# Patient Record
Sex: MALE | Race: BLACK OR AFRICAN AMERICAN | URBAN - METROPOLITAN AREA
[De-identification: names, ages, dates, MRNs, and addresses within clinical notes are randomized per-mention and may not be internally consistent; named-entity substitution may affect disease eponyms.]

---

## 2020-07-28 ENCOUNTER — INPATIENT (INPATIENT)
Facility: HOSPITAL | Age: 80
LOS: 8 days | Discharge: NURSING, REHAB OR LONG-TERM CARE | DRG: 207 | End: 2020-08-06
Attending: INTERNAL MEDICINE | Admitting: INTERNAL MEDICINE
Payer: MEDICARE

## 2020-07-28 ENCOUNTER — EMERGENCY (EMERGENCY)
Facility: HOSPITAL | Age: 80
LOS: 1 days | Discharge: ANOTHER HOSPITAL | End: 2020-07-28
Attending: EMERGENCY MEDICINE | Admitting: EMERGENCY MEDICINE
Payer: SELF-PAY

## 2020-07-28 VITALS
RESPIRATION RATE: 25 BRPM | WEIGHT: 285.06 LBS | HEART RATE: 57 BPM | OXYGEN SATURATION: 93 % | SYSTOLIC BLOOD PRESSURE: 149 MMHG | TEMPERATURE: 99 F | DIASTOLIC BLOOD PRESSURE: 65 MMHG

## 2020-07-28 VITALS
OXYGEN SATURATION: 97 % | SYSTOLIC BLOOD PRESSURE: 128 MMHG | RESPIRATION RATE: 24 BRPM | HEART RATE: 34 BPM | DIASTOLIC BLOOD PRESSURE: 60 MMHG

## 2020-07-28 VITALS
HEART RATE: 62 BPM | SYSTOLIC BLOOD PRESSURE: 82 MMHG | WEIGHT: 285.06 LBS | TEMPERATURE: 89 F | OXYGEN SATURATION: 98 % | RESPIRATION RATE: 28 BRPM | DIASTOLIC BLOOD PRESSURE: 36 MMHG

## 2020-07-28 DIAGNOSIS — Z29.9 ENCOUNTER FOR PROPHYLACTIC MEASURES, UNSPECIFIED: ICD-10-CM

## 2020-07-28 DIAGNOSIS — Z98.890 OTHER SPECIFIED POSTPROCEDURAL STATES: Chronic | ICD-10-CM

## 2020-07-28 DIAGNOSIS — A41.9 SEPSIS, UNSPECIFIED ORGANISM: ICD-10-CM

## 2020-07-28 DIAGNOSIS — J96.00 ACUTE RESPIRATORY FAILURE, UNSPECIFIED WHETHER WITH HYPOXIA OR HYPERCAPNIA: ICD-10-CM

## 2020-07-28 DIAGNOSIS — T68.XXXA HYPOTHERMIA, INITIAL ENCOUNTER: ICD-10-CM

## 2020-07-28 DIAGNOSIS — R41.82 ALTERED MENTAL STATUS, UNSPECIFIED: ICD-10-CM

## 2020-07-28 DIAGNOSIS — R00.1 BRADYCARDIA, UNSPECIFIED: ICD-10-CM

## 2020-07-28 DIAGNOSIS — R56.9 UNSPECIFIED CONVULSIONS: ICD-10-CM

## 2020-07-28 DIAGNOSIS — I95.9 HYPOTENSION, UNSPECIFIED: ICD-10-CM

## 2020-07-28 DIAGNOSIS — Z51.5 ENCOUNTER FOR PALLIATIVE CARE: ICD-10-CM

## 2020-07-28 LAB
ALBUMIN SERPL ELPH-MCNC: 2.2 G/DL — LOW (ref 3.3–5)
ALBUMIN SERPL ELPH-MCNC: 2.8 G/DL — LOW (ref 3.3–5)
ALP SERPL-CCNC: 70 U/L — SIGNIFICANT CHANGE UP (ref 40–120)
ALP SERPL-CCNC: 82 U/L — SIGNIFICANT CHANGE UP (ref 30–120)
ALT FLD-CCNC: 58 U/L — SIGNIFICANT CHANGE UP (ref 12–78)
ALT FLD-CCNC: 70 U/L DA — HIGH (ref 10–60)
ANION GAP SERPL CALC-SCNC: 3 MMOL/L — LOW (ref 5–17)
ANION GAP SERPL CALC-SCNC: 3 MMOL/L — LOW (ref 5–17)
APPEARANCE UR: ABNORMAL
APTT BLD: 35.7 SEC — HIGH (ref 27.5–35.5)
APTT BLD: 38.3 SEC — HIGH (ref 27.5–35.5)
AST SERPL-CCNC: 76 U/L — HIGH (ref 15–37)
AST SERPL-CCNC: 90 U/L — HIGH (ref 10–40)
BASE EXCESS BLDA CALC-SCNC: 0.1 MMOL/L — SIGNIFICANT CHANGE UP (ref -2–2)
BASE EXCESS BLDA CALC-SCNC: 0.5 MMOL/L — SIGNIFICANT CHANGE UP (ref -2–2)
BASOPHILS # BLD AUTO: 0 K/UL — SIGNIFICANT CHANGE UP (ref 0–0.2)
BASOPHILS # BLD AUTO: 0.01 K/UL — SIGNIFICANT CHANGE UP (ref 0–0.2)
BASOPHILS NFR BLD AUTO: 0 % — SIGNIFICANT CHANGE UP (ref 0–2)
BASOPHILS NFR BLD AUTO: 0.2 % — SIGNIFICANT CHANGE UP (ref 0–2)
BILIRUB SERPL-MCNC: 1.5 MG/DL — HIGH (ref 0.2–1.2)
BILIRUB SERPL-MCNC: 1.7 MG/DL — HIGH (ref 0.2–1.2)
BILIRUB UR-MCNC: ABNORMAL
BLOOD GAS COMMENTS: SIGNIFICANT CHANGE UP
BUN SERPL-MCNC: 32 MG/DL — HIGH (ref 7–23)
BUN SERPL-MCNC: 32 MG/DL — HIGH (ref 7–23)
CALCIUM SERPL-MCNC: 8.3 MG/DL — LOW (ref 8.5–10.1)
CALCIUM SERPL-MCNC: 9.4 MG/DL — SIGNIFICANT CHANGE UP (ref 8.4–10.5)
CHLORIDE SERPL-SCNC: 109 MMOL/L — HIGH (ref 96–108)
CHLORIDE SERPL-SCNC: 113 MMOL/L — HIGH (ref 96–108)
CK SERPL-CCNC: 769 U/L — HIGH (ref 39–308)
CO2 SERPL-SCNC: 29 MMOL/L — SIGNIFICANT CHANGE UP (ref 22–31)
CO2 SERPL-SCNC: 32 MMOL/L — HIGH (ref 22–31)
COLOR SPEC: ABNORMAL
CORTIS AM PEAK SERPL-MCNC: 16.1 UG/DL — SIGNIFICANT CHANGE UP (ref 6–18.4)
CREAT SERPL-MCNC: 1.3 MG/DL — SIGNIFICANT CHANGE UP (ref 0.5–1.3)
CREAT SERPL-MCNC: 1.57 MG/DL — HIGH (ref 0.5–1.3)
DIFF PNL FLD: ABNORMAL
EOSINOPHIL # BLD AUTO: 0.08 K/UL — SIGNIFICANT CHANGE UP (ref 0–0.5)
EOSINOPHIL # BLD AUTO: 0.21 K/UL — SIGNIFICANT CHANGE UP (ref 0–0.5)
EOSINOPHIL NFR BLD AUTO: 2.1 % — SIGNIFICANT CHANGE UP (ref 0–6)
EOSINOPHIL NFR BLD AUTO: 4.6 % — SIGNIFICANT CHANGE UP (ref 0–6)
EPI CELLS # UR: SIGNIFICANT CHANGE UP
ETHANOL SERPL-MCNC: <10 MG/DL — SIGNIFICANT CHANGE UP (ref 0–3)
GLUCOSE BLDC GLUCOMTR-MCNC: 92 MG/DL — SIGNIFICANT CHANGE UP (ref 70–99)
GLUCOSE SERPL-MCNC: 107 MG/DL — HIGH (ref 70–99)
GLUCOSE SERPL-MCNC: 87 MG/DL — SIGNIFICANT CHANGE UP (ref 70–99)
GLUCOSE UR QL: NEGATIVE — SIGNIFICANT CHANGE UP
HCO3 BLDA-SCNC: 23 MMOL/L — SIGNIFICANT CHANGE UP (ref 23–27)
HCO3 BLDA-SCNC: 24 MMOL/L — SIGNIFICANT CHANGE UP (ref 21–29)
HCT VFR BLD CALC: 28.8 % — LOW (ref 39–50)
HCT VFR BLD CALC: 31.1 % — LOW (ref 39–50)
HCT VFR BLD CALC: 37.5 % — LOW (ref 39–50)
HGB BLD-MCNC: 11.2 G/DL — LOW (ref 13–17)
HGB BLD-MCNC: 9.2 G/DL — LOW (ref 13–17)
HGB BLD-MCNC: 9.5 G/DL — LOW (ref 13–17)
HOROWITZ INDEX BLDA+IHG-RTO: 100 — SIGNIFICANT CHANGE UP
HOROWITZ INDEX BLDA+IHG-RTO: 30 — SIGNIFICANT CHANGE UP
IMM GRANULOCYTES NFR BLD AUTO: 0.2 % — SIGNIFICANT CHANGE UP (ref 0–1.5)
IMM GRANULOCYTES NFR BLD AUTO: 0.5 % — SIGNIFICANT CHANGE UP (ref 0–1.5)
INR BLD: 1.14 RATIO — SIGNIFICANT CHANGE UP (ref 0.88–1.16)
INR BLD: 1.21 RATIO — HIGH (ref 0.88–1.16)
KETONES UR-MCNC: ABNORMAL
LACTATE SERPL-SCNC: 0.8 MMOL/L — SIGNIFICANT CHANGE UP (ref 0.7–2)
LEUKOCYTE ESTERASE UR-ACNC: ABNORMAL
LYMPHOCYTES # BLD AUTO: 0.23 K/UL — LOW (ref 1–3.3)
LYMPHOCYTES # BLD AUTO: 0.35 K/UL — LOW (ref 1–3.3)
LYMPHOCYTES # BLD AUTO: 5.9 % — LOW (ref 13–44)
LYMPHOCYTES # BLD AUTO: 7.7 % — LOW (ref 13–44)
MAGNESIUM SERPL-MCNC: 2 MG/DL — SIGNIFICANT CHANGE UP (ref 1.6–2.6)
MCHC RBC-ENTMCNC: 28.7 PG — SIGNIFICANT CHANGE UP (ref 27–34)
MCHC RBC-ENTMCNC: 28.8 PG — SIGNIFICANT CHANGE UP (ref 27–34)
MCHC RBC-ENTMCNC: 29 PG — SIGNIFICANT CHANGE UP (ref 27–34)
MCHC RBC-ENTMCNC: 29.9 GM/DL — LOW (ref 32–36)
MCHC RBC-ENTMCNC: 30.5 GM/DL — LOW (ref 32–36)
MCHC RBC-ENTMCNC: 31.9 GM/DL — LOW (ref 32–36)
MCV RBC AUTO: 90.3 FL — SIGNIFICANT CHANGE UP (ref 80–100)
MCV RBC AUTO: 94 FL — SIGNIFICANT CHANGE UP (ref 80–100)
MCV RBC AUTO: 97.2 FL — SIGNIFICANT CHANGE UP (ref 80–100)
MONOCYTES # BLD AUTO: 0.22 K/UL — SIGNIFICANT CHANGE UP (ref 0–0.9)
MONOCYTES # BLD AUTO: 0.45 K/UL — SIGNIFICANT CHANGE UP (ref 0–0.9)
MONOCYTES NFR BLD AUTO: 5.7 % — SIGNIFICANT CHANGE UP (ref 2–14)
MONOCYTES NFR BLD AUTO: 9.8 % — SIGNIFICANT CHANGE UP (ref 2–14)
NEUTROPHILS # BLD AUTO: 3.32 K/UL — SIGNIFICANT CHANGE UP (ref 1.8–7.4)
NEUTROPHILS # BLD AUTO: 3.54 K/UL — SIGNIFICANT CHANGE UP (ref 1.8–7.4)
NEUTROPHILS NFR BLD AUTO: 77.5 % — HIGH (ref 43–77)
NEUTROPHILS NFR BLD AUTO: 85.8 % — HIGH (ref 43–77)
NITRITE UR-MCNC: NEGATIVE — SIGNIFICANT CHANGE UP
NRBC # BLD: 0 /100 WBCS — SIGNIFICANT CHANGE UP (ref 0–0)
PCO2 BLDA: 58 MMHG — HIGH (ref 32–46)
PCO2 BLDA: 85 MMHG — CRITICAL HIGH (ref 32–46)
PCP SPEC-MCNC: SIGNIFICANT CHANGE UP
PH BLD: 7.28 — LOW (ref 7.35–7.45)
PH BLDA: 7.15 — CRITICAL LOW (ref 7.35–7.45)
PH UR: 5 — SIGNIFICANT CHANGE UP (ref 5–8)
PLATELET # BLD AUTO: 135 K/UL — LOW (ref 150–400)
PLATELET # BLD AUTO: 147 K/UL — LOW (ref 150–400)
PLATELET # BLD AUTO: 158 K/UL — SIGNIFICANT CHANGE UP (ref 150–400)
PO2 BLDA: 145 MMHG — HIGH (ref 74–108)
PO2 BLDA: 68 MMHG — LOW (ref 74–108)
POTASSIUM SERPL-MCNC: 4.2 MMOL/L — SIGNIFICANT CHANGE UP (ref 3.5–5.3)
POTASSIUM SERPL-MCNC: 4.8 MMOL/L — SIGNIFICANT CHANGE UP (ref 3.5–5.3)
POTASSIUM SERPL-SCNC: 4.2 MMOL/L — SIGNIFICANT CHANGE UP (ref 3.5–5.3)
POTASSIUM SERPL-SCNC: 4.8 MMOL/L — SIGNIFICANT CHANGE UP (ref 3.5–5.3)
PROT SERPL-MCNC: 5.6 G/DL — LOW (ref 6–8.3)
PROT SERPL-MCNC: 6.8 G/DL — SIGNIFICANT CHANGE UP (ref 6–8.3)
PROT UR-MCNC: 500 MG/DL
PROTHROM AB SERPL-ACNC: 13.7 SEC — HIGH (ref 10.6–13.6)
PROTHROM AB SERPL-ACNC: 14 SEC — HIGH (ref 10.6–13.6)
RBC # BLD: 3.19 M/UL — LOW (ref 4.2–5.8)
RBC # BLD: 3.31 M/UL — LOW (ref 4.2–5.8)
RBC # BLD: 3.86 M/UL — LOW (ref 4.2–5.8)
RBC # FLD: 14.4 % — SIGNIFICANT CHANGE UP (ref 10.3–14.5)
RBC # FLD: 14.6 % — HIGH (ref 10.3–14.5)
RBC # FLD: 15 % — HIGH (ref 10.3–14.5)
RBC CASTS # UR COMP ASSIST: >50 /HPF (ref 0–4)
SAO2 % BLDA: 92 % — SIGNIFICANT CHANGE UP (ref 92–96)
SAO2 % BLDA: 98 % — HIGH (ref 92–96)
SARS-COV-2 RNA SPEC QL NAA+PROBE: SIGNIFICANT CHANGE UP
SODIUM SERPL-SCNC: 144 MMOL/L — SIGNIFICANT CHANGE UP (ref 135–145)
SODIUM SERPL-SCNC: 145 MMOL/L — SIGNIFICANT CHANGE UP (ref 135–145)
SP GR SPEC: 1.01 — SIGNIFICANT CHANGE UP (ref 1.01–1.02)
T3FREE SERPL-MCNC: 1.27 PG/ML — LOW (ref 1.8–4.6)
T4 FREE SERPL-MCNC: 1.1 NG/DL — SIGNIFICANT CHANGE UP (ref 0.9–1.8)
TROPONIN I SERPL-MCNC: 0.03 NG/ML — SIGNIFICANT CHANGE UP (ref 0.02–0.06)
UROBILINOGEN FLD QL: 4
WBC # BLD: 3.87 K/UL — SIGNIFICANT CHANGE UP (ref 3.8–10.5)
WBC # BLD: 4.57 K/UL — SIGNIFICANT CHANGE UP (ref 3.8–10.5)
WBC # BLD: 5.66 K/UL — SIGNIFICANT CHANGE UP (ref 3.8–10.5)
WBC # FLD AUTO: 3.87 K/UL — SIGNIFICANT CHANGE UP (ref 3.8–10.5)
WBC # FLD AUTO: 4.57 K/UL — SIGNIFICANT CHANGE UP (ref 3.8–10.5)
WBC # FLD AUTO: 5.66 K/UL — SIGNIFICANT CHANGE UP (ref 3.8–10.5)
WBC UR QL: SIGNIFICANT CHANGE UP

## 2020-07-28 PROCEDURE — 71045 X-RAY EXAM CHEST 1 VIEW: CPT

## 2020-07-28 PROCEDURE — 71045 X-RAY EXAM CHEST 1 VIEW: CPT | Mod: 26

## 2020-07-28 PROCEDURE — 83735 ASSAY OF MAGNESIUM: CPT

## 2020-07-28 PROCEDURE — 85610 PROTHROMBIN TIME: CPT

## 2020-07-28 PROCEDURE — 93005 ELECTROCARDIOGRAM TRACING: CPT

## 2020-07-28 PROCEDURE — 36415 COLL VENOUS BLD VENIPUNCTURE: CPT

## 2020-07-28 PROCEDURE — 70450 CT HEAD/BRAIN W/O DYE: CPT | Mod: 26,59

## 2020-07-28 PROCEDURE — 80053 COMPREHEN METABOLIC PANEL: CPT

## 2020-07-28 PROCEDURE — 99291 CRITICAL CARE FIRST HOUR: CPT

## 2020-07-28 PROCEDURE — 93010 ELECTROCARDIOGRAM REPORT: CPT | Mod: 76

## 2020-07-28 PROCEDURE — 82550 ASSAY OF CK (CPK): CPT

## 2020-07-28 PROCEDURE — 82962 GLUCOSE BLOOD TEST: CPT

## 2020-07-28 PROCEDURE — 70496 CT ANGIOGRAPHY HEAD: CPT | Mod: 26

## 2020-07-28 PROCEDURE — 85027 COMPLETE CBC AUTOMATED: CPT

## 2020-07-28 PROCEDURE — 71045 X-RAY EXAM CHEST 1 VIEW: CPT | Mod: 26,76

## 2020-07-28 PROCEDURE — 70498 CT ANGIOGRAPHY NECK: CPT

## 2020-07-28 PROCEDURE — 93010 ELECTROCARDIOGRAM REPORT: CPT

## 2020-07-28 PROCEDURE — 70498 CT ANGIOGRAPHY NECK: CPT | Mod: 26

## 2020-07-28 PROCEDURE — 99053 MED SERV 10PM-8AM 24 HR FAC: CPT

## 2020-07-28 PROCEDURE — 80307 DRUG TEST PRSMV CHEM ANLYZR: CPT

## 2020-07-28 PROCEDURE — 70450 CT HEAD/BRAIN W/O DYE: CPT

## 2020-07-28 PROCEDURE — 70496 CT ANGIOGRAPHY HEAD: CPT

## 2020-07-28 PROCEDURE — 84484 ASSAY OF TROPONIN QUANT: CPT

## 2020-07-28 PROCEDURE — 71045 X-RAY EXAM CHEST 1 VIEW: CPT | Mod: 26,77

## 2020-07-28 PROCEDURE — 85730 THROMBOPLASTIN TIME PARTIAL: CPT

## 2020-07-28 RX ORDER — PROPOFOL 10 MG/ML
20 INJECTION, EMULSION INTRAVENOUS
Qty: 1000 | Refills: 0 | Status: DISCONTINUED | OUTPATIENT
Start: 2020-07-28 | End: 2020-07-28

## 2020-07-28 RX ORDER — CEFTRIAXONE 500 MG/1
1000 INJECTION, POWDER, FOR SOLUTION INTRAMUSCULAR; INTRAVENOUS ONCE
Refills: 0 | Status: COMPLETED | OUTPATIENT
Start: 2020-07-28 | End: 2020-07-28

## 2020-07-28 RX ORDER — LEVETIRACETAM 250 MG/1
1000 TABLET, FILM COATED ORAL EVERY 12 HOURS
Refills: 0 | Status: DISCONTINUED | OUTPATIENT
Start: 2020-07-28 | End: 2020-07-29

## 2020-07-28 RX ORDER — DEXMEDETOMIDINE HYDROCHLORIDE IN 0.9% SODIUM CHLORIDE 4 UG/ML
0.2 INJECTION INTRAVENOUS
Qty: 200 | Refills: 0 | Status: DISCONTINUED | OUTPATIENT
Start: 2020-07-28 | End: 2020-07-28

## 2020-07-28 RX ORDER — ATROPINE SULFATE 0.1 MG/ML
1 SYRINGE (ML) INJECTION ONCE
Refills: 0 | Status: COMPLETED | OUTPATIENT
Start: 2020-07-28 | End: 2020-07-28

## 2020-07-28 RX ORDER — HEPARIN SODIUM 5000 [USP'U]/ML
10000 INJECTION INTRAVENOUS; SUBCUTANEOUS EVERY 6 HOURS
Refills: 0 | Status: DISCONTINUED | OUTPATIENT
Start: 2020-07-28 | End: 2020-07-29

## 2020-07-28 RX ORDER — HEPARIN SODIUM 5000 [USP'U]/ML
INJECTION INTRAVENOUS; SUBCUTANEOUS
Qty: 25000 | Refills: 0 | Status: DISCONTINUED | OUTPATIENT
Start: 2020-07-28 | End: 2020-07-29

## 2020-07-28 RX ORDER — SODIUM CHLORIDE 9 MG/ML
1000 INJECTION INTRAMUSCULAR; INTRAVENOUS; SUBCUTANEOUS ONCE
Refills: 0 | Status: COMPLETED | OUTPATIENT
Start: 2020-07-28 | End: 2020-07-28

## 2020-07-28 RX ORDER — HEPARIN SODIUM 5000 [USP'U]/ML
5000 INJECTION INTRAVENOUS; SUBCUTANEOUS EVERY 6 HOURS
Refills: 0 | Status: DISCONTINUED | OUTPATIENT
Start: 2020-07-28 | End: 2020-07-29

## 2020-07-28 RX ORDER — SUCCINYLCHOLINE CHLORIDE 100 MG/5ML
50 SYRINGE (ML) INTRAVENOUS ONCE
Refills: 0 | Status: COMPLETED | OUTPATIENT
Start: 2020-07-28 | End: 2020-07-28

## 2020-07-28 RX ORDER — CEFTRIAXONE 500 MG/1
1000 INJECTION, POWDER, FOR SOLUTION INTRAMUSCULAR; INTRAVENOUS EVERY 24 HOURS
Refills: 0 | Status: DISCONTINUED | OUTPATIENT
Start: 2020-07-29 | End: 2020-07-31

## 2020-07-28 RX ORDER — PANTOPRAZOLE SODIUM 20 MG/1
40 TABLET, DELAYED RELEASE ORAL DAILY
Refills: 0 | Status: DISCONTINUED | OUTPATIENT
Start: 2020-07-28 | End: 2020-08-02

## 2020-07-28 RX ORDER — ETOMIDATE 2 MG/ML
20 INJECTION INTRAVENOUS ONCE
Refills: 0 | Status: DISCONTINUED | OUTPATIENT
Start: 2020-07-28 | End: 2020-07-28

## 2020-07-28 RX ORDER — ATROPINE SULFATE 0.1 MG/ML
1 SYRINGE (ML) INJECTION ONCE
Refills: 0 | Status: DISCONTINUED | OUTPATIENT
Start: 2020-07-28 | End: 2020-07-28

## 2020-07-28 RX ORDER — SODIUM CHLORIDE 9 MG/ML
500 INJECTION, SOLUTION INTRAVENOUS ONCE
Refills: 0 | Status: COMPLETED | OUTPATIENT
Start: 2020-07-28 | End: 2020-07-28

## 2020-07-28 RX ORDER — SODIUM CHLORIDE 9 MG/ML
1000 INJECTION, SOLUTION INTRAVENOUS
Refills: 0 | Status: DISCONTINUED | OUTPATIENT
Start: 2020-07-28 | End: 2020-07-29

## 2020-07-28 RX ORDER — CHLORHEXIDINE GLUCONATE 213 G/1000ML
15 SOLUTION TOPICAL EVERY 12 HOURS
Refills: 0 | Status: DISCONTINUED | OUTPATIENT
Start: 2020-07-28 | End: 2020-08-01

## 2020-07-28 RX ORDER — NOREPINEPHRINE BITARTRATE/D5W 8 MG/250ML
0.1 PLASTIC BAG, INJECTION (ML) INTRAVENOUS
Qty: 8 | Refills: 0 | Status: DISCONTINUED | OUTPATIENT
Start: 2020-07-28 | End: 2020-07-28

## 2020-07-28 RX ORDER — HEPARIN SODIUM 5000 [USP'U]/ML
10000 INJECTION INTRAVENOUS; SUBCUTANEOUS ONCE
Refills: 0 | Status: COMPLETED | OUTPATIENT
Start: 2020-07-28 | End: 2020-07-28

## 2020-07-28 RX ORDER — MAGNESIUM SULFATE 500 MG/ML
2 VIAL (ML) INJECTION ONCE
Refills: 0 | Status: COMPLETED | OUTPATIENT
Start: 2020-07-28 | End: 2020-07-28

## 2020-07-28 RX ORDER — SUCCINYLCHOLINE CHLORIDE 100 MG/5ML
50 SYRINGE (ML) INTRAVENOUS ONCE
Refills: 0 | Status: DISCONTINUED | OUTPATIENT
Start: 2020-07-28 | End: 2020-07-28

## 2020-07-28 RX ORDER — ETOMIDATE 2 MG/ML
20 INJECTION INTRAVENOUS ONCE
Refills: 0 | Status: COMPLETED | OUTPATIENT
Start: 2020-07-28 | End: 2020-07-28

## 2020-07-28 RX ADMIN — Medication 1 MILLIGRAM(S): at 10:05

## 2020-07-28 RX ADMIN — SODIUM CHLORIDE 120 MILLILITER(S): 9 INJECTION, SOLUTION INTRAVENOUS at 20:16

## 2020-07-28 RX ADMIN — LEVETIRACETAM 440 MILLIGRAM(S): 250 TABLET, FILM COATED ORAL at 13:19

## 2020-07-28 RX ADMIN — Medication 2 MILLIGRAM(S): at 11:20

## 2020-07-28 RX ADMIN — PROPOFOL 15.5 MICROGRAM(S)/KG/MIN: 10 INJECTION, EMULSION INTRAVENOUS at 15:39

## 2020-07-28 RX ADMIN — Medication 2 MILLIGRAM(S): at 10:45

## 2020-07-28 RX ADMIN — PANTOPRAZOLE SODIUM 40 MILLIGRAM(S): 20 TABLET, DELAYED RELEASE ORAL at 12:58

## 2020-07-28 RX ADMIN — SODIUM CHLORIDE 500 MILLILITER(S): 9 INJECTION, SOLUTION INTRAVENOUS at 18:15

## 2020-07-28 RX ADMIN — SODIUM CHLORIDE 1000 MILLILITER(S): 9 INJECTION INTRAMUSCULAR; INTRAVENOUS; SUBCUTANEOUS at 10:00

## 2020-07-28 RX ADMIN — CHLORHEXIDINE GLUCONATE 15 MILLILITER(S): 213 SOLUTION TOPICAL at 17:09

## 2020-07-28 RX ADMIN — Medication 50 MILLIGRAM(S): at 10:08

## 2020-07-28 RX ADMIN — HEPARIN SODIUM 2300 UNIT(S)/HR: 5000 INJECTION INTRAVENOUS; SUBCUTANEOUS at 17:08

## 2020-07-28 RX ADMIN — Medication 2 MILLIGRAM(S): at 10:35

## 2020-07-28 RX ADMIN — PROPOFOL 15.5 MICROGRAM(S)/KG/MIN: 10 INJECTION, EMULSION INTRAVENOUS at 11:02

## 2020-07-28 RX ADMIN — CEFTRIAXONE 100 MILLIGRAM(S): 500 INJECTION, POWDER, FOR SOLUTION INTRAMUSCULAR; INTRAVENOUS at 11:03

## 2020-07-28 RX ADMIN — Medication 50 GRAM(S): at 17:10

## 2020-07-28 RX ADMIN — HEPARIN SODIUM 10000 UNIT(S): 5000 INJECTION INTRAVENOUS; SUBCUTANEOUS at 17:09

## 2020-07-28 RX ADMIN — ETOMIDATE 20 MILLIGRAM(S): 2 INJECTION INTRAVENOUS at 10:00

## 2020-07-28 NOTE — ED PROVIDER NOTE - PROGRESS NOTE DETAILS
Spoke with daughter. Patient fell 4 days ago and has been on the floor unable to get up since. Last talking at approximately 9 pm last night. Daughter heard him gurgling this morning and called EMS. CTA reveals no major vessel occlusion. Spoke with son who confirms hx of CVA, leg swelling, scrotal hernia. Pt has been seeing PCP Yuni Starks for leg weakness, spinal stenosis, edema. Has not had any cardiac eval for a few months due to eviction from his house and currently living in [place] with his daughter. Unknown if pt had a seizure in the past. States pt has been confused lately and probably has dementia.   I spoke with Dr. Mock who covers [facility] for consults. Plan - transfer to [facility] for admission there. Dr. Chamberlain accepting. Persistently bradycardic in 40s. Junctional at times but BP maintained at 110/60.  Becoming more awake and responsive. External pacemaker on standby.

## 2020-07-28 NOTE — ED PROVIDER NOTE - ENMT, MLM
Airway patent, Nasal mucosa clear. Appears to have dried blood on tongue.  Throat has no vesicles, no oropharyngeal exudates and uvula is midline.

## 2020-07-28 NOTE — ED PROVIDER NOTE - CARE PLAN
Principal Discharge DX:	Septic shock  Secondary Diagnosis:	Bradycardia Principal Discharge DX:	Septic shock  Secondary Diagnosis:	Bradycardia  Secondary Diagnosis:	Seizure

## 2020-07-28 NOTE — ED PROVIDER NOTE - OBJECTIVE STATEMENT
78yo M transferred from [facility] with seizure and bradycardia into the 30s.  pmd= dr jagruti holman covered by dr cardoso.  dr jackson cardiology at bedside on arrival in [facility]. pt hypothermic and hypotensive. pt with tongue bite and AMS. Dr maria pulmonolgy

## 2020-07-28 NOTE — H&P ADULT - PROBLEM SELECTOR PLAN 8
CTA HEAD/NECK -There is a questionable filling defect noted, possibly right atrial thrombus .HEPARIN DRIP ,ECHO IS PENDING ,SEEN BY CARDIOLOGIST

## 2020-07-28 NOTE — H&P ADULT - COMMENTS
ROS IS UNOBTAINABLE DUE TO PATIENT'S STATUS ROS IS UNOBTAINABLE DUE TO PATIENT'S STATUS ,Due to altered mental status/intubation, subjective information was not able to be obtained from the patient. History was obtained, to the extent possible, from review of the chart and collateral sources of information.

## 2020-07-28 NOTE — H&P ADULT - PROBLEM SELECTOR PLAN 4
Admitted for septic workup and evaluation,send blood and urine cx,serial lactate levels,monitor vitals closley,ivfs hydration,monitor urine output and renal profile,iv abx initiated ,seen by ID CONSULT

## 2020-07-28 NOTE — ED PROVIDER NOTE - CARDIAC, MLM
Bradycardic, Heart sounds S1, S2.  No murmurs, rubs or gallops. 2+ pitting edema bl LE, pitting Scrotal Edema

## 2020-07-28 NOTE — ED ADULT NURSE NOTE - OBJECTIVE STATEMENT
patient comes to ED from [facility] for admission for seizure pt presents unresponsive with nonpurposeful movements with shallow respirations pt presents with bradycardia hypotensive according to EMS pt was on the floor after possible seizure with blood noted in mouth/tongue

## 2020-07-28 NOTE — ED PROVIDER NOTE - CRITICAL CARE PROVIDED
interpretation of diagnostic studies/direct patient care (not related to procedure)/consultation with other physicians/documentation/additional history taking/consult w/ pt's family directly relating to pts condition

## 2020-07-28 NOTE — CONSULT NOTE ADULT - PROBLEM SELECTOR RECOMMENDATION 9
very limited history so very broad differential but would include bacterial sepsis in the differential and recommend broad spectrum abx pending clarification of clinical picture and results of micro investigations

## 2020-07-28 NOTE — H&P ADULT - PROBLEM SELECTOR PLAN 3
LAURE PRECAUTIONS ,ON KEPPRA ,SEEN BY NEUROLOGIST ,EEG ORDERED ,ICU ADMISSION - CTA head and neck with motion artifact, no obvious stenosis

## 2020-07-28 NOTE — ED ADULT NURSE REASSESSMENT NOTE - NS ED NURSE REASSESS COMMENT FT1
SON  HENNY LAURA 8   attempted to get list of meds from son but does not have list and meds have been changed recently and md office not open at this time
pt with cough  at times strips printed out  for transport son aware transfer
unable to get rectal temp [facility] aware pt transferred to [facility] via [facility]
pt received on stretcher bradycardic pacer pads on responds to pain stimulus small laceration to left side of tongue weaker strength to left side arm in particular  nods back to sleep after arousal family has been giving pt meds

## 2020-07-28 NOTE — CONSULT NOTE ADULT - ASSESSMENT
The patient is an 79 year old male with a history of HTN, CVA who presents with unresponsiveness.    Plan:  - Telemetry largely consistent with sinus bradycardia although at times alternating with junctional rhythm  - Continue to monitor on telemetry  - If HR drops below 30, can give a dose of atropine  - Heart rates largely due to comatose state  - Hold antihypertensives The patient is an 79 year old male with a history of HTN, CVA who presents with unresponsiveness.    Plan:  - Telemetry largely consistent with sinus bradycardia although at times alternating with junctional rhythm  - Continue to monitor on telemetry  - If HR drops below 30, can give a dose of atropine  - Heart rates largely due to comatose state  - Hold antihypertensives  - CTA head and neck with motion artifact, no obvious stenosis. There is a questionable filling defect noted, possibly right atrial thrombus.  - Check echocardiogram  - Sepsis work-up as patient is hypothermic  - Received antibiotics  - If vasopressors needed, can consider dopamine which will also increase HR  - Neurology eval for seizures  - Mechanical ventilation  - ICU care

## 2020-07-28 NOTE — CONSULT NOTE ADULT - ATTENDING COMMENTS
Agree with above, patient seen independently     80 y/o male with hx CVA admitted with status epilepticus and acute resp failure, found to be hypothermic and bradycardic. CT head neg for bleed however CTA suspicious for RA thrombus. After holding propofol he withdraws to pain on all 4 extremities, his pupils are equal bilaterally and reactive to light. Will continue vent support today, try weaning in am. Start Keppra, check EEG. May need MRI brain. Start ceftriaxone for suspected UTI, send UA, UDS. Ativan prn for seizures. Multiple attempts to reach family have been unsuccessful.     Patient critically ill, 37 mins spent Agree with above, patient seen independently     80 y/o male with hx CVA admitted with status epilepticus and acute resp failure, found to be hypothermic and bradycardic. CT head neg for bleed however CTA suspicious for RA thrombus. After holding propofol he withdraws to pain on all 4 extremities, his pupils are equal bilaterally and reactive to light. Will continue vent support today, try weaning in am. Start Keppra, check EEG. May need MRI brain. Start ceftriaxone for suspected UTI, send UA, UDS. Ativan prn for seizures. Check official ECHO, start iv heparin. Multiple attempts to reach family have been unsuccessful.     Patient critically ill, 37 mins spent

## 2020-07-28 NOTE — H&P ADULT - PROBLEM SELECTOR PLAN 2
LIKELY MULTIFACTORIAL 2/2 TO ACUTE METABOLIC ENCEPHALOPATHY SECONDARY TO SEPSIS AND ACUTE RESPIRATORY FAILURE ,POA AND POSTICTAL STATE

## 2020-07-28 NOTE — CONSULT NOTE ADULT - SUBJECTIVE AND OBJECTIVE BOX
CATHY SANCHEZ    [facility] ED    Patient is a 79y old  Male who presents with a chief complaint of      Allergies    No Known Allergies    Intolerances        HPI:      PAST MEDICAL & SURGICAL HISTORY:      FAMILY HISTORY:        MEDICATIONS   atropine Injectable 1 milliGRAM(s) IV Push once  cefTRIAXone   IVPB 1000 milliGRAM(s) IV Intermittent Once  dexMEDEtomidine Infusion 0.2 MICROgram(s)/kG/Hr IV Continuous <Continuous>  etomidate Injectable 20 milliGRAM(s) IV Push once  norepinephrine Infusion 0.1 MICROgram(s)/kG/Min IV Continuous <Continuous>  sodium chloride 0.9% Bolus 1000 milliLiter(s) IV Bolus once  succinylcholine Injectable 50 milliGRAM(s) IV Push Once      Vital Signs Last 24 Hrs  T(C): --  T(F): --  HR: 62 (28 Jul 2020 10:15) (62 - 62)  BP: 82/36 (28 Jul 2020 10:15) (82/36 - 82/36)  BP(mean): --  RR: 28 (28 Jul 2020 10:15) (28 - 28)  SpO2: 98% (28 Jul 2020 10:15) (98% - 98%)            LABS:                    WBC:      MICROBIOLOGY:  RECENT CULTURES:                  Sodium:          Hemoglobin:      Platelets:             RADIOLOGY & ADDITIONAL STUDIES:

## 2020-07-28 NOTE — ED PROVIDER NOTE - PROGRESS NOTE DETAILS
d/w patient son Jared, provided history CVA 2012, hernia seugery with testicle resection 2015, HTN, umbilical hernia repair 12/2019.  pmd= NIMISHA holman. son st he is HCP and wants full code d/w dr jagruti holamn, aware of pt status, confirmed history Witnessed Tonic Clonic seizure while intubated, 2 mg  IVP Ativan given - Erika POP PGY-3

## 2020-07-28 NOTE — CONSULT NOTE ADULT - SUBJECTIVE AND OBJECTIVE BOX
Patient is a 79y old  Male who presents with a chief complaint of altered mental status    BRIEF HOSPITAL COURSE: 78 yo male, PMHx CVA 2012, HTN, hernia surgery with testicle resection 2015, umbilical hernia repair 12/2019, who presented as transfer from [facility] with altered mental status. ED team spoke with patient's daughter, who stated her father fell to the ground on the floor of his motel room about 4 days ago and was unable to get up. She had been caring for him on the floor since and did not call 911 for assistance. Today she found him unresponsive, with blood in his mouth, and incontinent of urine. He was BIBA to [place] ED. Upon arrival, patient was sinus vs junctional bradycardic with HR 30's and prolonged QTc, BP stable initially.  Labs revealed impaired renal function (baseline unknown), transaminitis. CT brain was negative. A CODE STROKE was called, and a CTA head/neck were performed, which was concerning for ascending aortic mass possible right atrial thrombus. Patient was transferred to [facility] ED for further care. On arrival, he was bradycardic, hypotensive, and hypothermic to 89'F. ABG pH 7.1, pCO2 >80. Patient was moaning, not following commands or making purposeful movements. He was moving his LUE and LLE > RUE/RLE. He was emergently intubated for airway protection and ICU consult was called.      Events last 24 hours: ***      PAST MEDICAL & SURGICAL HISTORY:  Chronic GERD  HTN (hypertension)  HLD (hyperlipidemia)  CVA (cerebral vascular accident)          SOCIAL HISTORY:        Review of Systems:  CONSTITUTIONAL: No fever, chills, or fatigue  EYES: No visual disturbances  ENMT:  No difficulty hearing  NECK: No pain  RESPIRATORY: No cough. No shortness of breath  CARDIOVASCULAR: No chest pain, palpitations, or leg swelling  GASTROINTESTINAL: No abdominal pain. No nausea, vomiting, diarrhea, or constipation. No hematemesis, melena or hematochezia  GENITOURINARY: No dysuria, frequency, hematuria, or incontinence  NEUROLOGICAL: No headaches, loss of strength, numbness, or tremors  SKIN: No rashes  MUSCULOSKELETAL: No back or extremity pain. No calf pain  PSYCHIATRIC: No depression, anxiety, or difficulty sleeping      [  ] Due to altered mental status/intubation, subjective information was not able to be obtained from the patient. History was obtained, to the extent possible, from review of the chart and collateral sources of information.        Medications:        levETIRAcetam  IVPB 1000 milliGRAM(s) IV Intermittent every 12 hours  LORazepam   Injectable 2 milliGRAM(s) IV Push once  LORazepam   Injectable 2 milliGRAM(s) IV Push every 1 hour PRN  LORazepam   Injectable 2 milliGRAM(s) IV Push once  LORazepam   Injectable 2 milliGRAM(s) IV Push once  propofol Infusion. 20 MICROgram(s)/kG/Min IV Continuous <Continuous>  succinylcholine Injectable 50 milliGRAM(s) IV Push Once        pantoprazole  Injectable 40 milliGRAM(s) IV Push daily        sodium chloride 0.9% Bolus 1000 milliLiter(s) IV Bolus once      chlorhexidine 0.12% Liquid 15 milliLiter(s) Oral Mucosa every 12 hours        Mode: AC/ CMV (Assist Control/ Continuous Mandatory Ventilation)  RR (machine): 20  TV (machine): 550  FiO2: 40  PEEP: 5  ITime: 0.1  MAP: 11  PIP: 26      ICU Vital Signs Last 24 Hrs  T(C): 31.9 (28 Jul 2020 09:28), Max: 31.9 (28 Jul 2020 09:28)  T(F): 89.5 (28 Jul 2020 09:28), Max: 89.5 (28 Jul 2020 09:28)  HR: 40 (28 Jul 2020 10:15) (40 - 62)  BP: 82/36 (28 Jul 2020 10:15) (82/36 - 82/36)  BP(mean): --  ABP: --  ABP(mean): --  RR: 28 (28 Jul 2020 10:15) (28 - 28)  SpO2: 98% (28 Jul 2020 10:15) (98% - 98%)      ABG - ( 28 Jul 2020 11:08 )  pH, Arterial: x     pH, Blood: 7.28  /  pCO2: 58    /  pO2: 68    / HCO3: 24    / Base Excess: 0.1   /  SaO2: 92                  I&O's Detail        LABS:                CAPILLARY BLOOD GLUCOSE            CULTURES:            Physical Examination:    General: No acute distress.      HEENT: Pupils equal, reactive to light.  Symmetric.    PULM: Clear to auscultation bilaterally, no significant sputum production    CVS: Regular rate and rhythm, no murmurs, rubs, or gallops    ABD: Soft, nondistended, nontender, normoactive bowel sounds, no masses    EXT: No edema, nontender    SKIN: Warm and well perfused, no rashes noted.    NEURO: Alert, oriented, interactive, nonfocal        RADIOLOGY: ***        INVASIVE LINES:  INDWELLING CANTOR:  VTE PROPHYLAXIS:  CAM ICU:  CODE STATUS:        CRITICAL CARE TIME SPENT: *** minutes spent performing frequent bedside reassessments and augmenting plan of care to address problems of acute critical illness that pose high probability of life threatening deterioration and/or end organ damage/dysfunction and discussing goals of care, non-inclusive of time spent on procedures performed. Patient is a 79y old  Male who presents with a chief complaint of altered mental status    BRIEF HOSPITAL COURSE: 78 yo male, PMHx CVA 2012, HTN, hernia surgery with testicle resection 2015, umbilical hernia repair 12/2019, who presented as transfer from [facility] with altered mental status. ED team spoke with patient's daughter, who stated her father fell to the ground on the floor of his motel room about 4 days ago and was unable to get up. She had been caring for him on the floor since and did not call 911 for assistance. Today she found him unresponsive, with blood in his mouth, and incontinent of urine. He was BIBA to [place] ED. Upon arrival, patient was bradycardic with HR 30's, BP stable initially.  Labs revealed no leukocytosis, impaired renal function (baseline unknown), transaminitis, troponin negative . CT brain was negative. A CODE STROKE was called, and a CTA head/neck were performed, which was negative for large vessel occlusion but concerning for a density anterior to the ascending aorta possibly right atrial thrombus. Also revealed enlarged, multinodular thyroid. TPA criteria was not met, as patient had unknown onset of symptoms. Patient was transferred to [facility] ED for further care. On arrival, he was bradycardic, hypotensive, and hypothermic to 89'F. He briefly required vasopressor therapy, which was titrated off after receiving 2L NS bolus. ABG pH 7.1, pCO2 >80, lactate normal. Patient was moaning, not following commands or making purposeful movements. He was moving his LUE and LLE > RUE/RLE. He was emergently intubated for airway protection and ICU consult was called. On my evaluation, patient was actively seizing, generalized tonic clonic, given Ativan 2mg IV with cessation of seizure activity. Patient experienced two subsequent seizures, given Ativan 2mg IV x 2 and was started on Propofol infusion for seizure suppression as well as Keppra.       Alt MRN from [place] #[id number]      Unable to reach family to obtain further history. I attempted to contact patient's daughter twice with no answer, a voicemail was left. Unable to reach patient's son, invalid phone number.         PAST MEDICAL & SURGICAL HISTORY:  Chronic GERD  HTN (hypertension)  HLD (hyperlipidemia)  CVA (cerebral vascular accident)        SOCIAL HISTORY: UATO due to AMS        Review of Systems: Due to altered mental status/intubation, subjective information was not able to be obtained from the patient. History was obtained, to the extent possible, from review of the chart and collateral sources of information.        Medications:  levETIRAcetam  IVPB 1000 milliGRAM(s) IV Intermittent every 12 hours  LORazepam   Injectable 2 milliGRAM(s) IV Push once  LORazepam   Injectable 2 milliGRAM(s) IV Push every 1 hour PRN  LORazepam   Injectable 2 milliGRAM(s) IV Push once  LORazepam   Injectable 2 milliGRAM(s) IV Push once  propofol Infusion. 20 MICROgram(s)/kG/Min IV Continuous <Continuous>  succinylcholine Injectable 50 milliGRAM(s) IV Push Once  pantoprazole  Injectable 40 milliGRAM(s) IV Push daily  sodium chloride 0.9% Bolus 1000 milliLiter(s) IV Bolus once  chlorhexidine 0.12% Liquid 15 milliLiter(s) Oral Mucosa every 12 hours        Mode: AC/ CMV (Assist Control/ Continuous Mandatory Ventilation)  RR (machine): 20  TV (machine): 550  FiO2: 40  PEEP: 5  ITime: 0.1  MAP: 11  PIP: 26        ICU Vital Signs Last 24 Hrs  T(C): 31.9 (28 Jul 2020 09:28), Max: 31.9 (28 Jul 2020 09:28)  T(F): 89.5 (28 Jul 2020 09:28), Max: 89.5 (28 Jul 2020 09:28)  HR: 40 (28 Jul 2020 10:15) (40 - 62)  BP: 82/36 (28 Jul 2020 10:15) (82/36 - 82/36)  BP(mean): --  ABP: --  ABP(mean): --  RR: 28 (28 Jul 2020 10:15) (28 - 28)  SpO2: 98% (28 Jul 2020 10:15) (98% - 98%)        ABG - ( 28 Jul 2020 11:08 )  pH, Arterial: x     pH, Blood: 7.28  /  pCO2: 58    /  pO2: 68    / HCO3: 24    / Base Excess: 0.1   /  SaO2: 92                  I&O's Detail        LABS:                CAPILLARY BLOOD GLUCOSE            CULTURES:            Physical Examination:    General: No acute distress.      HEENT: Pupils 4mm equal, non-reactive to light. Symmetric. Enlarged tongue protruding with multiple small nonbleeding lacerations noted anteriorly.    PULM: Coarse to auscultation bilaterally, no significant sputum production    CVS: Sinus bradycardia HR 36bpm    ABD: Soft, nondistended, nontender, hypoactive bowel sounds, no masses    : Enlarged scrotum    EXT: 2+ pitting LE edema symmetric     SKIN: Warm and well perfused, no rashes noted.    NEURO: RASS -5. Pupils equal, nonreactive. No blink to threat. +Cough. No withdrawal to noxious stimuli x 4 extremities.        RADIOLOGY:         INVASIVE LINES: X   INDWELLING CANTOR: Y   VTE PROPHYLAXIS:  CAM ICU: +   CODE STATUS: FULL        CRITICAL CARE TIME SPENT: 70 minutes spent performing frequent bedside reassessments and augmenting plan of care to address problems of acute critical illness that pose high probability of life threatening deterioration and/or end organ damage/dysfunction, attempting to contact family, non-inclusive of time spent on procedures performed.

## 2020-07-28 NOTE — CONSULT NOTE ADULT - ASSESSMENT
LAURA MORGAN [facility] P [id number]  1940 DOA 2020 DR SHAREE MOCK  ALLERGY  nka       CONTACT    Datr Jacinta sullivan [phone] selv [phone] son Jared sullivan [phone]             Initial evaluation/Pulmonary Critical Care consultation requested on 2020   by Dr Mock  from Dr Johnson   Patient examined chart reviewed    HOSPITAL ADMISSION   PATIENT CAME  FROM (if information available)      LAURA MORGAN [facility] P [id number]  1940 DOA 2020 DR SHAREE MOCK     REVIEW OF SYMPTOMS      Able to give ROS  NO     PHYSICAL EXAM    HEENT Unremarkable PERRLA atraumatic   RESP Fair air entry EXP prolonged    Harsh breath sound Resp distres mild   CARDIAC S1 S2 No S3     NO JVD    ABDOMEN SOFT BS PRESENT NOT DISTENDED No hepatosplenomegaly PEDAL EDEMA present No calf tenderness  NO rash   GENERAL Not TOXIC looking    HPI/PMH.       79 m lving in hotel (evicted recently) was found unresponsive and brought to o er and transferred to [facility] P 2020 and Pulm consulted     Pt was noted unresponsive on hotel floor with possible tongue bite ? seizure incontinent   er vitals [facility] S 149/65 57         OXYGENATION      2020 ra 93%     VITALS/LABS       2020 149/65 57       PATIENT DATA/ASSESSMENT/RECOMMENDATIONS     79 m found unresponsive with possible clot in r atrium on cta head resp failure pH 711 pco2 80 possible rhabdo possible mi dehydrated in [place] No ac cva found on ct head     Pt intubated in er 2020                                            PROBLEMS.  POSSIBLE SEPSIS POA   2020 w 4.5   Suggest sepsis muller and bs ab     INTUBATED 2020    VENT  Lung protective ventilation  Target PaO2 60(+) FIO2 Least needed VT 6/k pH 7.30(+)PPLAT 35(-)    RO VTE   2020 cta head filling defect r atrium   Suggest cta chest and venous duplex legs and echo to ro clot in transit    POSSIBLE MI   POSSIBLE RHABDOMYOLYSIS  2020 ck 769   2020 Tr .026   Monitor c enz     HEMODYNAMICS  Target MAP 65 (+)    ANEMIA poa   2020 Hb 11.2   Monmitor serially     LASHELL POA   2020 Cr 1.5  IV fluids     MULTINODULAR GOITER    Seen on cta head     UNRESPONSIVE poa   RO HYPOGLYCEMIA    RO CVA   2020 cta Head Neg-betzy   2020 CT h No ac infarcts Multiple chr infarcts r mca and cerebellar   Monitor bgl   neuro eval    RO SEIZURE  sz precautions        TIME SPENT Over 55 minutes aggregate care time spent on encounter; activities included combinations of  direct pt care, counseling and/or coordinating care reviewing notes, lab data/ imaging, discussion with multidisciplinary team/ pt /family. Risks, benefits, alternatives of planned interventions when applicable were discussed in detail and questions answered as best as possible    LAURA MORGAN [facility] P [phone]  1940 DOA 2020 DR SHAREE MOCK

## 2020-07-28 NOTE — CONSULT NOTE ADULT - SUBJECTIVE AND OBJECTIVE BOX
CATHY SANCHEZ    [place]  ED    Patient is a 79y old  Male who presents with a chief complaint of      Allergies    Allergy Status Unknown    Intolerances        HPI:      PAST MEDICAL & SURGICAL HISTORY:  High cholesterol  HTN (hypertension)  No significant past surgical history      FAMILY HISTORY:        MEDICATIONS       Vital Signs Last 24 Hrs  T(C): 37.1 (28 Jul 2020 05:44), Max: 37.1 (28 Jul 2020 05:44)  T(F): 98.7 (28 Jul 2020 05:44), Max: 98.7 (28 Jul 2020 05:44)  HR: 34 (28 Jul 2020 08:50) (32 - 61)  BP: 128/60 (28 Jul 2020 08:50) (116/56 - 149/65)  BP(mean): --  RR: 24 (28 Jul 2020 08:50) (22 - 28)  SpO2: 97% (28 Jul 2020 08:50) (93% - 97%)            LABS:                        11.2   4.57  )-----------( 158      ( 28 Jul 2020 06:01 )             37.5     07-28    144  |  109<H>  |  32<H>  ----------------------------<  107<H>  4.8   |  32<H>  |  1.57<H>    Ca    9.4      28 Jul 2020 06:01  Mg     2.0     07-28    TPro  6.8  /  Alb  2.8<L>  /  TBili  1.7<H>  /  DBili  x   /  AST  90<H>  /  ALT  70<H>  /  AlkPhos  82  07-28    PT/INR - ( 28 Jul 2020 06:01 )   PT: 13.7 sec;   INR: 1.14 ratio         PTT - ( 28 Jul 2020 06:01 )  PTT:38.3 sec          WBC:  WBC Count: 4.57 K/uL (07-28 @ 06:01)      MICROBIOLOGY:  RECENT CULTURES:        CARDIAC MARKERS ( 28 Jul 2020 06:06 )  x     / x     / 769 U/L / x     / x      CARDIAC MARKERS ( 28 Jul 2020 06:01 )  .026 ng/mL / x     / x     / x     / x            PT/INR - ( 28 Jul 2020 06:01 )   PT: 13.7 sec;   INR: 1.14 ratio         PTT - ( 28 Jul 2020 06:01 )  PTT:38.3 sec    Sodium:  Sodium, Serum: 144 mmol/L (07-28 @ 06:01)      1.57 mg/dL 07-28 @ 06:01      Hemoglobin:  Hemoglobin: 11.2 g/dL (07-28 @ 06:01)      Platelets: Platelet Count - Automated: 158 K/uL (07-28 @ 06:01)      LIVER FUNCTIONS - ( 28 Jul 2020 06:01 )  Alb: 2.8 g/dL / Pro: 6.8 g/dL / ALK PHOS: 82 U/L / ALT: 70 U/L DA / AST: 90 U/L / GGT: x                 RADIOLOGY & ADDITIONAL STUDIES:

## 2020-07-28 NOTE — ED PROVIDER NOTE - OBJECTIVE STATEMENT
As per EMS, daughter found patient on the floor of a motel where she is staying. Last time seen well unknown at this time. Patient noted to have no coherent speech, and was reported to be moving both arms, but not his legs. Noted to have blood in his mouth from presumed bite to tongue, and was incontinent of urine.

## 2020-07-28 NOTE — CONSULT NOTE ADULT - SUBJECTIVE AND OBJECTIVE BOX
Date/Time Patient Seen:  		  Referring MD:   Data Reviewed	       Patient is a 79y old  Male who presents with a chief complaint of Altered Mental Status, Acute Hypercapnic Hypoxemic Respiratory Failure (28 Jul 2020 11:20)      Subjective/HPI     PAST MEDICAL & SURGICAL HISTORY:  Chronic GERD  HTN (hypertension)  HLD (hyperlipidemia)  CVA (cerebral vascular accident)        Medication list         MEDICATIONS  (STANDING):  chlorhexidine 0.12% Liquid 15 milliLiter(s) Oral Mucosa every 12 hours  levETIRAcetam  IVPB 1000 milliGRAM(s) IV Intermittent every 12 hours  magnesium sulfate  IVPB 2 Gram(s) IV Intermittent once  pantoprazole  Injectable 40 milliGRAM(s) IV Push daily  propofol Infusion. 20 MICROgram(s)/kG/Min (15.5 mL/Hr) IV Continuous <Continuous>    MEDICATIONS  (PRN):  LORazepam   Injectable 2 milliGRAM(s) IV Push every 1 hour PRN Seizure         Vitals log        ICU Vital Signs Last 24 Hrs  T(C): 33.1 (28 Jul 2020 12:42), Max: 33.1 (28 Jul 2020 12:42)  T(F): 91.5 (28 Jul 2020 12:42), Max: 91.5 (28 Jul 2020 12:42)  HR: 42 (28 Jul 2020 14:00) (36 - 62)  BP: 114/64 (28 Jul 2020 14:00) (77/47 - 131/61)  BP(mean): 84 (28 Jul 2020 14:00) (84 - 88)  ABP: --  ABP(mean): --  RR: 19 (28 Jul 2020 14:00) (19 - 28)  SpO2: 97% (28 Jul 2020 14:00) (94% - 98%)       Mode: AC/ CMV (Assist Control/ Continuous Mandatory Ventilation)  RR (machine): 20  TV (machine): 550  FiO2: 30  PEEP: 5  ITime: 1  MAP: 14  PIP: 24      Input and Output:  I&O's Detail    28 Jul 2020 07:01  -  28 Jul 2020 15:53  --------------------------------------------------------  IN:    propofol Infusion: 31 mL    Solution: 100 mL  Total IN: 131 mL    OUT:    Indwelling Catheter - Urethral: 40 mL  Total OUT: 40 mL    Total NET: 91 mL          Lab Data                        9.5    3.87  )-----------( 135      ( 28 Jul 2020 13:01 )             31.1     07-28    145  |  113<H>  |  32<H>  ----------------------------<  87  4.2   |  29  |  1.30    Ca    8.3<L>      28 Jul 2020 13:01  Phos  3.9     07-28  Mg     1.9     07-28    TPro  5.6<L>  /  Alb  2.2<L>  /  TBili  1.5<H>  /  DBili  x   /  AST  76<H>  /  ALT  58  /  AlkPhos  70  07-28    ABG - ( 28 Jul 2020 11:08 )  pH, Arterial: x     pH, Blood: 7.28  /  pCO2: 58    /  pO2: 68    / HCO3: 24    / Base Excess: 0.1   /  SaO2: 92                CARDIAC MARKERS ( 28 Jul 2020 13:01 )  .026 ng/mL / x     / 608 U/L / x     / x            Review of Systems	      Objective     Physical Examination        Pertinent Lab findings & Imaging      Mickey:  NO   Adequate UO     I&O's Detail    28 Jul 2020 07:01  -  28 Jul 2020 15:53  --------------------------------------------------------  IN:    propofol Infusion: 31 mL    Solution: 100 mL  Total IN: 131 mL    OUT:    Indwelling Catheter - Urethral: 40 mL  Total OUT: 40 mL    Total NET: 91 mL               Discussed with:     Cultures:	        Radiology

## 2020-07-28 NOTE — H&P ADULT - PROBLEM SELECTOR PLAN 9
LEG ELEVATION ,SCROTAL ELEVATION ,FOLLOWED BY CARDIOLOGY ,ECHO PENDING TO EVALUATE EF AND RULE OUT CHF

## 2020-07-28 NOTE — CONSULT NOTE ADULT - ASSESSMENT
80 yo male, PMHx CVA 2012, HTN, who presented as a transfer from [facility] with altered mental status, possible post-ictal state following new onset seizure, with acute hypercapnic respiratory failure, transient shock distributive vs cardiogenic, LASHELL, and transaminitis.    Neuro: multiple witnessed seizures in ED, temporarily resolves with Ativan. CT/CTA brain without acute intracranial pathology. Start Propofol to raise seizure threshold. Start Keppra. Needs EEG and neuro consult, consider eventual MRI once stabilized from cardiac and respiratory standpoint.    Pulm: Intubated for acute hypercapnic respiratory failure, vent augmented to manipulate acid-base balance, repeat ABG with improving respiratory acidosis. Weaned to 30% FiO2    CV: Questionable R atrial thrombus on CTA neck, will start empiric full anticoagulation with Heparin gtt. Needs TTE, will perform bedside POCUS on arrival to ICU to guide further resuscitation vs deresuscitation. Uncertain etiology of bradycardia, check TFT, cortisol, continue warming. Hemodynamics stabilized, weaned off pressors, monitoring end points of perfusion.    GI: Transaminitis possible ischemic hepatitis due to shock state, continue to trend    Renal: LASHELL likely ATN due to hypoperfusion during shock state, monitoring renal indices and UOP. Avoid further nephrotoxins    ID: Possible occult sepsis, blood cultures sent, send UA/UCx, sputum cx if able to obtain specimen. Check procalcitonin. Received empiric abx with Ceftriaxone    Endo: Check TFT, cortisol as above    Heme: Heparin gtt as above 78 yo male, PMHx CVA 2012, HTN, who presented as a transfer from [facility] with altered mental status, possible post-ictal state following new onset seizure, with acute hypercapnic respiratory failure, transient shock distributive vs cardiogenic, LASHELL, and transaminitis.    Neuro: multiple witnessed seizures in ED, temporarily resolves with Ativan. CT/CTA brain without acute intracranial pathology. Start Propofol to raise seizure threshold. Start Keppra. Needs EEG to r/o status epilepticus as etiology of ongoing encephalopathy, and neuro consult, consider eventual MRI once stabilized from cardiac and respiratory standpoint.    Pulm: Intubated for acute hypercapnic respiratory failure, vent augmented to manipulate acid-base balance, repeat ABG with improving respiratory acidosis. Weaned to 30% FiO2    CV: Questionable R atrial thrombus on CTA neck, will start empiric full anticoagulation with Heparin gtt. Needs TTE, will perform bedside POCUS on arrival to ICU to guide further resuscitation vs deresuscitation. Uncertain etiology of bradycardia, check TFT, cortisol, continue warming. Hemodynamics stabilized, weaned off pressors, monitoring end points of perfusion.    GI: Transaminitis possible ischemic hepatitis due to shock state, continue to trend    Renal: LASHELL likely ATN due to hypoperfusion during shock state, monitoring renal indices and UOP. Avoid further nephrotoxins    ID: Possible occult sepsis, blood cultures sent, send UA/UCx, sputum cx if able to obtain specimen. Check procalcitonin. Received empiric abx with Ceftriaxone    Endo: Check TFT, cortisol as above    Heme: Heparin gtt as above

## 2020-07-28 NOTE — H&P ADULT - NSICDXPASTMEDICALHX_GEN_ALL_CORE_FT
PAST MEDICAL HISTORY:  Chronic GERD     CVA (cerebral vascular accident)     HLD (hyperlipidemia)     HTN (hypertension)

## 2020-07-28 NOTE — CONSULT NOTE ADULT - SUBJECTIVE AND OBJECTIVE BOX
HPI:  78yo Male  transferred from [place] ED to [place] ED with s/p seizure activity  and bradycardia into the 30s.  Arrived to [place] hypothermic and hypotensive .Patient  required intubation ph 7.1 with CO2 85 due to acute hypoxic respiratory failure on arrival to ER ,placed on Levophed drip due to hypotension Admitted for septic workup and evaluation ,send blood and urine cx, serial lactate levels, monitor vitals closely hydration ,monitor urine output and renal profile,iv abx initiated -given ceftriaxone in ER  .Pulmonology and cardiology consults are called .Patient admitted  to ICU with septic shock ,noted to have  tongue bite and AMS ,neurology evaluation called  Admitted  to intensive care  unit for monitoring , to rule out ami -send 3 sets of cardiac enzymes to rule out acute coronary event, obtain ECHO to evaluate LVEF, cardiology consult  ,continue current sepsis management, ventilator management /O2 supply, anticoagulation plan as per cardiology consult Palliative care consult requested ,to discuss advance directives and complete MOLST .Tried to reach family to obtain details of past medical hx - left a message for daughter .not able to reach son ( non valid number ) (28 Jul 2020 10:57)      PAST MEDICAL & SURGICAL HISTORY:  Chronic GERD  HTN (hypertension)  HLD (hyperlipidemia)  CVA (cerebral vascular accident)      Antimicrobials      Immunological      Other  chlorhexidine 0.12% Liquid 15 milliLiter(s) Oral Mucosa every 12 hours  levETIRAcetam  IVPB 1000 milliGRAM(s) IV Intermittent every 12 hours  LORazepam   Injectable 2 milliGRAM(s) IV Push every 1 hour PRN  pantoprazole  Injectable 40 milliGRAM(s) IV Push daily  propofol Infusion. 20 MICROgram(s)/kG/Min IV Continuous <Continuous>      Allergies    No Known Allergies    Intolerances        SOCIAL HISTORY:  Social History:      FAMILY HISTORY:      ROS:    EYES:  Negative  blurry vision or double vision  GASTROINTESTINAL:  Negative for nausea, vomiting, diarrhea  -otherwise negative except for subjective    Vital Signs Last 24 Hrs  T(C): 33.1 (28 Jul 2020 12:42), Max: 33.1 (28 Jul 2020 12:42)  T(F): 91.5 (28 Jul 2020 12:42), Max: 91.5 (28 Jul 2020 12:42)  HR: 41 (28 Jul 2020 12:42) (36 - 62)  BP: 112/54 (28 Jul 2020 12:42) (77/47 - 116/54)  BP(mean): --  RR: 20 (28 Jul 2020 12:42) (20 - 28)  SpO2: 94% (28 Jul 2020 12:42) (94% - 98%)    PE:  WDWN   HEENT:  pt now intubated with some crusted blood on tongue  Neck:  Supple, no adenopathy  Lungs:  No accessory muscle use, bilaterally clear to auscultation, upper airway sounds  Cor:  distant  Abd:  Symmetric, normoactive BS.  Soft,, no masses, guarding or rebound.  Liver and spleen not enlarged  Extrem:  No cyanosis or sig LE edema  Skin:  No rashes.  Neuro:nonverbal      LABS:      in separate SY chart with decreased lymphocytes    MICROBIOLOGY:      RADIOLOGY & ADDITIONAL STUDIES:        --

## 2020-07-28 NOTE — H&P ADULT - NSHPSOURCEINFOTX_GEN_ALL_CORE
LEFT A MESSAGE FOR DAUGHTER SATYA [phone] AT 10 .30 AM  AND CALLED SON HENNY [phone]( NONVALID NUMBER)

## 2020-07-28 NOTE — H&P ADULT - ATTENDING COMMENTS
78yo Male  transferred from [place] ED to [place] ED with s/p seizure activity  and bradycardia into the 30s.  Arrived to [place] hypothermic and hypotensive .Patient  required intubation ph 7.1 with CO2 85 due to acute hypoxic respiratory failure on arrival to ER ,placed on Levophed drip due to hypotension Admitted for septic workup and evaluation ,send blood and urine cx, serial lactate levels, monitor vitals closely hydration ,monitor urine output and renal profile,iv abx initiated -given ceftriaxone in ER and seen by ID consult .Pulmonology and cardiology consults are called .Patient admitted  to ICU with septic shock ,noted to have  tongue bite and AMS ,neurology evaluation called  Admitted  to intensive care  unit for monitoring , to rule out ami -send 3 sets of cardiac enzymes to rule out acute coronary event, obtain ECHO to evaluate LVEF, cardiology consult  ,continue current sepsis management, ventilator management /O2 supply, anticoagulation plan as per cardiology consult Palliative care consult requested ,to discuss advance directives and complete MOLST .Tried to reach family to obtain details of past medical hx - left a message for daughter .not able to reach son ( non valid number ) 78yo Male PMHx CVA 2012, HTN, HLD , hernia surgery with testicle resection 2015, umbilical hernia repair 12/2019,  transferred from [place] ED to [place] ED with s/p seizure activity  and bradycardia into the 30s.  Arrived to [place] hypothermic and hypotensive .Patient  required intubation ph 7.1 with CO2 85 due to acute hypoxic respiratory failure on arrival to ER ,placed on Levophed drip due to hypotension Admitted for septic workup and evaluation ,send blood and urine cx, serial lactate levels, monitor vitals closely hydration ,monitor urine output and renal profile,iv abx initiated -given ceftriaxone in ER and seen by ID consult .Pulmonology and cardiology consults are called .Patient admitted  to ICU with septic shock ,noted to have  tongue bite and AMS ,neurology evaluation called  Admitted  to intensive care  unit for monitoring , to rule out ami -send 3 sets of cardiac enzymes to rule out acute coronary event, obtain ECHO to evaluate LVEF, cardiology consult  ,continue current sepsis management, ventilator management /O2 supply, anticoagulation plan as per cardiology consult Palliative care consult requested ,to discuss advance directives and complete MOLST .Tried to reach family to obtain details of past medical hx - left a message for daughter .not able to reach son ( non valid number )[place]  ED team spoke with patient's daughter, who stated her father fell to the ground on the floor of his motel room about 4 days ago and was unable to get up. She had been caring for him on the floor since and did not call 911 for assistance. Today she found him unresponsive, with blood in his mouth, and incontinent of urine.  CT brain was negative. A CODE STROKE was called, and a CTA head/neck were performed, which was negative for large vessel occlusion but concerning for a density anterior to the ascending aorta possibly right atrial thrombus. Also revealed enlarged, multinodular thyroid. TPA criteria was not met, as patient had unknown onset of symptoms. Patient was transferred to [facility] ED for further care .He developed  multiple witnessed seizures in [place] ED, temporarily resolved with Ativan administration . CT/CTA brain NAD . Seen by neurologist and EEG ordered to r/o status epilepticus as etiology of ongoing encephalopathy, probably will require  MRI once stabilized from cardiac and respiratory standpoint.

## 2020-07-28 NOTE — ED PROVIDER NOTE - ATTENDING CONTRIBUTION TO CARE
80yo M with CVA, HTN, hernia repair transffered from [place] ED with AMS, seizure, bradycardia in 30s,CTA head and neck no intervention path.  pt arrives with borderline bp and hypothermic  Dr Lima cardiology at bedside on arrival.  WD, WD, responsive to pain, non verbal,  NC/AT  S1S2 bradycardia,  CTAB,soft nontender  b/l LE edema 3+ with scrotal edema  labs, blood cultures, lactate, warmed IVFs, warming blanket, cardiac enzymes, EKG, cardioloogy consult, pulmonology consult, ICU consult, pt required intubation ph 7.1 with CO2 85.  sIv antibiotics  admit to ICU with septic shock 78yo M with CVA, HTN, hernia repair transffered from [place] ED with AMS, seizure, bradycardia in 30s,CTA head and neck no intervention path.  pt arrives with borderline bp and hypothermic  Dr Lima cardiology at bedside on arrival.  WD, WD, responsive to pain, non verbal,  NC/AT  S1S2 bradycardia,  CTAB,soft nontender  b/l LE edema 3+ with scrotal edema  labs, blood cultures, lactate, warmed IVFs, warming blanket, cardiac enzymes, EKG, cardioloogy consult, pulmonology consult, ICU consult, pt required intubation ph 7.1 with CO2 85.  sIv antibiotics  admit to ICU with septic shock, seizure, bradycardia

## 2020-07-28 NOTE — CONSULT NOTE ADULT - SUBJECTIVE AND OBJECTIVE BOX
History of Present Illness:    Past Medical/Surgical History:    Medications:    Family History: Non-contributory family history of premature cardiovascular atherosclerotic disease    Social History: No tobacco, alcohol or drug use    Review of Systems:  General: No fevers, chills, weight loss or gain  Skin: No rashes, color changes  Cardiovascular: No chest pain, orthopnea  Respiratory: No shortness of breath, cough  Gastrointestinal: No nausea, abdominal pain  Genitourinary: No incontinence, pain with urination  Musculoskeletal: No pain, swelling, decreased range of motion  Neurological: No headache, weakness  Psychiatric: No depression, anxiety  Endocrine: No weight loss or gain, increased thirst  All other systems are comprehensively negative.    Physical Exam:  Vitals:        Vital Signs Last 24 Hrs  T(C): --  T(F): --  HR: --  BP: --  BP(mean): --  RR: --  SpO2: --  General: NAD  HEENT: MMM  Neck: No JVD, no carotid bruit  Lungs: CTAB  CV: RRR, nl S1/S2, no M/R/G  Abdomen: S/NT/ND, +BS  Extremities: No LE edema, no cyanosis  Neuro: AAOx3, non-focal  Skin: No rash    Labs:                  ECG: History of Present Illness: The patient is an 79 year old male with a history of HTN, CVA who presents with being found down. The patient is unable to provide additional history due to comatose state. He was found down by his daughter in a motel about 4 days ago.    Past Medical/Surgical History:    Medications:    Family History: Non-contributory family history of premature cardiovascular atherosclerotic disease    Social History: No tobacco, alcohol or drug use    Review of Systems:  General: No fevers, chills, weight loss or gain  Skin: No rashes, color changes  Cardiovascular: No chest pain, orthopnea  Respiratory: No shortness of breath, cough  Gastrointestinal: No nausea, abdominal pain  Genitourinary: No incontinence, pain with urination  Musculoskeletal: No pain, swelling, decreased range of motion  Neurological: No headache, weakness  Psychiatric: No depression, anxiety  Endocrine: No weight loss or gain, increased thirst  All other systems are comprehensively negative.    Physical Exam:  Vitals:        Vital Signs Last 24 Hrs  T(C): --  T(F): --  HR: --  BP: --  BP(mean): --  RR: --  SpO2: --  General: NAD  HEENT: MMM  Neck: No JVD, no carotid bruit  Lungs: CTAB  CV: RRR, nl S1/S2, no M/R/G  Abdomen: S/NT/ND, +BS  Extremities: No LE edema, no cyanosis  Neuro: AAOx3, non-focal  Skin: No rash    Labs:                  ECG: History of Present Illness: The patient is an 79 year old male with a history of HTN, CVA who presents with being found down. The patient is unable to provide additional history due to comatose state. He was found down by his daughter in a motel about 4 days ago. His daughter was caring for him since and this morning found him unresponsive with blood coming from his mouth.    Past Medical/Surgical History:  HTN, CVA     Medications:  Home Medications:  atorvastatin 10 mg oral tablet: 1 tab(s) orally once a day (28 Jul 2020 11:16)  carvedilol 25 mg oral tablet: 1 tab(s) orally 2 times a day with meals  (28 Jul 2020 11:16)  hydrALAZINE 50 mg oral tablet: 1 tab(s) orally 2 times a day (28 Jul 2020 11:16)  pantoprazole 20 mg oral delayed release tablet: 1 tab(s) orally once a day (28 Jul 2020 11:16)  spironolactone 25 mg oral tablet: 1 tab(s) orally once a day (28 Jul 2020 11:16)      Family History: Non-contributory family history of premature cardiovascular atherosclerotic disease    Social History: No tobacco, alcohol or drug use    Review of Systems:  General: No fevers, chills, weight loss or gain  Skin: No rashes, color changes  Cardiovascular: No chest pain, orthopnea  Respiratory: No shortness of breath, cough  Gastrointestinal: No nausea, abdominal pain  Genitourinary: No incontinence, pain with urination  Musculoskeletal: No pain, swelling, decreased range of motion  Neurological: No headache, weakness  Psychiatric: No depression, anxiety  Endocrine: No weight loss or gain, increased thirst  All other systems are comprehensively negative.    Physical Exam:  Vitals:        Vital Signs Last 24 Hrs  T(C): --  T(F): --  HR: --  BP: --  BP(mean): --  RR: --  SpO2: --  General: Comatose  HEENT: MMM  Neck: No JVD, no carotid bruit  Lungs: CTAB  CV: RRR, nl S1/S2, no M/R/G  Abdomen: S/NT/ND, +BS  Extremities: 2+ LE edema, no cyanosis  Neuro: AAOx0, non-focal  Skin: No rash    Labs:                  ECG: Sinus bradycardia with junctional bradycardia

## 2020-07-28 NOTE — H&P ADULT - ASSESSMENT
78yo Male  transferred from [place] ED to [place] ED with s/p seizure activity  and bradycardia into the 30s.  Arrived to [place] hypothermic and hypotensive .Patient  required intubation ph 7.1 with CO2 85 due to acute hypoxic respiratory failure on arrival to ER ,placed on Levophed drip due to hypotension Admitted for septic workup and evaluation ,send blood and urine cx, serial lactate levels, monitor vitals closely hydration ,monitor urine output and renal profile,iv abx initiated -given ceftriaxone in ER and seen by ID consult .Pulmonology and cardiology consults are called .Patient admitted  to ICU with septic shock ,noted to have  tongue bite and AMS ,neurology evaluation called  Admitted  to intensive care  unit for monitoring , to rule out ami -send 3 sets of cardiac enzymes to rule out acute coronary event, obtain ECHO to evaluate LVEF, cardiology consult  ,continue current sepsis management, ventilator management /O2 supply, anticoagulation plan as per cardiology consult Palliative care consult requested ,to discuss advance directives and complete MOLST .Tried to reach family to obtain details of past medical hx - left a message for daughter .not able to reach son ( non valid number ) 78yo Male PMHx CVA 2012, HTN, HLD , hernia surgery with testicle resection 2015, umbilical hernia repair 12/2019,  transferred from [place] ED to [place] ED with s/p seizure activity  and bradycardia into the 30s.  Arrived to [place] hypothermic and hypotensive .Patient  required intubation ph 7.1 with CO2 85 due to acute hypoxic respiratory failure on arrival to ER ,placed on Levophed drip due to hypotension Admitted for septic workup and evaluation ,send blood and urine cx, serial lactate levels, monitor vitals closely hydration ,monitor urine output and renal profile,iv abx initiated -given ceftriaxone in ER and seen by ID consult .Pulmonology and cardiology consults are called .Patient admitted  to ICU with septic shock ,noted to have  tongue bite and AMS ,neurology evaluation called  Admitted  to intensive care  unit for monitoring , to rule out ami -send 3 sets of cardiac enzymes to rule out acute coronary event, obtain ECHO to evaluate LVEF, cardiology consult  ,continue current sepsis management, ventilator management /O2 supply, anticoagulation plan as per cardiology consult Palliative care consult requested ,to discuss advance directives and complete MOLST .Tried to reach family to obtain details of past medical hx - left a message for daughter .not able to reach son ( non valid number )[place]  ED team spoke with patient's daughter, who stated her father fell to the ground on the floor of his motel room about 4 days ago and was unable to get up. She had been caring for him on the floor since and did not call 911 for assistance. Today she found him unresponsive, with blood in his mouth, and incontinent of urine.  CT brain was negative. A CODE STROKE was called, and a CTA head/neck were performed, which was negative for large vessel occlusion but concerning for a density anterior to the ascending aorta possibly right atrial thrombus. Also revealed enlarged, multinodular thyroid. TPA criteria was not met, as patient had unknown onset of symptoms. Patient was transferred to [facility] ED for further care .He developed  multiple witnessed seizures in [place] ED, temporarily resolved with Ativan administration . CT/CTA brain NAD . Seen by neurologist and EEG ordered to r/o status epilepticus as etiology of ongoing encephalopathy, probably will require  MRI once stabilized from cardiac and respiratory standpoint.

## 2020-07-28 NOTE — H&P ADULT - PROBLEM SELECTOR PLAN 7
Admitted  to telemetry unit for monitoring , send 3 sets of cardiac enzymes to rule out acute coronary event, obtain ECHO to evaluate LVEF, cardiology consult  ,continue current management, O2 supply, anticoagulation plan as per cardiology consult ,Echocardiogram with mild LV systolic dysfunction, no obvious right atrial thrombus ,Hold carvedilol; eventually can resume when HR picks up furtherseen by Dr Lima  in ER

## 2020-07-28 NOTE — H&P ADULT - PROBLEM SELECTOR PLAN 5
ON LEVAPHED DRIP Admitted for septic workup and evaluation,send blood and urine cx,serial lactate levels,monitor vitals closley,ivfs hydration,monitor urine output and renal profile,iv abx initiated

## 2020-07-28 NOTE — CONSULT NOTE ADULT - ASSESSMENT
full consult to follow 80yo Male  transferred from [place] ED to [facility] with s/p seizure activity  and bradycardia into the 30s.  Arrived to [facility] hypothermic and hypotensive .Patient  required intubation ph 7.1 with CO2 85 due to acute hypoxic respiratory failure on arrival to ER ,placed on Levophed drip due to hypotension Admitted for septic workup and evaluation limited history but apparently down for prolonged period prior to coming to ER

## 2020-07-28 NOTE — H&P ADULT - NSHPLABSRESULTS_GEN_ALL_CORE
PENDING < from: Xray Chest 1 View- PORTABLE-Urgent (07.28.20 @ 13:46) >    EXAM:  XR CHEST PORTABLE URGENT 1V                            PROCEDURE DATE:  07/28/2020          INTERPRETATION:  Chest one view    HISTORY: NG tube placement    COMPARISON STUDY: Earlier the same day    Frontal expiratory view of the chest showsthe heart to be similarly enlarged in size. Endotracheal tube is present. Nasogastric tube reaches the body of the stomach.    The lungs show mild right base atelectasis. Defibrillator pad projects over the left chest and there is no definite evidence of pneumothorax.    IMPRESSION:  NG tube to stomach.    Thank you for the courtesy of this referral.    < end of copied text >

## 2020-07-28 NOTE — ED PROVIDER NOTE - CLINICAL SUMMARY MEDICAL DECISION MAKING FREE TEXT BOX
78yo M transferred with bradycardia, hypothermia, hypotension, seizure, requiring intubation, vasopressors, IV antibiotics, warming blanket, warm IVFs, cardiology, pulmonolgy and critical care consultations.

## 2020-07-28 NOTE — PATIENT PROFILE ADULT - SURGICAL SITE INCISION
Date of Service: 10/11/2017    CHIEF COMPLAINT:  Sore throat.    HISTORY OF PRESENT ILLNESS:  The patient is a 8-year-old male with no significant past medical history who presents to clinic with a 2-day history of sore throat.  His father brings him into clinic and states that Floyd had been in his usual state of health until 2 days prior to this clinic visit when he began complaining of sore throat.  Father states that Floyd has not been complaining of any cold symptoms.  He has had no nasal congestion or runny nose.  There have been no complaints of any ear pain or ear drainage.  He has not been having any cough.  There has been no shortness of breath or wheezing.  He has not had any rashes.  Appetite has been slightly reduced with the sore throat pain, but he has been drinking well.  Father does state that he went to school yesterday but today did stay home because he was feeling poorly.  He was fatigued this morning, did take approximately a 45 minute nap and afterwards he felt better.  Father states that there has been no hoarseness to his voice.  No other symptoms have been reported.    RECENT ILLNESSES:  None.    SICK CONTACTS:  Elder sister in the home with sore throat pain last week but no other ill contacts have been noted.    REVIEW OF SYSTEMS:  See HPI, otherwise negative.    ALLERGIES:  None.    CURRENT MEDICATIONS:  Ibuprofen.  Tylenol.    He has used MetroGel and Cleocin external solution to perioral rash in the past but not currently.    OBJECTIVE:  VITAL SIGNS:  Blood pressure is 108/66.  Initial temperature in the office was 99.2.  Prior to discharge home it was 99.5.  Respiratory rate is 14.  Weight is 34.1 kg.  GENERAL:  He is alert and active.  He is not in any respiratory distress.  He is well hydrated.  HEENT:  Head is normocephalic, atraumatic.  Extraocular movements were intact.  Sclerae and conjunctivae are clear.  Lids are normal.  He has a good red reflex bilaterally.  Nares are  patent.  He has no rhinorrhea.  There is no crusted nasal drainage or flaring.  Ears are normal shape and position.  Tympanic membranes are pearly gray bilaterally.  Oropharynx reveals tonsils to be 2-3+ in size with bright red erythema but no overlying exudate is seen.  There are no tonsillar crypts or fissures.  He has no halitosis or strawberry tongue.  Mucous membranes are moist.  NECK:  Supple.  He has no lymphadenopathy and good range of motion to the neck.  LUNGS:  Clear to auscultation and percussion.  There is no wheeze, no rhonchi or crackles.  HEART:  Mildly tachycardic.  He has a very soft systolic ejection murmur but no rub or gallop and cap refill is brisk.  ABDOMEN:  Soft, rounded, nontender, nondistended.  He has good bowel sounds.  There are no masses or hepatosplenomegaly.  EXTREMITIES:  Warm, dry without abnormalities.  NEUROLOGICAL:  He has normal tone, bulk and strength.  SKIN:  Warm, dry, well perfused.  Cap refill is brisk.  He has no rash or other lesions.    IMPRESSION:  A 8-year-old with acute pharyngitis, low-grade fever and enlarged tonsils.    PLAN:  I discussed with the family the different findings on his examination today in the office.  Due to the constellation of symptoms, we did opt to go ahead and do a Rapid Strep in the office, which was negative.  We will send his other swab for throat culture and contact the family if it does turn positive.  In the meantime, however, no antibiotics were prescribed.  We did discuss symptomatic treatment in the home and appropriate dosing of Tylenol and Motrin as well as the use of lozenges, pushing fluids and salt was recommended.  If he has no improvement or any worsening, especially within the next 5-7 days, he should be returned to clinic and be re-seen with contemplation of other viruses including that of mononucleosis.  Father did verbalize his understanding.      Dictated By: Aletha Groves MD  Signing Provider: Aletha Groves,  MD REDDING/gilberto ([id number])  DD: 10/11/2017 14:12:08 TD: 10/12/2017 07:45:55    Copy Sent To:      no

## 2020-07-28 NOTE — CONSULT NOTE ADULT - CONSULT REQUESTED DATE/TIME
28-Jul-2020 09:24
28-Jul-2020 10:20
28-Jul-2020 10:37
28-Jul-2020 13:00
28-Jul-2020
28-Jul-2020 15:53

## 2020-07-28 NOTE — H&P ADULT - NSHPATTENDINGPLANDISCUSS_GEN_ALL_CORE
ER PHYSICIAN IN [facility] , , , ,  , , [facility] RN , LEFT A MESSAGE FOR DAUGHTER SATYA AT 10.30 AM

## 2020-07-28 NOTE — CONSULT NOTE ADULT - SUBJECTIVE AND OBJECTIVE BOX
ams sz respiratory failure ? septic shock  antibiotics as needed  recommend Keppra 500 bid  EEG  monitor electrolytes   may need repeat ct imaging or mri head if no change in exam

## 2020-07-28 NOTE — H&P ADULT - HISTORY OF PRESENT ILLNESS
78yo Male  transferred from [place] ED to [place] ED with s/p seizure activity  and bradycardia into the 30s.  Arrived to [place] hypothermic and hypotensive .Patient  required intubation ph 7.1 with CO2 85 due to acute hypoxic respiratory failure on arrival to ER ,placed on Levophed drip due to hypotension Admitted for septic workup and evaluation ,send blood and urine cx, serial lactate levels, monitor vitals closely hydration ,monitor urine output and renal profile,iv abx initiated -given ceftriaxone in ER and seen by ID consult .Pulmonology and cardiology consults are called .Patient admitted  to ICU with septic shock ,noted to have  tongue bite and AMS ,neurology evaluation called  Admitted  to intensive care  unit for monitoring , to rule out ami -send 3 sets of cardiac enzymes to rule out acute coronary event, obtain ECHO to evaluate LVEF, cardiology consult  ,continue current sepsis management, ventilator management /O2 supply, anticoagulation plan as per cardiology consult Palliative care consult requested ,to discuss advance directives and complete MOLST .Tried to reach family to obtain details of past medical hx - left a message for daughter .not able to reach son ( non valid number ) 78yo Male PMHx CVA 2012, HTN, HLD , hernia surgery with testicle resection 2015, umbilical hernia repair 12/2019,  transferred from [place] ED to [place] ED with s/p seizure activity  and bradycardia into the 30s.  Arrived to [place] hypothermic and hypotensive .Patient  required intubation ph 7.1 with CO2 85 due to acute hypoxic respiratory failure on arrival to ER ,placed on Levophed drip due to hypotension Admitted for septic workup and evaluation ,send blood and urine cx, serial lactate levels, monitor vitals closely hydration ,monitor urine output and renal profile,iv abx initiated -given ceftriaxone in ER and seen by ID consult .Pulmonology and cardiology consults are called .Patient admitted  to ICU with septic shock ,noted to have  tongue bite and AMS ,neurology evaluation called  Admitted  to intensive care  unit for monitoring , to rule out ami -send 3 sets of cardiac enzymes to rule out acute coronary event, obtain ECHO to evaluate LVEF, cardiology consult  ,continue current sepsis management, ventilator management /O2 supply, anticoagulation plan as per cardiology consult Palliative care consult requested ,to discuss advance directives and complete MOLST .Tried to reach family to obtain details of past medical hx - left a message for daughter .not able to reach son ( non valid number )[facility]  ED team spoke with patient's daughter, who stated her father fell to the ground on the floor of his motel room about 4 days ago and was unable to get up. She had been caring for him on the floor since and did not call 911 for assistance. Today she found him unresponsive, with blood in his mouth, and incontinent of urine.  CT brain was negative. A CODE STROKE was called, and a CTA head/neck were performed, which was negative for large vessel occlusion but concerning for a density anterior to the ascending aorta possibly right atrial thrombus. Also revealed enlarged, multinodular thyroid. TPA criteria was not met, as patient had unknown onset of symptoms. Patient was transferred to [facility] ED for further care .He developed  multiple witnessed seizures in [place] ED, temporarily resolved with Ativan administration . CT/CTA brain NAD . Seen by neurologist and EEG ordered to r/o status epilepticus as etiology of ongoing encephalopathy, probably will require  MRI once stabilized from cardiac and respiratory standpoint.

## 2020-07-29 DIAGNOSIS — I51.3 INTRACARDIAC THROMBOSIS, NOT ELSEWHERE CLASSIFIED: ICD-10-CM

## 2020-07-29 DIAGNOSIS — R60.0 LOCALIZED EDEMA: ICD-10-CM

## 2020-07-29 DIAGNOSIS — R41.89 OTHER SYMPTOMS AND SIGNS INVOLVING COGNITIVE FUNCTIONS AND AWARENESS: ICD-10-CM

## 2020-07-29 PROBLEM — I10 ESSENTIAL (PRIMARY) HYPERTENSION: Chronic | Status: ACTIVE | Noted: 2020-07-28

## 2020-07-29 PROBLEM — E78.00 PURE HYPERCHOLESTEROLEMIA, UNSPECIFIED: Chronic | Status: ACTIVE | Noted: 2020-07-28

## 2020-07-29 LAB
ALBUMIN SERPL ELPH-MCNC: 2.3 G/DL — LOW (ref 3.3–5)
ALP SERPL-CCNC: 71 U/L — SIGNIFICANT CHANGE UP (ref 40–120)
ALT FLD-CCNC: 72 U/L — SIGNIFICANT CHANGE UP (ref 12–78)
ANION GAP SERPL CALC-SCNC: 6 MMOL/L — SIGNIFICANT CHANGE UP (ref 5–17)
ANION GAP SERPL CALC-SCNC: 7 MMOL/L — SIGNIFICANT CHANGE UP (ref 5–17)
APTT BLD: 93 SEC — HIGH (ref 27.5–35.5)
APTT BLD: >200 SEC — CRITICAL HIGH (ref 27.5–35.5)
AST SERPL-CCNC: 99 U/L — HIGH (ref 15–37)
BASE EXCESS BLDA CALC-SCNC: 2.4 MMOL/L — HIGH (ref -2–2)
BASOPHILS # BLD AUTO: 0.02 K/UL — SIGNIFICANT CHANGE UP (ref 0–0.2)
BASOPHILS NFR BLD AUTO: 0.3 % — SIGNIFICANT CHANGE UP (ref 0–2)
BILIRUB SERPL-MCNC: 1.5 MG/DL — HIGH (ref 0.2–1.2)
BLOOD GAS COMMENTS ARTERIAL: SIGNIFICANT CHANGE UP
BUN SERPL-MCNC: 32 MG/DL — HIGH (ref 7–23)
BUN SERPL-MCNC: 32 MG/DL — HIGH (ref 7–23)
CALCIUM SERPL-MCNC: 8.3 MG/DL — LOW (ref 8.5–10.1)
CALCIUM SERPL-MCNC: 8.4 MG/DL — LOW (ref 8.5–10.1)
CHLORIDE SERPL-SCNC: 112 MMOL/L — HIGH (ref 96–108)
CHLORIDE SERPL-SCNC: 113 MMOL/L — HIGH (ref 96–108)
CO2 SERPL-SCNC: 26 MMOL/L — SIGNIFICANT CHANGE UP (ref 22–31)
CO2 SERPL-SCNC: 27 MMOL/L — SIGNIFICANT CHANGE UP (ref 22–31)
CREAT SERPL-MCNC: 1.7 MG/DL — HIGH (ref 0.5–1.3)
CREAT SERPL-MCNC: 1.7 MG/DL — HIGH (ref 0.5–1.3)
EOSINOPHIL # BLD AUTO: 0.11 K/UL — SIGNIFICANT CHANGE UP (ref 0–0.5)
EOSINOPHIL NFR BLD AUTO: 1.7 % — SIGNIFICANT CHANGE UP (ref 0–6)
GLUCOSE SERPL-MCNC: 74 MG/DL — SIGNIFICANT CHANGE UP (ref 70–99)
GLUCOSE SERPL-MCNC: 87 MG/DL — SIGNIFICANT CHANGE UP (ref 70–99)
HCO3 BLDA-SCNC: 27 MMOL/L — SIGNIFICANT CHANGE UP (ref 23–27)
HCT VFR BLD CALC: 27.7 % — LOW (ref 39–50)
HCT VFR BLD CALC: 28.8 % — LOW (ref 39–50)
HGB BLD-MCNC: 9 G/DL — LOW (ref 13–17)
HGB BLD-MCNC: 9.2 G/DL — LOW (ref 13–17)
HOROWITZ INDEX BLDA+IHG-RTO: 30 — SIGNIFICANT CHANGE UP
IMM GRANULOCYTES NFR BLD AUTO: 0.3 % — SIGNIFICANT CHANGE UP (ref 0–1.5)
LYME C6 AB IGG/IGM EIA REFLEX WESTERN BL: SIGNIFICANT CHANGE UP
LYMPHOCYTES # BLD AUTO: 0.56 K/UL — LOW (ref 1–3.3)
LYMPHOCYTES # BLD AUTO: 8.8 % — LOW (ref 13–44)
MAGNESIUM SERPL-MCNC: 2.1 MG/DL — SIGNIFICANT CHANGE UP (ref 1.6–2.6)
MCHC RBC-ENTMCNC: 28.6 PG — SIGNIFICANT CHANGE UP (ref 27–34)
MCHC RBC-ENTMCNC: 28.8 PG — SIGNIFICANT CHANGE UP (ref 27–34)
MCHC RBC-ENTMCNC: 31.9 GM/DL — LOW (ref 32–36)
MCHC RBC-ENTMCNC: 32.5 GM/DL — SIGNIFICANT CHANGE UP (ref 32–36)
MCV RBC AUTO: 88.8 FL — SIGNIFICANT CHANGE UP (ref 80–100)
MCV RBC AUTO: 89.4 FL — SIGNIFICANT CHANGE UP (ref 80–100)
MONOCYTES # BLD AUTO: 0.84 K/UL — SIGNIFICANT CHANGE UP (ref 0–0.9)
MONOCYTES NFR BLD AUTO: 13.2 % — SIGNIFICANT CHANGE UP (ref 2–14)
NEUTROPHILS # BLD AUTO: 4.8 K/UL — SIGNIFICANT CHANGE UP (ref 1.8–7.4)
NEUTROPHILS NFR BLD AUTO: 75.7 % — SIGNIFICANT CHANGE UP (ref 43–77)
NRBC # BLD: 0 /100 WBCS — SIGNIFICANT CHANGE UP (ref 0–0)
NRBC # BLD: 0 /100 WBCS — SIGNIFICANT CHANGE UP (ref 0–0)
PCO2 BLDA: 37 MMHG — SIGNIFICANT CHANGE UP (ref 32–46)
PH BLDA: 7.46 — HIGH (ref 7.35–7.45)
PHOSPHATE SERPL-MCNC: 2.9 MG/DL — SIGNIFICANT CHANGE UP (ref 2.5–4.5)
PLATELET # BLD AUTO: 135 K/UL — LOW (ref 150–400)
PLATELET # BLD AUTO: 154 K/UL — SIGNIFICANT CHANGE UP (ref 150–400)
PO2 BLDA: 99 MMHG — SIGNIFICANT CHANGE UP (ref 74–108)
POTASSIUM SERPL-MCNC: 3.8 MMOL/L — SIGNIFICANT CHANGE UP (ref 3.5–5.3)
POTASSIUM SERPL-MCNC: 4 MMOL/L — SIGNIFICANT CHANGE UP (ref 3.5–5.3)
POTASSIUM SERPL-SCNC: 3.8 MMOL/L — SIGNIFICANT CHANGE UP (ref 3.5–5.3)
POTASSIUM SERPL-SCNC: 4 MMOL/L — SIGNIFICANT CHANGE UP (ref 3.5–5.3)
PROT SERPL-MCNC: 6 G/DL — SIGNIFICANT CHANGE UP (ref 6–8.3)
RBC # BLD: 3.12 M/UL — LOW (ref 4.2–5.8)
RBC # BLD: 3.22 M/UL — LOW (ref 4.2–5.8)
RBC # FLD: 15.1 % — HIGH (ref 10.3–14.5)
RBC # FLD: 15.4 % — HIGH (ref 10.3–14.5)
SAO2 % BLDA: 98 % — HIGH (ref 92–96)
SARS-COV-2 IGG SERPL QL IA: POSITIVE
SARS-COV-2 IGM SERPL IA-ACNC: 90.6 INDEX — HIGH
SODIUM SERPL-SCNC: 145 MMOL/L — SIGNIFICANT CHANGE UP (ref 135–145)
SODIUM SERPL-SCNC: 146 MMOL/L — HIGH (ref 135–145)
WBC # BLD: 6.35 K/UL — SIGNIFICANT CHANGE UP (ref 3.8–10.5)
WBC # BLD: 6.51 K/UL — SIGNIFICANT CHANGE UP (ref 3.8–10.5)
WBC # FLD AUTO: 6.35 K/UL — SIGNIFICANT CHANGE UP (ref 3.8–10.5)
WBC # FLD AUTO: 6.51 K/UL — SIGNIFICANT CHANGE UP (ref 3.8–10.5)

## 2020-07-29 PROCEDURE — 99291 CRITICAL CARE FIRST HOUR: CPT

## 2020-07-29 RX ORDER — FUROSEMIDE 40 MG
60 TABLET ORAL ONCE
Refills: 0 | Status: DISCONTINUED | OUTPATIENT
Start: 2020-07-29 | End: 2020-07-29

## 2020-07-29 RX ORDER — SODIUM CHLORIDE 9 MG/ML
1000 INJECTION, SOLUTION INTRAVENOUS
Refills: 0 | Status: DISCONTINUED | OUTPATIENT
Start: 2020-07-29 | End: 2020-07-30

## 2020-07-29 RX ORDER — HYDRALAZINE HCL 50 MG
10 TABLET ORAL ONCE
Refills: 0 | Status: COMPLETED | OUTPATIENT
Start: 2020-07-29 | End: 2020-07-29

## 2020-07-29 RX ORDER — HYDRALAZINE HCL 50 MG
50 TABLET ORAL EVERY 8 HOURS
Refills: 0 | Status: DISCONTINUED | OUTPATIENT
Start: 2020-07-29 | End: 2020-08-02

## 2020-07-29 RX ORDER — ALBUTEROL 90 UG/1
4 AEROSOL, METERED ORAL EVERY 6 HOURS
Refills: 0 | Status: DISCONTINUED | OUTPATIENT
Start: 2020-07-29 | End: 2020-08-01

## 2020-07-29 RX ORDER — FUROSEMIDE 40 MG
60 TABLET ORAL ONCE
Refills: 0 | Status: COMPLETED | OUTPATIENT
Start: 2020-07-29 | End: 2020-07-29

## 2020-07-29 RX ORDER — IPRATROPIUM BROMIDE 0.2 MG/ML
4 SOLUTION, NON-ORAL INHALATION EVERY 6 HOURS
Refills: 0 | Status: DISCONTINUED | OUTPATIENT
Start: 2020-07-29 | End: 2020-08-01

## 2020-07-29 RX ORDER — HYDRALAZINE HCL 50 MG
25 TABLET ORAL THREE TIMES A DAY
Refills: 0 | Status: DISCONTINUED | OUTPATIENT
Start: 2020-07-29 | End: 2020-07-29

## 2020-07-29 RX ORDER — HEPARIN SODIUM 5000 [USP'U]/ML
5000 INJECTION INTRAVENOUS; SUBCUTANEOUS EVERY 8 HOURS
Refills: 0 | Status: DISCONTINUED | OUTPATIENT
Start: 2020-07-29 | End: 2020-08-06

## 2020-07-29 RX ORDER — SODIUM CHLORIDE 9 MG/ML
1000 INJECTION, SOLUTION INTRAVENOUS
Refills: 0 | Status: DISCONTINUED | OUTPATIENT
Start: 2020-07-29 | End: 2020-07-29

## 2020-07-29 RX ORDER — LEVETIRACETAM 250 MG/1
500 TABLET, FILM COATED ORAL
Refills: 0 | Status: DISCONTINUED | OUTPATIENT
Start: 2020-07-29 | End: 2020-07-29

## 2020-07-29 RX ORDER — LEVETIRACETAM 250 MG/1
500 TABLET, FILM COATED ORAL EVERY 12 HOURS
Refills: 0 | Status: DISCONTINUED | OUTPATIENT
Start: 2020-07-29 | End: 2020-08-03

## 2020-07-29 RX ORDER — SODIUM CHLORIDE 9 MG/ML
1000 INJECTION, SOLUTION INTRAVENOUS ONCE
Refills: 0 | Status: COMPLETED | OUTPATIENT
Start: 2020-07-29 | End: 2020-07-29

## 2020-07-29 RX ORDER — ATORVASTATIN CALCIUM 80 MG/1
10 TABLET, FILM COATED ORAL AT BEDTIME
Refills: 0 | Status: DISCONTINUED | OUTPATIENT
Start: 2020-07-29 | End: 2020-08-06

## 2020-07-29 RX ORDER — ALBUMIN HUMAN 25 %
50 VIAL (ML) INTRAVENOUS EVERY 6 HOURS
Refills: 0 | Status: COMPLETED | OUTPATIENT
Start: 2020-07-29 | End: 2020-07-31

## 2020-07-29 RX ADMIN — Medication 25 MILLIGRAM(S): at 14:32

## 2020-07-29 RX ADMIN — SODIUM CHLORIDE 150 MILLILITER(S): 9 INJECTION, SOLUTION INTRAVENOUS at 01:10

## 2020-07-29 RX ADMIN — Medication 10 MILLIGRAM(S): at 16:30

## 2020-07-29 RX ADMIN — PANTOPRAZOLE SODIUM 40 MILLIGRAM(S): 20 TABLET, DELAYED RELEASE ORAL at 11:54

## 2020-07-29 RX ADMIN — Medication 60 MILLIGRAM(S): at 20:46

## 2020-07-29 RX ADMIN — LEVETIRACETAM 420 MILLIGRAM(S): 250 TABLET, FILM COATED ORAL at 12:59

## 2020-07-29 RX ADMIN — HEPARIN SODIUM 0 UNIT(S)/HR: 5000 INJECTION INTRAVENOUS; SUBCUTANEOUS at 00:51

## 2020-07-29 RX ADMIN — CHLORHEXIDINE GLUCONATE 15 MILLILITER(S): 213 SOLUTION TOPICAL at 05:42

## 2020-07-29 RX ADMIN — ALBUTEROL 4 PUFF(S): 90 AEROSOL, METERED ORAL at 20:15

## 2020-07-29 RX ADMIN — CEFTRIAXONE 100 MILLIGRAM(S): 500 INJECTION, POWDER, FOR SOLUTION INTRAMUSCULAR; INTRAVENOUS at 11:53

## 2020-07-29 RX ADMIN — ATORVASTATIN CALCIUM 10 MILLIGRAM(S): 80 TABLET, FILM COATED ORAL at 21:20

## 2020-07-29 RX ADMIN — Medication 50 MILLIGRAM(S): at 21:20

## 2020-07-29 RX ADMIN — HEPARIN SODIUM 5000 UNIT(S): 5000 INJECTION INTRAVENOUS; SUBCUTANEOUS at 21:20

## 2020-07-29 RX ADMIN — HEPARIN SODIUM 1900 UNIT(S)/HR: 5000 INJECTION INTRAVENOUS; SUBCUTANEOUS at 01:54

## 2020-07-29 RX ADMIN — LEVETIRACETAM 440 MILLIGRAM(S): 250 TABLET, FILM COATED ORAL at 01:04

## 2020-07-29 RX ADMIN — Medication 4 PUFF(S): at 20:15

## 2020-07-29 RX ADMIN — Medication 50 MILLILITER(S): at 17:40

## 2020-07-29 RX ADMIN — Medication 60 MILLIGRAM(S): at 14:33

## 2020-07-29 RX ADMIN — Medication 50 MILLILITER(S): at 12:59

## 2020-07-29 RX ADMIN — SODIUM CHLORIDE 1000 MILLILITER(S): 9 INJECTION, SOLUTION INTRAVENOUS at 11:54

## 2020-07-29 RX ADMIN — CHLORHEXIDINE GLUCONATE 15 MILLILITER(S): 213 SOLUTION TOPICAL at 17:39

## 2020-07-29 NOTE — PROGRESS NOTE ADULT - ASSESSMENT
80yo Male PMHx CVA 2012, HTN, HLD , hernia surgery with testicle resection 2015, umbilical hernia repair 12/2019,  transferred from [place] ED to [place] ED with s/p seizure activity  and bradycardia into the 30s.  Arrived to [place] hypothermic and hypotensive .Patient  required intubation ph 7.1 with CO2 85 due to acute hypoxic respiratory failure on arrival to ER ,placed on Levophed drip due to hypotension Admitted for septic workup and evaluation ,send blood and urine cx, serial lactate levels, monitor vitals closely hydration ,monitor urine output and renal profile,iv abx initiated -given ceftriaxone in ER and seen by ID consult .Pulmonology and cardiology consults are called .Patient admitted  to ICU with septic shock ,noted to have  tongue bite and AMS ,neurology evaluation called  Admitted  to intensive care  unit for monitoring , to rule out ami -send 3 sets of cardiac enzymes to rule out acute coronary event, obtain ECHO to evaluate LVEF, cardiology consult  ,continue current sepsis management, ventilator management /O2 supply, anticoagulation plan as per cardiology consult Palliative care consult requested ,to discuss advance directives and complete MOLST .Tried to reach family to obtain details of past medical hx - left a message for daughter .not able to reach son ( non valid number )[place]  ED team spoke with patient's daughter, who stated her father fell to the ground on the floor of his motel room about 4 days ago and was unable to get up. She had been caring for him on the floor since and did not call 911 for assistance. Today she found him unresponsive, with blood in his mouth, and incontinent of urine.  CT brain was negative. A CODE STROKE was called, and a CTA head/neck were performed, which was negative for large vessel occlusion but concerning for a density anterior to the ascending aorta possibly right atrial thrombus. Also revealed enlarged, multinodular thyroid. TPA criteria was not met, as patient had unknown onset of symptoms. Patient was transferred to [facility] ED for further care .He developed  multiple witnessed seizures in [place] ED, temporarily resolved with Ativan administration . CT/CTA brain NAD . Seen by neurologist and EEG ordered to r/o status epilepticus as etiology of ongoing encephalopathy, probably will require  MRI once stabilized from cardiac and respiratory standpoint.

## 2020-07-29 NOTE — PROGRESS NOTE ADULT - SUBJECTIVE AND OBJECTIVE BOX
Chief Complaint: Unresponsiveness    Interval Events: BP and HR improved. Remains intubated.    Review of Systems:  Unable to obtain    Physical Exam:  Vitals:        Vital Signs Last 24 Hrs  T(C): 37.2 (29 Jul 2020 07:36), Max: 37.2 (28 Jul 2020 23:54)  T(F): 99 (29 Jul 2020 07:36), Max: 99 (28 Jul 2020 23:54)  HR: 57 (29 Jul 2020 08:24) (36 - 57)  BP: 165/71 (29 Jul 2020 07:00) (77/47 - 165/71)  BP(mean): 102 (29 Jul 2020 07:00) (80 - 103)  RR: 17 (29 Jul 2020 07:00) (8 - 28)  SpO2: 100% (29 Jul 2020 08:24) (55% - 100%)  General: Comatose  HEENT: ET tube in place  Neck: No JVD, no carotid bruit  Lungs: CTAB  CV: RRR, nl S1/S2, no M/R/G  Abdomen: S/NT/ND, +BS  Extremities: 2+ LE edema, no cyanosis  Neuro: Non-focal  Skin: No rash    Labs:                        9.2    6.35  )-----------( 154      ( 29 Jul 2020 05:58 )             28.8     07-29    146<H>  |  113<H>  |  32<H>  ----------------------------<  74  4.0   |  26  |  1.70<H>    Ca    8.4<L>      29 Jul 2020 05:58  Phos  2.9     07-29  Mg     2.1     07-29    TPro  6.0  /  Alb  2.3<L>  /  TBili  1.5<H>  /  DBili  x   /  AST  99<H>  /  ALT  72  /  AlkPhos  71  07-29    CARDIAC MARKERS ( 28 Jul 2020 13:01 )  .026 ng/mL / x     / 608 U/L / x     / x      CARDIAC MARKERS ( 28 Jul 2020 06:06 )  x     / x     / 769 U/L / x     / x      CARDIAC MARKERS ( 28 Jul 2020 06:01 )  .026 ng/mL / x     / x     / x     / x          PT/INR - ( 28 Jul 2020 13:01 )   PT: 14.0 sec;   INR: 1.21 ratio         PTT - ( 29 Jul 2020 07:56 )  PTT:93.0 sec    Telemetry: Sinus bradycardia

## 2020-07-29 NOTE — PROGRESS NOTE ADULT - ATTENDING COMMENTS
80yo Male PMHx CVA 2012, HTN, HLD , hernia surgery with testicle resection 2015, umbilical hernia repair 12/2019,  transferred from [place] ED to [facility] ED with s/p seizure activity  and bradycardia into the 30s.  Arrived to [facility] hypothermic and hypotensive .Patient  required intubation ph 7.1 with CO2 85 due to acute hypoxic respiratory failure on arrival to ER ,placed on Levophed drip due to hypotension Admitted for septic workup and evaluation ,send blood and urine cx, serial lactate levels, monitor vitals closely hydration ,monitor urine output and renal profile,iv abx initiated -given ceftriaxone in ER and seen by ID consult .Pulmonology and cardiology consults are called .Patient admitted  to ICU with septic shock ,noted to have  tongue bite and AMS ,neurology evaluation called  Admitted  to intensive care  unit for monitoring , to rule out ami -send 3 sets of cardiac enzymes to rule out acute coronary event, obtain ECHO to evaluate LVEF, cardiology consult  ,continue current sepsis management, ventilator management /O2 supply, anticoagulation plan as per cardiology consult Palliative care consult requested ,to discuss advance directives and complete MOLST .Tried to reach family to obtain details of past medical hx - left a message for daughter .not able to reach son ( non valid number )[place]  ED team spoke with patient's daughter, who stated her father fell to the ground on the floor of his motel room about 4 days ago and was unable to get up. She had been caring for him on the floor since and did not call 911 for assistance. Today she found him unresponsive, with blood in his mouth, and incontinent of urine.  CT brain was negative. A CODE STROKE was called, and a CTA head/neck were performed, which was negative for large vessel occlusion but concerning for a density anterior to the ascending aorta possibly right atrial thrombus. Also revealed enlarged, multinodular thyroid. TPA criteria was not met, as patient had unknown onset of symptoms. Patient was transferred to [facility] ED for further care .He developed  multiple witnessed seizures in [place] ED, temporarily resolved with Ativan administration . CT/CTA brain NAD . Seen by neurologist and EEG ordered to r/o status epilepticus as etiology of ongoing encephalopathy, probably will require  MRI once stabilized from cardiac and respiratory standpoint.

## 2020-07-29 NOTE — PROGRESS NOTE ADULT - SUBJECTIVE AND OBJECTIVE BOX
infectious diseases progress note:    CATHY SANCHEZ is a 79y y. o. Male patient    No concerning overnight events    Allergies    Allergy Status Unknown  No Known Allergies    Intolerances        ANTIBIOTICS/RELEVANT:  antimicrobials  cefTRIAXone   IVPB 1000 milliGRAM(s) IV Intermittent every 24 hours    immunologic:    OTHER:  chlorhexidine 0.12% Liquid 15 milliLiter(s) Oral Mucosa every 12 hours  lactated ringers. 1000 milliLiter(s) IV Continuous <Continuous>  lactated ringers. 1000 milliLiter(s) IV Continuous <Continuous>  levETIRAcetam  IVPB 1000 milliGRAM(s) IV Intermittent every 12 hours  LORazepam   Injectable 2 milliGRAM(s) IV Push every 1 hour PRN  pantoprazole  Injectable 40 milliGRAM(s) IV Push daily      Objective:  Vital Signs Last 24 Hrs  T(C): 37.2 (2020 07:36), Max: 37.2 (2020 23:54)  T(F): 99 (2020 07:36), Max: 99 (2020 23:54)  HR: 57 (2020 08:24) (36 - 57)  BP: 165/71 (2020 07:00) (77/47 - 165/71)  BP(mean): 102 (2020 07:00) (80 - 103)  RR: 17 (2020 07:00) (8 - 28)  SpO2: 100% (2020 08:24) (55% - 100%)    T(C): 37.2 (20 @ 07:36), Max: 37.2 (20 @ 23:54)  T(C): 37.2 (20 @ 07:36), Max: 37.2 (20 @ 23:54)  T(C): 37.2 (20 @ 07:36), Max: 37.2 (20 @ 23:54)    PHYSICAL EXAM:  HEENT: NC atraumatic, intubated  Neck: supple  Respiratory: no accessory muscle use, breathing comfortably on the vent  Cardiovascular: distant  Gastrointestinal: normal appearing, nondistended  Extremities: no clubbing, no cyanosis,  Neuro: pt not alert      LABS:                          9.2    6.35  )-----------( 154      ( 2020 05:58 )             28.8       6.35  @ 05:58  5.66  @ 23:45  3.87  @ 13:01  4.57  @ 06:01          146<H>  |  113<H>  |  32<H>  ----------------------------<  74  4.0   |  26  |  1.70<H>    Ca    8.4<L>      2020 05:58  Phos  2.9       Mg     2.1         TPro  6.0  /  Alb  2.3<L>  /  TBili  1.5<H>  /  DBili  x   /  AST  99<H>  /  ALT  72  /  AlkPhos  71        Creatinine, Serum: 1.70 mg/dL (20 @ 05:58)  Creatinine, Serum: 1.30 mg/dL (20 @ 13:01)  Creatinine, Serum: 1.57 mg/dL (20 @ 06:01)      PT/INR - ( 2020 13:01 )   PT: 14.0 sec;   INR: 1.21 ratio         PTT - ( 2020 07:56 )  PTT:93.0 sec  Urinalysis Basic - ( 2020 18:19 )    Color: Red / Appearance: Turbid / S.015 / pH: x  Gluc: x / Ketone: Trace  / Bili: Small / Urobili: 4   Blood: x / Protein: 500 mg/dL / Nitrite: Negative   Leuk Esterase: Trace / RBC: >50 /HPF / WBC 0-2   Sq Epi: x / Non Sq Epi: Occasional / Bacteria: x            INFLAMMATORY MARKERS  Auto Neutrophil #: 4.80 K/uL (20 @ 05:58)  Auto Lymphocyte #: 0.56 K/uL (20 @ 05:58)  Auto Neutrophil #: 3.32 K/uL (20 @ 13:01)  Auto Lymphocyte #: 0.23 K/uL (20 @ 13:01)  Auto Lymphocyte #: 0.35 K/uL (20 @ 06:01)  Auto Neutrophil #: 3.54 K/uL (20 @ 06:01)    Lactate, Blood: 0.8 mmol/L (20 @ 10:00)    Auto Eosinophil #: 0.11 K/uL (20 @ 05:58)  Auto Eosinophil #: 0.08 K/uL (20 @ 13:01)  Auto Eosinophil #: 0.21 K/uL (20 @ 06:01)        Procalcitonin, Serum: 0.08 ng/mL (20 @ 13:01)    Troponin I, Serum: .026 ng/mL (20 @ 13:01)  Troponin I, Serum: .026 ng/mL (20 @ 06:01)    Creatine Kinase, Serum: 608 U/L (20 @ 13:01)  Creatine Kinase, Serum: 769 U/L (20 @ 06:06)              Activated Partial Thromboplastin Time: 93.0 sec (20 @ 07:56)  Activated Partial Thromboplastin Time: >200.0 sec (20 @ 23:45)  Activated Partial Thromboplastin Time: 35.7 sec (20 @ 13:01)  INR: 1.21 ratio (20 @ 13:01)  Activated Partial Thromboplastin Time: 38.3 sec (20 @ 06:01)  INR: 1.14 ratio (20 @ 06:01)          MICROBIOLOGY:      LYME IgG/IgM Antibodies Result: 0.12 Index ( 20:00)          RADIOLOGY & ADDITIONAL STUDIES:

## 2020-07-29 NOTE — DIETITIAN INITIAL EVALUATION ADULT. - ENTERAL
Jevity 1.5 30mL/hr x 20 hrs, advance by 10mL Q8h until goal rate of 80mL/hr x 20 hrs achieved (2400cal, 102 gm protein)

## 2020-07-29 NOTE — PROGRESS NOTE ADULT - SUBJECTIVE AND OBJECTIVE BOX
PROGRESS NOTE  Patient is a 79y old  Male who presents with a chief complaint of Altered Mental Status, Acute Hypercapnic Hypoxemic Respiratory Failure (2020 11:20)  Chart and available morning labs /imaging are reviewed electronically , urgent issues addressed . More information  is being added upon completion of rounds , when more information is collected and management discussed with consultants , medical staff and social service/case management on the floor   OVERNIGHT  24 hour events:  Overnight poorly responsive, no seizures off propofol, off heparin 2/2 continued oral bleeding and hematuria.  Patient failed CPAP trial this AM. NGT PLACED .OFF HEPARIN DRIP NOW DUE TO HEMATURIA AND PPT ELEVATION   HPI:  80yo Male PMHx CVA , HTN, HLD , hernia surgery with testicle resection , umbilical hernia repair 2019,  transferred from [place] [facility] to [place] [facility] with s/p seizure activity  and bradycardia into the 30s.  Arrived to [place] hypothermic and hypotensive .Patient  required intubation ph 7.1 with CO2 85 due to acute hypoxic respiratory failure on arrival to ER ,placed on Levophed drip due to hypotension Admitted for septic workup and evaluation ,send blood and urine cx, serial lactate levels, monitor vitals closely hydration ,monitor urine output and renal profile,iv abx initiated -given ceftriaxone in ER and seen by ID consult .Pulmonology and cardiology consults are called .Patient admitted  to ICU with septic shock ,noted to have  tongue bite and AMS ,neurology evaluation called  Admitted  to intensive care  unit for monitoring , to rule out ami -send 3 sets of cardiac enzymes to rule out acute coronary event, obtain ECHO to evaluate LVEF, cardiology consult  ,continue current sepsis management, ventilator management /O2 supply, anticoagulation plan as per cardiology consult Palliative care consult requested ,to discuss advance directives and complete MOLST .Tried to reach family to obtain details of past medical hx - left a message for daughter .not able to reach son ( non valid number )[place]  ED team spoke with patient's daughter, who stated her father fell to the ground on the floor of his motel room about 4 days ago and was unable to get up. She had been caring for him on the floor since and did not call 911 for assistance. Today she found him unresponsive, with blood in his mouth, and incontinent of urine.  CT brain was negative. A CODE STROKE was called, and a CTA head/neck were performed, which was negative for large vessel occlusion but concerning for a density anterior to the ascending aorta possibly right atrial thrombus. Also revealed enlarged, multinodular thyroid. TPA criteria was not met, as patient had unknown onset of symptoms. [facility] was transferred [facility] care .He developed  multiple witnessed seizures in [place] ED, temporarily resolved with Ativan administration . CT/CTA brain NAD . Seen by neurologist and EEG ordered to r/o status epilepticus as etiology of ongoing encephalopathy, probably will require  MRI once stabilized from cardiac and respiratory standpoint. (2020 10:57)  PAST MEDICAL & SURGICAL HISTORY:  Chronic GERD  HTN (hypertension)  HLD (hyperlipidemia)  CVA (cerebral vascular accident)  High cholesterol  HTN (hypertension)  History of surgical removal of testicle  H/O hernia repair  No significant past surgical history  MEDICATIONS  (STANDING):  albumin human 25% IVPB 50 milliLiter(s) IV Intermittent every 6 hours  ALBUTerol    90 MICROgram(s) HFA Inhaler 4 Puff(s) Inhalation every 6 hours  atorvastatin 10 milliGRAM(s) Oral at bedtime  cefTRIAXone   IVPB 1000 milliGRAM(s) IV Intermittent every 24 hours  chlorhexidine 0.12% Liquid 15 milliLiter(s) Oral Mucosa every 12 hours  furosemide   Injectable 60 milliGRAM(s) IV Push once  hydrALAZINE 25 milliGRAM(s) Oral three times a day  ipratropium 17 MICROgram(s) HFA Inhaler 4 Puff(s) Inhalation every 6 hours  lactated ringers. 1000 milliLiter(s) (120 mL/Hr) IV Continuous <Continuous>  lactated ringers. 1000 milliLiter(s) (150 mL/Hr) IV Continuous <Continuous>  levETIRAcetam  IVPB 500 milliGRAM(s) IV Intermittent every 12 hours  pantoprazole  Injectable 40 milliGRAM(s) IV Push daily  MEDICATIONS  (PRN):  LORazepam   Injectable 2 milliGRAM(s) IV Push every 1 hour PRN Seizure  OBJECTIVE  T(C): 36.7 (20 @ 12:46), Max: 37.2 (20 @ 23:54)  HR: 53 (20 @ 14:00) (47 - 71)  BP: 192/86 (20 @ 14:00) (104/74 - 192/86)  RR: 27 (20 @ 14:00) (0 - 97)  SpO2: 98% (20 @ 14:00) (55% - 100%)  Wt(kg): --  I&O's Summary  2020 07:01  -  2020 07:00  --------------------------------------------------------  IN: 2665 mL / OUT: 375 mL / NET: 2290 mL  CANTOR -HEMATURIA   General: Comatose  HEENT: ET tube in place   Neck: No JVD, no carotid bruit  Lungs: CTAB  CV: RRR, nl S1/S2, no M/R/G  Abdomen: S/NT/ND, +BS NGT   Extremities: 2+ LE edema, no cyanosis  Neuro: Non-focal  Skin: No rash  LABS:                        9.2    6.35  )-----------( 154      ( 2020 05:58 )             28.8         146<H>  |  113<H>  |  32<H>  ----------------------------<  74  4.0   |  26  |  1.70<H>    Ca    8.4<L>      2020 05:58  Phos  2.9       Mg     2.1         TPro  6.0  /  Alb  2.3<L>  /  TBili  1.5<H>  /  DBili  x   /  AST  99<H>  /  ALT  72  /  AlkPhos  71      CAPILLARY BLOOD GLUCOSE        PT/INR - ( 2020 13:01 )   PT: 14.0 sec;   INR: 1.21 ratio         PTT - ( 2020 07:56 )  PTT:93.0 sec  Urinalysis Basic - ( 2020 18:19 )    Color: Red / Appearance: Turbid / S.015 / pH: x  Gluc: x / Ketone: Trace  / Bili: Small / Urobili: 4   Blood: x / Protein: 500 mg/dL / Nitrite: Negative   Leuk Esterase: Trace / RBC: >50 /HPF / WBC 0-2   Sq Epi: x / Non Sq Epi: Occasional / Bacteria: x        RADIOLOGY & ADDITIONAL TESTS:< from: Xray Chest 1 View- PORTABLE-Urgent (20 @ 13:46) >  EXAM:  XR CHEST PORTABLE URGENT 1V                            PROCEDURE DATE:  2020          INTERPRETATION:  Chest one view    HISTORY: NG tube placement    COMPARISON STUDY: Earlier the same day    Frontal expiratory view of the chest showsthe heart to be similarly enlarged in size. Endotracheal tube is present. Nasogastric tube reaches the body of the stomach.    The lungs show mild right base atelectasis. Defibrillator pad projects over the left chest and there is no definite evidence of pneumothorax.    IMPRESSION:  NG tube to stomach.    Thank you for the courtesy of this referral.    < end of copied text >  < from: TTE Echo Complete w/o Contrast w/ Doppler (20 @ 14:20) >   EXAM:  ECHO TTE WO CON COMP W DOPP         PROCEDURE DATE:  2020        INTERPRETATION:  Ordering Physician: AMNA COSBY    Indication: Acute respiratory failure    Technician: BRANDAN    Study Quality: Good  A complete echocardiographic study was performed utilizing standard protocol including spectral and color Doppler in all echocardiographic windows.    Height: 177 cm  Weight: 129 kg  BSA: 2.42 m2  Blood Pressure: 131/61 mmHg    MEASUREMENTS  IVS: 1.6 cm  PWT: 1.5 cm  LA: 4.6 cm  AO: 3.3 cm  LVIDd: 5.9 cm  LVIDs: 4.5 cm    LVEF: 45%  RVSP: N/A  RA Pressure: N/A    FINDINGS  Left Ventricle: The left ventricle is dilated. Increased left ventricular wall thickness. There is global hypokinesis. The left ventricular systolic function is mildly reduced with an estimated EF of 45-50%.  Right Ventricle: The right ventricle is normal in size and function.  Left Atrium: The left atrium is dilated.  Right Atrium: The right atrium is dilated.  Mitral Valve: The mitral valve is structurally normal. Trace mitral regurgitation.  Aortic Valve: Aortic valve sclerosis. No aortic regurgitation.  Tricuspid Valve: The tricuspid valve is structurally normal. Trace tricuspid regurgitation. Inadequate tricuspid regurgitation to estimate pulmonary artery systolic pressure.  Pulmonic Valve: The pulmonic valve is grossly normal. Trace pulmonic regurgitation.  Diastolic Function: Diastolic dysfunction with elevated left ventricular filling pressures.  Pericardium/Pleura: No pericardial effusion visualized.  Aorta: The aortic root is normal in size.  CONCLUSIONS:  1. Mildly reduced left ventricular systolic function.  2. Dilated left ventricle with increased left ventricular wall thickness.  3. Bi-atrial enlargement.  < end of copied text >   reviewed elctronically  ASSESSMENT/PLAN:

## 2020-07-29 NOTE — PROGRESS NOTE ADULT - SUBJECTIVE AND OBJECTIVE BOX
Patient is a 79y old  Male who presents with a chief complaint of Altered Mental Status, Acute Hypercapnic Hypoxemic Respiratory Failure (2020 11:20)    24 hour events: ***    REVIEW OF SYSTEMS  Constitutional: No fever, chills, fatigue  Neuro: No headache, numbness, weakness  Resp: No cough, wheezing, shortness of breath  CVS: No chest pain, palpitations, leg swelling  GI: No abdominal pain, nausea, vomiting, diarrhea   : No dysuria, frequency, incontinence  Skin: No itching, burning, rashes, or lesions   Msk: No joint pain or swelling  Psych: No depression, anxiety, mood swings  Heme: No bleeding    T(F): 99 (20 @ 07:36), Max: 99 (20 @ 23:54)  HR: 57 (20 @ 08:24) (34 - 62)  BP: 165/71 (20 @ 07:00) (77/47 - 165/71)  RR: 17 (20 @ 07:00) (8 - 28)  SpO2: 100% (20 @ 08:24) (55% - 100%)  Wt(kg): --    Mode: AC/ CMV (Assist Control/ Continuous Mandatory Ventilation), RR (machine): 20, TV (machine): 550, FiO2: 30, PEEP: 5  CAPILLARY BLOOD GLUCOSE        I&O's Summary     @ 07:01  -   @ 07:00  --------------------------------------------------------  IN: 2665 mL / OUT: 375 mL / NET: 2290 mL      PHYSICAL EXAM  General:   CNS:   HEENT:   Resp:   CVS:   Abd:   Ext:   Skin:     MEDICATIONS  cefTRIAXone   IVPB IV Intermittent          levETIRAcetam  IVPB IV Intermittent  LORazepam   Injectable IV Push PRN        pantoprazole  Injectable IV Push      lactated ringers. IV Continuous  lactated ringers. IV Continuous      chlorhexidine 0.12% Liquid Oral Mucosa                            9.2    6.35  )-----------( 154      ( 2020 05:58 )             28.8       07-29    146<H>  |  113<H>  |  32<H>  ----------------------------<  74  4.0   |  26  |  1.70<H>    Ca    8.4<L>      2020 05:58  Phos  2.9       Mg     2.1         TPro  6.0  /  Alb  2.3<L>  /  TBili  1.5<H>  /  DBili  x   /  AST  99<H>  /  ALT  72  /  AlkPhos  71      Lactate 0.8           28 @ 10:00      CARDIAC MARKERS ( 2020 13:01 )  .026 ng/mL / x     / 608 U/L / x     / x      CARDIAC MARKERS ( 2020 06:06 )  x     / x     / 769 U/L / x     / x      CARDIAC MARKERS ( 2020 06:01 )  .026 ng/mL / x     / x     / x     / x          PT/INR - ( 2020 13:01 )   PT: 14.0 sec;   INR: 1.21 ratio         PTT - ( 2020 07:56 )  PTT:93.0 sec  Urinalysis Basic - ( 2020 18:19 )    Color: Red / Appearance: Turbid / S.015 / pH: x  Gluc: x / Ketone: Trace  / Bili: Small / Urobili: 4   Blood: x / Protein: 500 mg/dL / Nitrite: Negative   Leuk Esterase: Trace / RBC: >50 /HPF / WBC 0-2   Sq Epi: x / Non Sq Epi: Occasional / Bacteria: x            Radiology: ***  Bedside lung ultrasound: ***  Bedside ECHO: ***    CENTRAL LINE: N          DATE INSERTED:              REMOVE: Y/N  CANTOR: Y                       DATE INSERTED:              REMOVE: Y/N  A-LINE: N                       DATE INSERTED:              REMOVE: Y/N    GLOBAL ISSUE/BEST PRACTICE  Analgesia:   Sedation:   CAM-ICU:   HOB elevation: yes  Stress ulcer prophylaxis:   VTE prophylaxis:   Glycemic control:   Nutrition:     CODE STATUS: ***  [facility] discussion: Y Patient is a 79y old  Male who presents with a chief complaint of Altered Mental Status, Acute Hypercapnic Hypoxemic Respiratory Failure (2020 11:20)    24 hour events: Patient seen and examined at bedside.     REVIEW OF SYSTEMS  Unable to obtain meaningful ROS    T(F): 99 (20 @ 07:36), Max: 99 (20 @ 23:54)  HR: 57 (20 @ 08:24) (34 - 62)  BP: 165/71 (20 @ 07:00) (77/47 - 165/71)  RR: 17 (20 @ 07:00) (8 - 28)  SpO2: 100% (20 @ 08:24) (55% - 100%)  Wt(kg): --    Mode: AC/ CMV (Assist Control/ Continuous Mandatory Ventilation), RR (machine): 20, TV (machine): 550, FiO2: 30, PEEP: 5  CAPILLARY BLOOD GLUCOSE        I&O's Summary     @ 07:01  -   @ 07:00  --------------------------------------------------------  IN: 2665 mL / OUT: 375 mL / NET: 2290 mL      PHYSICAL EXAM  General:   CNS:   HEENT:   Resp:   CVS:   Abd:   Ext:   Skin:     MEDICATIONS  cefTRIAXone   IVPB IV Intermittent          levETIRAcetam  IVPB IV Intermittent  LORazepam   Injectable IV Push PRN        pantoprazole  Injectable IV Push      lactated ringers. IV Continuous  lactated ringers. IV Continuous      chlorhexidine 0.12% Liquid Oral Mucosa                            9.2    6.35  )-----------( 154      ( 2020 05:58 )             28.8       07-29    146<H>  |  113<H>  |  32<H>  ----------------------------<  74  4.0   |  26  |  1.70<H>    Ca    8.4<L>      2020 05:58  Phos  2.9       Mg     2.1         TPro  6.0  /  Alb  2.3<L>  /  TBili  1.5<H>  /  DBili  x   /  AST  99<H>  /  ALT  72  /  AlkPhos  71  07-29    Lactate 0.8           07-28 @ 10:00      CARDIAC MARKERS ( 2020 13:01 )  .026 ng/mL / x     / 608 U/L / x     / x      CARDIAC MARKERS ( 2020 06:06 )  x     / x     / 769 U/L / x     / x      CARDIAC MARKERS ( 2020 06:01 )  .026 ng/mL / x     / x     / x     / x          PT/INR - ( 2020 13:01 )   PT: 14.0 sec;   INR: 1.21 ratio         PTT - ( 2020 07:56 )  PTT:93.0 sec  Urinalysis Basic - ( 2020 18:19 )    Color: Red / Appearance: Turbid / S.015 / pH: x  Gluc: x / Ketone: Trace  / Bili: Small / Urobili: 4   Blood: x / Protein: 500 mg/dL / Nitrite: Negative   Leuk Esterase: Trace / RBC: >50 /HPF / WBC 0-2   Sq Epi: x / Non Sq Epi: Occasional / Bacteria: x            Radiology: ***  Bedside lung ultrasound: ***  Bedside ECHO: ***    CENTRAL LINE: N          DATE INSERTED:              REMOVE: Y/N  CANTOR: Y                       DATE INSERTED:              REMOVE: Y/N  A-LINE: N                       DATE INSERTED:              REMOVE: Y/N    GLOBAL ISSUE/BEST PRACTICE  Analgesia:   Sedation:   CAM-ICU:   HOB elevation: yes  Stress ulcer prophylaxis:   VTE prophylaxis:   Glycemic control:   Nutrition:     CODE STATUS: Full  GO discussion: Y Patient is a 79y old  Male who presents with a chief complaint of Altered Mental Status, Acute Hypercapnic Hypoxemic Respiratory Failure (2020 11:20)    24 hour events: Patient seen and examined at bedside. Patient failed CPAP trial this AM.     REVIEW OF SYSTEMS  Unable to obtain meaningful ROS due to respiratory status    T(F): 99 (20 @ 07:36), Max: 99 (20 @ 23:54)  HR: 57 (20 @ 08:24) (34 - 62)  BP: 165/71 (20 @ 07:00) (77/47 - 165/71)  RR: 17 (20 @ 07:00) (8 - 28)  SpO2: 100% (20 @ 08:24) (55% - 100%)  Wt(kg): --    Mode: AC/ CMV (Assist Control/ Continuous Mandatory Ventilation), RR (machine): 20, TV (machine): 550, FiO2: 30, PEEP: 5  CAPILLARY BLOOD GLUCOSE        I&O's Summary     @ 07:01  -   @ 07:00  --------------------------------------------------------  IN: 2665 mL / OUT: 375 mL / NET: 2290 mL      PHYSICAL EXAM  General:   CNS:   HEENT:   Resp:   CVS:   Abd:   Ext:   Skin:     MEDICATIONS  cefTRIAXone   IVPB IV Intermittent          levETIRAcetam  IVPB IV Intermittent  LORazepam   Injectable IV Push PRN        pantoprazole  Injectable IV Push      lactated ringers. IV Continuous  lactated ringers. IV Continuous      chlorhexidine 0.12% Liquid Oral Mucosa                            9.2    6.35  )-----------( 154      ( 2020 05:58 )             28.8       07-29    146<H>  |  113<H>  |  32<H>  ----------------------------<  74  4.0   |  26  |  1.70<H>    Ca    8.4<L>      2020 05:58  Phos  2.9       Mg     2.1         TPro  6.0  /  Alb  2.3<L>  /  TBili  1.5<H>  /  DBili  x   /  AST  99<H>  /  ALT  72  /  AlkPhos  71      Lactate 0.8            @ 10:00      CARDIAC MARKERS ( 2020 13:01 )  .026 ng/mL / x     / 608 U/L / x     / x      CARDIAC MARKERS ( 2020 06:06 )  x     / x     / 769 U/L / x     / x      CARDIAC MARKERS ( 2020 06:01 )  .026 ng/mL / x     / x     / x     / x          PT/INR - ( 2020 13:01 )   PT: 14.0 sec;   INR: 1.21 ratio         PTT - ( 2020 07:56 )  PTT:93.0 sec  Urinalysis Basic - ( 2020 18:19 )    Color: Red / Appearance: Turbid / S.015 / pH: x  Gluc: x / Ketone: Trace  / Bili: Small / Urobili: 4   Blood: x / Protein: 500 mg/dL / Nitrite: Negative   Leuk Esterase: Trace / RBC: >50 /HPF / WBC 0-2   Sq Epi: x / Non Sq Epi: Occasional / Bacteria: x            Radiology: ***  Bedside lung ultrasound: ***  Bedside ECHO: ***    CENTRAL LINE: N          DATE INSERTED:              REMOVE: Y/N  CANTOR: Y                       DATE INSERTED:              REMOVE: Y/N  A-LINE: N                       DATE INSERTED:              REMOVE: Y/N    GLOBAL ISSUE/BEST PRACTICE  Analgesia:   Sedation:   CAM-ICU:   HOB elevation: yes  Stress ulcer prophylaxis:   VTE prophylaxis:   Glycemic control:   Nutrition:     CODE STATUS: Full  [facility] discussion: Y Patient is a 79y old  Male who presents with a chief complaint of Altered Mental Status, Acute Hypercapnic Hypoxemic Respiratory Failure (2020 11:20)    24 hour events: Patient seen and examined at bedside. Patient failed CPAP trial this AM.     REVIEW OF SYSTEMS  Unable to obtain meaningful ROS as patient intubated    T(F): 99 (20 @ 07:36), Max: 99 (20 @ 23:54)  HR: 57 (20 @ 08:24) (34 - 62)  BP: 165/71 (20 @ 07:00) (77/47 - 165/71)  RR: 17 (20 @ 07:00) (8 - 28)  SpO2: 100% (20 @ 08:24) (55% - 100%)  Wt(kg): --    Mode: AC/ CMV (Assist Control/ Continuous Mandatory Ventilation), RR (machine): 20, TV (machine): 550, FiO2: 30, PEEP: 5  CAPILLARY BLOOD GLUCOSE        I&O's Summary     @ 07:01  -   @ 07:00  --------------------------------------------------------  IN: 2665 mL / OUT: 375 mL / NET: 2290 mL      PHYSICAL EXAM  General: Intubated and sedated  CNS: non-focal  HEENT: ET tube in place  Resp: CTAB  CVS: RRR, nl S1/S2, no M/R/G  Abd: S/NT/ND, +BS  Ext: 2+ LE edema, no cyanosis  Skin: No rash    MEDICATIONS  cefTRIAXone   IVPB IV Intermittent          levETIRAcetam  IVPB IV Intermittent  LORazepam   Injectable IV Push PRN        pantoprazole  Injectable IV Push      lactated ringers. IV Continuous  lactated ringers. IV Continuous      chlorhexidine 0.12% Liquid Oral Mucosa                            9.2    6.35  )-----------( 154      ( 2020 05:58 )             28.8       07    146<H>  |  113<H>  |  32<H>  ----------------------------<  74  4.0   |  26  |  1.70<H>    Ca    8.4<L>      2020 05:58  Phos  2.9       Mg     2.1         TPro  6.0  /  Alb  2.3<L>  /  TBili  1.5<H>  /  DBili  x   /  AST  99<H>  /  ALT  72  /  AlkPhos  71      Lactate 0.8            @ 10:00      CARDIAC MARKERS ( 2020 13:01 )  .026 ng/mL / x     / 608 U/L / x     / x      CARDIAC MARKERS ( 2020 06:06 )  x     / x     / 769 U/L / x     / x      CARDIAC MARKERS ( 2020 06:01 )  .026 ng/mL / x     / x     / x     / x          PT/INR - ( 2020 13:01 )   PT: 14.0 sec;   INR: 1.21 ratio         PTT - ( 2020 07:56 )  PTT:93.0 sec  Urinalysis Basic - ( 2020 18:19 )    Color: Red / Appearance: Turbid / S.015 / pH: x  Gluc: x / Ketone: Trace  / Bili: Small / Urobili: 4   Blood: x / Protein: 500 mg/dL / Nitrite: Negative   Leuk Esterase: Trace / RBC: >50 /HPF / WBC 0-2   Sq Epi: x / Non Sq Epi: Occasional / Bacteria: x            Radiology: ***  Bedside lung ultrasound: ***  Bedside ECHO: ***    CENTRAL LINE: N          DATE INSERTED:              REMOVE: Y/N  CANTOR: Y                       DATE INSERTED:              REMOVE: Y/N  A-LINE: N                       DATE INSERTED:              REMOVE: Y/N    GLOBAL ISSUE/BEST PRACTICE  Analgesia: yes  Sedation: yes  HOB elevation: yes  Stress ulcer prophylaxis: yes  VTE prophylaxis:   Glycemic control:   Nutrition:     CODE STATUS: Full  [facility] discussion: Y Patient is a 79y old  Male who presents with a chief complaint of Altered Mental Status, Acute Hypercapnic Hypoxemic Respiratory Failure (2020 11:20)    24 hour events: Patient seen and examined at bedside. Patient failed CPAP trial this AM.     REVIEW OF SYSTEMS  Unable to obtain meaningful ROS as patient intubated    T(F): 99 (20 @ 07:36), Max: 99 (20 @ 23:54)  HR: 57 (20 @ 08:24) (34 - 62)  BP: 165/71 (20 @ 07:00) (77/47 - 165/71)  RR: 17 (20 @ 07:00) (8 - 28)  SpO2: 100% (20 @ 08:24) (55% - 100%)  Wt(kg): --    Mode: AC/ CMV (Assist Control/ Continuous Mandatory Ventilation), RR (machine): 20, TV (machine): 550, FiO2: 30, PEEP: 5  CAPILLARY BLOOD GLUCOSE        I&O's Summary     @ 07:01  -   @ 07:00  --------------------------------------------------------  IN: 2665 mL / OUT: 375 mL / NET: 2290 mL      PHYSICAL EXAM  General: Intubated and sedated  CNS: non-focal  HEENT: ET tube in place  Resp: CTAB  CVS: RRR, nl S1/S2, no M/R/G  Abd: S/NT/ND, +BS  Ext: 2+ LE edema, no cyanosis  Skin: No rash    MEDICATIONS  cefTRIAXone   IVPB IV Intermittent          levETIRAcetam  IVPB IV Intermittent  LORazepam   Injectable IV Push PRN        pantoprazole  Injectable IV Push      lactated ringers. IV Continuous  lactated ringers. IV Continuous      chlorhexidine 0.12% Liquid Oral Mucosa                            9.2    6.35  )-----------( 154      ( 2020 05:58 )             28.8       07    146<H>  |  113<H>  |  32<H>  ----------------------------<  74  4.0   |  26  |  1.70<H>    Ca    8.4<L>      2020 05:58  Phos  2.9       Mg     2.1         TPro  6.0  /  Alb  2.3<L>  /  TBili  1.5<H>  /  DBili  x   /  AST  99<H>  /  ALT  72  /  AlkPhos  71      Lactate 0.8            @ 10:00      CARDIAC MARKERS ( 2020 13:01 )  .026 ng/mL / x     / 608 U/L / x     / x      CARDIAC MARKERS ( 2020 06:06 )  x     / x     / 769 U/L / x     / x      CARDIAC MARKERS ( 2020 06:01 )  .026 ng/mL / x     / x     / x     / x          PT/INR - ( 2020 13:01 )   PT: 14.0 sec;   INR: 1.21 ratio         PTT - ( 2020 07:56 )  PTT:93.0 sec  Urinalysis Basic - ( 2020 18:19 )    Color: Red / Appearance: Turbid / S.015 / pH: x  Gluc: x / Ketone: Trace  / Bili: Small / Urobili: 4   Blood: x / Protein: 500 mg/dL / Nitrite: Negative   Leuk Esterase: Trace / RBC: >50 /HPF / WBC 0-2   Sq Epi: x / Non Sq Epi: Occasional / Bacteria: x    CENTRAL LINE: N          DATE INSERTED:              REMOVE: Y/N  CANTOR: Y                       DATE INSERTED:              REMOVE: Y/N  A-LINE: N                       DATE INSERTED:              REMOVE: Y/N    GLOBAL ISSUE/BEST PRACTICE  Analgesia: yes  Sedation: yes  HOB elevation: yes  Stress ulcer prophylaxis: yes  VTE prophylaxis:   Glycemic control:   Nutrition:     CODE STATUS: Full  [facility] discussion: Y Patient is a 79y old  Male who presents with a chief complaint of Altered Mental Status, Acute Hypercapnic Hypoxemic Respiratory Failure (2020 11:20)    24 hour events: Patient seen and examined at bedside. Overnight poorly responsive, no seizures off propofol, off heparin 2/2 continued oral bleeding and hematuria.  Patient failed CPAP trial this AM.     REVIEW OF SYSTEMS  Unable to obtain meaningful ROS as patient intubated    T(F): 99 (20 @ 07:36), Max: 99 (20 @ 23:54)  HR: 57 (20 @ 08:24) (34 - 62)  BP: 165/71 (20 @ 07:00) (77/47 - 165/71)  RR: 17 (20 @ 07:00) (8 - 28)  SpO2: 100% (20 @ 08:24) (55% - 100%)  Wt(kg): --    Mode: AC/ CMV (Assist Control/ Continuous Mandatory Ventilation), RR (machine): 20, TV (machine): 550, FiO2: 30, PEEP: 5  CAPILLARY BLOOD GLUCOSE        I&O's Summary     @ 07:01  -   @ 07:00  --------------------------------------------------------  IN: 2665 mL / OUT: 375 mL / NET: 2290 mL      PHYSICAL EXAM  General: Intubated, off sedation  CNS: non-focal  HEENT: ET tube in place  Resp: CTAB  CVS: RRR, nl S1/S2, no M/R/G  Abd: very ascitic, pitting edema, firm likely 2/2 underlying fluid, withdrawing to deep palpation  Ext: 2+ LE edema, no cyanosis  Skin: No rash    MEDICATIONS  cefTRIAXone   IVPB IV Intermittent          levETIRAcetam  IVPB IV Intermittent  LORazepam   Injectable IV Push PRN        pantoprazole  Injectable IV Push      lactated ringers. IV Continuous  lactated ringers. IV Continuous      chlorhexidine 0.12% Liquid Oral Mucosa                            9.2    6.35  )-----------( 154      ( 2020 05:58 )             28.8           146<H>  |  113<H>  |  32<H>  ----------------------------<  74  4.0   |  26  |  1.70<H>    Ca    8.4<L>      2020 05:58  Phos  2.9       Mg     2.1         TPro  6.0  /  Alb  2.3<L>  /  TBili  1.5<H>  /  DBili  x   /  AST  99<H>  /  ALT  72  /  AlkPhos  71      Lactate 0.8            @ 10:00      CARDIAC MARKERS ( 2020 13:01 )  .026 ng/mL / x     / 608 U/L / x     / x      CARDIAC MARKERS ( 2020 06:06 )  x     / x     / 769 U/L / x     / x      CARDIAC MARKERS ( 2020 06:01 )  .026 ng/mL / x     / x     / x     / x          PT/INR - ( 2020 13:01 )   PT: 14.0 sec;   INR: 1.21 ratio         PTT - ( 2020 07:56 )  PTT:93.0 sec  Urinalysis Basic - ( 2020 18:19 )        Color: Red / Appearance: Turbid / S.015 / pH: x  Gluc: x / Ketone: Trace  / Bili: Small / Urobili: 4   Blood: x / Protein: 500 mg/dL / Nitrite: Negative   Leuk Esterase: Trace / RBC: >50 /HPF / WBC 0-2   Sq Epi: x / Non Sq Epi: Occasional / Bacteria: x    EXAM:  CT ANGIO NECK (W)AW IC                          PROCEDURE DATE:  2020    IMPRESSION:  Extremely limited study due to extensive motion. No major branch occlusion or aneurysm noted within the intracranial vasculature. Atherosclerotic stenosis involving the origins of the internal carotid arteries, likely less than 50%.    At the inferior margin of the examination, there is a questionable area of fluid and contrast density anterior to the ascending aorta, probably the right atrium. Apparent filling defect likely related to contrast mixing, but thrombus is not entirely excluded. Consider dedicated CT chest.    Enlarged, multinodular thyroid.      PROCEDURE DATE:  2020      eXAM:  XR CHEST AP OR PA 1V         There is prominence of the central pulmonary vasculature, which may reflect venous hypertension.  Low lung volumes are present.       EXAM:  ECHO TTE WO CON COMP W DOPP      PROCEDURE DATE:  2020    CONCLUSIONS:  1. Mildly reduced left ventricular systolic function.  2. Dilated left ventricle with increased left ventricular wall thickness.  3. Bi-atrial enlargement.      CENTRAL LINE: N          DATE INSERTED:              REMOVE: Y/N  CANTOR: Y                       DATE INSERTED:              REMOVE: Y/N  A-LINE: N                       DATE INSERTED:              REMOVE: Y/N    GLOBAL ISSUE/BEST PRACTICE  Analgesia: yes  Sedation: yes  HOB elevation: yes  Stress ulcer prophylaxis: yes  VTE prophylaxis:   Glycemic control:   Nutrition:     CODE STATUS: Full  [facility] discussion: Y Patient is a 79y old  Male who presents with a chief complaint of Altered Mental Status, Acute Hypercapnic Hypoxemic Respiratory Failure (2020 11:20)    24 hour events: Patient seen and examined at bedside. Overnight poorly responsive, no seizures off propofol, off heparin 2/2 continued oral bleeding and hematuria.  Patient failed CPAP trial this AM.     REVIEW OF SYSTEMS  Unable to obtain meaningful ROS as patient intubated    T(F): 99 (20 @ 07:36), Max: 99 (20 @ 23:54)  HR: 57 (20 @ 08:24) (34 - 62)  BP: 165/71 (20 @ 07:00) (77/47 - 165/71)  RR: 17 (20 @ 07:00) (8 - 28)  SpO2: 100% (20 @ 08:24) (55% - 100%)  Wt(kg): --    Mode: AC/ CMV (Assist Control/ Continuous Mandatory Ventilation), RR (machine): 20, TV (machine): 550, FiO2: 30, PEEP: 5  CAPILLARY BLOOD GLUCOSE        I&O's Summary     @ 07:01  -   @ 07:00  --------------------------------------------------------  IN: 2665 mL / OUT: 375 mL / NET: 2290 mL      PHYSICAL EXAM  General: Intubated, off sedation  CNS: non-focal  HEENT: ET tube in place  Resp: CTAB  CVS: RRR, nl S1/S2, no M/R/G  Abd: very ascitic, pitting edema, firm likely 2/2 underlying fluid, withdrawing to deep palpation; impressive scrotal edema  Ext: 2+ LE edema, no cyanosis  Skin: No rash    MEDICATIONS  cefTRIAXone   IVPB IV Intermittent          levETIRAcetam  IVPB IV Intermittent  LORazepam   Injectable IV Push PRN        pantoprazole  Injectable IV Push      lactated ringers. IV Continuous  lactated ringers. IV Continuous      chlorhexidine 0.12% Liquid Oral Mucosa                            9.2    6.35  )-----------( 154      ( 2020 05:58 )             28.8           146<H>  |  113<H>  |  32<H>  ----------------------------<  74  4.0   |  26  |  1.70<H>    Ca    8.4<L>      2020 05:58  Phos  2.9       Mg     2.1         TPro  6.0  /  Alb  2.3<L>  /  TBili  1.5<H>  /  DBili  x   /  AST  99<H>  /  ALT  72  /  AlkPhos  71      Lactate 0.8            @ 10:00      CARDIAC MARKERS ( 2020 13:01 )  .026 ng/mL / x     / 608 U/L / x     / x      CARDIAC MARKERS ( 2020 06:06 )  x     / x     / 769 U/L / x     / x      CARDIAC MARKERS ( 2020 06:01 )  .026 ng/mL / x     / x     / x     / x          PT/INR - ( 2020 13:01 )   PT: 14.0 sec;   INR: 1.21 ratio         PTT - ( 2020 07:56 )  PTT:93.0 sec  Urinalysis Basic - ( 2020 18:19 )        Color: Red / Appearance: Turbid / S.015 / pH: x  Gluc: x / Ketone: Trace  / Bili: Small / Urobili: 4   Blood: x / Protein: 500 mg/dL / Nitrite: Negative   Leuk Esterase: Trace / RBC: >50 /HPF / WBC 0-2   Sq Epi: x / Non Sq Epi: Occasional / Bacteria: x    EXAM:  CT ANGIO NECK (W)AW IC                          PROCEDURE DATE:  2020    IMPRESSION:  Extremely limited study due to extensive motion. No major branch occlusion or aneurysm noted within the intracranial vasculature. Atherosclerotic stenosis involving the origins of the internal carotid arteries, likely less than 50%.    At the inferior margin of the examination, there is a questionable area of fluid and contrast density anterior to the ascending aorta, probably the right atrium. Apparent filling defect likely related to contrast mixing, but thrombus is not entirely excluded. Consider dedicated CT chest.    Enlarged, multinodular thyroid.      PROCEDURE DATE:  2020      eXAM:  XR CHEST AP OR PA 1V         There is prominence of the central pulmonary vasculature, which may reflect venous hypertension.  Low lung volumes are present.       EXAM:  ECHO TTE WO CON COMP W DOPP      PROCEDURE DATE:  2020    CONCLUSIONS:  1. Mildly reduced left ventricular systolic function.  2. Dilated left ventricle with increased left ventricular wall thickness.  3. Bi-atrial enlargement.      CENTRAL LINE: N          DATE INSERTED:              REMOVE: Y/N  CANTOR: Y                       DATE INSERTED:              REMOVE: Y/N  A-LINE: N                       DATE INSERTED:              REMOVE: Y/N    GLOBAL ISSUE/BEST PRACTICE  Analgesia: yes  Sedation: yes  HOB elevation: yes  Stress ulcer prophylaxis: yes  VTE prophylaxis:   Glycemic control:   Nutrition:     CODE STATUS: Full  GOC discussion: Y

## 2020-07-29 NOTE — PROGRESS NOTE ADULT - ASSESSMENT
78yo Male  transferred from [place] ED to [facility] with s/p seizure activity  and bradycardia into the 30s.  Arrived to [facility] hypothermic and hypotensive .Patient  required intubation ph 7.1 with CO2 85 due to acute hypoxic respiratory failure on arrival to ER ,placed on Levophed drip due to hypotension Admitted for septic workup and evaluation limited history but apparently down for prolonged period prior to coming to ER

## 2020-07-29 NOTE — PROGRESS NOTE ADULT - ASSESSMENT
80 yo male, PMHx CVA 2012, HTN, who presented as a transfer from [facility] with altered mental status, possible post-ictal state following new onset seizure, with acute hypercapnic respiratory failure, transient shock distributive vs cardiogenic, LASHELL, and transaminitis.    Neuro: multiple witnessed seizures in ED, temporarily resolves with Ativan. CT/CTA brain without acute intracranial pathology. Start Propofol to raise seizure threshold. Start Keppra. Needs EEG to r/o status epilepticus as etiology of ongoing encephalopathy, and neuro consult, consider eventual MRI once stabilized from cardiac and respiratory standpoint.  Pulm: Intubated for acute hypercapnic respiratory failure, vent augmented to manipulate acid-base balance, repeat ABG with improving respiratory acidosis. Weaned to 30% FiO2  CV: Questionable R atrial thrombus on CTA neck, will start empiric full anticoagulation with Heparin gtt. Needs TTE, will perform bedside POCUS on arrival to ICU to guide further resuscitation vs deresuscitation. Uncertain etiology of bradycardia, check TFT, cortisol, continue warming. Hemodynamics stabilized, weaned off pressors, monitoring end points of perfusion.  GI: Transaminitis possible ischemic hepatitis due to shock state, continue to trend  Renal: LASHELL likely ATN due to hypoperfusion during shock state, monitoring renal indices and UOP. Avoid further nephrotoxins  ID: Possible occult sepsis, blood cultures sent, send UA/UCx, sputum cx if able to obtain specimen. Check procalcitonin. Received empiric abx with Ceftriaxone  Endo: Check TFT, cortisol as above  Heme: Heparin gtt as above 80 yo male, PMHx CVA 2012, HTN, who presented as a transfer from [facility] with altered mental status, possible post-ictal state following new onset seizure, with acute hypercapnic respiratory failure, transient shock distributive vs cardiogenic, LASHELL, and transaminitis.    Neuro: multiple witnessed seizures in ED, temporarily resolves with Ativan. CT/CTA brain without acute intracranial pathology. Start Propofol to raise seizure threshold. Start Keppra. Needs EEG to r/o status epilepticus as etiology of ongoing encephalopathy, and neuro consult, consider eventual MRI once stabilized from cardiac and respiratory standpoint.  Pulm: Intubated for acute hypercapnic respiratory failure, vent augmented to manipulate acid-base balance, repeat ABG with improving respiratory acidosis. Weaned to 30% FiO2  CV: Questionable R atrial thrombus on CTA neck, will start empiric full anticoagulation with Heparin gtt. Needs TTE, will perform bedside POCUS on arrival to ICU to guide further resuscitation vs deresuscitation. Uncertain etiology of bradycardia, check TFT, cortisol, continue warming. Hemodynamics stabilized, weaned off pressors, monitoring end points of perfusion.  GI: Transaminitis possible ischemic hepatitis due to shock state, continue to trend  Renal: LASHELL likely ATN due to hypoperfusion during shock state, monitoring renal indices and UOP. Avoid further nephrotoxins  ID: Continue empiric abx with Ceftriaxone. Possible occult sepsis, blood cultures sent, send UA/UCx, sputum cx if able to obtain specimen. Procalcitonin 0.08.   Endo: TSH wnl, cortisol wnl  Heme: S/p heparin gtt 80 yo male, PMHx CVA 2012, HTN, who presented as a transfer from [facility] with altered mental status, possible post-ictal state following new onset seizure, with acute hypercapnic respiratory failure, transient shock distributive vs cardiogenic, LASHELL, and transaminitis.    Neuro: multiple witnessed seizures in ED, temporarily resolves with Ativan. CTA head and neck with motion artifact, no obvious stenosis, questionable filling defect noted, possibly right atrial thrombus. Off Propofol. Decrease Keppra 1000 mg BID to 500 mg BID. F/u EEG to r/o status epilepticus as etiology of ongoing encephalopathy. May need MRI brain  Pulm: Intubated for acute hypercapnic respiratory failure, vent augmented to manipulate acid-base balance. Weaned to 30% FiO2  CV: Questionable R atrial thrombus on CTA neck, now off Heparin gtt. Echocardiogram with mild LV systolic dysfunction, no obvious right atrial thrombus. Hold Carvedilol for bradycardia. Start Hydralazine 25 mg TID. Restart Atorvastatin.   GI: Start tube feeds. Transaminitis possible ischemic hepatitis due to shock state, continue to trend  Renal: LASHELL likely ATN due to hypoperfusion during shock state, monitoring renal indices and UOP  ID: Continue empiric abx with Ceftriaxone. Possible occult sepsis, UA trace LE, f/u BCx UCx, sputum cx if able to obtain specimen. Procalcitonin 0.08.  Endo: TSH wnl, T3 low, cortisol wnl  Heme: Off heparin gtt  Dispo: Monitor in ICU 80 yo male, PMHx CVA 2012, HTN, who presented as a transfer from [facility] with altered mental status, possible post-ictal state following new onset seizure, with acute hypercapnic respiratory failure, transient shock distributive vs cardiogenic, LASHELL, and transaminitis.    Neuro: multiple witnessed seizures in ED, temporarily resolves with Ativan. CTA head and neck with motion artifact, no obvious stenosis, questionable filling defect noted, possibly right atrial thrombus. Off Propofol. Decrease Keppra 1000 mg BID to 500 mg BID. F/u EEG to r/o status epilepticus as etiology of ongoing encephalopathy. May need MRI brain  Pulm: Intubated for acute hypercapnic respiratory failure, vent augmented to manipulate acid-base balance. Weaned to 30% FiO2. Start Albumin 25% 50 mL q6h  CV: Questionable R atrial thrombus on CTA neck, now off Heparin gtt. Echocardiogram with mild LV systolic dysfunction, no obvious right atrial thrombus. Hold Carvedilol for bradycardia. Start Hydralazine 25 mg TID. Restart Atorvastatin.   GI: Start tube feeds. Transaminitis possible ischemic hepatitis due to shock state, continue to trend  Renal: LASHELL likely ATN due to hypoperfusion during shock state, monitoring renal indices and UOP. Given IV LR 1 L bolus x1, IV Lasix 60 mg  ID: Continue empiric abx with Ceftriaxone. Possible occult sepsis, UA trace LE, f/u BCx UCx, sputum cx if able to obtain specimen. Procalcitonin 0.08.  Endo: TSH wnl, T3 low, cortisol wnl  Heme: Off heparin gtt  Dispo: Monitor in ICU 78 yo male, PMHx CVA 2012, HTN, who presented as a transfer from [facility] with altered mental status, possible post-ictal state following new onset seizure, with acute hypercapnic respiratory failure, transient shock distributive vs cardiogenic, LASHELL, and transaminitis.    Neuro: multiple witnessed seizures in ED, temporarily resolves with Ativan. CTA head and neck with motion artifact, no obvious stenosis, questionable filling defect noted, possibly right atrial thrombus. Off Propofol.   - Decrease Keppra 1000 mg BID to 500 mg BID.   - F/u EEG to r/o status epilepticus as etiology of ongoing encephalopathy.   - May need MRI brain  Pulm: Intubated for acute hypercapnic respiratory failure, vent augmented to manipulate acid-base balance. Weaned to 30% FiO2. Start Albumin 25% 50 mL q6h  - SBT today  - No extubation until oral bleeding resolves, monitor bleeding  CV: Questionable R atrial thrombus on CTA neck, now off Heparin gtt 2/2 oral bleeding and hematuria. Trops WNL. Echocardiogram with mild LV systolic dysfunction, dilated LV w LVH, b/l atrial enlargement, no obvious right atrial thrombus. On POCUS of IVC appears non-fluid responsive.   - Hold Carvedilol for bradycardia, will revaluate need on 7/30  - Start Hydralazine 25 mg TID.   - Restart Atorvastatin  GI: Start tube feeds. Transaminitis possible ischemic hepatitis due to shock state, continue to trend  Renal: LASHELL likely ATN due to hypoperfusion during shock state, complicated by Rhabdomyolysis (CK downtrending, contd. dark hematuria), monitoring renal indices and UOP. On POCUS of IVC appears non-fluid responsive.   - Given IV LR 1 L bolus x1 for rhabdomyolysis   - IV Lasix 60 mg, monitor urine output  - Albumin 25% 50ml q6  ID: Rule out occult sepsis. UA trace LE. Last Procalcitonin 0.08.   - Continue empiric Ceftriaxone.    - f/u BCx, UCx, sputum cx if able to obtain specimen.   Endo: TSH wnl, T3 low, cortisol wnl; CT Angio w/ multinodular thyroid; will require outpatient workup  Heme: Off heparin gtt; no need for A/C at this time  - Consider starting ASA 7/30  Diet:  - Started tube feeds, s/p dietitian eval  Dispo: Monitor in ICU;   - contact daughter for collateral history and post-discharge planning 80 yo male, PMHx CVA 2012, HTN, who presented as a transfer from [facility] with altered mental status, possible post-ictal state following new onset seizure, with acute hypercapnic respiratory failure, transient shock distributive vs cardiogenic, LASHELL, and transaminitis. Hypothermia, bradycardia to 30's and multiple witnessed seizures in ED.     Neuro: Multiple witnessed seizures in ED, resolving with Ativan; likely metabolic encephalopathy CTA head and neck with motion artifact, no obvious stenosis, questionable filling defect noted, possibly right atrial thrombus. Off Propofol.   - Decrease Keppra 1000 mg BID to 500 mg BID.   - F/u EEG to r/o status epilepticus as etiology of ongoing encephalopathy.   - May need MRI brain  - Ativan 1 mg IVP 1 6 prn for breakthrough seizures  Pulm: Intubated for acute hypercapnic respiratory failure, vent augmented to manipulate acid-base balance. Weaned to 30% FiO2. Start Albumin 25% 50 mL q6h  - SBT today  - No extubation until oral bleeding resolves, monitor bleeding  CV: Questionable R atrial thrombus on CTA neck, now off Heparin gtt 2/2 oral bleeding and hematuria. Trops WNL. Echocardiogram with mild LV systolic dysfunction, dilated LV w LVH, b/l atrial enlargement, no obvious right atrial thrombus. On POCUS of IVC appears non-fluid responsive.   - Hold Carvedilol for bradycardia, will revaluate need on 7/30  - Start Hydralazine 25 mg TID.   - Restart Atorvastatin  GI: Start tube feeds. Transaminitis possible ischemic hepatitis due to shock state, continue to trend  Renal: LASHELL likely ATN due to hypoperfusion during shock state, complicated by rhabdomyolysis (CK downtrending, contd. dark hematuria), monitoring renal indices and UOP. On POCUS of IVC appears non-fluid responsive. Heavy proteinuria in addition to large blood and +LE on UA, likely underlying CKD.   - Given IV LR 1 L bolus x1 for rhabdomyolysis   - IV Lasix 60 mg, monitor urine output  - Albumin 25% 50ml q6  ID: Rule out occult sepsis. UA trace LE. Last Procalcitonin 0.08.   - Continue empiric Ceftriaxone.    - f/u BCx, UCx, sputum cx if able to obtain specimen.   Endo: TSH wnl, T3 low, cortisol wnl; CT Angio w/ multinodular thyroid; will require outpatient workup  Heme: Off heparin gtt; no need for A/C at this time  - Consider starting ASA 7/30  Diet:  - Started tube feeds, s/p dietitian eval  Dispo: Monitor in ICU;   - contact daughter for collateral history and post-discharge planning 78 yo male, PMHx CVA 2012, HTN, who presented as a transfer from [facility] with altered mental status, possible post-ictal state following new onset seizure, with acute hypercapnic respiratory failure, transient shock distributive vs cardiogenic, LASHELL, and transaminitis. Hypothermia, bradycardia to 30's and multiple witnessed seizures in ED.     Neuro: Multiple witnessed seizures in ED, resolving with Ativan; likely metabolic encephalopathy CTA head and neck with motion artifact, no obvious stenosis, questionable filling defect noted, possibly right atrial thrombus. Off Propofol.   - Decrease Keppra 1000 mg BID to 500 mg BID.   - F/u EEG to r/o status epilepticus as etiology of ongoing encephalopathy.   - May need MRI brain  - Ativan 1 mg IVP 1 6 prn for breakthrough seizures  Pulm: Intubated for acute hypercapnic respiratory failure, vent augmented to manipulate acid-base balance. Weaned to 30% FiO2. POCUS w b/l basilar consolidations.  - Start Albumin 25% 50 mL q6h  - SBT today  - No extubation until oral bleeding resolves, monitor bleeding  CV: Questionable R atrial thrombus on CTA neck, now off Heparin gtt 2/2 oral bleeding and hematuria. Trops WNL. Echocardiogram with mild LV systolic dysfunction, dilated LV w LVH, b/l atrial enlargement, no obvious right atrial thrombus. On POCUS of IVC appears non-fluid responsive.   - Hold Carvedilol for bradycardia, will revaluate need on 7/30  - Start Hydralazine 25 mg TID.   - Restart Atorvastatin  GI: Start tube feeds. Transaminitis possible ischemic hepatitis due to shock state, continue to trend  Renal: LASHELL likely ATN due to hypoperfusion during shock state, complicated by rhabdomyolysis (CK downtrending, contd. dark hematuria), monitoring renal indices and UOP. On POCUS of IVC appears non-fluid responsive. Heavy proteinuria in addition to large blood and +LE on UA, likely underlying CKD.   - Given IV LR 1 L bolus x1 for rhabdomyolysis   - IV Lasix 60 mg, monitor urine output  - Albumin 25% 50ml q6  ID: Rule out occult sepsis. UA trace LE. Last Procalcitonin 0.08.   - Continue empiric Ceftriaxone.    - f/u BCx, UCx, sputum cx if able to obtain specimen.   Endo: TSH wnl, T3 low, cortisol wnl; CT Angio w/ multinodular thyroid; will require outpatient workup  Heme: Off heparin gtt; no need for A/C at this time  - Consider starting ASA 7/30  Diet:  - Started tube feeds, s/p dietitian eval  Dispo: Monitor in ICU;   - contact daughter for collateral history and post-discharge planning

## 2020-07-29 NOTE — PROGRESS NOTE ADULT - SUBJECTIVE AND OBJECTIVE BOX
Date/Time Patient Seen:  		  Referring MD:   Data Reviewed	       Patient is a 79y old  Male who presents with a chief complaint of Altered Mental Status, Acute Hypercapnic Hypoxemic Respiratory Failure (28 Jul 2020 11:20)      Subjective/HPI     PAST MEDICAL & SURGICAL HISTORY:  Chronic GERD  HTN (hypertension)  HLD (hyperlipidemia)  CVA (cerebral vascular accident)  High cholesterol  HTN (hypertension)  Unknown and unspecified causes of morbidity  History of surgical removal of testicle  H/O hernia repair  No significant past surgical history        Medication list         MEDICATIONS  (STANDING):  cefTRIAXone   IVPB 1000 milliGRAM(s) IV Intermittent every 24 hours  chlorhexidine 0.12% Liquid 15 milliLiter(s) Oral Mucosa every 12 hours  lactated ringers. 1000 milliLiter(s) (120 mL/Hr) IV Continuous <Continuous>  lactated ringers. 1000 milliLiter(s) (150 mL/Hr) IV Continuous <Continuous>  levETIRAcetam  IVPB 1000 milliGRAM(s) IV Intermittent every 12 hours  pantoprazole  Injectable 40 milliGRAM(s) IV Push daily    MEDICATIONS  (PRN):  LORazepam   Injectable 2 milliGRAM(s) IV Push every 1 hour PRN Seizure         Vitals log        ICU Vital Signs Last 24 Hrs  T(C): 36.9 (29 Jul 2020 03:52), Max: 37.2 (28 Jul 2020 23:54)  T(F): 98.5 (29 Jul 2020 03:52), Max: 99 (28 Jul 2020 23:54)  HR: 52 (29 Jul 2020 07:00) (32 - 62)  BP: 165/71 (29 Jul 2020 07:00) (77/47 - 165/71)  BP(mean): 102 (29 Jul 2020 07:00) (80 - 103)  ABP: --  ABP(mean): --  RR: 17 (29 Jul 2020 07:00) (8 - 28)  SpO2: 100% (29 Jul 2020 07:00) (55% - 100%)       Mode: AC/ CMV (Assist Control/ Continuous Mandatory Ventilation)  RR (machine): 20  TV (machine): 550  FiO2: 30  PEEP: 5  ITime: 1  MAP: 14  PIP: 27      Input and Output:  I&O's Detail    28 Jul 2020 07:01  -  29 Jul 2020 07:00  --------------------------------------------------------  IN:    heparin  Infusion.: 203 mL    Lactated Ringers IV Bolus: 500 mL    lactated ringers.: 600 mL    lactated ringers.: 1050 mL    propofol Infusion: 62 mL    Solution: 50 mL    Solution: 200 mL  Total IN: 2665 mL    OUT:    Indwelling Catheter - Urethral: 375 mL  Total OUT: 375 mL    Total NET: 2290 mL          Lab Data                        9.2    6.35  )-----------( 154      ( 29 Jul 2020 05:58 )             28.8     07-29    146<H>  |  113<H>  |  32<H>  ----------------------------<  74  4.0   |  26  |  1.70<H>    Ca    8.4<L>      29 Jul 2020 05:58  Phos  2.9     07-29  Mg     2.1     07-29    TPro  6.0  /  Alb  2.3<L>  /  TBili  1.5<H>  /  DBili  x   /  AST  99<H>  /  ALT  72  /  AlkPhos  71  07-29    ABG - ( 28 Jul 2020 11:08 )  pH, Arterial: x     pH, Blood: 7.28  /  pCO2: 58    /  pO2: 68    / HCO3: 24    / Base Excess: 0.1   /  SaO2: 92                CARDIAC MARKERS ( 28 Jul 2020 13:01 )  .026 ng/mL / x     / 608 U/L / x     / x      CARDIAC MARKERS ( 28 Jul 2020 06:06 )  x     / x     / 769 U/L / x     / x      CARDIAC MARKERS ( 28 Jul 2020 06:01 )  .026 ng/mL / x     / x     / x     / x            Review of Systems	      Objective     Physical Examination    heart s1s2  lung dec BS  abd soft      Pertinent Lab findings & Imaging      Mickey:  NO   Adequate UO     I&O's Detail    28 Jul 2020 07:01  -  29 Jul 2020 07:00  --------------------------------------------------------  IN:    heparin  Infusion.: 203 mL    Lactated Ringers IV Bolus: 500 mL    lactated ringers.: 600 mL    lactated ringers.: 1050 mL    propofol Infusion: 62 mL    Solution: 50 mL    Solution: 200 mL  Total IN: 2665 mL    OUT:    Indwelling Catheter - Urethral: 375 mL  Total OUT: 375 mL    Total NET: 2290 mL               Discussed with:     Cultures:	        Radiology

## 2020-07-29 NOTE — PROGRESS NOTE ADULT - ASSESSMENT
cont rx cont rx      PAPITOS CATHY [facility] [phone]  1940 DOA 2020 DR SHAREE PUTNAM     REVIEW OF SYMPTOMS      Able to give ROS  NO     PHYSICAL EXAM    HEENT Unremarkable PERRLA atraumatic   RESP Fair air entry EXP prolonged    Harsh breath sound Resp distres mild   CARDIAC S1 S2 No S3     NO JVD    ABDOMEN SOFT BS PRESENT NOT DISTENDED No hepatosplenomegaly PEDAL EDEMA present No calf tenderness  NO rash   GENERAL Not TOXIC looking    HPI/PMH.       79 m lving in hotel (evicted recently) was found unresponsive and brought to [facility] er and transferred to [facility] P 2020 and Pulm consulted     Pt was noted unresponsive on hotel floor with possible tongue bite ? seizure incontinent   er vitals [facility] S 149/65 57         OXYGENATION      2020 ra 93%     VITALS/LABS       2020 afeb 58 190/80   2020 u 550   2020 ac 20/550/5/.3     RELEVANT INFORMATION     ABG  9a ac 20/550/.3/5 728/58/68   COVID igg  covid igg pos+betzy   COVID pcr  Neg-betzy   W 2020 w 6.3   ROCEPHIN rocephin ()   ECHO  echo ef 45% dd biatrial enlargement  HYDRALAZINE hydralaz 25.3 ()    Hb 2020 Hb 9.2   Plt 2020 plt 154   Na 2020 Na 146   Xr 2020 Cr 1.7   KEPPRA keppra 500.2 ()     PT DATA/BEST PRACTICE  ALLERGY    nka                 WT               2020 128 k                       BMI                2020 40             CrCl                                    ADVANCED DIRECTIVE     LINES TUBES POA.                 NONVIOLENT NONSELFDESTRUCTIVE LEVEL ONE 2020   HEAD OF BED ELEVATION Yes      INFECTION PPLX               PROCEDURE   Intubation )   Arevalo   DVT PROPHYLAXIS           SCHULTZ PROPHYLAXIS          Protonix 40 ()   DYSPHAGIA EVAL     DIET.     jevity 1.5 1600 () (og)                                                                         IV F      ()   Albumin 12.5x4 ()         PATIENT DATA/ASSESSMENT/RECOMMENDATIONS     79 m found unresponsive with possible clot in r atrium on cta head resp failure pH 711 pco2 80 possible rhabdo possible mi dehydrated in [place] No ac cva found on ct head     Pt intubated in er 2020                                      PROBLEMS.  POSSIBLE SEPSIS POA   2020 w 4.5   Pt was started on rocephin for poss uti     INTUBATED 2020    VENT  Lung protective ventilation  Target PaO2 60(+) FIO2 Least needed VT 6/k pH 7.30(+)PPLAT 35(-)  Wean as tolertd     RO VTE   SUSPICION OF RA THROMBUS ON CTA HEAD   2020 cta head filling defect r atrium   ECHO  echo ef 45% dd biatrial enlargement   No clot seen   Pt taken off IV hep      POSSIBLE MI   POSSIBLE RHABDOMYOLYSIS  2020 ck 769   2020 Tr .026   Monitor c enz     HEMODYNAMICS  Target MAP 65 (+)  2020 pocus ivc eval indicated nonfluid responsive state     ANEMIA poa   2020 Hb 11.2   Monmitor serially     LASHELL POA   2020 Cr 1.5  Likely sec sepsis and rhabdo   IV fluids     MULTINODULAR GOITER    Seen on cta head     UNRESPONSIVE poa   RO HYPOGLYCEMIA    RO CVA   SEVERAL WITNESSED SZ IN ER 2020 cta Head Neg-betzy   2020 CT h No ac infarcts 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 Risks, benefits, alternatives of planned interventions when applicable were discussed in detail and questions answered as best as possible    LAURA MORGAN [facility] [phone]  1940 DOA 2020 DR SHAREE PUTNAM

## 2020-07-29 NOTE — PROGRESS NOTE ADULT - ASSESSMENT
The patient is an 79 year old male with a history of HTN, CVA who presents with unresponsiveness.    Plan:  - HR improved overall  - Hold carvedilol; eventually can resume when HR picks up further  - Now a bit hypertensive; if BP remains elevated can restart hydralazine  - CTA head and neck with motion artifact, no obvious stenosis. There is a questionable filling defect noted, possibly right atrial thrombus.  - Echocardiogram with mild LV systolic dysfunction, no obvious right atrial thrombus  - Now off of heparin  - On ceftriaxone  - Neurology follow-up  - EEG in progress  - Mechanical ventilation  - ICU care

## 2020-07-29 NOTE — PROGRESS NOTE ADULT - ATTENDING COMMENTS
Patient seen and examined, agree with above     78 y/o male with hx CVA and HTN, admitted with status epilepticus and acute resp failure, found to be hypothermic and bradycardic which improved with passive warming. CT head neg for bleed, CTA suspicious for RA thrombus but ECHO neg for thrombus.     He has been off propofol and responds to touch and pain, withdraws on all 4 extremities   He does not follow commands at this time   There is no active seizure activity   No fever   He had tongue bite during seizure episode and bled from that overnight after he was placed on heparin - heparin is off now     Bedside POCUS with a-line predominance, bibasilar consolidations, mild to moderate LV sys dysfn, diastolic dysfn, biatrial enlargement, IVC 2.3 cm w/o variability     Recs:   Neuro: continue Keppra 500 mg q12, f/u EEG, may need MRI brain, keep off propofol if tolerates, use prn Ativan for seizure  CV: Keep off AC given oral bleeding, hold aspirin - will likely start tomorrow, resume hydralazine for HTN, hold Coreg due to bradycardia, resume statin, ECHO reviewed   Resp: decrease Vt to 500 ml, RR to 16, repeat ABG, not ready for extubation today, will do PSV trial tomorrow, bibasilar consolidations likely atelectasis - ordered BDs and chest PT   GI: start TF   ID: suspected UTI, continue ceftriaxone, f/u UCx and BCx, elevated COVID IgG titre suggestive of recent infection/exposure   Renal: LASHELL from rhabdomyolysis, UO improved with albumin, crystalloids and Lasix, will repeat Lasix in pm, decrease IVF to 100 ml/hr, will need aggressive diuresis given anasarca and heart failure once renal fn stable, maintain Arevalo, hematuria improved   PPx: SCDs  Prognosis poor     Patient critically ill, 50 mins spent     Dr. Castro left voicemail for family to call back - will update

## 2020-07-29 NOTE — PROGRESS NOTE ADULT - SUBJECTIVE AND OBJECTIVE BOX
Neurology follow up note    CATHY [id number]      Interval History:    Patient feels ok no new complaints.    MEDICATIONS    atorvastatin 10 milliGRAM(s) Oral at bedtime  cefTRIAXone   IVPB 1000 milliGRAM(s) IV Intermittent every 24 hours  chlorhexidine 0.12% Liquid 15 milliLiter(s) Oral Mucosa every 12 hours  hydrALAZINE Injectable 25 milliGRAM(s) IV Push three times a day  lactated ringers. 1000 milliLiter(s) IV Continuous <Continuous>  lactated ringers. 1000 milliLiter(s) IV Continuous <Continuous>  LORazepam   Injectable 2 milliGRAM(s) IV Push every 1 hour PRN  pantoprazole  Injectable 40 milliGRAM(s) IV Push daily      Allergies    Allergy Status Unknown  No Known Allergies    Intolerances        Height (cm): 177.8 ( @ 12:42)  Weight (kg): 128.9 ( @ 12:42)  BMI (kg/m2): 40.8 ( @ 12:42)    Vital Signs Last 24 Hrs  T(C): 37.2 (2020 07:36), Max: 37.2 (2020 23:54)  T(F): 99 (2020 07:36), Max: 99 (2020 23:54)  HR: 66 (2020 10:00) (40 - 66)  BP: 166/70 (2020 10:00) (104/74 - 176/77)  BP(mean): 101 (2020 10:00) (80 - 111)  RR: 0 (2020 10:00) (0 - 21)  SpO2: 100% (2020 10:00) (55% - 100%)      REVIEW OF SYSTEMS: intubated       On Neurological Examination:    Mental Status - Patient is  Lethargic Unresponsive  .     Does not follow commands    Speech -  intubated               Cranial Nerves - Pupils 3 mm equal and reactive to light,     Motor Exam -    With stimuli positive movement of all 4 extremities    Muscle tone - is normal all over.  No asymmetry is seen.      Sensory    Bilateral intact to light touch      GENERAL Exam: Nontoxic , No Acute Distress   	  HEENT:  normocephalic, atraumatic  		  LUNGS: intubated   	  HEART: Normal S1S2   No murmur RRR        	  GI/ ABDOMEN:  Soft  Non tender  	   SKIN: Normal  No Ecchymosis               LABS:  CBC Full  -  ( 2020 05:58 )  WBC Count : 6.35 K/uL  RBC Count : 3.22 M/uL  Hemoglobin : 9.2 g/dL  Hematocrit : 28.8 %  Platelet Count - Automated : 154 K/uL  Mean Cell Volume : 89.4 fl  Mean Cell Hemoglobin : 28.6 pg  Mean Cell Hemoglobin Concentration : 31.9 gm/dL  Auto Neutrophil # : 4.80 K/uL  Auto Lymphocyte # : 0.56 K/uL  Auto Monocyte # : 0.84 K/uL  Auto Eosinophil # : 0.11 K/uL  Auto Basophil # : 0.02 K/uL  Auto Neutrophil % : 75.7 %  Auto Lymphocyte % : 8.8 %  Auto Monocyte % : 13.2 %  Auto Eosinophil % : 1.7 %  Auto Basophil % : 0.3 %    Urinalysis Basic - ( 2020 18:19 )    Color: Red / Appearance: Turbid / S.015 / pH: x  Gluc: x / Ketone: Trace  / Bili: Small / Urobili: 4   Blood: x / Protein: 500 mg/dL / Nitrite: Negative   Leuk Esterase: Trace / RBC: >50 /HPF / WBC 0-2   Sq Epi: x / Non Sq Epi: Occasional / Bacteria: x          146<H>  |  113<H>  |  32<H>  ----------------------------<  74  4.0   |  26  |  1.70<H>    Ca    8.4<L>      2020 05:58  Phos  2.9     -  Mg     2.1     -    TPro  6.0  /  Alb  2.3<L>  /  TBili  1.5<H>  /  DBili  x   /  AST  99<H>  /  ALT  72  /  AlkPhos  71      Hemoglobin A1C:     LIVER FUNCTIONS - ( 2020 05:58 )  Alb: 2.3 g/dL / Pro: 6.0 g/dL / ALK PHOS: 71 U/L / ALT: 72 U/L / AST: 99 U/L / GGT: x           Vitamin B12   PT/INR - ( 2020 13:01 )   PT: 14.0 sec;   INR: 1.21 ratio         PTT - ( 2020 07:56 )  PTT:93.0 sec      RADIOLOGY    ANALYSIS AND PLAN:  This is an 79-year-old with an episode of altered mental status and seizure.  1.	For episode of altered mental status and seizure, suspect this could be possibly metabolic encephalopathy.  The patient was hypothermic upon initial presentation, questionable any type of septic type process, septic shock.  2.	Infectious Disease workup.  3.	antibiotics as needed   4.	For underlying seizure events, status epilepticus,  Keppra 500 mg twice a day.    5.	Ativan 1 mg IV push, q. 6h. p.r.n. for any type of breakthrough seizures.  6.	Seizure precaution.  7.	Will plan for EEG.  8.	For history of high cholesterol, continue the patient on statin.  9.	based on clinical course may need repeat ct head or mri if possible   10.	For history of hypertension, monitor systolic blood pressure.  11.	Will monitor the patient's electrolytes.  12.	Attempted to contact the family, daughter; Jacinta, at [phone] and son, Jared at [phone], no response 2020    Greater than 45 minutes spent in direct patient care reviewing  the notes, lab data/ imaging , discussion with multidisciplinary team.

## 2020-07-29 NOTE — PROGRESS NOTE ADULT - SUBJECTIVE AND OBJECTIVE BOX
CATHYVALARIE SANCHEZ    [facility] ICU1 11    Patient is a 79y old  Male who presents with a chief complaint of Altered Mental Status, Acute Hypercapnic Hypoxemic Respiratory Failure (2020 11:20)       Allergies    Allergy Status Unknown  No Known Allergies    Intolerances        HPI:  78yo Male PMHx CVA , HTN, HLD , hernia surgery with testicle resection , umbilical hernia repair 2019,  transferred from [place] ED to [place] ED with s/p seizure activity  and bradycardia into the 30s.  Arrived to [place] hypothermic and hypotensive .Patient  required intubation ph 7.1 with CO2 85 due to acute hypoxic respiratory failure on arrival to ER ,placed on Levophed drip due to hypotension Admitted for septic workup and evaluation ,send blood and urine cx, serial lactate levels, monitor vitals closely hydration ,monitor urine output and renal profile,iv abx initiated -given ceftriaxone in ER and seen by ID consult .Pulmonology and cardiology consults are called .Patient admitted  to ICU with septic shock ,noted to have  tongue bite and AMS ,neurology evaluation called  Admitted  to intensive care  unit for monitoring , to rule out ami -send 3 sets of cardiac enzymes to rule out acute coronary event, obtain ECHO to evaluate LVEF, cardiology consult  ,continue current sepsis management, ventilator management /O2 supply, anticoagulation plan as per cardiology consult Palliative care consult requested ,to discuss advance directives and complete MOLST .Tried to reach family to obtain details of past medical hx - left a message for daughter .not able to reach son ( non valid number )[place]  ED team spoke with patient's daughter, who stated her father fell to the ground on the floor of his motel room about 4 days ago and was unable to get up. She had been caring for him on the floor since and did not call 911 for assistance. Today she found him unresponsive, with blood in his mouth, and incontinent of urine.  CT brain was negative. A CODE STROKE was called, and a CTA head/neck were performed, which was negative for large vessel occlusion but concerning for a density anterior to the ascending aorta possibly right atrial thrombus. Also revealed enlarged, multinodular thyroid. TPA criteria was not met, as patient had unknown onset of symptoms. Patient was transferred to [facility] ED for further care .He developed  multiple witnessed seizures in [place] ED, temporarily resolved with Ativan administration . CT/CTA brain NAD . Seen by neurologist and EEG ordered to r/o status epilepticus as etiology of ongoing encephalopathy, probably will require  MRI once stabilized from cardiac and respiratory standpoint. (2020 10:57)      PAST MEDICAL & SURGICAL HISTORY:  Chronic GERD  HTN (hypertension)  HLD (hyperlipidemia)  CVA (cerebral vascular accident)  High cholesterol  HTN (hypertension)  History of surgical removal of testicle  H/O hernia repair  No significant past surgical history      FAMILY HISTORY:        MEDICATIONS   cefTRIAXone   IVPB 1000 milliGRAM(s) IV Intermittent every 24 hours  chlorhexidine 0.12% Liquid 15 milliLiter(s) Oral Mucosa every 12 hours  lactated ringers. 1000 milliLiter(s) IV Continuous <Continuous>  lactated ringers. 1000 milliLiter(s) IV Continuous <Continuous>  levETIRAcetam  IVPB 1000 milliGRAM(s) IV Intermittent every 12 hours  LORazepam   Injectable 2 milliGRAM(s) IV Push every 1 hour PRN  pantoprazole  Injectable 40 milliGRAM(s) IV Push daily      Vital Signs Last 24 Hrs  T(C): 36.9 (2020 03:52), Max: 37.2 (2020 23:54)  T(F): 98.5 (2020 03:52), Max: 99 (2020 23:54)  HR: 53 (2020 06:00) (32 - 62)  BP: 139/63 (2020 06:00) (77/47 - 163/72)  BP(mean): 91 (2020 06:00) (80 - 103)  RR: 21 (2020 06:00) (8 - 28)  SpO2: 100% (2020 06:00) (55% - 100%)      20 @ 07:01  -  20 @ 06:51  --------------------------------------------------------  IN: 2665 mL / OUT: 375 mL / NET: 2290 mL        Mode: AC/ CMV (Assist Control/ Continuous Mandatory Ventilation), RR (machine): 20, TV (machine): 550, FiO2: 30, PEEP: 5, ITime: 1, MAP: 14, PIP: 27    LABS:                        9.2    6.35  )-----------( 154      ( 2020 05:58 )             28.8         146<H>  |  113<H>  |  32<H>  ----------------------------<  74  4.0   |  26  |  1.70<H>    Ca    8.4<L>      2020 05:58  Phos  2.9       Mg     2.1         TPro  6.0  /  Alb  2.3<L>  /  TBili  1.5<H>  /  DBili  x   /  AST  99<H>  /  ALT  72  /  AlkPhos  71      PT/INR - ( 2020 13:01 )   PT: 14.0 sec;   INR: 1.21 ratio         PTT - ( 2020 23:45 )  PTT:>200.0 sec  Urinalysis Basic - ( 2020 18:19 )    Color: Red / Appearance: Turbid / S.015 / pH: x  Gluc: x / Ketone: Trace  / Bili: Small / Urobili: 4   Blood: x / Protein: 500 mg/dL / Nitrite: Negative   Leuk Esterase: Trace / RBC: >50 /HPF / WBC 0-2   Sq Epi: x / Non Sq Epi: Occasional / Bacteria: x        ABG - ( 2020 11:08 )  pH, Arterial: x     pH, Blood: 7.28  /  pCO2: 58    /  pO2: 68    / HCO3: 24    / Base Excess: 0.1   /  SaO2: 92                  WBC:  WBC Count: 6.35 K/uL ( @ 05:58)  WBC Count: 5.66 K/uL ( @ 23:45)  WBC Count: 3.87 K/uL ( @ 13:01)  WBC Count: 4.57 K/uL ( @ 06:01)      MICROBIOLOGY:  RECENT CULTURES:        CARDIAC MARKERS ( 2020 13:01 )  .026 ng/mL / x     / 608 U/L / x     / x      CARDIAC MARKERS ( 2020 06:06 )  x     / x     / 769 U/L / x     / x      CARDIAC MARKERS ( 2020 06:01 )  .026 ng/mL / x     / x     / x     / x            PT/INR - ( 2020 13:01 )   PT: 14.0 sec;   INR: 1.21 ratio         PTT - ( 2020 23:45 )  PTT:>200.0 sec    Sodium:  Sodium, Serum: 146 mmol/L ( 05:58)  Sodium, Serum: 145 mmol/L ( 13:01)  Sodium, Serum: 144 mmol/L ( 06:01)      1.70 mg/dL :58  1.30 mg/dL :01  1.57 mg/dL  06:01      Hemoglobin:  Hemoglobin: 9.2 g/dL ( 05:58)  Hemoglobin: 9.2 g/dL ( 23:45)  Hemoglobin: 9.5 g/dL ( 13:01)  Hemoglobin: 11.2 g/dL ( 06:01)      Platelets: Platelet Count - Automated: 154 K/uL ( 05:58)  Platelet Count - Automated: 147 K/uL ( 23:45)  Platelet Count - Automated: 135 K/uL ( 13:01)  Platelet Count - Automated: 158 K/uL ( @ 06:01)      LIVER FUNCTIONS - ( 2020 05:58 )  Alb: 2.3 g/dL / Pro: 6.0 g/dL / ALK PHOS: 71 U/L / ALT: 72 U/L / AST: 99 U/L / GGT: x             Urinalysis Basic - ( 2020 18:19 )    Color: Red / Appearance: Turbid / S.015 / pH: x  Gluc: x / Ketone: Trace  / Bili: Small / Urobili: 4   Blood: x / Protein: 500 mg/dL / Nitrite: Negative   Leuk Esterase: Trace / RBC: >50 /HPF / WBC 0-2   Sq Epi: x / Non Sq Epi: Occasional / Bacteria: x        RADIOLOGY & ADDITIONAL STUDIES:

## 2020-07-29 NOTE — DIETITIAN INITIAL EVALUATION ADULT. - OTHER INFO
80 yo male, PMHx CVA 2012, HTN, who presented as a transfer from [facility] with altered mental status, possible post-ictal state following new onset seizure, with acute hypercapnic respiratory failure, transient shock distributive vs cardiogenic, ALSHELL, and transaminitis.  Unable to obtain any info from pt.

## 2020-07-29 NOTE — PROGRESS NOTE ADULT - SUBJECTIVE AND OBJECTIVE BOX
CATHY SANCHEZ    [place]  ED    Patient is a 79y old  Male who presents with a chief complaint of      Allergies    Allergy Status Unknown  No Known Allergies    Intolerances        HPI:      PAST MEDICAL & SURGICAL HISTORY:  Chronic GERD  HTN (hypertension)  HLD (hyperlipidemia)  CVA (cerebral vascular accident)  High cholesterol  HTN (hypertension)  History of surgical removal of testicle  H/O hernia repair  No significant past surgical history      FAMILY HISTORY:        MEDICATIONS       Vital Signs Last 24 Hrs  T(C): 36.9 (2020 03:52), Max: 37.2 (2020 23:54)  T(F): 98.5 (2020 03:52), Max: 99 (2020 23:54)  HR: 53 (2020 06:00) (32 - 62)  BP: 139/63 (2020 06:00) (77/47 - 163/72)  BP(mean): 91 (2020 06:00) (80 - 103)  RR: 21 (2020 06:00) (8 - 28)  SpO2: 100% (2020 06:00) (55% - 100%)            LABS:                        9.2    6.35  )-----------( 154      ( 2020 05:58 )             28.8     07-29    146<H>  |  113<H>  |  32<H>  ----------------------------<  74  4.0   |  26  |  1.70<H>    Ca    8.4<L>      2020 05:58  Phos  2.9     07-29  Mg     2.1     07-29    TPro  6.0  /  Alb  2.3<L>  /  TBili  1.5<H>  /  DBili  x   /  AST  99<H>  /  ALT  72  /  AlkPhos  71  07-29    PT/INR - ( 2020 13:01 )   PT: 14.0 sec;   INR: 1.21 ratio         PTT - ( 2020 23:45 )  PTT:>200.0 sec  Urinalysis Basic - ( 2020 18:19 )    Color: Red / Appearance: Turbid / S.015 / pH: x  Gluc: x / Ketone: Trace  / Bili: Small / Urobili: 4   Blood: x / Protein: 500 mg/dL / Nitrite: Negative   Leuk Esterase: Trace / RBC: >50 /HPF / WBC 0-2   Sq Epi: x / Non Sq Epi: Occasional / Bacteria: x        ABG - ( 2020 11:08 )  pH, Arterial: x     pH, Blood: 7.28  /  pCO2: 58    /  pO2: 68    / HCO3: 24    / Base Excess: 0.1   /  SaO2: 92                  WBC:  WBC Count: 6.35 K/uL ( 05:58)  WBC Count: 5.66 K/uL ( 23:45)  WBC Count: 3.87 K/uL ( 13:01)  WBC Count: 4.57 K/uL ( 06:01)      MICROBIOLOGY:  RECENT CULTURES:        CARDIAC MARKERS ( 2020 13:01 )  .026 ng/mL / x     / 608 U/L / x     / x      CARDIAC MARKERS ( 2020 06:06 )  x     / x     / 769 U/L / x     / x      CARDIAC MARKERS ( 2020 06:01 )  .026 ng/mL / x     / x     / x     / x            PT/INR - ( 2020 13:01 )   PT: 14.0 sec;   INR: 1.21 ratio         PTT - ( 2020 23:45 )  PTT:>200.0 sec    Sodium:  Sodium, Serum: 146 mmol/L ( 05:58)  Sodium, Serum: 145 mmol/L ( 13:01)  Sodium, Serum: 144 mmol/L ( 06:01)      1.70 mg/dL  05:58  1.30 mg/dL  13:01  1.57 mg/dL  06:01      Hemoglobin:  Hemoglobin: 9.2 g/dL ( 05:58)  Hemoglobin: 9.2 g/dL ( 23:45)  Hemoglobin: 9.5 g/dL ( 13:01)  Hemoglobin: 11.2 g/dL ( 06:01)      Platelets: Platelet Count - Automated: 154 K/uL ( 05:58)  Platelet Count - Automated: 147 K/uL ( 23:45)  Platelet Count - Automated: 135 K/uL ( 13:01)  Platelet Count - Automated: 158 K/uL (07-28 @ 06:01)      LIVER FUNCTIONS - ( 2020 05:58 )  Alb: 2.3 g/dL / Pro: 6.0 g/dL / ALK PHOS: 71 U/L / ALT: 72 U/L / AST: 99 U/L / GGT: x             Urinalysis Basic - ( 2020 18:19 )    Color: Red / Appearance: Turbid / S.015 / pH: x  Gluc: x / Ketone: Trace  / Bili: Small / Urobili: 4   Blood: x / Protein: 500 mg/dL / Nitrite: Negative   Leuk Esterase: Trace / RBC: >50 /HPF / WBC 0-2   Sq Epi: x / Non Sq Epi: Occasional / Bacteria: x        RADIOLOGY & ADDITIONAL STUDIES:

## 2020-07-30 LAB
ALBUMIN SERPL ELPH-MCNC: 2.6 G/DL — LOW (ref 3.3–5)
ALP SERPL-CCNC: 66 U/L — SIGNIFICANT CHANGE UP (ref 40–120)
ALT FLD-CCNC: 78 U/L — SIGNIFICANT CHANGE UP (ref 12–78)
ANION GAP SERPL CALC-SCNC: 4 MMOL/L — LOW (ref 5–17)
AST SERPL-CCNC: 80 U/L — HIGH (ref 15–37)
BASOPHILS # BLD AUTO: 0.01 K/UL — SIGNIFICANT CHANGE UP (ref 0–0.2)
BASOPHILS NFR BLD AUTO: 0.2 % — SIGNIFICANT CHANGE UP (ref 0–2)
BILIRUB SERPL-MCNC: 1.9 MG/DL — HIGH (ref 0.2–1.2)
BUN SERPL-MCNC: 30 MG/DL — HIGH (ref 7–23)
CALCIUM SERPL-MCNC: 8.6 MG/DL — SIGNIFICANT CHANGE UP (ref 8.5–10.1)
CHLORIDE SERPL-SCNC: 111 MMOL/L — HIGH (ref 96–108)
CO2 SERPL-SCNC: 31 MMOL/L — SIGNIFICANT CHANGE UP (ref 22–31)
CREAT SERPL-MCNC: 1.6 MG/DL — HIGH (ref 0.5–1.3)
CULTURE RESULTS: NO GROWTH — SIGNIFICANT CHANGE UP
EOSINOPHIL # BLD AUTO: 0.17 K/UL — SIGNIFICANT CHANGE UP (ref 0–0.5)
EOSINOPHIL NFR BLD AUTO: 2.7 % — SIGNIFICANT CHANGE UP (ref 0–6)
GLUCOSE SERPL-MCNC: 112 MG/DL — HIGH (ref 70–99)
HCT VFR BLD CALC: 27.2 % — LOW (ref 39–50)
HGB BLD-MCNC: 8.6 G/DL — LOW (ref 13–17)
IMM GRANULOCYTES NFR BLD AUTO: 0.3 % — SIGNIFICANT CHANGE UP (ref 0–1.5)
LYMPHOCYTES # BLD AUTO: 0.72 K/UL — LOW (ref 1–3.3)
LYMPHOCYTES # BLD AUTO: 11.2 % — LOW (ref 13–44)
MAGNESIUM SERPL-MCNC: 2 MG/DL — SIGNIFICANT CHANGE UP (ref 1.6–2.6)
MCHC RBC-ENTMCNC: 28.5 PG — SIGNIFICANT CHANGE UP (ref 27–34)
MCHC RBC-ENTMCNC: 31.6 GM/DL — LOW (ref 32–36)
MCV RBC AUTO: 90.1 FL — SIGNIFICANT CHANGE UP (ref 80–100)
MONOCYTES # BLD AUTO: 0.92 K/UL — HIGH (ref 0–0.9)
MONOCYTES NFR BLD AUTO: 14.4 % — HIGH (ref 2–14)
NEUTROPHILS # BLD AUTO: 4.57 K/UL — SIGNIFICANT CHANGE UP (ref 1.8–7.4)
NEUTROPHILS NFR BLD AUTO: 71.2 % — SIGNIFICANT CHANGE UP (ref 43–77)
NRBC # BLD: 0 /100 WBCS — SIGNIFICANT CHANGE UP (ref 0–0)
PHOSPHATE SERPL-MCNC: 3.2 MG/DL — SIGNIFICANT CHANGE UP (ref 2.5–4.5)
PLATELET # BLD AUTO: 134 K/UL — LOW (ref 150–400)
POTASSIUM SERPL-MCNC: 3.6 MMOL/L — SIGNIFICANT CHANGE UP (ref 3.5–5.3)
POTASSIUM SERPL-SCNC: 3.6 MMOL/L — SIGNIFICANT CHANGE UP (ref 3.5–5.3)
PROT SERPL-MCNC: 6.1 G/DL — SIGNIFICANT CHANGE UP (ref 6–8.3)
RBC # BLD: 3.02 M/UL — LOW (ref 4.2–5.8)
RBC # FLD: 15.4 % — HIGH (ref 10.3–14.5)
SODIUM SERPL-SCNC: 146 MMOL/L — HIGH (ref 135–145)
SPECIMEN SOURCE: SIGNIFICANT CHANGE UP
WBC # BLD: 6.41 K/UL — SIGNIFICANT CHANGE UP (ref 3.8–10.5)
WBC # FLD AUTO: 6.41 K/UL — SIGNIFICANT CHANGE UP (ref 3.8–10.5)

## 2020-07-30 PROCEDURE — 99291 CRITICAL CARE FIRST HOUR: CPT

## 2020-07-30 PROCEDURE — 71045 X-RAY EXAM CHEST 1 VIEW: CPT | Mod: 26

## 2020-07-30 PROCEDURE — 70551 MRI BRAIN STEM W/O DYE: CPT | Mod: 26

## 2020-07-30 RX ORDER — MIDAZOLAM HYDROCHLORIDE 1 MG/ML
2 INJECTION, SOLUTION INTRAMUSCULAR; INTRAVENOUS ONCE
Refills: 0 | Status: DISCONTINUED | OUTPATIENT
Start: 2020-07-30 | End: 2020-07-30

## 2020-07-30 RX ORDER — FUROSEMIDE 40 MG
60 TABLET ORAL ONCE
Refills: 0 | Status: COMPLETED | OUTPATIENT
Start: 2020-07-30 | End: 2020-07-30

## 2020-07-30 RX ORDER — CARVEDILOL PHOSPHATE 80 MG/1
25 CAPSULE, EXTENDED RELEASE ORAL EVERY 12 HOURS
Refills: 0 | Status: DISCONTINUED | OUTPATIENT
Start: 2020-07-30 | End: 2020-07-30

## 2020-07-30 RX ORDER — ASPIRIN/CALCIUM CARB/MAGNESIUM 324 MG
81 TABLET ORAL DAILY
Refills: 0 | Status: DISCONTINUED | OUTPATIENT
Start: 2020-07-30 | End: 2020-08-06

## 2020-07-30 RX ORDER — FUROSEMIDE 40 MG
60 TABLET ORAL DAILY
Refills: 0 | Status: DISCONTINUED | OUTPATIENT
Start: 2020-07-31 | End: 2020-08-02

## 2020-07-30 RX ORDER — CARVEDILOL PHOSPHATE 80 MG/1
6.25 CAPSULE, EXTENDED RELEASE ORAL EVERY 12 HOURS
Refills: 0 | Status: DISCONTINUED | OUTPATIENT
Start: 2020-07-30 | End: 2020-08-02

## 2020-07-30 RX ADMIN — LEVETIRACETAM 420 MILLIGRAM(S): 250 TABLET, FILM COATED ORAL at 01:05

## 2020-07-30 RX ADMIN — Medication 60 MILLIGRAM(S): at 11:26

## 2020-07-30 RX ADMIN — HEPARIN SODIUM 5000 UNIT(S): 5000 INJECTION INTRAVENOUS; SUBCUTANEOUS at 22:10

## 2020-07-30 RX ADMIN — Medication 4 PUFF(S): at 13:35

## 2020-07-30 RX ADMIN — Medication 50 MILLILITER(S): at 18:21

## 2020-07-30 RX ADMIN — Medication 50 MILLIGRAM(S): at 05:10

## 2020-07-30 RX ADMIN — Medication 50 MILLIGRAM(S): at 22:10

## 2020-07-30 RX ADMIN — PANTOPRAZOLE SODIUM 40 MILLIGRAM(S): 20 TABLET, DELAYED RELEASE ORAL at 11:27

## 2020-07-30 RX ADMIN — LEVETIRACETAM 420 MILLIGRAM(S): 250 TABLET, FILM COATED ORAL at 13:02

## 2020-07-30 RX ADMIN — CARVEDILOL PHOSPHATE 6.25 MILLIGRAM(S): 80 CAPSULE, EXTENDED RELEASE ORAL at 18:26

## 2020-07-30 RX ADMIN — Medication 50 MILLILITER(S): at 11:27

## 2020-07-30 RX ADMIN — SODIUM CHLORIDE 100 MILLILITER(S): 9 INJECTION, SOLUTION INTRAVENOUS at 05:09

## 2020-07-30 RX ADMIN — HEPARIN SODIUM 5000 UNIT(S): 5000 INJECTION INTRAVENOUS; SUBCUTANEOUS at 05:10

## 2020-07-30 RX ADMIN — Medication 50 MILLILITER(S): at 05:10

## 2020-07-30 RX ADMIN — ALBUTEROL 4 PUFF(S): 90 AEROSOL, METERED ORAL at 07:30

## 2020-07-30 RX ADMIN — MIDAZOLAM HYDROCHLORIDE 2 MILLIGRAM(S): 1 INJECTION, SOLUTION INTRAMUSCULAR; INTRAVENOUS at 17:44

## 2020-07-30 RX ADMIN — CHLORHEXIDINE GLUCONATE 15 MILLILITER(S): 213 SOLUTION TOPICAL at 05:10

## 2020-07-30 RX ADMIN — Medication 81 MILLIGRAM(S): at 11:26

## 2020-07-30 RX ADMIN — Medication 50 MILLILITER(S): at 00:00

## 2020-07-30 RX ADMIN — ALBUTEROL 4 PUFF(S): 90 AEROSOL, METERED ORAL at 13:30

## 2020-07-30 RX ADMIN — HEPARIN SODIUM 5000 UNIT(S): 5000 INJECTION INTRAVENOUS; SUBCUTANEOUS at 13:03

## 2020-07-30 RX ADMIN — Medication 4 PUFF(S): at 21:00

## 2020-07-30 RX ADMIN — ALBUTEROL 4 PUFF(S): 90 AEROSOL, METERED ORAL at 20:59

## 2020-07-30 RX ADMIN — ATORVASTATIN CALCIUM 10 MILLIGRAM(S): 80 TABLET, FILM COATED ORAL at 22:10

## 2020-07-30 RX ADMIN — CHLORHEXIDINE GLUCONATE 15 MILLILITER(S): 213 SOLUTION TOPICAL at 18:21

## 2020-07-30 RX ADMIN — Medication 50 MILLIGRAM(S): at 13:03

## 2020-07-30 RX ADMIN — CEFTRIAXONE 100 MILLIGRAM(S): 500 INJECTION, POWDER, FOR SOLUTION INTRAMUSCULAR; INTRAVENOUS at 11:27

## 2020-07-30 RX ADMIN — Medication 4 PUFF(S): at 07:30

## 2020-07-30 NOTE — PROGRESS NOTE ADULT - SUBJECTIVE AND OBJECTIVE BOX
Patient is a 79y old  Male who presents with a chief complaint of UNRESPONSIVENESS (2020 07:25)    24 hour events: Patient seen and examined at bedside.    REVIEW OF SYSTEMS  Unable to obtain meaningful ROS as patient intubated    T(F): 98.3 (20 @ 05:04), Max: 99.6 (20 @ 20:39)  HR: 76 (20 @ 07:00) (53 - 78)  BP: 161/81 (20 @ 07:00) (127/66 - 192/86)  RR: 39 (20 @ 07:00) (0 - 97)  SpO2: 96% (20 @ 07:00) (92% - 100%)  Wt(kg): --    Mode: AC/ CMV (Assist Control/ Continuous Mandatory Ventilation), RR (machine): 16, TV (machine): 500, FiO2: 30, PEEP: 5  CAPILLARY BLOOD GLUCOSE        I&O's Summary     @ 07:01  -   @ 07:00  --------------------------------------------------------  IN: 4660 mL / OUT: 5145 mL / NET: -485 mL      PHYSICAL EXAM  General: Intubated, off sedation  CNS: non-focal  HEENT: ET tube in place  Resp: CTAB  CVS: RRR, nl S1/S2, no M/R/G  Abd: very ascitic, pitting edema, firm likely 2/2 underlying fluid, withdrawing to deep palpation; impressive scrotal edema  Ext: 2+ LE edema, no cyanosis  Skin: No rash    MEDICATIONS  cefTRIAXone   IVPB IV Intermittent    hydrALAZINE Oral    atorvastatin Oral    ALBUTerol    90 MICROgram(s) HFA Inhaler Inhalation  ipratropium 17 MICROgram(s) HFA Inhaler Inhalation    levETIRAcetam  IVPB IV Intermittent      heparin   Injectable SubCutaneous    pantoprazole  Injectable IV Push      albumin human 25% IVPB IV Intermittent  lactated ringers. IV Continuous      chlorhexidine 0.12% Liquid Oral Mucosa                            8.6    6.41  )-----------( 134      ( 2020 05:35 )             27.2       07-30    146<H>  |  111<H>  |  30<H>  ----------------------------<  112<H>  3.6   |  31  |  1.60<H>    Ca    8.6      2020 05:35  Phos  3.2     0730  Mg     2.0     30    TPro  6.1  /  Alb  2.6<L>  /  TBili  1.9<H>  /  DBili  x   /  AST  80<H>  /  ALT  78  /  AlkPhos  66  07-30      CARDIAC MARKERS ( 2020 13:01 )  .026 ng/mL / x     / 608 U/L / x     / x          PT/INR - ( 2020 13:01 )   PT: 14.0 sec;   INR: 1.21 ratio         PTT - ( 2020 07:56 )  PTT:93.0 sec  Urinalysis Basic - ( 2020 18:19 )    Color: Red / Appearance: Turbid / S.015 / pH: x  Gluc: x / Ketone: Trace  / Bili: Small / Urobili: 4   Blood: x / Protein: 500 mg/dL / Nitrite: Negative   Leuk Esterase: Trace / RBC: >50 /HPF / WBC 0-2   Sq Epi: x / Non Sq Epi: Occasional / Bacteria: x      .Urine Clean Catch (Midstream)   No growth --  @ 01:14  .Blood Blood-Peripheral   No growth to date. --  @ 16:18      CENTRAL LINE: N          DATE INSERTED:              REMOVE: Y/N  CANTOR: Y                       DATE INSERTED:              REMOVE: Y/N  A-LINE: N                       DATE INSERTED:              REMOVE: Y/N    GLOBAL ISSUE/BEST PRACTICE  Analgesia: yes  Sedation: yes  HOB elevation: yes  Stress ulcer prophylaxis: yes  VTE prophylaxis:   Glycemic control:   Nutrition:     CODE STATUS: Full  GO discussion: Y Patient is a 79y old  Male who presents with a chief complaint of UNRESPONSIVENESS (2020 07:25)    24 hour events: Patient seen and examined at bedside.    REVIEW OF SYSTEMS    Patient gestured no to pain, sob, cp, abd pain. Otherwise unable to obtain meaningful ROS as patient intubated and poorly responsive    T(F): 98.3 (20 @ 05:04), Max: 99.6 (20 @ 20:39)  HR: 76 (20 @ 07:00) (53 - 78)  BP: 161/81 (20 @ 07:00) (127/66 - 192/86)  RR: 39 (20 @ 07:00) (0 - 97)  SpO2: 96% (20 @ 07:00) (92% - 100%)  Wt(kg): --    Mode: AC/ CMV (Assist Control/ Continuous Mandatory Ventilation), RR (machine): 16, TV (machine): 500, FiO2: 30, PEEP: 5  CAPILLARY BLOOD GLUCOSE        I&O's Summary     @ 07:01  -   @ 07:00  --------------------------------------------------------  IN: 4660 mL / OUT: 5145 mL / NET: -485 mL      PHYSICAL EXAM  General: Intubated, off sedation  CNS: awake but unable to open eyes, responds minimally to commands and questions, able to shake head no, squeeze hand b/l, wiggle toes b/l,  HEENT: not able to open eyes, pupils responsive to light, no EOM, ET tube in place  Resp: CTAB  CVS: RRR, nl S1/S2, no M/R/G  Abd: very ascitic, pitting edema, firm likely 2/2 underlying fluid, withdrawing to deep palpation; impressive scrotal edema  Ext: 2+ LE edema, no cyanosis  Skin: No rash    MEDICATIONS  cefTRIAXone   IVPB IV Intermittent    hydrALAZINE Oral    atorvastatin Oral    ALBUTerol    90 MICROgram(s) HFA Inhaler Inhalation  ipratropium 17 MICROgram(s) HFA Inhaler Inhalation    levETIRAcetam  IVPB IV Intermittent      heparin   Injectable SubCutaneous    pantoprazole  Injectable IV Push      albumin human 25% IVPB IV Intermittent  lactated ringers. IV Continuous      chlorhexidine 0.12% Liquid Oral Mucosa                            8.6    6.41  )-----------( 134      ( 2020 05:35 )             27.2       07-30    146<H>  |  111<H>  |  30<H>  ----------------------------<  112<H>  3.6   |  31  |  1.60<H>    Ca    8.6      2020 05:35  Phos  3.2     07-30  Mg     2.0     07-30    TPro  6.1  /  Alb  2.6<L>  /  TBili  1.9<H>  /  DBili  x   /  AST  80<H>  /  ALT  78  /  AlkPhos  66  07-30      CARDIAC MARKERS ( 2020 13:01 )  .026 ng/mL / x     / 608 U/L / x     / x          PT/INR - ( 2020 13:01 )   PT: 14.0 sec;   INR: 1.21 ratio         PTT - ( 2020 07:56 )  PTT:93.0 sec  Urinalysis Basic - ( 2020 18:19 )    Color: Red / Appearance: Turbid / S.015 / pH: x  Gluc: x / Ketone: Trace  / Bili: Small / Urobili: 4   Blood: x / Protein: 500 mg/dL / Nitrite: Negative   Leuk Esterase: Trace / RBC: >50 /HPF / WBC 0-2   Sq Epi: x / Non Sq Epi: Occasional / Bacteria: x      .Urine Clean Catch (Midstream)   No growth --  @ 01:14  .Blood Blood-Peripheral   No growth to date. --  @ 16:18      CENTRAL LINE: N          DATE INSERTED:              REMOVE: Y/N  CANTOR: Y                       DATE INSERTED:              REMOVE: Y/N  A-LINE: N                       DATE INSERTED:              REMOVE: Y/N    GLOBAL ISSUE/BEST PRACTICE  Analgesia: yes  Sedation: yes  HOB elevation: yes  Stress ulcer prophylaxis: yes  VTE prophylaxis:   Glycemic control:   Nutrition:     CODE STATUS: Full  GO discussion: Y Patient is a 79y old  Male who presents with a chief complaint of UNRESPONSIVENESS (2020 07:25)    24 hour events: Patient seen and examined at bedside. Failed SBT this AM, put back on Pressure Support.     REVIEW OF SYSTEMS    Patient gestured no to pain, sob, cp, abd pain.   Otherwise unable to obtain meaningful ROS as patient intubated and poorly responsive    T(F): 98.3 (20 @ 05:04), Max: 99.6 (20 @ 20:39)  HR: 76 (20 @ 07:00) (53 - 78)  BP: 161/81 (20 @ 07:00) (127/66 - 192/86)  RR: 39 (20 @ 07:00) (0 - 97)  SpO2: 96% (20 @ 07:00) (92% - 100%)  Wt(kg): --    Mode: AC/ CMV (Assist Control/ Continuous Mandatory Ventilation), RR (machine): 16, TV (machine): 500, FiO2: 30, PEEP: 5  CAPILLARY BLOOD GLUCOSE      I&O's Summary     @ 07:01  -   @ 07:00  --------------------------------------------------------  IN: 4660 mL / OUT: 5145 mL / NET: -485 mL      PHYSICAL EXAM  General: Intubated, off sedation  CNS: interval improvement in mental status, awake, opens eyes when called, responds to commands and questions, able to shake head yes/no, squeeze hand b/l, wiggle toes b/l,  HEENT: pupils responsive to light, strabismus noted, ET tube in place  Resp: CTAB  CVS: RRR, nl S1/S2, no M/R/G  Abd: very ascitic, pitting abdominal edema, firm likely 2/2 underlying fluid; impressive scrotal edema  Ext: 2+ LE edema, WWP, no cyanosis  Skin: No rash  Urine color clear yellow (maroon yesterday)    MEDICATIONS  cefTRIAXone   IVPB IV Intermittent    hydrALAZINE Oral    atorvastatin Oral    ALBUTerol    90 MICROgram(s) HFA Inhaler Inhalation  ipratropium 17 MICROgram(s) HFA Inhaler Inhalation    levETIRAcetam  IVPB IV Intermittent      heparin   Injectable SubCutaneous    pantoprazole  Injectable IV Push      albumin human 25% IVPB IV Intermittent  lactated ringers. IV Continuous      chlorhexidine 0.12% Liquid Oral Mucosa                            8.6    6.41  )-----------( 134      ( 2020 05:35 )             27.2       07-30    146<H>  |  111<H>  |  30<H>  ----------------------------<  112<H>  3.6   |  31  |  1.60<H>    Ca    8.6      2020 05:35  Phos  3.2     07-30  Mg     2.0     07-30    TPro  6.1  /  Alb  2.6<L>  /  TBili  1.9<H>  /  DBili  x   /  AST  80<H>  /  ALT  78  /  AlkPhos  66  07-30      CARDIAC MARKERS ( 2020 13:01 )  .026 ng/mL / x     / 608 U/L / x     / x          PT/INR - ( 2020 13:01 )   PT: 14.0 sec;   INR: 1.21 ratio         PTT - ( 2020 07:56 )  PTT:93.0 sec  Urinalysis Basic - ( 2020 18:19 )    Color: Red / Appearance: Turbid / S.015 / pH: x  Gluc: x / Ketone: Trace  / Bili: Small / Urobili: 4   Blood: x / Protein: 500 mg/dL / Nitrite: Negative   Leuk Esterase: Trace / RBC: >50 /HPF / WBC 0-2   Sq Epi: x / Non Sq Epi: Occasional / Bacteria: x      .Urine Clean Catch (Midstream)   No growth --  @ 01:14  .Blood Blood-Peripheral   No growth to date. --  @ 16:18      CENTRAL LINE: N          DATE INSERTED:              REMOVE: Y/N  CANTOR: Y                       DATE INSERTED:              REMOVE: Y/N  A-LINE: N                       DATE INSERTED:              REMOVE: Y/N    GLOBAL ISSUE/BEST PRACTICE  Analgesia: yes  Sedation: yes  HOB elevation: yes  Stress ulcer prophylaxis: yes  VTE prophylaxis:   Glycemic control:   Nutrition:     CODE STATUS: Full  GOC discussion: Y

## 2020-07-30 NOTE — PROGRESS NOTE ADULT - ASSESSMENT
78 yo male, PMHx CVA 2012, HTN, who presented as a transfer from [facility] with altered mental status, possible post-ictal state following new onset seizure, with acute hypercapnic respiratory failure, transient shock distributive vs cardiogenic, LASHELL, and transaminitis. Hypothermia, bradycardia to 30's and multiple witnessed seizures in ED.     Neuro: Multiple witnessed seizures in ED, resolving with Ativan; likely metabolic encephalopathy CTA head and neck with motion artifact, no obvious stenosis, questionable filling defect noted, possibly right atrial thrombus. Off Propofol.   - Decrease Keppra 1000 mg BID to 500 mg BID.   - F/u EEG to r/o status epilepticus as etiology of ongoing encephalopathy.   - May need MRI brain  - Ativan 1 mg IVP 1 6 prn for breakthrough seizures  Pulm: Intubated for acute hypercapnic respiratory failure, vent augmented to manipulate acid-base balance. Weaned to 30% FiO2. POCUS w b/l basilar consolidations.  - Start Albumin 25% 50 mL q6h  - SBT today  - No extubation until oral bleeding resolves, monitor bleeding  CV: Questionable R atrial thrombus on CTA neck, now off Heparin gtt 2/2 oral bleeding and hematuria. Trops WNL. Echocardiogram with mild LV systolic dysfunction, dilated LV w LVH, b/l atrial enlargement, no obvious right atrial thrombus. On POCUS of IVC appears non-fluid responsive.   - Hold Carvedilol for bradycardia, will revaluate need on 7/30  - Start Hydralazine 25 mg TID.   - Restart Atorvastatin  GI: Start tube feeds. Transaminitis possible ischemic hepatitis due to shock state, continue to trend  Renal: LASHELL likely ATN due to hypoperfusion during shock state, complicated by rhabdomyolysis (CK downtrending, contd. dark hematuria), monitoring renal indices and UOP. On POCUS of IVC appears non-fluid responsive. Heavy proteinuria in addition to large blood and +LE on UA, likely underlying CKD.   - Given IV LR 1 L bolus x1 for rhabdomyolysis   - IV Lasix 60 mg, monitor urine output  - Albumin 25% 50ml q6  ID: Rule out occult sepsis. UA trace LE. Last Procalcitonin 0.08.   - Continue empiric Ceftriaxone.    - f/u BCx, UCx, sputum cx if able to obtain specimen.   Endo: TSH wnl, T3 low, cortisol wnl; CT Angio w/ multinodular thyroid; will require outpatient workup  Heme: Off heparin gtt; no need for A/C at this time  - Consider starting ASA 7/30  Diet:  - Started tube feeds, s/p dietitian eval  Dispo: Monitor in ICU;   - contact daughter for collateral history and post-discharge planning 80 yo male, PMHx CVA 2012, HTN, who presented as a transfer from [facility] with altered mental status, possible post-ictal state following new onset seizure, with acute hypercapnic respiratory failure, transient shock distributive vs cardiogenic, LASHELL, and transaminitis. Hypothermia, bradycardia to 30's and multiple witnessed seizures in ED.     Neuro: Multiple witnessed seizures in ED, resolving with Ativan; likely metabolic encephalopathy CTA head and neck with motion artifact, no obvious stenosis, questionable filling defect noted, possibly right atrial thrombus. Off Propofol.   - Continue Keppra 500 mg BID  - F/u EEG to r/o status epilepticus as etiology of ongoing encephalopathy.   - May need MRI brain  - Ativan 1 mg IVP 1 6 prn for breakthrough seizures  Pulm: Intubated for acute hypercapnic respiratory failure, vent augmented to manipulate acid-base balance. Weaned to 30% FiO2, didn't pass TOV. POCUS w b/l basilar consolidations.  - Start Albumin 25% 50 mL q6h  - SBT today  - No extubation until oral bleeding resolves, monitor bleeding  - Chest PT  CV: Questionable R atrial thrombus on CTA neck, now off Heparin gtt 2/2 oral bleeding and hematuria. Trops WNL. Echocardiogram with mild LV systolic dysfunction, dilated LV w LVH, b/l atrial enlargement, no obvious right atrial thrombus. On POCUS of IVC appears non-fluid responsive.   - Continue Hydralazine 25 mg TID  - Continue Atorvastatin  - Hold Carvedilol for bradycardia, will revaluate need on 7/30  - Holding ASA    GI: Start tube feeds. Transaminitis possible ischemic hepatitis due to shock state, continue to trend  Renal: LASHELL likely ATN due to hypoperfusion during shock state, complicated by rhabdomyolysis (CK downtrending, contd. dark hematuria), monitoring renal indices and UOP. On POCUS of IVC appears non-fluid responsive. Heavy proteinuria in addition to large blood and +LE on UA, likely underlying CKD.   - Given IV LR 1 L bolus x1 for rhabdomyolysis   - IV Lasix 60 mg, monitor urine output  - Albumin 25% 50ml q6  ID: Rule out occult sepsis. UA trace LE. Last Procalcitonin 0.08.   - Continue empiric Ceftriaxone.    - f/u BCx, UCx, sputum cx if able to obtain specimen.   Endo: TSH wnl, T3 low, cortisol wnl; CT Angio w/ multinodular thyroid; will require outpatient workup  Heme: Off heparin gtt; no need for A/C at this time  - Consider starting ASA 7/30  Diet:  - Started tube feeds, s/p dietitian eval  Dispo: Monitor in ICU;   - contact daughter for collateral history and post-discharge planning 80 yo male, PMHx CVA 2012, HTN, who presented as a transfer from [facility] with altered mental status, acute hypercapnic respiratory failure, transient shock distributive vs cardiogenic, LASHELL, and transaminitis. Hypothermia, bradycardia to 30's and multiple witnessed seizures in [facility]. 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     Neuro: Multiple witnessed seizures in ED, resolving with Ativan; likely metabolic encephalopathy CTA head and neck with motion artifact, no obvious stenosis, questionable filling defect noted, possibly right atrial thrombus. Off Propofol.   - Continue Keppra 500 mg BID  - EEG w/o acute pathology  - May need MRI brain  - Ativan 1 mg IVP 1 6 prn for breakthrough seizures  - Keep off Propofol  Pulm: Intubated for acute hypercapnic respiratory failure, vent augmented to manipulate acid-base balance. Didn't pass SBT 7/30 AM, now on PS. POCUS w a-line predominance, b/l basilar consolidations, likely atelectasis  - Albumin 25% 50 mL q6h  - SBT today  - Cont. Albuterol 90 mcg inhaler  - Cont. Atrovent  - Chest PT  - Plan for TOV in PM  CV: Aggressive diuresis given anasarca and heart failure once renal fn stable; Questionable R atrial thrombus on CTA neck, now off Heparin gtt 2/2 oral bleeding and hematuria. Echocardiogram with mild LV systolic dysfunction, dilated LV w LVH, b/l atrial enlargement, no obvious right atrial thrombus. On POCUS (7/29) of IVC 2.3 cm w/o variability. - Received IV Lasix 60 mg in AM; consider repeat in PM  - Start Carvedilol  - Start ASA  - Continue Hydralazine 50 mg q8  - Continue Atorvastatin  GI: Hold tube feeds for possible extubation. AST/ALT downtrending  Renal: LASHELL likely ATN due to hypoperfusion during shock state, complicated by rhabdomyolysis (CK downtrending, hematuria resolved), monitoring renal indices and UOP. On POCUS of IVC appears non-fluid responsive. Heavy proteinuria noted on UA, possible underlying CKD.   - UO improved s/p albumin, IVF, and lasix.  - IVF now at 100ml/hr for rhabdomyolysis  - Monitor urine output  - Albumin 25% 50ml q6  - Continue protonix  ID: No evidence of infection. BCx and UCx NGTD. UA trace LE.   - Discontinue empiric Ceftriaxone  - f/u BCx, UCx, sputum cx if able to obtain specimen.   Endo: TSH wnl, T3 low, cortisol wnl; CT Angio w/ multinodular thyroid; will require outpatient workup  Heme: Oral bleed and hematuria resolved. Off heparin gtt  - Consider restarting Heparin on 7/31  - Start ASA  PPx: SCDs  Diet:  - Started tube feeds, s/p dietitian eval  Dispo: Monitor in ICU;   - contact daughter for collateral history and post-discharge planning

## 2020-07-30 NOTE — PROGRESS NOTE ADULT - SUBJECTIVE AND OBJECTIVE BOX
Date/Time Patient Seen:  		  Referring MD:   Data Reviewed	       Patient is a 79y old  Male who presents with a chief complaint of UNRESPONSIVENESS (29 Jul 2020 08:33)      Subjective/HPI     PAST MEDICAL & SURGICAL HISTORY:  Chronic GERD  HTN (hypertension)  HLD (hyperlipidemia)  CVA (cerebral vascular accident)  High cholesterol  HTN (hypertension)  Unknown and unspecified causes of morbidity  History of surgical removal of testicle  H/O hernia repair  No significant past surgical history        Medication list         MEDICATIONS  (STANDING):  albumin human 25% IVPB 50 milliLiter(s) IV Intermittent every 6 hours  ALBUTerol    90 MICROgram(s) HFA Inhaler 4 Puff(s) Inhalation every 6 hours  atorvastatin 10 milliGRAM(s) Oral at bedtime  cefTRIAXone   IVPB 1000 milliGRAM(s) IV Intermittent every 24 hours  chlorhexidine 0.12% Liquid 15 milliLiter(s) Oral Mucosa every 12 hours  heparin   Injectable 5000 Unit(s) SubCutaneous every 8 hours  hydrALAZINE 50 milliGRAM(s) Oral every 8 hours  ipratropium 17 MICROgram(s) HFA Inhaler 4 Puff(s) Inhalation every 6 hours  lactated ringers. 1000 milliLiter(s) (100 mL/Hr) IV Continuous <Continuous>  levETIRAcetam  IVPB 500 milliGRAM(s) IV Intermittent every 12 hours  pantoprazole  Injectable 40 milliGRAM(s) IV Push daily    MEDICATIONS  (PRN):         Vitals log        ICU Vital Signs Last 24 Hrs  T(C): 36.8 (30 Jul 2020 05:04), Max: 37.6 (29 Jul 2020 20:39)  T(F): 98.3 (30 Jul 2020 05:04), Max: 99.6 (29 Jul 2020 20:39)  HR: 76 (30 Jul 2020 07:00) (53 - 78)  BP: 161/81 (30 Jul 2020 07:00) (127/66 - 192/86)  BP(mean): 114 (30 Jul 2020 07:00) (90 - 124)  ABP: --  ABP(mean): --  RR: 39 (30 Jul 2020 07:00) (0 - 97)  SpO2: 96% (30 Jul 2020 07:00) (92% - 100%)       Mode: AC/ CMV (Assist Control/ Continuous Mandatory Ventilation)  RR (machine): 16  TV (machine): 500  FiO2: 30  PEEP: 5  ITime: 1  MAP: 12  PIP: 27      Input and Output:  I&O's Detail    29 Jul 2020 07:01  -  30 Jul 2020 07:00  --------------------------------------------------------  IN:    Enteral Tube Flush: 120 mL    Glucerna 1.5: 240 mL    Lactated Ringers IV Bolus: 1000 mL    lactated ringers.: 1350 mL    lactated ringers.: 1500 mL    Solution: 200 mL    Solution: 200 mL    Solution: 50 mL  Total IN: 4660 mL    OUT:    Indwelling Catheter - Urethral: 5145 mL  Total OUT: 5145 mL    Total NET: -485 mL          Lab Data                        8.6    6.41  )-----------( 134      ( 30 Jul 2020 05:35 )             27.2     07-30    146<H>  |  111<H>  |  30<H>  ----------------------------<  112<H>  3.6   |  31  |  1.60<H>    Ca    8.6      30 Jul 2020 05:35  Phos  3.2     07-30  Mg     2.0     07-30    TPro  6.1  /  Alb  2.6<L>  /  TBili  1.9<H>  /  DBili  x   /  AST  80<H>  /  ALT  78  /  AlkPhos  66  07-30    ABG - ( 29 Jul 2020 17:37 )  pH, Arterial: 7.46  pH, Blood: x     /  pCO2: 37    /  pO2: 99    / HCO3: 27    / Base Excess: 2.4   /  SaO2: 98                CARDIAC MARKERS ( 28 Jul 2020 13:01 )  .026 ng/mL / x     / 608 U/L / x     / x            Review of Systems	      Objective     Physical Examination  heart s1s2  lung dec BS  BIPAP in use        Pertinent Lab findings & Imaging      Mickey:  NO   Adequate UO     I&O's Detail    29 Jul 2020 07:01  -  30 Jul 2020 07:00  --------------------------------------------------------  IN:    Enteral Tube Flush: 120 mL    Glucerna 1.5: 240 mL    Lactated Ringers IV Bolus: 1000 mL    lactated ringers.: 1350 mL    lactated ringers.: 1500 mL    Solution: 200 mL    Solution: 200 mL    Solution: 50 mL  Total IN: 4660 mL    OUT:    Indwelling Catheter - Urethral: 5145 mL  Total OUT: 5145 mL    Total NET: -485 mL               Discussed with:     Cultures:	        Radiology

## 2020-07-30 NOTE — PROGRESS NOTE ADULT - ATTENDING COMMENTS
Patient seen and examined, agree with above     80 y/o male with hx CVA and HTN, admitted with status epilepticus and acute resp failure, found to be hypothermic and bradycardic which improved with passive warming. CT head neg for bleed, CTA suspicious for RA thrombus but ECHO neg for thrombus.     Recs:   Neuro: continue Keppra 500 mg q12, EEG neg for seizure, check MRI brain, more responsive today and follows simple commands, not on continuous sedation   CV: Keep off AC given oral bleeding, resume aspirin, BP elevated on hydralazine and HR stable, resume Coreg, continue statin    Resp: tolerated PSV 12/5 but not 10/5, not ready for extubation, continue vent support, bibasilar consolidations likely atelectasis - ordered BDs and chest PT   GI: continue TF, add bowel regimen   ID: urine and blood culture neg, no other signs of bacterial infection, d/c ceftriaxone and monitor off abx, elevated COVID IgG titre suggestive of recent exposure   Renal: LASHELL from rhabdomyolysis - improving, gave Lasix 60 mg earlier, will start daily Lasix, d/c IVF, d/c albumin tomorrow, hematuria resolved  PPx: sc heparin   Prognosis poor     Patient critically ill, 48 mins spent     D/w son over the phone - patient was at rehab from Dec 2019 to May 2020 after hospitalization for spinal stenosis, he was diagnosed with heart failure in Jan 2020, he did not have any COVID related symptoms except cough in May however some patients were diagnosed with COVID at that rehab around that time (likely exposure timeline), he has been very lethargic with speech abnormalities and falls lately (ambulates with walker), no obvious seizure like activity at home, 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 PCP is Dr. JL Starks [phone] who titrated his BP meds recently

## 2020-07-30 NOTE — PROGRESS NOTE ADULT - SUBJECTIVE AND OBJECTIVE BOX
Chief Complaint: Unresponsiveness    Interval Events: No events overnight. Being weaned off of vent.    Review of Systems:  Unable to obtain    Physical Exam:  Vital Signs Last 24 Hrs  T(C): 36.8 (30 Jul 2020 05:04), Max: 37.6 (29 Jul 2020 20:39)  T(F): 98.3 (30 Jul 2020 05:04), Max: 99.6 (29 Jul 2020 20:39)  HR: 76 (30 Jul 2020 07:00) (53 - 78)  BP: 161/81 (30 Jul 2020 07:00) (127/66 - 192/86)  BP(mean): 114 (30 Jul 2020 07:00) (90 - 124)  RR: 39 (30 Jul 2020 07:00) (0 - 97)  SpO2: 96% (30 Jul 2020 07:00) (92% - 100%)  General: NAD  HEENT: ET tube in place  Neck: No JVD, no carotid bruit  Lungs: CTAB  CV: RRR, nl S1/S2, no M/R/G  Abdomen: S/NT/ND, +BS  Extremities: 2+ LE edema, no cyanosis  Neuro: Non-focal  Skin: No rash    Labs:             07-30    146<H>  |  111<H>  |  30<H>  ----------------------------<  112<H>  3.6   |  31  |  1.60<H>    Ca    8.6      30 Jul 2020 05:35  Phos  3.2     07-30  Mg     2.0     07-30    TPro  6.1  /  Alb  2.6<L>  /  TBili  1.9<H>  /  DBili  x   /  AST  80<H>  /  ALT  78  /  AlkPhos  66  07-30                        8.6    6.41  )-----------( 134      ( 30 Jul 2020 05:35 )             27.2       Telemetry: Sinus rhythm

## 2020-07-30 NOTE — PROGRESS NOTE ADULT - SUBJECTIVE AND OBJECTIVE BOX
PROGRESS NOTE  Patient is a 79y old  Male who presents with a chief complaint of UNRESPONSIVENESS (2020 08:15)  Chart and available morning labs /imaging are reviewed electronically , urgent issues addressed . More information  is being added upon completion of rounds , when more information is collected and management discussed with consultants , medical staff and social service/case management on the floor   Intubated for acute hypercapnic respiratory failure, Off Propofol. Followed by neurologist due to sz events   Decreased Keppra 1000 mg BID to 500 mg BID.    F/u EEG to r/o status epilepticus as etiology of ongoing encephalopathy. May need MRI brain  OVERNIGHT Given IV LR 1 L bolus x1 for rhabdomyolysis - No extubation until oral bleeding resolves, monitor bleeding Off heparin gtt; no need for A/C at this time  REVIEW OF SYSTEMS  Unable to obtain meaningful ROS as patient intubated  HPI:  80yo Male PMHx CVA , HTN, HLD , hernia surgery with testicle resection , umbilical hernia repair 2019,  transferred from [place] ED to [place] ED with s/p seizure activity  and bradycardia into the 30s.  Arrived to [place] hypothermic and hypotensive .Patient  required intubation ph 7.1 with CO2 85 due to acute hypoxic respiratory failure on arrival to ER ,placed on Levophed drip due to hypotension Admitted for septic workup and evaluation ,send blood and urine cx, serial lactate levels, monitor vitals closely hydration ,monitor urine output and renal profile,iv abx initiated -given ceftriaxone in ER and seen by ID consult .Pulmonology and cardiology consults are called .Patient admitted  to ICU with septic shock ,noted to have  tongue bite and AMS ,neurology evaluation called  Admitted  to intensive care  unit for monitoring , to rule out ami -send 3 sets of cardiac enzymes to rule out acute coronary event, obtain ECHO to evaluate LVEF, cardiology consult  ,continue current sepsis management, ventilator management /O2 supply, anticoagulation plan as per cardiology consult Palliative care consult requested ,to discuss advance directives and complete MOLST .Tried to reach family to obtain details of past medical hx - left a message for daughter .not able to reach son ( non valid number )[place]  ED team spoke with patient's daughter, who stated her father fell to the ground on the floor of his motel room about 4 days ago and was unable to get up. She had been caring for him on the floor since and did not call 911 for assistance. Today she found him unresponsive, with blood in his mouth, and incontinent of urine.  CT brain was negative. A CODE STROKE was called, and a CTA head/neck were performed, which was negative for large vessel occlusion but concerning for a density anterior to the ascending aorta possibly right atrial thrombus. Also revealed enlarged, multinodular thyroid. TPA criteria was not met, as patient had unknown onset of symptoms. Patient was transferred to hospitals ED for further care .He developed  multiple witnessed seizures in [place] ED, temporarily resolved with Ativan administration . CT/CTA brain NAD . Seen by neurologist and EEG ordered to r/o status epilepticus as etiology of ongoing encephalopathy, probably will require  MRI once stabilized from cardiac and respiratory standpoint. (2020 10:57)    PAST MEDICAL & SURGICAL HISTORY:  Chronic GERD  HTN (hypertension)  HLD (hyperlipidemia)  CVA (cerebral vascular accident)  High cholesterol  HTN (hypertension)  History of surgical removal of testicle  H/O hernia repair  No significant past surgical history      MEDICATIONS  (STANDING):  albumin human 25% IVPB 50 milliLiter(s) IV Intermittent every 6 hours  ALBUTerol    90 MICROgram(s) HFA Inhaler 4 Puff(s) Inhalation every 6 hours  atorvastatin 10 milliGRAM(s) Oral at bedtime  cefTRIAXone   IVPB 1000 milliGRAM(s) IV Intermittent every 24 hours  chlorhexidine 0.12% Liquid 15 milliLiter(s) Oral Mucosa every 12 hours  heparin   Injectable 5000 Unit(s) SubCutaneous every 8 hours  hydrALAZINE 50 milliGRAM(s) Oral every 8 hours  ipratropium 17 MICROgram(s) HFA Inhaler 4 Puff(s) Inhalation every 6 hours  lactated ringers. 1000 milliLiter(s) (100 mL/Hr) IV Continuous <Continuous>  levETIRAcetam  IVPB 500 milliGRAM(s) IV Intermittent every 12 hours  pantoprazole  Injectable 40 milliGRAM(s) IV Push daily  NOTE In accordance with  [facility] policy ICU final medical management decisions/MD orders are  determined/done only by ICU Intensivists   MEDICATIONS  (PRN):      OBJECTIVE    T(C): 36.8 (20 @ 05:04), Max: 37.6 (20 @ 20:39)  HR: 76 (20 @ 08:00) (53 - 78)  BP: 168/74 (20 @ 08:00) (127/66 - 192/86)  RR: 33 (20 @ 08:00) (0 - 97)  SpO2: 96% (20 @ 08:00) (92% - 100%)  Wt(kg): --  I&O's Summary    2020 07:01  -  2020 07:00  --------------------------------------------------------  IN: 4660 mL / OUT: 5145 mL / NET: -485 mL  General: Comatose  HEENT: ET tube in place   Neck: No JVD, no carotid bruit  Lungs: CTAB  CV: RRR, nl S1/S2, no M/R/G  Abdomen: S/NT/ND, +BS NGT   Extremities: 2+ LE edema, no cyanosis  Neuro: Non-focal  Skin: No rash  LABS:                        8.6    6.41  )-----------( 134      ( 2020 05:35 )             27.2     07-30    146<H>  |  111<H>  |  30<H>  ----------------------------<  112<H>  3.6   |  31  |  1.60<H>    Ca    8.6      2020 05:35  Phos  3.2     07-30  Mg     2.0     07-30    TPro  6.1  /  Alb  2.6<L>  /  TBili  1.9<H>  /  DBili  x   /  AST  80<H>  /  ALT  78  /  AlkPhos  66  07-30    CAPILLARY BLOOD GLUCOSE        PT/INR - ( 2020 13:01 )   PT: 14.0 sec;   INR: 1.21 ratio         PTT - ( 2020 07:56 )  PTT:93.0 sec  Urinalysis Basic - ( 2020 18:19 )    Color: Red / Appearance: Turbid / S.015 / pH: x  Gluc: x / Ketone: Trace  / Bili: Small / Urobili: 4   Blood: x / Protein: 500 mg/dL / Nitrite: Negative   Leuk Esterase: Trace / RBC: >50 /HPF / WBC 0-2   Sq Epi: x / Non Sq Epi: Occasional / Bacteria: x        Culture - Urine (collected 2020 01:14)  Source: .Urine Clean Catch (Midstream)  Final Report (2020 04:38):    No growth    Culture - Blood (collected 2020 16:18)  Source: .Blood Blood-Peripheral  Preliminary Report (2020 17:01):    No growth to date.    Culture - Blood (collected 2020 16:18)  Source: .Blood Blood-Peripheral  Preliminary Report (2020 17:01):    No growth to date.      RADIOLOGY & ADDITIONAL TESTS:< from: Xray Chest 1 View- PORTABLE-Urgent (20 @ 13:46) >  EXAM:  XR CHEST PORTABLE URGENT 1V                            PROCEDURE DATE:  2020          INTERPRETATION:  Chest one view    HISTORY: NG tube placement    COMPARISON STUDY: Earlier the same day    Frontal expiratory view of the chest showsthe heart to be similarly enlarged in size. Endotracheal tube is present. Nasogastric tube reaches the body of the stomach.    The lungs show mild right base atelectasis. Defibrillator pad projects over the left chest and there is no definite evidence of pneumothorax.    IMPRESSION:  NG tube to stomach.    Thank you for the courtesy of this referral.    < end of copied text >  < from: EEG Awake or Drowsy (20 @ 10:26) >   EXAM:  EEG-AWAKE AND DROWSY        PROCEDURE DATE:  2020        INTERPRETATION:  GENERAL DESCRIPTION:  The EEG was recorded with a 32-channel digital EEG machine. Standard interval 10/20 lateral replacements were used.    CONDITIONS OF RECORDING: The EEG was carried out with the patient in the unresponsive state on a ventilator. Muscle artifacts present.    DESCRIPTION: The resting background rhythms consist of low to moderate voltage, symmetrical activity. The frequency spectrum shows amoderate amount of theta, small amount of delta and a negligible amount of beta activity.    Symmetrical sleep spindles and vertex wave activity was seen.    Hyperventilation was not performed because the patient was unresponsive. Intermittent photicstimulation from 2-20 flashes per second fails to elicit any abnormality.    With noxious stimulation there was no change in the background rhythms.    There were no areas of focal slowing, epileptiform  discharges or electrographic seizures.    IMPRESSION: Normal unresponsive EEG.    COMMENT: A 24-48 hour EEG might be desirable to further evaluate for possible seizure disorder, if clinically necessary. This EEG does not exclude the clinical diagnosis of seizures or epilepsy.    < end of copied text >     reviewed elctronically  < from: TTE Echo Complete w/o Contrast w/ Doppler (20 @ 14:20) >   EXAM:  ECHO TTE WO CON COMP W DOPP         PROCEDURE DATE:  2020        INTERPRETATION:  Ordering Physician: AMNA COSBY    Indication: Acute respiratory failure    Technician: BRANDAN    Study Quality: Good  A complete echocardiographic study was performed utilizing standard protocol including spectral and color Doppler in all echocardiographic windows.    Height: 177 cm  Weight: 129 kg  BSA: 2.42 m2  Blood Pressure: 131/61 mmHg    MEASUREMENTS  IVS: 1.6 cm  PWT: 1.5 cm  LA: 4.6 cm  AO: 3.3 cm  LVIDd: 5.9 cm  LVIDs: 4.5 cm    LVEF: 45%  RVSP: N/A  RA Pressure: N/A    FINDINGS  Left Ventricle: The left ventricle is dilated. Increased left ventricular wall thickness. There is global hypokinesis. The left ventricular systolic function is mildly reduced with an estimated EF of 45-50%.  Right Ventricle: The right ventricle is normal in size and function.  Left Atrium: The left atrium is dilated.  Right Atrium: The right atrium is dilated.  Mitral Valve: The mitral valve is structurally normal. Trace mitral regurgitation.  Aortic Valve: Aortic valve sclerosis. No aortic regurgitation.  Tricuspid Valve: The tricuspid valve is structurally normal. Trace tricuspid regurgitation. Inadequate tricuspid regurgitation to estimate pulmonary artery systolic pressure.  Pulmonic Valve: The pulmonic valve is grossly normal. Trace pulmonic regurgitation.  Diastolic Function: Diastolic dysfunction with elevated left ventricular filling pressures.  Pericardium/Pleura: No pericardial effusion visualized.  Aorta: The aortic root is normal in size.    CONCLUSIONS:  1. Mildly reduced left ventricular systolic function.  2. Dilated left ventricle with increased left ventricular wall thickness.  3. Bi-atrial enlargement.        < end of copied text >

## 2020-07-30 NOTE — PROGRESS NOTE ADULT - SUBJECTIVE AND OBJECTIVE BOX
infectious diseases progress note:    CATHY SANCHEZ is a 79y y. o. Male patient    Patient remains intubated but responding to discomfort    Allergies    Allergy Status Unknown  No Known Allergies    Intolerances        ANTIBIOTICS/RELEVANT:  antimicrobials  cefTRIAXone   IVPB 1000 milliGRAM(s) IV Intermittent every 24 hours    immunologic:    OTHER:  albumin human 25% IVPB 50 milliLiter(s) IV Intermittent every 6 hours  ALBUTerol    90 MICROgram(s) HFA Inhaler 4 Puff(s) Inhalation every 6 hours  aspirin  chewable 81 milliGRAM(s) Oral daily  atorvastatin 10 milliGRAM(s) Oral at bedtime  carvedilol 25 milliGRAM(s) Oral every 12 hours  chlorhexidine 0.12% Liquid 15 milliLiter(s) Oral Mucosa every 12 hours  furosemide   Injectable 60 milliGRAM(s) IV Push once  heparin   Injectable 5000 Unit(s) SubCutaneous every 8 hours  hydrALAZINE 50 milliGRAM(s) Oral every 8 hours  ipratropium 17 MICROgram(s) HFA Inhaler 4 Puff(s) Inhalation every 6 hours  lactated ringers. 1000 milliLiter(s) IV Continuous <Continuous>  levETIRAcetam  IVPB 500 milliGRAM(s) IV Intermittent every 12 hours  pantoprazole  Injectable 40 milliGRAM(s) IV Push daily      Objective:  Last 24-Vital Signs Last 24 Hrs  T(C): 36.8 (2020 05:04), Max: 37.6 (2020 20:39)  T(F): 98.3 (2020 05:04), Max: 99.6 (2020 20:39)  HR: 73 (2020 10:00) (53 - 78)  BP: 144/68 (2020 10:00) (127/66 - 192/86)  BP(mean): 98 (2020 10:00) (90 - 124)  RR: 25 (2020 10:00) (16 - 97)  SpO2: 98% (2020 10:00) (92% - 100%)    T(C): 36.8 (20 @ 05:04), Max: 37.6 (20 @ 20:39)  T(F): 98.3 (20 @ 05:04), Max: 99.6 (20 @ 20:39)  T(C): 36.8 (20 @ 05:04), Max: 37.6 (20 @ 20:39)  T(F): 98.3 (20 @ 05:04), Max: 99.6 (20 @ 20:39)  T(C): 36.8 (20 @ 05:04), Max: 37.6 (20 @ 20:39)  T(F): 98.3 (20 @ 05:04), Max: 99.6 (20 @ 20:39)    PHYSICAL EXAM:  Constitutional: Well-developed, well nourished  Eyes: PERRLA, EOMI  Ear/Nose/Throat: intubated  Neck: no JVD, no lymphadenopathy, supple  Respiratory: no accessory muscle use, lung fields bilaterally coarse upper airway sounds  Cardiovascular: distant  Gastrointestinal: soft, NT, no HSM, BS-normal  Extremities: no clubbing, no cyanosis,   Neuro: patient not alert      LABS:                        8.6    6.41  )-----------( 134      ( 2020 05:35 )             27.2       WBC 6.41   @ 05:35  WBC 6.51   @ 17:37  WBC 6.35   @ 05:58  WBC 5.66   @ 23:45  WBC 3.87   @ 13:01  WBC 4.57   @ 06:01      0730    146<H>  |  111<H>  |  30<H>  ----------------------------<  112<H>  3.6   |  31  |  1.60<H>    Ca    8.6      2020 05:35  Phos  3.2       Mg     2.0         TPro  6.1  /  Alb  2.6<L>  /  TBili  1.9<H>  /  DBili  x   /  AST  80<H>  /  ALT  78  /  AlkPhos  66        Creatinine, Serum: 1.60 mg/dL (20 @ 05:35)  Creatinine, Serum: 1.70 mg/dL (20 @ 17:53)  Creatinine, Serum: 1.70 mg/dL (20 @ 05:58)  Creatinine, Serum: 1.30 mg/dL (20 @ 13:01)  Creatinine, Serum: 1.57 mg/dL (20 @ 06:01)      PT/INR - ( 2020 13:01 )   PT: 14.0 sec;   INR: 1.21 ratio         PTT - ( 2020 07:56 )  PTT:93.0 sec  Urinalysis Basic - ( 2020 18:19 )    Color: Red / Appearance: Turbid / S.015 / pH: x  Gluc: x / Ketone: Trace  / Bili: Small / Urobili: 4   Blood: x / Protein: 500 mg/dL / Nitrite: Negative   Leuk Esterase: Trace / RBC: >50 /HPF / WBC 0-2   Sq Epi: x / Non Sq Epi: Occasional / Bacteria: x            MICROBIOLOGY:    COVID-19  Antibody - for prior infection screening (20 @ 03:07)    COVID-19 IgG Antibody Index: 90.60: Roche ECLIA Total AB (SHAWANDA)  NOTE: This result index represents a total antibody measurement,  which  includes IgG, IgA, and IgM  Negative <= 0.99 Index  Positive >= 1.00 Index Index    COVID-19 IgG Antibody Interpretation: Positive:      RADIOLOGY & ADDITIONAL STUDIES:

## 2020-07-30 NOTE — PROGRESS NOTE ADULT - SUBJECTIVE AND OBJECTIVE BOX
CATHY SANCHEZ    [facility] ICU1 11    Patient is a 79y old  Male who presents with a chief complaint of UNRESPONSIVENESS (2020 08:00)       Allergies    Allergy Status Unknown  No Known Allergies    Intolerances        HPI:  78yo Male PMHx CVA , HTN, HLD , hernia surgery with testicle resection , umbilical hernia repair 2019,  transferred from [place] ED to [place] ED with s/p seizure activity  and bradycardia into the 30s.  Arrived to [place] hypothermic and hypotensive .Patient  required intubation ph 7.1 with CO2 85 due to acute hypoxic respiratory failure on arrival to ER ,placed on Levophed drip due to hypotension Admitted for septic workup and evaluation ,send blood and urine cx, serial lactate levels, monitor vitals closely hydration ,monitor urine output and renal profile,iv abx initiated -given ceftriaxone in ER and seen by ID consult .Pulmonology and cardiology consults are called .Patient admitted  to ICU with septic shock ,noted to have  tongue bite and AMS ,neurology evaluation called  Admitted  to intensive care  unit for monitoring , to rule out ami -send 3 sets of cardiac enzymes to rule out acute coronary event, obtain ECHO to evaluate LVEF, cardiology consult  ,continue current sepsis management, ventilator management /O2 supply, anticoagulation plan as per cardiology consult Palliative care consult requested ,to discuss advance directives and complete MOLST .Tried to reach family to obtain details of past medical hx - left a message for daughter .not able to reach son ( non valid number )[place]  ED team spoke with patient's daughter, who stated her father fell to the ground on the floor of his motel room about 4 days ago and was unable to get up. She had been caring for him on the floor since and did not call 911 for assistance. Today she found him unresponsive, with blood in his mouth, and incontinent of urine.  CT brain was negative. A CODE STROKE was called, and a CTA head/neck were performed, which was negative for large vessel occlusion but concerning for a density anterior to the ascending aorta possibly right atrial thrombus. Also revealed enlarged, multinodular thyroid. TPA criteria was not met, as patient had unknown onset of symptoms. Patient was transferred to [facility] ED for further care .He developed  multiple witnessed seizures in [place] ED, temporarily resolved with Ativan administration . CT/CTA brain NAD . Seen by neurologist and EEG ordered to r/o status epilepticus as etiology of ongoing encephalopathy, probably will require  MRI once stabilized from cardiac and respiratory standpoint. (2020 10:57)      PAST MEDICAL & SURGICAL HISTORY:  Chronic GERD  HTN (hypertension)  HLD (hyperlipidemia)  CVA (cerebral vascular accident)  High cholesterol  HTN (hypertension)  History of surgical removal of testicle  H/O hernia repair  No significant past surgical history      FAMILY HISTORY:        MEDICATIONS   albumin human 25% IVPB 50 milliLiter(s) IV Intermittent every 6 hours  ALBUTerol    90 MICROgram(s) HFA Inhaler 4 Puff(s) Inhalation every 6 hours  atorvastatin 10 milliGRAM(s) Oral at bedtime  cefTRIAXone   IVPB 1000 milliGRAM(s) IV Intermittent every 24 hours  chlorhexidine 0.12% Liquid 15 milliLiter(s) Oral Mucosa every 12 hours  heparin   Injectable 5000 Unit(s) SubCutaneous every 8 hours  hydrALAZINE 50 milliGRAM(s) Oral every 8 hours  ipratropium 17 MICROgram(s) HFA Inhaler 4 Puff(s) Inhalation every 6 hours  lactated ringers. 1000 milliLiter(s) IV Continuous <Continuous>  levETIRAcetam  IVPB 500 milliGRAM(s) IV Intermittent every 12 hours  pantoprazole  Injectable 40 milliGRAM(s) IV Push daily      Vital Signs Last 24 Hrs  T(C): 36.8 (2020 05:04), Max: 37.6 (2020 20:39)  T(F): 98.3 (2020 05:04), Max: 99.6 (2020 20:39)  HR: 76 (2020 08:00) (53 - 78)  BP: 168/74 (2020 08:00) (127/66 - 192/86)  BP(mean): 106 (2020 08:00) (90 - 124)  RR: 33 (2020 08:00) (0 - 97)  SpO2: 96% (2020 08:00) (92% - 100%)      20 @ 07:01  -  20 @ 07:00  --------------------------------------------------------  IN: 4660 mL / OUT: 5145 mL / NET: -485 mL        Mode: AC/ CMV (Assist Control/ Continuous Mandatory Ventilation), RR (machine): 16, TV (machine): 500, FiO2: 30, PEEP: 5, ITime: 1, MAP: 12, PIP: 27    LABS:                        8.6    6.41  )-----------( 134      ( 2020 05:35 )             27.2     07-30    146<H>  |  111<H>  |  30<H>  ----------------------------<  112<H>  3.6   |  31  |  1.60<H>    Ca    8.6      2020 05:35  Phos  3.2     0730  Mg     2.0     07-30    TPro  6.1  /  Alb  2.6<L>  /  TBili  1.9<H>  /  DBili  x   /  AST  80<H>  /  ALT  78  /  AlkPhos  66  07-30    PT/INR - ( 2020 13:01 )   PT: 14.0 sec;   INR: 1.21 ratio         PTT - ( 2020 07:56 )  PTT:93.0 sec  Urinalysis Basic - ( 2020 18:19 )    Color: Red / Appearance: Turbid / S.015 / pH: x  Gluc: x / Ketone: Trace  / Bili: Small / Urobili: 4   Blood: x / Protein: 500 mg/dL / Nitrite: Negative   Leuk Esterase: Trace / RBC: >50 /HPF / WBC 0-2   Sq Epi: x / Non Sq Epi: Occasional / Bacteria: x        ABG - ( 2020 17:37 )  pH, Arterial: 7.46  pH, Blood: x     /  pCO2: 37    /  pO2: 99    / HCO3: 27    / Base Excess: 2.4   /  SaO2: 98                  WBC:  WBC Count: 6.41 K/uL ( @ 05:35)  WBC Count: 6.51 K/uL ( @ 17:37)  WBC Count: 6.35 K/uL ( @ 05:58)  WBC Count: 5.66 K/uL ( @ 23:45)  WBC Count: 3.87 K/uL ( @ 13:01)  WBC Count: 4.57 K/uL ( @ 06:01)      MICROBIOLOGY:  RECENT CULTURES:   .Urine Clean Catch (Midstream) XXXX XXXX   No growth     .Blood Blood-Peripheral XXXX XXXX   No growth to date.          CARDIAC MARKERS ( 2020 13:01 )  .026 ng/mL / x     / 608 U/L / x     / x            PT/INR - ( 2020 13:01 )   PT: 14.0 sec;   INR: 1.21 ratio         PTT - ( 2020 07:56 )  PTT:93.0 sec    Sodium:  Sodium, Serum: 146 mmol/L ( 05:35)  Sodium, Serum: 145 mmol/L ( 17:53)  Sodium, Serum: 146 mmol/L ( 05:58)  Sodium, Serum: 145 mmol/L ( 13:01)  Sodium, Serum: 144 mmol/L ( 06:01)      1.60 mg/dL  05:35  1.70 mg/dL  17:53  1.70 mg/dL  05:58  1.30 mg/dL  13:01  1.57 mg/dL  @ 06:01      Hemoglobin:  Hemoglobin: 8.6 g/dL ( @ 05:35)  Hemoglobin: 9.0 g/dL ( @ 17:37)  Hemoglobin: 9.2 g/dL ( @ 05:58)  Hemoglobin: 9.2 g/dL ( 23:45)  Hemoglobin: 9.5 g/dL ( 13:01)  Hemoglobin: 11.2 g/dL ( 06:01)      Platelets: Platelet Count - Automated: 134 K/uL ( @ 05:35)  Platelet Count - Automated: 135 K/uL ( 17:37)  Platelet Count - Automated: 154 K/uL ( 05:58)  Platelet Count - Automated: 147 K/uL ( @ 23:45)  Platelet Count - Automated: 135 K/uL ( 13:01)  Platelet Count - Automated: 158 K/uL ( @ 06:01)      LIVER FUNCTIONS - ( 2020 05:35 )  Alb: 2.6 g/dL / Pro: 6.1 g/dL / ALK PHOS: 66 U/L / ALT: 78 U/L / AST: 80 U/L / GGT: x             Urinalysis Basic - ( 2020 18:19 )    Color: Red / Appearance: Turbid / S.015 / pH: x  Gluc: x / Ketone: Trace  / Bili: Small / Urobili: 4   Blood: x / Protein: 500 mg/dL / Nitrite: Negative   Leuk Esterase: Trace / RBC: >50 /HPF / WBC 0-2   Sq Epi: x / Non Sq Epi: Occasional / Bacteria: x        RADIOLOGY & ADDITIONAL STUDIES:

## 2020-07-30 NOTE — PROGRESS NOTE ADULT - ASSESSMENT
78yo Male PMHx CVA 2012, HTN, HLD , hernia surgery with testicle resection 2015, umbilical hernia repair 12/2019,  transferred from [place] ED to [place] ED with s/p seizure activity  and bradycardia into the 30s.  Arrived to [place] hypothermic and hypotensive .Patient  required intubation ph 7.1 with CO2 85 due to acute hypoxic respiratory failure on arrival to ER ,placed on Levophed drip due to hypotension Admitted for septic workup and evaluation ,send blood and urine cx, serial lactate levels, monitor vitals closely hydration ,monitor urine output and renal profile,iv abx initiated -given ceftriaxone in ER and seen by ID consult .Pulmonology and cardiology consults are called .Patient admitted  to ICU with septic shock ,noted to have  tongue bite and AMS ,neurology evaluation called  Admitted  to intensive care  unit for monitoring , to rule out ami -send 3 sets of cardiac enzymes to rule out acute coronary event, obtain ECHO to evaluate LVEF, cardiology consult  ,continue current sepsis management, ventilator management /O2 supply, anticoagulation plan as per cardiology consult Palliative care consult requested ,to discuss advance directives and complete MOLST .Tried to reach family to obtain details of past medical hx - left a message for daughter .not able to reach son ( non valid number )[place]  ED team spoke with patient's daughter, who stated her father fell to the ground on the floor of his motel room about 4 days ago and was unable to get up. She had been caring for him on the floor since and did not call 911 for assistance. Today she found him unresponsive, with blood in his mouth, and incontinent of urine.  CT brain was negative. A CODE STROKE was called, and a CTA head/neck were performed, which was negative for large vessel occlusion but concerning for a density anterior to the ascending aorta possibly right atrial thrombus. Also revealed enlarged, multinodular thyroid. TPA criteria was not met, as patient had unknown onset of symptoms. Patient was transferred to [facility] ED for further care .He developed  multiple witnessed seizures in [place] ED, temporarily resolved with Ativan administration . CT/CTA brain NAD . Seen by neurologist and EEG ordered to r/o status epilepticus as etiology of ongoing encephalopathy, probably will require  MRI once stabilized from cardiac and respiratory standpoint.

## 2020-07-30 NOTE — PROGRESS NOTE ADULT - ASSESSMENT
The patient is an 79 year old male with a history of HTN, CVA who presents with unresponsiveness.    Plan:  - HR improved overall  - Hold carvedilol; eventually can resume when HR picks up further  - Continue hydralazine 50 mg q8h  - CTA head and neck with motion artifact, no obvious stenosis. There is a questionable filling defect noted, possibly right atrial thrombus.  - Echocardiogram with mild LV systolic dysfunction, no obvious right atrial thrombus  - On ceftriaxone  - Neurology follow-up  - EEG in progress  - Wean off ventilator  - ICU care

## 2020-07-30 NOTE — PROGRESS NOTE ADULT - ATTENDING COMMENTS
80yo Male PMHx CVA 2012, HTN, HLD , hernia surgery with testicle resection 2015, umbilical hernia repair 12/2019,  transferred from [place] ED to [place] ED with s/p seizure activity  and bradycardia into the 30s.  Arrived to [place] hypothermic and hypotensive .Patient  required intubation ph 7.1 with CO2 85 due to acute hypoxic respiratory failure on arrival to ER ,placed on Levophed drip due to hypotension Admitted for septic workup and evaluation ,send blood and urine cx, serial lactate levels, monitor vitals closely hydration ,monitor urine output and renal profile,iv abx initiated -given ceftriaxone in ER and seen by ID consult .Pulmonology and cardiology consults are called .Patient admitted  to ICU with septic shock ,noted to have  tongue bite and AMS ,neurology evaluation called  Admitted  to intensive care  unit for monitoring , to rule out ami -send 3 sets of cardiac enzymes to rule out acute coronary event, obtain ECHO to evaluate LVEF, cardiology consult  ,continue current sepsis management, ventilator management /O2 supply, anticoagulation plan as per cardiology consult Palliative care consult requested ,to discuss advance directives and complete MOLST .Tried to reach family to obtain details of past medical hx - left a message for daughter .not able to reach son ( non valid number )[place]  ED team spoke with patient's daughter, who stated her father fell to the ground on the floor of his motel room about 4 days ago and was unable to get up. She had been caring for him on the floor since and did not call 911 for assistance. Today she found him unresponsive, with blood in his mouth, and incontinent of urine.  CT brain was negative. A CODE STROKE was called, and a CTA head/neck were performed, which was negative for large vessel occlusion but concerning for a density anterior to the ascending aorta possibly right atrial thrombus. Also revealed enlarged, multinodular thyroid. TPA criteria was not met, as patient had unknown onset of symptoms. Patient was transferred to [facility] ED for further care .He developed  multiple witnessed seizures in [place] ED, temporarily resolved with Ativan administration . CT/CTA brain NAD . Seen by neurologist and EEG ordered to r/o status epilepticus as etiology of ongoing encephalopathy, probably will require  MRI once stabilized from cardiac and respiratory standpoint.

## 2020-07-30 NOTE — PROGRESS NOTE ADULT - SUBJECTIVE AND OBJECTIVE BOX
Neurology follow up note    CATHY [id number]      Interval History:    Patient intubated off sedation     MEDICATIONS    albumin human 25% IVPB 50 milliLiter(s) IV Intermittent every 6 hours  ALBUTerol    90 MICROgram(s) HFA Inhaler 4 Puff(s) Inhalation every 6 hours  aspirin  chewable 81 milliGRAM(s) Oral daily  atorvastatin 10 milliGRAM(s) Oral at bedtime  carvedilol 25 milliGRAM(s) Oral every 12 hours  cefTRIAXone   IVPB 1000 milliGRAM(s) IV Intermittent every 24 hours  chlorhexidine 0.12% Liquid 15 milliLiter(s) Oral Mucosa every 12 hours  heparin   Injectable 5000 Unit(s) SubCutaneous every 8 hours  hydrALAZINE 50 milliGRAM(s) Oral every 8 hours  ipratropium 17 MICROgram(s) HFA Inhaler 4 Puff(s) Inhalation every 6 hours  lactated ringers. 1000 milliLiter(s) IV Continuous <Continuous>  levETIRAcetam  IVPB 500 milliGRAM(s) IV Intermittent every 12 hours  pantoprazole  Injectable 40 milliGRAM(s) IV Push daily      Allergies    Allergy Status Unknown  No Known Allergies    Intolerances            Vital Signs Last 24 Hrs  T(C): 36.8 (2020 05:04), Max: 37.6 (2020 20:39)  T(F): 98.3 (2020 05:04), Max: 99.6 (2020 20:39)  HR: 74 (2020 09:00) (53 - 78)  BP: 156/75 (2020 09:00) (127/66 - 192/86)  BP(mean): 108 (2020 09:00) (90 - 124)  RR: 33 (2020 09:00) (16 - 97)  SpO2: 97% (2020 09:00) (92% - 100%)      REVIEW OF SYSTEMS: intubated       On Neurological Examination:    Mental Status - Patient is  Lethargic Unresponsive  .     Does not follow commands    Speech -  intubated               Cranial Nerves - Pupils 3 mm equal and reactive to light,     Motor Exam -    With stimuli positive movement of all 4 extremities    Muscle tone - is normal all over.  No asymmetry is seen.      Sensory    Bilateral intact to light touch      GENERAL Exam: Nontoxic , No Acute Distress   	  HEENT:  normocephalic, atraumatic  		  LUNGS: intubated   	  HEART: Normal S1S2   No murmur RRR        	  GI/ ABDOMEN:  Soft  Non tender  	   SKIN: Normal  No Ecchymosis             LABS:  CBC Full  -  ( 2020 05:35 )  WBC Count : 6.41 K/uL  RBC Count : 3.02 M/uL  Hemoglobin : 8.6 g/dL  Hematocrit : 27.2 %  Platelet Count - Automated : 134 K/uL  Mean Cell Volume : 90.1 fl  Mean Cell Hemoglobin : 28.5 pg  Mean Cell Hemoglobin Concentration : 31.6 gm/dL  Auto Neutrophil # : 4.57 K/uL  Auto Lymphocyte # : 0.72 K/uL  Auto Monocyte # : 0.92 K/uL  Auto Eosinophil # : 0.17 K/uL  Auto Basophil # : 0.01 K/uL  Auto Neutrophil % : 71.2 %  Auto Lymphocyte % : 11.2 %  Auto Monocyte % : 14.4 %  Auto Eosinophil % : 2.7 %  Auto Basophil % : 0.2 %    Urinalysis Basic - ( 2020 18:19 )    Color: Red / Appearance: Turbid / S.015 / pH: x  Gluc: x / Ketone: Trace  / Bili: Small / Urobili: 4   Blood: x / Protein: 500 mg/dL / Nitrite: Negative   Leuk Esterase: Trace / RBC: >50 /HPF / WBC 0-2   Sq Epi: x / Non Sq Epi: Occasional / Bacteria: x      07-30    146<H>  |  111<H>  |  30<H>  ----------------------------<  112<H>  3.6   |  31  |  1.60<H>    Ca    8.6      2020 05:35  Phos  3.2     07-30  Mg     2.0     07-30    TPro  6.1  /  Alb  2.6<L>  /  TBili  1.9<H>  /  DBili  x   /  AST  80<H>  /  ALT  78  /  AlkPhos  66  07-30    Hemoglobin A1C:     LIVER FUNCTIONS - ( 2020 05:35 )  Alb: 2.6 g/dL / Pro: 6.1 g/dL / ALK PHOS: 66 U/L / ALT: 78 U/L / AST: 80 U/L / GGT: x           Vitamin B12   PT/INR - ( 2020 13:01 )   PT: 14.0 sec;   INR: 1.21 ratio         PTT - ( 2020 07:56 )  PTT:93.0 sec      RADIOLOGY    ANALYSIS AND PLAN:  This is an 79-year-old with an episode of altered mental status and seizure.  1.	For episode of altered mental status and seizure, suspect this could be possibly metabolic encephalopathy.  The patient was hypothermic upon initial presentation, questionable any type of septic type process, septic shock.  2.	Infectious Disease workup.  3.	antibiotics as needed   4.	For underlying seizure events, status epilepticus,  Keppra 500 mg twice a day.    5.	Ativan 1 mg IV push, q. 6h. p.r.n. for any type of breakthrough seizures.  6.	Seizure precaution.  7.	 EEG no siezure discharges   8.	For history of high cholesterol, continue the patient on statin.  9.	based on clinical course may need repeat ct head or mri if possible   10.	For history of hypertension, monitor systolic blood pressure.  11.	Will monitor the patient's electrolytes.  12.	Attempted to contact the family, daughter; Jacinta, at [phone] and son, Jared at [phone], no response 2020    Greater than 45 minutes spent in direct patient care reviewing  the notes, lab data/ imaging , discussion with multidisciplinary team. Neurology follow up note    CATHY [id number]      Interval History:    Patient intubated off sedation     MEDICATIONS    albumin human 25% IVPB 50 milliLiter(s) IV Intermittent every 6 hours  ALBUTerol    90 MICROgram(s) HFA Inhaler 4 Puff(s) Inhalation every 6 hours  aspirin  chewable 81 milliGRAM(s) Oral daily  atorvastatin 10 milliGRAM(s) Oral at bedtime  carvedilol 25 milliGRAM(s) Oral every 12 hours  cefTRIAXone   IVPB 1000 milliGRAM(s) IV Intermittent every 24 hours  chlorhexidine 0.12% Liquid 15 milliLiter(s) Oral Mucosa every 12 hours  heparin   Injectable 5000 Unit(s) SubCutaneous every 8 hours  hydrALAZINE 50 milliGRAM(s) Oral every 8 hours  ipratropium 17 MICROgram(s) HFA Inhaler 4 Puff(s) Inhalation every 6 hours  lactated ringers. 1000 milliLiter(s) IV Continuous <Continuous>  levETIRAcetam  IVPB 500 milliGRAM(s) IV Intermittent every 12 hours  pantoprazole  Injectable 40 milliGRAM(s) IV Push daily      Allergies    Allergy Status Unknown  No Known Allergies    Intolerances            Vital Signs Last 24 Hrs  T(C): 36.8 (2020 05:04), Max: 37.6 (2020 20:39)  T(F): 98.3 (2020 05:04), Max: 99.6 (2020 20:39)  HR: 74 (2020 09:00) (53 - 78)  BP: 156/75 (2020 09:00) (127/66 - 192/86)  BP(mean): 108 (2020 09:00) (90 - 124)  RR: 33 (2020 09:00) (16 - 97)  SpO2: 97% (2020 09:00) (92% - 100%)      REVIEW OF SYSTEMS: intubated       On Neurological Examination:    Mental Status - Patient is  Lethargic Unresponsive  .     Does not follow commands    Speech -  intubated               Cranial Nerves - Pupils 3 mm equal and reactive to light,     Motor Exam -    With stimuli positive movement of all 4 extremities    Muscle tone - is normal all over.  No asymmetry is seen.      Sensory    Bilateral intact to light touch      GENERAL Exam: Nontoxic , No Acute Distress   	  HEENT:  normocephalic, atraumatic  		  LUNGS: intubated   	  HEART: Normal S1S2   No murmur RRR        	  GI/ ABDOMEN:  Soft  Non tender  	   SKIN: Normal  No Ecchymosis             LABS:  CBC Full  -  ( 2020 05:35 )  WBC Count : 6.41 K/uL  RBC Count : 3.02 M/uL  Hemoglobin : 8.6 g/dL  Hematocrit : 27.2 %  Platelet Count - Automated : 134 K/uL  Mean Cell Volume : 90.1 fl  Mean Cell Hemoglobin : 28.5 pg  Mean Cell Hemoglobin Concentration : 31.6 gm/dL  Auto Neutrophil # : 4.57 K/uL  Auto Lymphocyte # : 0.72 K/uL  Auto Monocyte # : 0.92 K/uL  Auto Eosinophil # : 0.17 K/uL  Auto Basophil # : 0.01 K/uL  Auto Neutrophil % : 71.2 %  Auto Lymphocyte % : 11.2 %  Auto Monocyte % : 14.4 %  Auto Eosinophil % : 2.7 %  Auto Basophil % : 0.2 %    Urinalysis Basic - ( 2020 18:19 )    Color: Red / Appearance: Turbid / S.015 / pH: x  Gluc: x / Ketone: Trace  / Bili: Small / Urobili: 4   Blood: x / Protein: 500 mg/dL / Nitrite: Negative   Leuk Esterase: Trace / RBC: >50 /HPF / WBC 0-2   Sq Epi: x / Non Sq Epi: Occasional / Bacteria: x      07-30    146<H>  |  111<H>  |  30<H>  ----------------------------<  112<H>  3.6   |  31  |  1.60<H>    Ca    8.6      2020 05:35  Phos  3.2     07-30  Mg     2.0     07-30    TPro  6.1  /  Alb  2.6<L>  /  TBili  1.9<H>  /  DBili  x   /  AST  80<H>  /  ALT  78  /  AlkPhos  66  07-30    Hemoglobin A1C:     LIVER FUNCTIONS - ( 2020 05:35 )  Alb: 2.6 g/dL / Pro: 6.1 g/dL / ALK PHOS: 66 U/L / ALT: 78 U/L / AST: 80 U/L / GGT: x           Vitamin B12   PT/INR - ( 2020 13:01 )   PT: 14.0 sec;   INR: 1.21 ratio         PTT - ( 2020 07:56 )  PTT:93.0 sec      RADIOLOGY    ANALYSIS AND PLAN:  This is an 79-year-old with an episode of altered mental status and seizure.  1.	For episode of altered mental status and seizure, suspect this could be possibly metabolic encephalopathy.  The patient was hypothermic upon initial presentation, questionable any type of septic type process, septic shock.  2.	Infectious Disease workup.  3.	antibiotics as needed   4.	For underlying seizure  events, status epilepticus,  Keppra 500 mg twice a day.    5.	Ativan 1 mg IV push, q. 6h. p.r.n. for any type of breakthrough seizures.  6.	Seizure precaution.  7.	 EEG no seizures discharges   8.	For history of high cholesterol, continue the patient on statin.  9.	based on clinical course may need repeat ct head or mri if possible   10.	For history of hypertension, monitor systolic blood pressure.  11.	Will monitor the patient's electrolytes.  12.	Attempted to contact the family, daughter; Jacinta, at [phone] and son, Jaerd at [phone], no response 2020    Greater than 45 minutes spent in direct patient care reviewing  the notes, lab data/ imaging , discussion with multidisciplinary team.

## 2020-07-30 NOTE — PROGRESS NOTE ADULT - ASSESSMENT
78yo Male  transferred from [place] ED to [place] ED with s/p seizure activity  and bradycardia into the 30s.  Arrived to [place] hypothermic and hypotensive .Patient  required intubation ph 7.1 with CO2 85 due to acute hypoxic respiratory failure on arrival to ER ,placed on Levophed drip due to hypotension Admitted for septic workup and evaluation limited history but apparently down for prolonged period prior to coming to ER

## 2020-07-30 NOTE — PROGRESS NOTE ADULT - ASSESSMENT
cont rx cont rx      LAURA MORGAN [facility] [id number]  1940 [address]A 2020 DR SHAREE PUTNAM     REVIEW OF SYMPTOMS      Able to give ROS  NO     PHYSICAL EXAM    HEENT Unremarkable PERRLA atraumatic   RESP Fair air entry EXP prolonged    Harsh breath sound Resp distres mild   CARDIAC S1 S2 No S3     NO JVD    ABDOMEN SOFT BS PRESENT NOT DISTENDED No hepatosplenomegaly PEDAL EDEMA present No calf tenderness  NO rash   GENERAL Not TOXIC looking    HPI/PMH.       79 m lving in hotel (evicted recently) was found unresponsive and brought to [place] er and transferred to [facility] P 2020 and Pulm consulted     Pt was noted unresponsive on hotel floor with possible tongue bite ? seizure incontinent   er vitals [facility] S 149/65 57         OXYGENATION      2020 ra 93%     VITALS/LABS       2020 afeb 160/80   2020 ac 16/500/    RELEVANT INFORMATION       ABG //500/.3 746/37/99  ABG  11a ac 20/550/.3/5 728/58/68     ALBUTEROL albuterol ()     COVID igg  covid igg pos+betzy   COVID pcr  Neg-betzy     W 2020 w 6.3   BLOD C  bc Neg-betzy   URINE c  uc Neg-betzy   ROCEPHIN rocephin ()     ECHO  echo ef 45% dd biatrial enlargement  LASIX lasix 60 ()   HYDRALAZINE hydralaz 50.3 ()    CARVEDILOL carvedilol 6.25x2(2020)     Hb -2020 Hb 9.2 - 8.6   Plt -2020 plt 154 - 134   Na 2020 Na 146   Xr -2020 Cr 1.7 - 1.6     KEPPRA keppra 500.2 ()       PT DATA/BEST PRACTICE  ALLERGY    nka                 WT               2020 128 k                       BMI                2020 40             CrCl                                    ADVANCED DIRECTIVE     LINES TUBES POA.                 NONVIOLENT NONSELFDESTRUCTIVE LEVEL ONE 2020   HEAD OF BED ELEVATION Yes      INFECTION PPLX          PROCEDURE   Intubation )   Arevalo   DVT PROPHYLAXIS         hposc (2020)   SCHULTZ PROPHYLAXIS          Protonix 40 ()   DYSPHAGIA EVAL     DIET.     jevity 1.5 1600 () (og)                                                                         IV F      ()   Albumin 12.5x4 ()                                                        PATIENT DATA/ASSESSMENT/RECOMMENDATIONS     79 m found unresponsive with possible clot in r atrium on cta head resp failure pH 711 pco2 80 possible rhabdo possible mi dehydrated in [place] No ac cva found on ct head     Pt intubated in er 2020                                      PROBLEMS.  POSSIBLE SEPSIS POA   2020 w 4.5   Pt was started on rocephin for poss uti     INTUBATED 2020    VENT  Lung protective ventilation  Target PaO2 60(+) FIO2 Least needed VT 6/k pH 7.30(+)PPLAT 35(-)  Wean as tolertd     RO VTE   SUSPICION OF RA THROMBUS ON CTA HEAD   2020 cta head filling defect r atrium   ECHO  echo ef 45% dd biatrial enlargement   No clot seen   Pt taken off IV hep    POSSIBLE MI   POSSIBLE RHABDOMYOLYSIS  2020 ck 769   2020 Tr .026   Monitor c enz   On albumin lasix     HEMODYNAMICS  Target MAP 65 (+)  2020 pocus ivc eval indicated nonfluid responsive state     ANEMIA poa   2020 Hb 11.2   Monmitor serially     LASHELL POA   2020 Cr 1.5  Likely sec sepsis and rhabdo   IV fluids     MULTINODULAR GOITER    Seen on cta head     UNRESPONSIVE poa   RO HYPOGLYCEMIA    RO CVA   POSSIBLE ANOXIC ENCEPHALOPATHY  SEVERAL WITNESSED SZ IN ER 2020 cta Head Neg-betzy   2020 CT h No ac infarcts 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 Risks, benefits, alternatives of planned interventions when applicable were discussed in detail and questions answered as best as possible    LAURA MORGAN [facility] [phone]  1940 DOA 2020 DR SHAREE PUTNAM

## 2020-07-31 DIAGNOSIS — N17.9 ACUTE KIDNEY FAILURE, UNSPECIFIED: ICD-10-CM

## 2020-07-31 DIAGNOSIS — I50.9 HEART FAILURE, UNSPECIFIED: ICD-10-CM

## 2020-07-31 DIAGNOSIS — Z01.84 ENCOUNTER FOR ANTIBODY RESPONSE EXAMINATION: ICD-10-CM

## 2020-07-31 LAB
ALBUMIN SERPL ELPH-MCNC: 2.7 G/DL — LOW (ref 3.3–5)
ALP SERPL-CCNC: 63 U/L — SIGNIFICANT CHANGE UP (ref 40–120)
ALT FLD-CCNC: 65 U/L — SIGNIFICANT CHANGE UP (ref 12–78)
ANION GAP SERPL CALC-SCNC: 4 MMOL/L — LOW (ref 5–17)
ANION GAP SERPL CALC-SCNC: 4 MMOL/L — LOW (ref 5–17)
AST SERPL-CCNC: 52 U/L — HIGH (ref 15–37)
BASOPHILS # BLD AUTO: 0.01 K/UL — SIGNIFICANT CHANGE UP (ref 0–0.2)
BASOPHILS NFR BLD AUTO: 0.2 % — SIGNIFICANT CHANGE UP (ref 0–2)
BILIRUB SERPL-MCNC: 2.2 MG/DL — HIGH (ref 0.2–1.2)
BUN SERPL-MCNC: 29 MG/DL — HIGH (ref 7–23)
BUN SERPL-MCNC: 29 MG/DL — HIGH (ref 7–23)
CALCIUM SERPL-MCNC: 8.5 MG/DL — SIGNIFICANT CHANGE UP (ref 8.5–10.1)
CALCIUM SERPL-MCNC: 8.7 MG/DL — SIGNIFICANT CHANGE UP (ref 8.5–10.1)
CHLORIDE SERPL-SCNC: 106 MMOL/L — SIGNIFICANT CHANGE UP (ref 96–108)
CHLORIDE SERPL-SCNC: 109 MMOL/L — HIGH (ref 96–108)
CK SERPL-CCNC: 335 U/L — HIGH (ref 26–308)
CO2 SERPL-SCNC: 33 MMOL/L — HIGH (ref 22–31)
CO2 SERPL-SCNC: 35 MMOL/L — HIGH (ref 22–31)
CREAT SERPL-MCNC: 1.4 MG/DL — HIGH (ref 0.5–1.3)
CREAT SERPL-MCNC: 1.4 MG/DL — HIGH (ref 0.5–1.3)
EOSINOPHIL # BLD AUTO: 0.17 K/UL — SIGNIFICANT CHANGE UP (ref 0–0.5)
EOSINOPHIL NFR BLD AUTO: 2.7 % — SIGNIFICANT CHANGE UP (ref 0–6)
GLUCOSE SERPL-MCNC: 139 MG/DL — HIGH (ref 70–99)
GLUCOSE SERPL-MCNC: 91 MG/DL — SIGNIFICANT CHANGE UP (ref 70–99)
GRAM STN FLD: SIGNIFICANT CHANGE UP
HCT VFR BLD CALC: 26.2 % — LOW (ref 39–50)
HGB BLD-MCNC: 8.3 G/DL — LOW (ref 13–17)
IMM GRANULOCYTES NFR BLD AUTO: 0.5 % — SIGNIFICANT CHANGE UP (ref 0–1.5)
LYMPHOCYTES # BLD AUTO: 0.65 K/UL — LOW (ref 1–3.3)
LYMPHOCYTES # BLD AUTO: 10.2 % — LOW (ref 13–44)
MAGNESIUM SERPL-MCNC: 1.8 MG/DL — SIGNIFICANT CHANGE UP (ref 1.6–2.6)
MAGNESIUM SERPL-MCNC: 1.8 MG/DL — SIGNIFICANT CHANGE UP (ref 1.6–2.6)
MCHC RBC-ENTMCNC: 28.7 PG — SIGNIFICANT CHANGE UP (ref 27–34)
MCHC RBC-ENTMCNC: 31.7 GM/DL — LOW (ref 32–36)
MCV RBC AUTO: 90.7 FL — SIGNIFICANT CHANGE UP (ref 80–100)
MONOCYTES # BLD AUTO: 0.84 K/UL — SIGNIFICANT CHANGE UP (ref 0–0.9)
MONOCYTES NFR BLD AUTO: 13.1 % — SIGNIFICANT CHANGE UP (ref 2–14)
NEUTROPHILS # BLD AUTO: 4.69 K/UL — SIGNIFICANT CHANGE UP (ref 1.8–7.4)
NEUTROPHILS NFR BLD AUTO: 73.3 % — SIGNIFICANT CHANGE UP (ref 43–77)
NRBC # BLD: 0 /100 WBCS — SIGNIFICANT CHANGE UP (ref 0–0)
PHOSPHATE SERPL-MCNC: 3.3 MG/DL — SIGNIFICANT CHANGE UP (ref 2.5–4.5)
PLATELET # BLD AUTO: 141 K/UL — LOW (ref 150–400)
POTASSIUM SERPL-MCNC: 3.7 MMOL/L — SIGNIFICANT CHANGE UP (ref 3.5–5.3)
POTASSIUM SERPL-MCNC: 3.7 MMOL/L — SIGNIFICANT CHANGE UP (ref 3.5–5.3)
POTASSIUM SERPL-SCNC: 3.7 MMOL/L — SIGNIFICANT CHANGE UP (ref 3.5–5.3)
POTASSIUM SERPL-SCNC: 3.7 MMOL/L — SIGNIFICANT CHANGE UP (ref 3.5–5.3)
PROT SERPL-MCNC: 6.2 G/DL — SIGNIFICANT CHANGE UP (ref 6–8.3)
RBC # BLD: 2.89 M/UL — LOW (ref 4.2–5.8)
RBC # FLD: 15.4 % — HIGH (ref 10.3–14.5)
SODIUM SERPL-SCNC: 145 MMOL/L — SIGNIFICANT CHANGE UP (ref 135–145)
SODIUM SERPL-SCNC: 146 MMOL/L — HIGH (ref 135–145)
SPECIMEN SOURCE: SIGNIFICANT CHANGE UP
WBC # BLD: 6.39 K/UL — SIGNIFICANT CHANGE UP (ref 3.8–10.5)
WBC # FLD AUTO: 6.39 K/UL — SIGNIFICANT CHANGE UP (ref 3.8–10.5)

## 2020-07-31 PROCEDURE — 99291 CRITICAL CARE FIRST HOUR: CPT

## 2020-07-31 RX ORDER — FUROSEMIDE 40 MG
60 TABLET ORAL ONCE
Refills: 0 | Status: COMPLETED | OUTPATIENT
Start: 2020-07-31 | End: 2020-07-31

## 2020-07-31 RX ORDER — POLYETHYLENE GLYCOL 3350 17 G/17G
17 POWDER, FOR SOLUTION ORAL
Refills: 0 | Status: DISCONTINUED | OUTPATIENT
Start: 2020-07-31 | End: 2020-08-01

## 2020-07-31 RX ORDER — DEXMEDETOMIDINE HYDROCHLORIDE IN 0.9% SODIUM CHLORIDE 4 UG/ML
0.2 INJECTION INTRAVENOUS
Qty: 200 | Refills: 0 | Status: DISCONTINUED | OUTPATIENT
Start: 2020-07-31 | End: 2020-08-01

## 2020-07-31 RX ORDER — SENNA PLUS 8.6 MG/1
2 TABLET ORAL AT BEDTIME
Refills: 0 | Status: DISCONTINUED | OUTPATIENT
Start: 2020-07-31 | End: 2020-08-01

## 2020-07-31 RX ORDER — POTASSIUM CHLORIDE 20 MEQ
40 PACKET (EA) ORAL ONCE
Refills: 0 | Status: COMPLETED | OUTPATIENT
Start: 2020-07-31 | End: 2020-07-31

## 2020-07-31 RX ORDER — MAGNESIUM SULFATE 500 MG/ML
2 VIAL (ML) INJECTION ONCE
Refills: 0 | Status: COMPLETED | OUTPATIENT
Start: 2020-07-31 | End: 2020-07-31

## 2020-07-31 RX ORDER — QUETIAPINE FUMARATE 200 MG/1
25 TABLET, FILM COATED ORAL AT BEDTIME
Refills: 0 | Status: DISCONTINUED | OUTPATIENT
Start: 2020-07-31 | End: 2020-08-01

## 2020-07-31 RX ADMIN — CHLORHEXIDINE GLUCONATE 15 MILLILITER(S): 213 SOLUTION TOPICAL at 17:56

## 2020-07-31 RX ADMIN — Medication 81 MILLIGRAM(S): at 11:19

## 2020-07-31 RX ADMIN — Medication 4 PUFF(S): at 14:14

## 2020-07-31 RX ADMIN — POLYETHYLENE GLYCOL 3350 17 GRAM(S): 17 POWDER, FOR SOLUTION ORAL at 11:19

## 2020-07-31 RX ADMIN — Medication 50 MILLILITER(S): at 11:19

## 2020-07-31 RX ADMIN — Medication 50 MILLILITER(S): at 00:12

## 2020-07-31 RX ADMIN — Medication 4 PUFF(S): at 01:32

## 2020-07-31 RX ADMIN — PANTOPRAZOLE SODIUM 40 MILLIGRAM(S): 20 TABLET, DELAYED RELEASE ORAL at 11:19

## 2020-07-31 RX ADMIN — Medication 60 MILLIGRAM(S): at 13:29

## 2020-07-31 RX ADMIN — ALBUTEROL 4 PUFF(S): 90 AEROSOL, METERED ORAL at 19:49

## 2020-07-31 RX ADMIN — POLYETHYLENE GLYCOL 3350 17 GRAM(S): 17 POWDER, FOR SOLUTION ORAL at 17:55

## 2020-07-31 RX ADMIN — HEPARIN SODIUM 5000 UNIT(S): 5000 INJECTION INTRAVENOUS; SUBCUTANEOUS at 06:26

## 2020-07-31 RX ADMIN — Medication 40 MILLIEQUIVALENT(S): at 18:38

## 2020-07-31 RX ADMIN — DEXMEDETOMIDINE HYDROCHLORIDE IN 0.9% SODIUM CHLORIDE 6.45 MICROGRAM(S)/KG/HR: 4 INJECTION INTRAVENOUS at 08:03

## 2020-07-31 RX ADMIN — Medication 50 GRAM(S): at 18:38

## 2020-07-31 RX ADMIN — DEXMEDETOMIDINE HYDROCHLORIDE IN 0.9% SODIUM CHLORIDE 6.45 MICROGRAM(S)/KG/HR: 4 INJECTION INTRAVENOUS at 20:03

## 2020-07-31 RX ADMIN — ALBUTEROL 4 PUFF(S): 90 AEROSOL, METERED ORAL at 07:51

## 2020-07-31 RX ADMIN — LEVETIRACETAM 420 MILLIGRAM(S): 250 TABLET, FILM COATED ORAL at 01:07

## 2020-07-31 RX ADMIN — Medication 4 PUFF(S): at 19:49

## 2020-07-31 RX ADMIN — Medication 50 MILLILITER(S): at 06:26

## 2020-07-31 RX ADMIN — ALBUTEROL 4 PUFF(S): 90 AEROSOL, METERED ORAL at 01:31

## 2020-07-31 RX ADMIN — HEPARIN SODIUM 5000 UNIT(S): 5000 INJECTION INTRAVENOUS; SUBCUTANEOUS at 21:14

## 2020-07-31 RX ADMIN — CHLORHEXIDINE GLUCONATE 15 MILLILITER(S): 213 SOLUTION TOPICAL at 06:25

## 2020-07-31 RX ADMIN — HEPARIN SODIUM 5000 UNIT(S): 5000 INJECTION INTRAVENOUS; SUBCUTANEOUS at 13:30

## 2020-07-31 RX ADMIN — CARVEDILOL PHOSPHATE 6.25 MILLIGRAM(S): 80 CAPSULE, EXTENDED RELEASE ORAL at 06:26

## 2020-07-31 RX ADMIN — QUETIAPINE FUMARATE 25 MILLIGRAM(S): 200 TABLET, FILM COATED ORAL at 21:11

## 2020-07-31 RX ADMIN — Medication 4 PUFF(S): at 07:53

## 2020-07-31 RX ADMIN — SENNA PLUS 2 TABLET(S): 8.6 TABLET ORAL at 21:12

## 2020-07-31 RX ADMIN — LEVETIRACETAM 420 MILLIGRAM(S): 250 TABLET, FILM COATED ORAL at 13:30

## 2020-07-31 RX ADMIN — Medication 50 MILLIGRAM(S): at 06:26

## 2020-07-31 RX ADMIN — ATORVASTATIN CALCIUM 10 MILLIGRAM(S): 80 TABLET, FILM COATED ORAL at 21:11

## 2020-07-31 RX ADMIN — ALBUTEROL 4 PUFF(S): 90 AEROSOL, METERED ORAL at 14:12

## 2020-07-31 RX ADMIN — Medication 50 MILLIGRAM(S): at 13:29

## 2020-07-31 RX ADMIN — Medication 60 MILLIGRAM(S): at 06:57

## 2020-07-31 RX ADMIN — Medication 50 MILLIGRAM(S): at 21:12

## 2020-07-31 NOTE — PROGRESS NOTE ADULT - SUBJECTIVE AND OBJECTIVE BOX
CATHY SANCHEZ    [facility] ICU1 11    Patient is a 79y old  Male who presents with a chief complaint of UNRESPONSIVENESS (31 Jul 2020 08:00)       Allergies    Allergy Status Unknown  No Known Allergies    Intolerances        HPI:  80yo Male PMHx CVA 2012, HTN, HLD , hernia surgery with testicle resection 2015, umbilical hernia repair 12/2019,  transferred from [place] ED to [place] ED with s/p seizure activity  and bradycardia into the 30s.  Arrived to [place] hypothermic and hypotensive .Patient  required intubation ph 7.1 with CO2 85 due to acute hypoxic respiratory failure on arrival to ER ,placed on Levophed drip due to hypotension Admitted for septic workup and evaluation ,send blood and urine cx, serial lactate levels, monitor vitals closely hydration ,monitor urine output and renal profile,iv abx initiated -given ceftriaxone in ER and seen by ID consult .Pulmonology and cardiology consults are called .Patient admitted  to ICU with septic shock ,noted to have  tongue bite and AMS ,neurology evaluation called  Admitted  to intensive care  unit for monitoring , to rule out ami -send 3 sets of cardiac enzymes to rule out acute coronary event, obtain ECHO to evaluate LVEF, cardiology consult  ,continue current sepsis management, ventilator management /O2 supply, anticoagulation plan as per cardiology consult Palliative care consult requested ,to discuss advance directives and complete MOLST .Tried to reach family to obtain details of past medical hx - left a message for daughter .not able to reach son ( non valid number )[place]  ED team spoke with patient's daughter, who stated her father fell to the ground on the floor of his motel room about 4 days ago and was unable to get up. She had been caring for him on the floor since and did not call 911 for assistance. Today she found him unresponsive, with blood in his mouth, and incontinent of urine.  CT brain was negative. A CODE STROKE was called, and a CTA head/neck were performed, which was negative for large vessel occlusion but concerning for a density anterior to the ascending aorta possibly right atrial thrombus. Also revealed enlarged, multinodular thyroid. TPA criteria was not met, as patient had unknown onset of symptoms. Patient was transferred to [facility] ED for further care .He developed  multiple witnessed seizures in [place] ED, temporarily resolved with Ativan administration . CT/CTA brain NAD . Seen by neurologist and EEG ordered to r/o status epilepticus as etiology of ongoing encephalopathy, probably will require  MRI once stabilized from cardiac and respiratory standpoint. (28 Jul 2020 10:57)      PAST MEDICAL & SURGICAL HISTORY:  Chronic GERD  HTN (hypertension)  HLD (hyperlipidemia)  CVA (cerebral vascular accident)  High cholesterol  HTN (hypertension)  History of surgical removal of testicle  H/O hernia repair  No significant past surgical history      FAMILY HISTORY:        MEDICATIONS   albumin human 25% IVPB 50 milliLiter(s) IV Intermittent every 6 hours  ALBUTerol    90 MICROgram(s) HFA Inhaler 4 Puff(s) Inhalation every 6 hours  aspirin  chewable 81 milliGRAM(s) Oral daily  atorvastatin 10 milliGRAM(s) Oral at bedtime  carvedilol 6.25 milliGRAM(s) Oral every 12 hours  cefTRIAXone   IVPB 1000 milliGRAM(s) IV Intermittent every 24 hours  chlorhexidine 0.12% Liquid 15 milliLiter(s) Oral Mucosa every 12 hours  dexMEDEtomidine Infusion 0.2 MICROgram(s)/kG/Hr IV Continuous <Continuous>  furosemide   Injectable 60 milliGRAM(s) IV Push daily  heparin   Injectable 5000 Unit(s) SubCutaneous every 8 hours  hydrALAZINE 50 milliGRAM(s) Oral every 8 hours  ipratropium 17 MICROgram(s) HFA Inhaler 4 Puff(s) Inhalation every 6 hours  levETIRAcetam  IVPB 500 milliGRAM(s) IV Intermittent every 12 hours  pantoprazole  Injectable 40 milliGRAM(s) IV Push daily      Vital Signs Last 24 Hrs  T(C): 36.7 (31 Jul 2020 03:43), Max: 37.1 (30 Jul 2020 15:50)  T(F): 98.1 (31 Jul 2020 03:43), Max: 98.7 (30 Jul 2020 15:50)  HR: 64 (31 Jul 2020 07:53) (54 - 85)  BP: 173/75 (31 Jul 2020 07:00) (144/68 - 176/86)  BP(mean): 108 (31 Jul 2020 07:00) (94 - 123)  RR: 19 (31 Jul 2020 07:00) (17 - 33)  SpO2: 95% (31 Jul 2020 07:53) (94% - 99%)      07-30-20 @ 07:01  -  07-31-20 @ 07:00  --------------------------------------------------------  IN: 2500 mL / OUT: 3600 mL / NET: -1100 mL        Mode: AC/ CMV (Assist Control/ Continuous Mandatory Ventilation), RR (machine): 16, TV (machine): 500, FiO2: 25, PEEP: 5, PS: 12, ITime: 1, MAP: 12, PIP: 28    LABS:                        8.3    6.39  )-----------( 141      ( 31 Jul 2020 05:35 )             26.2     07-31    146<H>  |  109<H>  |  29<H>  ----------------------------<  91  3.7   |  33<H>  |  1.40<H>    Ca    8.5      31 Jul 2020 05:35  Phos  3.3     07-31  Mg     1.8     07-31    TPro  6.2  /  Alb  2.7<L>  /  TBili  2.2<H>  /  DBili  x   /  AST  52<H>  /  ALT  65  /  AlkPhos  63  07-31          ABG - ( 29 Jul 2020 17:37 )  pH, Arterial: 7.46  pH, Blood: x     /  pCO2: 37    /  pO2: 99    / HCO3: 27    / Base Excess: 2.4   /  SaO2: 98                  WBC:  WBC Count: 6.39 K/uL (07-31 @ 05:35)  WBC Count: 6.41 K/uL (07-30 @ 05:35)  WBC Count: 6.51 K/uL (07-29 @ 17:37)  WBC Count: 6.35 K/uL (07-29 @ 05:58)  WBC Count: 5.66 K/uL (07-28 @ 23:45)  WBC Count: 3.87 K/uL (07-28 @ 13:01)  WBC Count: 4.57 K/uL (07-28 @ 06:01)      MICROBIOLOGY:  RECENT CULTURES:  07-29 .Urine Clean Catch (Midstream) XXXX XXXX   No growth    07-28 .Blood Blood-Peripheral XXXX XXXX   No growth to date.          CARDIAC MARKERS ( 31 Jul 2020 05:35 )  x     / x     / 335 U/L / x     / x                Sodium:  Sodium, Serum: 146 mmol/L (07-31 @ 05:35)  Sodium, Serum: 146 mmol/L (07-30 @ 05:35)  Sodium, Serum: 145 mmol/L (07-29 @ 17:53)  Sodium, Serum: 146 mmol/L (07-29 @ 05:58)  Sodium, Serum: 145 mmol/L (07-28 @ 13:01)  Sodium, Serum: 144 mmol/L (07-28 @ 06:01)      1.40 mg/dL 07-31 @ 05:35  1.60 mg/dL 07-30 @ 05:35  1.70 mg/dL 07-29 @ 17:53  1.70 mg/dL 07-29 @ 05:58  1.30 mg/dL 07-28 @ 13:01  1.57 mg/dL 07-28 @ 06:01      Hemoglobin:  Hemoglobin: 8.3 g/dL (07-31 @ 05:35)  Hemoglobin: 8.6 g/dL (07-30 @ 05:35)  Hemoglobin: 9.0 g/dL (07-29 @ 17:37)  Hemoglobin: 9.2 g/dL (07-29 @ 05:58)  Hemoglobin: 9.2 g/dL (07-28 @ 23:45)  Hemoglobin: 9.5 g/dL (07-28 @ 13:01)  Hemoglobin: 11.2 g/dL (07-28 @ 06:01)      Platelets: Platelet Count - Automated: 141 K/uL (07-31 @ 05:35)  Platelet Count - Automated: 134 K/uL (07-30 @ 05:35)  Platelet Count - Automated: 135 K/uL (07-29 @ 17:37)  Platelet Count - Automated: 154 K/uL (07-29 @ 05:58)  Platelet Count - Automated: 147 K/uL (07-28 @ 23:45)  Platelet Count - Automated: 135 K/uL (07-28 @ 13:01)  Platelet Count - Automated: 158 K/uL (07-28 @ 06:01)      LIVER FUNCTIONS - ( 31 Jul 2020 05:35 )  Alb: 2.7 g/dL / Pro: 6.2 g/dL / ALK PHOS: 63 U/L / ALT: 65 U/L / AST: 52 U/L / GGT: x                 RADIOLOGY & ADDITIONAL STUDIES:

## 2020-07-31 NOTE — PROGRESS NOTE ADULT - SUBJECTIVE AND OBJECTIVE BOX
Patient is a 79y old  Male who presents with a chief complaint of UNRESPONSIVENESS (31 Jul 2020 10:52)    Interval History: O/N MARINA, agitated and pulling tube overnight received midazolam 2mg ivp, put on seroquel 25mg d, placed on restraints. Failed CPAP trial in AM. Otherwise improving mental status.     REVIEW OF SYSTEMS  Able to gesture answers to questions, ROS negative as below  Constitutional: No fever, chills, fatigue  Neuro: No headache, numbness, weakness  Resp: No cough, wheezing, shortness of breath  CVS: No chest pain, palpitations, leg swelling  GI: No abdominal pain, nausea, vomiting, diarrhea   : No dysuria, frequency, incontinence  Skin: No itching, burning, rashes, or lesions   Msk: No joint pain or swelling  Heme: No bleeding    VITALS:  T(F): 98.8 (07-31-20 @ 13:00), Max: 98.8 (07-31-20 @ 13:00)  HR: 85 (07-31-20 @ 14:13) (51 - 86)  BP: 139/63 (07-31-20 @ 14:00) (124/58 - 176/86)  RR: 20 (07-31-20 @ 14:00) (16 - 27)  SpO2: 95% (07-31-20 @ 14:13) (94% - 99%)  Wt(kg): --    VENT:  Mode: AC/ CMV (Assist Control/ Continuous Mandatory Ventilation), RR (machine): 16, TV (machine): 500, FiO2: 25, PEEP: 5  CAPILLARY BLOOD GLUCOSE        I&O's Summary    07-30 @ 07:01  -  07-31 @ 07:00  --------------------------------------------------------  IN: 2500 mL / OUT: 3600 mL / NET: -1100 mL    07-31 @ 07:01  -  07-31 @ 15:54  --------------------------------------------------------  IN: 759.4 mL / OUT: 2000 mL / NET: -1240.6 mL      PHYSICAL EXAM  General: Intubated, off sedation  CNS: marked improvement in mental status, awake, responds to commands and questions, able to shake head yes/no, squeeze hand b/l, wiggle toes b/l,  HEENT: pupils responsive to light, strabismus resolved, ET tube in place  Resp: course ronchi b/l, no wheezing  CVS: RRR, nl S1/S2, no M/R/G  Abd: very ascitic, pitting abdominal edema, firm likely 2/2 underlying fluid; impressive scrotal edema  Ext: 2+ LE edema, WWP, no cyanosis  Skin: No rash  Urine color clear yellow (maroon yesterday)    MEDICATIONS    carvedilol Oral  furosemide   Injectable IV Push  hydrALAZINE Oral    atorvastatin Oral    ALBUTerol    90 MICROgram(s) HFA Inhaler Inhalation  ipratropium 17 MICROgram(s) HFA Inhaler Inhalation    dexMEDEtomidine Infusion IV Continuous  levETIRAcetam  IVPB IV Intermittent  QUEtiapine Oral      aspirin  chewable Oral  heparin   Injectable SubCutaneous    pantoprazole  Injectable IV Push  polyethylene glycol 3350 Oral  senna Oral          chlorhexidine 0.12% Liquid Oral Mucosa                            8.3    6.39  )-----------( 141      ( 31 Jul 2020 05:35 )             26.2       07-31    146<H>  |  109<H>  |  29<H>  ----------------------------<  91  3.7   |  33<H>  |  1.40<H>    Ca    8.5      31 Jul 2020 05:35  Phos  3.3     07-31  Mg     1.8     07-31    TPro  6.2  /  Alb  2.7<L>  /  TBili  2.2<H>  /  DBili  x   /  AST  52<H>  /  ALT  65  /  AlkPhos  63  07-31      CARDIAC MARKERS ( 31 Jul 2020 05:35 )  x     / x     / 335 U/L / x     / x            Culture - Sputum . (07.31.20 @ 12:07)    Gram Stain:   Moderate polymorphonuclear leukocytes per low power field  No Squamous epithelial cells per low power field  Rare Yeast like cells per oil power field    Specimen Source: .Sputum Sputum  Specimen Source: .Urine Clean Catch (Midstream) (07.29.20 @ 01:14)  Culture - Urine (07.29.20 @ 01:14)    Specimen Source: .Urine Clean Catch (Midstream)    Culture Results:   No growth    Culture - Blood (07.28.20 @ 16:18)    Specimen Source: .Blood Blood-Peripheral    Culture Results:   No growth to date.    CENTRAL LINE: N          DATE INSERTED:              REMOVE: Y/N  CANTOR: Y                       DATE INSERTED:              REMOVE: Y/N  A-LINE: N                       DATE INSERTED:              REMOVE: Y/N    GLOBAL ISSUE/BEST PRACTICE  Analgesia: yes  Sedation: yes  HOB elevation: yes  Stress ulcer prophylaxis: yes  VTE prophylaxis: scd  Glycemic control: ss  Nutrition: tube feeds    CODE STATUS: Full  GOC discussion: Y

## 2020-07-31 NOTE — PROGRESS NOTE ADULT - SUBJECTIVE AND OBJECTIVE BOX
PROGRESS NOTE  Patient is a 79y old  Male who presents with a chief complaint of UNRESPONSIVENESS (30 Jul 2020 10:47)  Chart and available morning labs /imaging are reviewed electronically , urgent issues addressed . More information  is being added upon completion of rounds , when more information is collected and management discussed with consultants , medical staff and social service/case management on the floor     OVERNIGHT      HPI:  80yo Male PMHx CVA 2012, HTN, HLD , hernia surgery with testicle resection 2015, umbilical hernia repair 12/2019,  transferred from [facility] to [facility] with s/p seizure activity  and bradycardia into the 30s.  Arrived to [facility] hypothermic and hypotensive .Patient  required intubation ph 7.1 with CO2 85 due to acute hypoxic respiratory failure on arrival to ER ,placed on Levophed drip due to hypotension Admitted for septic workup and evaluation ,send blood and urine cx, serial lactate levels, monitor vitals closely hydration ,monitor urine output and renal profile,iv abx initiated -given ceftriaxone in ER and seen by ID consult .Pulmonology and cardiology consults are called .Patient admitted  to ICU with septic shock ,noted to have  tongue bite and AMS ,neurology evaluation called  Admitted  to intensive care  unit for monitoring , to rule out ami -send 3 sets of cardiac enzymes to rule out acute coronary event, obtain ECHO to evaluate LVEF, cardiology consult  ,continue current sepsis management, ventilator management /O2 supply, anticoagulation plan as per cardiology consult Palliative care consult requested ,to discuss advance directives and complete MOLST .Tried to reach family to obtain details of past medical hx - left a message for daughter .not able to reach son ( non valid number )[facility]  ED team spoke with patient's daughter, who stated her father fell to the ground on the floor of his motel room about 4 days ago and was unable to get up. She had been caring for him on the floor since and did not call 911 for assistance. Today she found him unresponsive, with blood in his mouth, and incontinent of urine.  CT brain was negative. A CODE STROKE was called, and a CTA head/neck were performed, which was negative for large vessel occlusion but concerning for a density anterior to the ascending aorta possibly right atrial thrombus. Also revealed enlarged, multinodular thyroid. TPA criteria was not met, as patient had unknown onset of symptoms. Patient was transferred to [facility] ED for further care .He developed  multiple witnessed seizures in [facility] ED, temporarily resolved with Ativan administration . CT/CTA brain NAD . Seen by neurologist and EEG ordered to r/o status epilepticus as etiology of ongoing encephalopathy, probably will require  MRI once stabilized from cardiac and respiratory standpoint. (28 Jul 2020 10:57)    PAST MEDICAL & SURGICAL HISTORY:  Chronic GERD  HTN (hypertension)  HLD (hyperlipidemia)  CVA (cerebral vascular accident)  High cholesterol  HTN (hypertension)  History of surgical removal of testicle  H/O hernia repair  No significant past surgical history      MEDICATIONS  (STANDING):  albumin human 25% IVPB 50 milliLiter(s) IV Intermittent every 6 hours  ALBUTerol    90 MICROgram(s) HFA Inhaler 4 Puff(s) Inhalation every 6 hours  aspirin  chewable 81 milliGRAM(s) Oral daily  atorvastatin 10 milliGRAM(s) Oral at bedtime  carvedilol 6.25 milliGRAM(s) Oral every 12 hours  cefTRIAXone   IVPB 1000 milliGRAM(s) IV Intermittent every 24 hours  chlorhexidine 0.12% Liquid 15 milliLiter(s) Oral Mucosa every 12 hours  furosemide   Injectable 60 milliGRAM(s) IV Push daily  heparin   Injectable 5000 Unit(s) SubCutaneous every 8 hours  hydrALAZINE 50 milliGRAM(s) Oral every 8 hours  ipratropium 17 MICROgram(s) HFA Inhaler 4 Puff(s) Inhalation every 6 hours  levETIRAcetam  IVPB 500 milliGRAM(s) IV Intermittent every 12 hours  pantoprazole  Injectable 40 milliGRAM(s) IV Push daily    MEDICATIONS  (PRN):      OBJECTIVE    T(C): 36.7 (07-31-20 @ 03:43), Max: 37.1 (07-30-20 @ 15:50)  HR: 65 (07-31-20 @ 07:00) (54 - 79)  BP: 173/75 (07-31-20 @ 07:00) (144/68 - 176/86)  RR: 19 (07-31-20 @ 07:00) (17 - 33)  SpO2: 96% (07-31-20 @ 07:00) (94% - 99%)  Wt(kg): --  I&O's Summary    30 Jul 2020 07:01  -  31 Jul 2020 07:00  --------------------------------------------------------  IN: 2500 mL / OUT: 3600 mL / NET: -1100 mL    ROS is unobtainable due to current status PATIENT  IS UNABLE TO GIVE ANY/RELIABLE HISTORY OR REVIEW OF SYSTEMS PLEASE ALSO SEE UNDER HPI AND ASSESSMENT FOR OBTAINED REVIEW OF SYSTEMS   General: NAD  HEENT: ET tube in place  Neck: No JVD, no carotid bruit  Lungs: CTAB  CV: RRR, nl S1/S2, no M/R/G  Abdomen: S/NT/ND, +BS  Extremities: 2+ LE edema, no cyanosis  Neuro: Non-focal  Skin: No rash    LABS:                        8.3    6.39  )-----------( 141      ( 31 Jul 2020 05:35 )             26.2     07-31    146<H>  |  109<H>  |  29<H>  ----------------------------<  91  3.7   |  33<H>  |  1.40<H>    Ca    8.5      31 Jul 2020 05:35  Phos  3.3     07-31  Mg     1.8     07-31    TPro  6.2  /  Alb  2.7<L>  /  TBili  2.2<H>  /  DBili  x   /  AST  52<H>  /  ALT  65  /  AlkPhos  63  07-31    CAPILLARY BLOOD GLUCOSE        PTT - ( 29 Jul 2020 07:56 )  PTT:93.0 sec      Culture - Urine (collected 29 Jul 2020 01:14)  Source: .Urine Clean Catch (Midstream)  Final Report (30 Jul 2020 04:38):    No growth    Culture - Blood (collected 28 Jul 2020 16:18)  Source: .Blood Blood-Peripheral  Preliminary Report (29 Jul 2020 17:01):    No growth to date.    Culture - Blood (collected 28 Jul 2020 16:18)  Source: .Blood Blood-Peripheral  Preliminary Report (29 Jul 2020 17:01):    No growth to date.      RADIOLOGY & ADDITIONAL TESTS:   reviewed elctronically  ASSESSMENT/PLAN: PROGRESS NOTE  Patient is a 79y old  Male who presents with a chief complaint of UNRESPONSIVENESS (30 Jul 2020 10:47)  Chart and available morning labs /imaging are reviewed electronically , urgent issues addressed . More information  is being added upon completion of rounds , when more information is collected and management discussed with consultants , medical staff and social service/case management on the floor   OVERNIGHT Interval Events: No significant changes.  Failed SBT this AM, put back on Pressure Support. Failed SBT this AM, put back on Pressure Support. Failed SBT this AM, put back on Pressure Support.    unable to obtain meaningful ROS as patient intubated and poorly responsive  HPI:  80yo Male PMHx CVA 2012, HTN, HLD , hernia surgery with testicle resection 2015, umbilical hernia repair 12/2019,  transferred from [place] ED to [place] ED with s/p seizure activity  and bradycardia into the 30s.  Arrived to [place] hypothermic and hypotensive .Patient  required intubation ph 7.1 with CO2 85 due to acute hypoxic respiratory failure on arrival to ER ,placed on Levophed drip due to hypotension Admitted for septic workup and evaluation ,send blood and urine cx, serial lactate levels, monitor vitals closely hydration ,monitor urine output and renal profile,iv abx initiated -given ceftriaxone in ER and seen by ID consult .Pulmonology and cardiology consults are called .Patient admitted  to ICU with septic shock ,noted to have  tongue bite and AMS ,neurology evaluation called  Admitted  to intensive care  unit for monitoring , to rule out ami -send 3 sets of cardiac enzymes to rule out acute coronary event, obtain ECHO to evaluate LVEF, cardiology consult  ,continue current sepsis management, ventilator management /O2 supply, anticoagulation plan as per cardiology consult Palliative care consult requested ,to discuss advance directives and complete MOLST .Tried to reach family to obtain details of past medical hx - left a message for daughter .not able to reach son ( non valid number )[place]  ED team spoke with patient's daughter, who stated her father fell to the ground on the floor of his motel room about 4 days ago and was unable to get up. She had been caring for him on the floor since and did not call 911 for assistance. Today she found him unresponsive, with blood in his mouth, and incontinent of urine.  CT brain was negative. A CODE STROKE was called, and a CTA head/neck were performed, which was negative for large vessel occlusion but concerning for a density anterior to the ascending aorta possibly right atrial thrombus. Also revealed enlarged, multinodular thyroid. TPA criteria was not met, as patient had unknown onset of symptoms. Patient was transferred to [facility] ED for further care .He developed  multiple witnessed seizures in [place] ED, temporarily resolved with Ativan administration . CT/CTA brain NAD . Seen by neurologist and EEG ordered to r/o status epilepticus as etiology of ongoing encephalopathy, probably will require  MRI once stabilized from cardiac and respiratory standpoint. (28 Jul 2020 10:57)    PAST MEDICAL & SURGICAL HISTORY:  Chronic GERD  HTN (hypertension)  HLD (hyperlipidemia)  CVA (cerebral vascular accident)  High cholesterol  HTN (hypertension)  History of surgical removal of testicle  H/O hernia repair  No significant past surgical history      MEDICATIONS  (STANDING):  albumin human 25% IVPB 50 milliLiter(s) IV Intermittent every 6 hours  ALBUTerol    90 MICROgram(s) HFA Inhaler 4 Puff(s) Inhalation every 6 hours  aspirin  chewable 81 milliGRAM(s) Oral daily  atorvastatin 10 milliGRAM(s) Oral at bedtime  carvedilol 6.25 milliGRAM(s) Oral every 12 hours  cefTRIAXone   IVPB 1000 milliGRAM(s) IV Intermittent every 24 hours  chlorhexidine 0.12% Liquid 15 milliLiter(s) Oral Mucosa every 12 hours  furosemide   Injectable 60 milliGRAM(s) IV Push daily  heparin   Injectable 5000 Unit(s) SubCutaneous every 8 hours  hydrALAZINE 50 milliGRAM(s) Oral every 8 hours  ipratropium 17 MICROgram(s) HFA Inhaler 4 Puff(s) Inhalation every 6 hours  levETIRAcetam  IVPB 500 milliGRAM(s) IV Intermittent every 12 hours  pantoprazole  Injectable 40 milliGRAM(s) IV Push daily  NOTE In accordance with  [facility] policy ICU final medical management decisions/MD orders are  determined/done only by ICU Intensivists   MEDICATIONS  (PRN):      OBJECTIVE    T(C): 36.7 (07-31-20 @ 03:43), Max: 37.1 (07-30-20 @ 15:50)  HR: 65 (07-31-20 @ 07:00) (54 - 79)  BP: 173/75 (07-31-20 @ 07:00) (144/68 - 176/86)  RR: 19 (07-31-20 @ 07:00) (17 - 33)  SpO2: 96% (07-31-20 @ 07:00) (94% - 99%)  Wt(kg): --  I&O's Summary    30 Jul 2020 07:01  -  31 Jul 2020 07:00  --------------------------------------------------------  IN: 2500 mL / OUT: 3600 mL / NET: -1100 mL    ROS is unobtainable due to current status PATIENT  IS UNABLE TO GIVE ANY/RELIABLE HISTORY OR REVIEW OF SYSTEMS PLEASE ALSO SEE UNDER HPI AND ASSESSMENT FOR OBTAINED REVIEW OF SYSTEMS   General: NAD  HEENT: ET tube in place  Neck: No JVD, no carotid bruit  Lungs: CTAB  CV: RRR, nl S1/S2, no M/R/G  Abdomen: S/NT/ND, +BS  Extremities: 2+ LE edema, no cyanosis  Neuro: Non-focal  Skin: No rash    LABS:                        8.3    6.39  )-----------( 141      ( 31 Jul 2020 05:35 )             26.2     07-31    146<H>  |  109<H>  |  29<H>  ----------------------------<  91  3.7   |  33<H>  |  1.40<H>    Ca    8.5      31 Jul 2020 05:35  Phos  3.3     07-31  Mg     1.8     07-31    TPro  6.2  /  Alb  2.7<L>  /  TBili  2.2<H>  /  DBili  x   /  AST  52<H>  /  ALT  65  /  AlkPhos  63  07-31    CAPILLARY BLOOD GLUCOSE        PTT - ( 29 Jul 2020 07:56 )  PTT:93.0 sec      Culture - Urine (collected 29 Jul 2020 01:14)  Source: .Urine Clean Catch (Midstream)  Final Report (30 Jul 2020 04:38):    No growth    Culture - Blood (collected 28 Jul 2020 16:18)  Source: .Blood Blood-Peripheral  Preliminary Report (29 Jul 2020 17:01):    No growth to date.    Culture - Blood (collected 28 Jul 2020 16:18)  Source: .Blood Blood-Peripheral  Preliminary Report (29 Jul 2020 17:01):    No growth to date.      RADIOLOGY & ADDITIONAL TESTS:  < from: MR Head No Cont (07.30.20 @ 18:06) >  EXAM:  MR BRAIN                            PROCEDURE DATE:  07/30/2020          INTERPRETATION:  CLINICAL INDICATION: Weakness, lethargy, speech abnormality. Evaluate for CVA.    TECHNIQUE: Multi-planar multi-sequential MR imaging of the brain was performed without intravenous contrast.    COMPARISON: CT head, CT angiography head and neck 7/28/2020.    FINDINGS:    MRI BRAIN:    There is gliosis and encephalomalacia in the right occipital lobe with associated ex-vacuo dilatation of the occipital horn of right lateral ventricle, findings consistent with chronic infarct in the right PCA vascular territory. There is also cortically-based encephalomalacia/gliosis with associated hemosiderin deposition and chronic blood products in the right posterior frontal lobe and right perirolandic region, findings consistent with small chronic infarct in the right distal MCA vascular territory. There are chronic lacunar infarcts in the right subinsular white matter and in the cerebellar hemispheres bilaterally. There are scattered and patchy T2/FLAIR hyperintense changes in the periventricular and subcortical white matter and also in the central bonnie, nonspecific finding, but most likely representing sequelae of moderately severe chronic microvascular ischemic disease.    There is diffuse cortical sulcal prominence related to underlying brain parenchymal volume.    No acute infarction, intracranial hemorrhage or mass. There is no evidence of obstructive hydrocephalus. There are no extra-axial fluid collections.    The visualized skull base flow voids appear patent.    There is an enlarged, partially empty sella turcica.    The visualized intraorbital contents are normal. There are scattered mild mucosal inflammatory changes of the ethmoid air cells and maxillary sinuses. There are tiny polyps versus mucus retention cysts in the bilateral maxillary sinuses. The mastoid air cells are grossly clear. The visualized soft tissues and osseous structures appear unremarkable.    IMPRESSION:    No acute intracranial findings.    Chronic infarct in the right occipital lobe (right PCA territory). Cortically-based chronic infarcts in the right posterior frontal lobe and right perirolandic region (right distal MCA territory). Chronic lacunar infarcts in the right subinsular white matter and cerebellar hemispheres bilaterally.    < end of copied text >   reviewed elctronically  ASSESSMENT/PLAN: 	< from: Xray Chest 1 View-PORTABLE IMMEDIATE (07.30.20 @ 15:33) >  EXAM:  XR CHEST PORTABLE IMMED 1V                            PROCEDURE DATE:  07/30/2020          INTERPRETATION:  PROCEDURE: AP view of the chest.    CLINICAL INFORMATION: NG tube placement.    COMPARISON: 07/28/2020.    FINDINGS AND  IMPRESSION: There is an NG tube seen with the tip in the stomach. Lung apices and left costophrenic angle is excluded from the study. Heart size appears enlarged. Degenerative changes of the spine.    < end of copied text >

## 2020-07-31 NOTE — PROGRESS NOTE ADULT - ASSESSMENT
The patient is an 79 year old male with a history of HTN, CVA who presents with unresponsiveness.    Plan:  - HR improved overall  - Continue carvedilol 6.25 mg bid; titrate up if needed  - Continue hydralazine 50 mg q8h  - Receiving furosemide and albumin  - CTA head and neck with motion artifact, no obvious stenosis. There is a questionable filling defect noted, possibly right atrial thrombus.  - Echocardiogram with mild LV systolic dysfunction, no obvious right atrial thrombus  - On ceftriaxone  - Neurology follow-up  - MRI with old CVA  - ICU care

## 2020-07-31 NOTE — PROGRESS NOTE ADULT - SUBJECTIVE AND OBJECTIVE BOX
infectious diseases progress note:    CATHY SANCHEZ is a 79y y. o. Male patient    No concerning overnight events    Allergies    Allergy Status Unknown  No Known Allergies    Intolerances        ANTIBIOTICS/RELEVANT:  antimicrobials    immunologic:    OTHER:  albumin human 25% IVPB 50 milliLiter(s) IV Intermittent every 6 hours  ALBUTerol    90 MICROgram(s) HFA Inhaler 4 Puff(s) Inhalation every 6 hours  aspirin  chewable 81 milliGRAM(s) Oral daily  atorvastatin 10 milliGRAM(s) Oral at bedtime  carvedilol 6.25 milliGRAM(s) Oral every 12 hours  chlorhexidine 0.12% Liquid 15 milliLiter(s) Oral Mucosa every 12 hours  dexMEDEtomidine Infusion 0.2 MICROgram(s)/kG/Hr IV Continuous <Continuous>  furosemide   Injectable 60 milliGRAM(s) IV Push daily  heparin   Injectable 5000 Unit(s) SubCutaneous every 8 hours  hydrALAZINE 50 milliGRAM(s) Oral every 8 hours  ipratropium 17 MICROgram(s) HFA Inhaler 4 Puff(s) Inhalation every 6 hours  levETIRAcetam  IVPB 500 milliGRAM(s) IV Intermittent every 12 hours  pantoprazole  Injectable 40 milliGRAM(s) IV Push daily  polyethylene glycol 3350 17 Gram(s) Oral two times a day  senna 2 Tablet(s) Oral at bedtime      Objective:  Vital Signs Last 24 Hrs  T(C): 36.8 (31 Jul 2020 08:00), Max: 37.1 (30 Jul 2020 15:50)  T(F): 98.2 (31 Jul 2020 08:00), Max: 98.7 (30 Jul 2020 15:50)  HR: 57 (31 Jul 2020 09:00) (54 - 85)  BP: 137/59 (31 Jul 2020 09:00) (137/59 - 176/86)  BP(mean): 85 (31 Jul 2020 09:00) (85 - 123)  RR: 20 (31 Jul 2020 09:00) (17 - 30)  SpO2: 96% (31 Jul 2020 09:00) (94% - 99%)    T(C): 36.8 (07-31-20 @ 08:00), Max: 37.6 (07-29-20 @ 20:39)  T(C): 36.8 (07-31-20 @ 08:00), Max: 37.6 (07-29-20 @ 20:39)  T(C): 36.8 (07-31-20 @ 08:00), Max: 37.6 (07-29-20 @ 20:39)    PHYSICAL EXAM: pt intubated  HEENT: NC atraumatic  Neck: supple  Respiratory: no accessory muscle use, breathing comfortably on vent  Cardiovascular: distant  Gastrointestinal: normal appearing, nondistended  Extremities: no clubbing, no cyanosis,      LABS:                          8.3    6.39  )-----------( 141      ( 31 Jul 2020 05:35 )             26.2       6.39 07-31 @ 05:35  6.41 07-30 @ 05:35  6.51 07-29 @ 17:37  6.35 07-29 @ 05:58  5.66 07-28 @ 23:45  3.87 07-28 @ 13:01  4.57 07-28 @ 06:01      07-31    146<H>  |  109<H>  |  29<H>  ----------------------------<  91  3.7   |  33<H>  |  1.40<H>    Ca    8.5      31 Jul 2020 05:35  Phos  3.3     07-31  Mg     1.8     07-31    TPro  6.2  /  Alb  2.7<L>  /  TBili  2.2<H>  /  DBili  x   /  AST  52<H>  /  ALT  65  /  AlkPhos  63  07-31      Creatinine, Serum: 1.40 mg/dL (07-31-20 @ 05:35)  Creatinine, Serum: 1.60 mg/dL (07-30-20 @ 05:35)  Creatinine, Serum: 1.70 mg/dL (07-29-20 @ 17:53)  Creatinine, Serum: 1.70 mg/dL (07-29-20 @ 05:58)  Creatinine, Serum: 1.30 mg/dL (07-28-20 @ 13:01)  Creatinine, Serum: 1.57 mg/dL (07-28-20 @ 06:01)                INFLAMMATORY MARKERS  Auto Neutrophil #: 4.69 K/uL (07-31-20 @ 05:35)  Auto Lymphocyte #: 0.65 K/uL (07-31-20 @ 05:35)  Auto Neutrophil #: 4.57 K/uL (07-30-20 @ 05:35)  Auto Lymphocyte #: 0.72 K/uL (07-30-20 @ 05:35)  Auto Neutrophil #: 4.80 K/uL (07-29-20 @ 05:58)  Auto Lymphocyte #: 0.56 K/uL (07-29-20 @ 05:58)  Auto Neutrophil #: 3.32 K/uL (07-28-20 @ 13:01)  Auto Lymphocyte #: 0.23 K/uL (07-28-20 @ 13:01)  Auto Lymphocyte #: 0.35 K/uL (07-28-20 @ 06:01)  Auto Neutrophil #: 3.54 K/uL (07-28-20 @ 06:01)    Lactate, Blood: 0.8 mmol/L (07-28-20 @ 10:00)    Auto Eosinophil #: 0.17 K/uL (07-31-20 @ 05:35)  Auto Eosinophil #: 0.17 K/uL (07-30-20 @ 05:35)  Auto Eosinophil #: 0.11 K/uL (07-29-20 @ 05:58)  Auto Eosinophil #: 0.08 K/uL (07-28-20 @ 13:01)  Auto Eosinophil #: 0.21 K/uL (07-28-20 @ 06:01)        Procalcitonin, Serum: 0.08 ng/mL (07-28-20 @ 13:01)    Troponin I, Serum: .026 ng/mL (07-28-20 @ 13:01)  Troponin I, Serum: .026 ng/mL (07-28-20 @ 06:01)    Creatine Kinase, Serum: 335 U/L (07-31-20 @ 05:35)  Creatine Kinase, Serum: 608 U/L (07-28-20 @ 13:01)  Creatine Kinase, Serum: 769 U/L (07-28-20 @ 06:06)              Activated Partial Thromboplastin Time: 93.0 sec (07-29-20 @ 07:56)  Activated Partial Thromboplastin Time: >200.0 sec (07-28-20 @ 23:45)  Activated Partial Thromboplastin Time: 35.7 sec (07-28-20 @ 13:01)  INR: 1.21 ratio (07-28-20 @ 13:01)  Activated Partial Thromboplastin Time: 38.3 sec (07-28-20 @ 06:01)  INR: 1.14 ratio (07-28-20 @ 06:01)          MICROBIOLOGY:              RADIOLOGY & ADDITIONAL STUDIES:

## 2020-07-31 NOTE — PROGRESS NOTE ADULT - ASSESSMENT
78yo Male  transferred from [place] ED to [place] ED with s/p seizure activity  and bradycardia into the 30s.  Arrived to [place] hypothermic and hypotensive .Patient  required intubation ph 7.1 with CO2 85 due to acute hypoxic respiratory failure on arrival to ER ,placed on Levophed drip due to hypotension Admitted for septic workup and evaluation limited history but apparently down for prolonged period prior to coming to ER

## 2020-07-31 NOTE — PROGRESS NOTE ADULT - SUBJECTIVE AND OBJECTIVE BOX
Date/Time Patient Seen:  		  Referring MD:   Data Reviewed	       Patient is a 79y old  Male who presents with a chief complaint of UNRESPONSIVENESS (31 Jul 2020 07:15)      Subjective/HPI     PAST MEDICAL & SURGICAL HISTORY:  Chronic GERD  HTN (hypertension)  HLD (hyperlipidemia)  CVA (cerebral vascular accident)  High cholesterol  HTN (hypertension)  Unknown and unspecified causes of morbidity  History of surgical removal of testicle  H/O hernia repair  No significant past surgical history        Medication list         MEDICATIONS  (STANDING):  albumin human 25% IVPB 50 milliLiter(s) IV Intermittent every 6 hours  ALBUTerol    90 MICROgram(s) HFA Inhaler 4 Puff(s) Inhalation every 6 hours  aspirin  chewable 81 milliGRAM(s) Oral daily  atorvastatin 10 milliGRAM(s) Oral at bedtime  carvedilol 6.25 milliGRAM(s) Oral every 12 hours  cefTRIAXone   IVPB 1000 milliGRAM(s) IV Intermittent every 24 hours  chlorhexidine 0.12% Liquid 15 milliLiter(s) Oral Mucosa every 12 hours  dexMEDEtomidine Infusion 0.2 MICROgram(s)/kG/Hr (6.45 mL/Hr) IV Continuous <Continuous>  furosemide   Injectable 60 milliGRAM(s) IV Push daily  heparin   Injectable 5000 Unit(s) SubCutaneous every 8 hours  hydrALAZINE 50 milliGRAM(s) Oral every 8 hours  ipratropium 17 MICROgram(s) HFA Inhaler 4 Puff(s) Inhalation every 6 hours  levETIRAcetam  IVPB 500 milliGRAM(s) IV Intermittent every 12 hours  pantoprazole  Injectable 40 milliGRAM(s) IV Push daily    MEDICATIONS  (PRN):         Vitals log        ICU Vital Signs Last 24 Hrs  T(C): 36.7 (31 Jul 2020 03:43), Max: 37.1 (30 Jul 2020 15:50)  T(F): 98.1 (31 Jul 2020 03:43), Max: 98.7 (30 Jul 2020 15:50)  HR: 64 (31 Jul 2020 07:53) (54 - 85)  BP: 173/75 (31 Jul 2020 07:00) (144/68 - 176/86)  BP(mean): 108 (31 Jul 2020 07:00) (94 - 123)  ABP: --  ABP(mean): --  RR: 19 (31 Jul 2020 07:00) (17 - 33)  SpO2: 95% (31 Jul 2020 07:53) (94% - 99%)       Mode: AC/ CMV (Assist Control/ Continuous Mandatory Ventilation)  RR (machine): 16  TV (machine): 500  FiO2: 25  PEEP: 5  PS: 12  ITime: 1  MAP: 12  PIP: 28      Input and Output:  I&O's Detail    30 Jul 2020 07:01  -  31 Jul 2020 07:00  --------------------------------------------------------  IN:    Enteral Tube Flush: 310 mL    Glucerna 1.5: 790 mL    lactated ringers.: 1000 mL    Solution: 200 mL    Solution: 50 mL    Solution: 150 mL  Total IN: 2500 mL    OUT:    Indwelling Catheter - Urethral: 3600 mL  Total OUT: 3600 mL    Total NET: -1100 mL          Lab Data                        8.3    6.39  )-----------( 141      ( 31 Jul 2020 05:35 )             26.2     07-31    146<H>  |  109<H>  |  29<H>  ----------------------------<  91  3.7   |  33<H>  |  1.40<H>    Ca    8.5      31 Jul 2020 05:35  Phos  3.3     07-31  Mg     1.8     07-31    TPro  6.2  /  Alb  2.7<L>  /  TBili  2.2<H>  /  DBili  x   /  AST  52<H>  /  ALT  65  /  AlkPhos  63  07-31    ABG - ( 29 Jul 2020 17:37 )  pH, Arterial: 7.46  pH, Blood: x     /  pCO2: 37    /  pO2: 99    / HCO3: 27    / Base Excess: 2.4   /  SaO2: 98                CARDIAC MARKERS ( 31 Jul 2020 05:35 )  x     / x     / 335 U/L / x     / x            Review of Systems	      Objective     Physical Examination    heart s1s2  lung dec BS  abd soft      Pertinent Lab findings & Imaging      Mickey:  NO   Adequate UO     I&O's Detail    30 Jul 2020 07:01  -  31 Jul 2020 07:00  --------------------------------------------------------  IN:    Enteral Tube Flush: 310 mL    Glucerna 1.5: 790 mL    lactated ringers.: 1000 mL    Solution: 200 mL    Solution: 50 mL    Solution: 150 mL  Total IN: 2500 mL    OUT:    Indwelling Catheter - Urethral: 3600 mL  Total OUT: 3600 mL    Total NET: -1100 mL               Discussed with:     Cultures:	        Radiology

## 2020-07-31 NOTE — PROGRESS NOTE ADULT - ATTENDING COMMENTS
80yo Male PMHx CVA 2012, HTN, HLD , hernia surgery with testicle resection 2015, umbilical hernia repair 12/2019,  transferred from [place] ED to [place] ED with s/p seizure activity  and bradycardia into the 30s.  Arrived to [place] hypothermic and hypotensive .Patient  required intubation ph 7.1 with CO2 85 due to acute hypoxic respiratory failure on arrival to ER ,placed on Levophed drip due to hypotension Admitted for septic workup and evaluation ,send blood and urine cx, serial lactate levels, monitor vitals closely hydration ,monitor urine output and renal profile,iv abx initiated -given ceftriaxone in ER and seen by ID consult .Pulmonology and cardiology consults are called .Patient admitted  to ICU with septic shock ,noted to have  tongue bite and AMS ,neurology evaluation called  Admitted  to intensive care  unit for monitoring , to rule out ami -send 3 sets of cardiac enzymes to rule out acute coronary event, obtain ECHO to evaluate LVEF, cardiology consult  ,continue current sepsis management, ventilator management /O2 supply, anticoagulation plan as per cardiology consult Palliative care consult requested ,to discuss advance directives and complete MOLST .Tried to reach family to obtain details of past medical hx - left a message for daughter .not able to reach son ( non valid number )[place]  ED team spoke with patient's daughter, who stated her father fell to the ground on the floor of his motel room about 4 days ago and was unable to get up. She had been caring for him on the floor since and did not call 911 for assistance. Today she found him unresponsive, with blood in his mouth, and incontinent of urine.  CT brain was negative. A CODE STROKE was called, and a CTA head/neck were performed, which was negative for large vessel occlusion but concerning for a density anterior to the ascending aorta possibly right atrial thrombus. Also revealed enlarged, multinodular thyroid. TPA criteria was not met, as patient had unknown onset of symptoms. Patient was transferred to [facility] ED for further care .He developed  multiple witnessed seizures in [place] ED, temporarily resolved with Ativan administration . CT/CTA brain NAD . Seen by neurologist and EEG ordered to r/o status epilepticus as etiology of ongoing encephalopathy, probably will require  MRI once stabilized from cardiac and respiratory standpoint.

## 2020-07-31 NOTE — PROGRESS NOTE ADULT - ASSESSMENT
80yo Male PMHx CVA 2012, HTN, HLD , hernia surgery with testicle resection 2015, umbilical hernia repair 12/2019,  transferred from [place] ED to [place] ED with s/p seizure activity  and bradycardia into the 30s.  Arrived to [place] hypothermic and hypotensive .Patient  required intubation ph 7.1 with CO2 85 due to acute hypoxic respiratory failure on arrival to ER ,placed on Levophed drip due to hypotension Admitted for septic workup and evaluation ,send blood and urine cx, serial lactate levels, monitor vitals closely hydration ,monitor urine output and renal profile,iv abx initiated -given ceftriaxone in ER and seen by ID consult .Pulmonology and cardiology consults are called .Patient admitted  to ICU with septic shock ,noted to have  tongue bite and AMS ,neurology evaluation called  Admitted  to intensive care  unit for monitoring , to rule out ami -send 3 sets of cardiac enzymes to rule out acute coronary event, obtain ECHO to evaluate LVEF, cardiology consult  ,continue current sepsis management, ventilator management /O2 supply, anticoagulation plan as per cardiology consult Palliative care consult requested ,to discuss advance directives and complete MOLST .Tried to reach family to obtain details of past medical hx - left a message for daughter .not able to reach son ( non valid number )[place]  ED team spoke with patient's daughter, who stated her father fell to the ground on the floor of his motel room about 4 days ago and was unable to get up. She had been caring for him on the floor since and did not call 911 for assistance. Today she found him unresponsive, with blood in his mouth, and incontinent of urine.  CT brain was negative. A CODE STROKE was called, and a CTA head/neck were performed, which was negative for large vessel occlusion but concerning for a density anterior to the ascending aorta possibly right atrial thrombus. Also revealed enlarged, multinodular thyroid. TPA criteria was not met, as patient had unknown onset of symptoms. Patient was transferred to [facility] ED for further care .He developed  multiple witnessed seizures in [place] ED, temporarily resolved with Ativan administration . CT/CTA brain NAD . Seen by neurologist and EEG ordered to r/o status epilepticus as etiology of ongoing encephalopathy, probably will require  MRI once stabilized from cardiac and respiratory standpoint.

## 2020-07-31 NOTE — PROGRESS NOTE ADULT - SUBJECTIVE AND OBJECTIVE BOX
Neurology follow up note    CATHY [id number]      Interval History:    Patient awake in bed     MEDICATIONS    albumin human 25% IVPB 50 milliLiter(s) IV Intermittent every 6 hours  ALBUTerol    90 MICROgram(s) HFA Inhaler 4 Puff(s) Inhalation every 6 hours  aspirin  chewable 81 milliGRAM(s) Oral daily  atorvastatin 10 milliGRAM(s) Oral at bedtime  carvedilol 6.25 milliGRAM(s) Oral every 12 hours  chlorhexidine 0.12% Liquid 15 milliLiter(s) Oral Mucosa every 12 hours  dexMEDEtomidine Infusion 0.2 MICROgram(s)/kG/Hr IV Continuous <Continuous>  furosemide   Injectable 60 milliGRAM(s) IV Push daily  heparin   Injectable 5000 Unit(s) SubCutaneous every 8 hours  hydrALAZINE 50 milliGRAM(s) Oral every 8 hours  ipratropium 17 MICROgram(s) HFA Inhaler 4 Puff(s) Inhalation every 6 hours  levETIRAcetam  IVPB 500 milliGRAM(s) IV Intermittent every 12 hours  pantoprazole  Injectable 40 milliGRAM(s) IV Push daily  polyethylene glycol 3350 17 Gram(s) Oral two times a day  senna 2 Tablet(s) Oral at bedtime      Allergies    Allergy Status Unknown  No Known Allergies    Intolerances            Vital Signs Last 24 Hrs  T(C): 36.8 (31 Jul 2020 08:00), Max: 37.1 (30 Jul 2020 15:50)  T(F): 98.2 (31 Jul 2020 08:00), Max: 98.7 (30 Jul 2020 15:50)  HR: 57 (31 Jul 2020 09:00) (54 - 85)  BP: 137/59 (31 Jul 2020 09:00) (137/59 - 176/86)  BP(mean): 85 (31 Jul 2020 09:00) (85 - 123)  RR: 20 (31 Jul 2020 09:00) (17 - 30)  SpO2: 96% (31 Jul 2020 09:00) (94% - 99%)      REVIEW OF SYSTEMS:  intubated     On Neurological Examination:    Mental Status - Patient is alert, awake    Follow simple commands able to show left thumb and 2 fingers right hand     Speech - intubated                   Cranial Nerves -   extraocular eye movements intact.     Motor Exam -   bilateral upper hand grasp 5/5   bilateral lower  able to wiggle feet       Muscle tone - is normal all over.  No asymmetry is seen.      GENERAL Exam: Nontoxic , No Acute Distress   	  HEENT:  normocephalic, atraumatic  		  LUNGS: intubated   	  HEART: Normal S1S2   No murmur RRR        	  GI/ ABDOMEN:  Soft  Non tender    EXTREMITIES:   No Edema  No Clubbing  No Cyanosis   	   SKIN: Normal  No Ecchymosis               LABS:  CBC Full  -  ( 31 Jul 2020 05:35 )  WBC Count : 6.39 K/uL  RBC Count : 2.89 M/uL  Hemoglobin : 8.3 g/dL  Hematocrit : 26.2 %  Platelet Count - Automated : 141 K/uL  Mean Cell Volume : 90.7 fl  Mean Cell Hemoglobin : 28.7 pg  Mean Cell Hemoglobin Concentration : 31.7 gm/dL  Auto Neutrophil # : 4.69 K/uL  Auto Lymphocyte # : 0.65 K/uL  Auto Monocyte # : 0.84 K/uL  Auto Eosinophil # : 0.17 K/uL  Auto Basophil # : 0.01 K/uL  Auto Neutrophil % : 73.3 %  Auto Lymphocyte % : 10.2 %  Auto Monocyte % : 13.1 %  Auto Eosinophil % : 2.7 %  Auto Basophil % : 0.2 %      07-31    146<H>  |  109<H>  |  29<H>  ----------------------------<  91  3.7   |  33<H>  |  1.40<H>    Ca    8.5      31 Jul 2020 05:35  Phos  3.3     07-31  Mg     1.8     07-31    TPro  6.2  /  Alb  2.7<L>  /  TBili  2.2<H>  /  DBili  x   /  AST  52<H>  /  ALT  65  /  AlkPhos  63  07-31    Hemoglobin A1C:     LIVER FUNCTIONS - ( 31 Jul 2020 05:35 )  Alb: 2.7 g/dL / Pro: 6.2 g/dL / ALK PHOS: 63 U/L / ALT: 65 U/L / AST: 52 U/L / GGT: x           Vitamin B12         RADIOLOGY    ANALYSIS AND PLAN:  This is an 79-year-old with an episode of altered mental status and seizure.  1.	For episode of altered mental status and seizure, suspect this could be possibly metabolic encephalopathy.  The patient was hypothermic upon initial presentation, questionable any type of septic type process, septic shock.  2.	off antibiotics   3.	For underlying seizure  events, status epilepticus,  Keppra 500 mg twice a day.  no new events    4.	Ativan 1 mg IV push, q. 6h. p.r.n. for any type of breakthrough seizures.  5.	Seizure precaution.  6.	 EEG no seizures discharges   7.	For history of high cholesterol, continue the patient on statin.  8.	For history of hypertension, monitor systolic blood pressure.  9.	Will monitor the patient's electrolytes.  10.	mri normal     Greater than 45 minutes spent in direct patient care reviewing  the notes, lab data/ imaging , discussion with multidisciplinary team.

## 2020-07-31 NOTE — PROGRESS NOTE ADULT - ATTENDING COMMENTS
Patient seen and examined, agree with above     78 y/o male with hx CVA and HTN, admitted with status epilepticus and acute resp failure, found to be hypothermic and bradycardic which improved with passive warming. CT head neg for bleed, CTA suspicious for RA thrombus but ECHO neg for thrombus.     Recs:   Neuro: continue Keppra 500 mg q12, EEG neg for seizure, MRI brain neg for acute pathology, responsiveness continues to improve and he follows all commands, Precedex added in am for anxiety    CV: continue aspirin, BP well controlled with hydralazine, Coreg    Resp: failed PSV trial, not ready for extubation, will aggressively diurese    GI: continue TF, add bowel regimen for constipation   ID: urine and blood culture neg, no other signs of bacterial infection, d/c ceftriaxone and monitor off abx, elevated COVID IgG titre suggestive of recent exposure   Renal: LASHELL from rhabdomyolysis - improving, gave Lasix 60 mg earlier, will start daily Lasix, d/c IVF, d/c albumin tomorrow, hematuria resolved  PPx: sc heparin   Prognosis poor     Patient critically ill, 48 mins spent     D/w son over the phone - patient was at rehab from Dec 2019 to May 2020 after hospitalization for spinal stenosis, he was diagnosed with heart failure in Jan 2020, he did not have any COVID related symptoms except cough in May however some patients were diagnosed with COVID at that rehab around that time (likely exposure timeline), he has been very lethargic with speech abnormalities and falls lately (ambulates with walker), no obvious seizure like activity at home, 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 PCP is Dr. JL Starks [phone] who titrated his BP meds recently Patient seen and examined, agree with above     78 y/o male with hx CVA and HTN, admitted with status epilepticus and acute resp failure, found to be hypothermic and bradycardic which improved with passive warming. CT head neg for bleed, CTA suspicious for RA thrombus but ECHO neg for thrombus.     Recs:   Neuro: continue Keppra 500 mg q12, EEG neg for seizure, MRI brain neg for acute pathology, responsiveness continues to improve and he follows all commands, Precedex added in am for anxiety    CV: continue aspirin, BP well controlled with hydralazine, Coreg    Resp: failed PSV trial, not ready for extubation, will aggressively diurese    GI: continue TF, add bowel regimen for constipation   ID: urine and blood culture neg, no other signs of bacterial infection, d/c ceftriaxone and monitor off abx, elevated COVID IgG titre suggestive of recent exposure   Renal: LASHELL from rhabdomyolysis - improving  PPx: sc heparin     Patient critically ill, 42 mins spent

## 2020-07-31 NOTE — PROGRESS NOTE ADULT - ASSESSMENT
78 yo male, PMHx CVA 2012, HTN, who presented as a transfer from [facility] with altered mental status, acute hypercapnic respiratory failure, transient shock distributive vs cardiogenic, LASHELL, and transaminitis. Hypothermia, bradycardia to 30's and multiple witnessed seizures in [place] ED. 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    Neuro: Multiple witnessed seizures in ED, resolving with Ativan; likely metabolic encephalopathy CTA head and neck with motion artifact, no obvious stenosis, questionable filling defect noted, possibly right atrial thrombus. Off Propofol.   - marked improvement in mental status  - Continue Keppra 500 mg BID  - On Quetiapine 25mg bedtime for agitation  - EEG w/o acute pathology  - MRI brain w/o acute pathology  - Ativan 1 mg IVP 1 6 prn for breakthrough seizures  - Keep off Propofol  Pulm: Intubated for acute hypercapnic respiratory failure, vent augmented to manipulate acid-base balance. Didn't pass SBT 7/30 AM, now on PS. POCUS w a-line predominance, b/l basilar consolidations, likely atelectasis vs aspiration pneumonitis  - Sputum culture w Moderate pmls, rare Yeast like cells  - Albumin 25% 50 mL q6h  - SBT today failed, now on AC-volume control  - Cont. Albuterol 90 mcg inhaler  - Cont. Atrovent  - aggressive Chest PT  - Plan for TOV in PM  CV: Aggressive diuresis given anasarca and heart failure once renal fn stable; Questionable R atrial thrombus on CTA neck, now off Heparin gtt 2/2 oral bleeding and hematuria. Echocardiogram with mild LV systolic dysfunction, dilated LV w LVH, b/l atrial enlargement, no obvious right atrial thrombus. On POCUS (7/29) of IVC 2.3 cm w/o variability.   - Received IV Lasix 60 mg in PM; consider repeat overnight; fluid target: net -2 to 2.5L over 24 hrs  - Cont Carvedilol  - Cont ASA  - Continue Hydralazine 50 mg q8  - Continue Atorvastatin  GI: Cont tube feeds. AST/ALT downtrending  - on Miralax / Senna  - on protonix  Renal: LASHELL likely ATN due to hypoperfusion during shock state, complicated by rhabdomyolysis (CK downtrending, hematuria resolved), monitoring renal indices and UOP. On POCUS of IVC appears non-fluid responsive. Heavy proteinuria noted on UA, possible underlying CKD.   - UO improved s/p albumin, IVF, and lasix.  - Start daily lasix  - DC IVF, DC albumin, hematuria resolved, CK now ??  - Monitor urine output  - Albumin 25% 50ml q6  - Continue protonix  ID: No evidence of infection. UA trace LE.   - Sputum culture w Moderate pmls, rare Yeast like cells  - BCx and UCx NGTD  - D/C'd ceftriaxone, d/w ID, monitor off Abx  - f/u BCx, UCx, sputum cx  Endo: TSH wnl, T3 low, cortisol wnl; CT Angio w/ multinodular thyroid; will require outpatient workup  Heme: Oral bleed and hematuria resolved.   - On Heparin  - On ASA  PPx: SCDs  Diet:  - Started tube feeds, s/p dietitian eval  Dispo: Monitor in ICU;   - contact daughter for collateral history and post-discharge planning

## 2020-07-31 NOTE — PROGRESS NOTE ADULT - ASSESSMENT
cont rx cont rx      LAURA MORGAN [facility] P [id number]  1940 [address] 2020 [address] SHAREE PUTNAM     REVIEW OF SYMPTOMS      Able to give ROS  NO     PHYSICAL EXAM    HEENT Unremarkable PERRLA atraumatic   RESP Fair air entry EXP prolonged    Harsh breath sound Resp distres mild   CARDIAC S1 S2 No S3     NO JVD    ABDOMEN SOFT BS PRESENT NOT DISTENDED No hepatosplenomegaly PEDAL EDEMA present No calf tenderness  NO rash   GENERAL Not TOXIC looking    HPI/PMH.       79 m lving in hotel (evicted recently) was found unresponsive and brought to [facility] and transferred to [facility] P 2020 and Pulm consulted     Pt was noted unresponsive on hotel floor with possible tongue bite ? seizure incontinent   er vitals [facility] S 149/65 57         OXYGENATION      2020 ra 93%     VITALS/LABS      2020 afeb 56 120/60   2020 4p Dexm 0.2 m/k/h   2020 cpap 5/12/.6     RELEVANT INFORMATION       ABG 16/5/500/.3 746/37/99  ABG  11a ac 20/550/.3/5 728/58/68     ALBUTEROL albuterol ()     COVID igg  covid igg pos+betzy   COVID pcr  Neg-betzy     W 2020 w 6.3   BLOD C  bc Neg-betzy   URINE c  uc Neg-betzy   ROCEPHIN rocephin (-)     CK 2020 ck 335   ECHO  echo ef 45% dd biatrial enlargement  ASA  asa 81 ()  LASIX lasix 60 ()   HYDRALAZINE hydralaz 50.3 ()    CARVEDILOL carvedilol 6.25x2(2020)     Hb -2020 Hb 9.2 - 8.6   Plt -2020 plt 154 - 134   Na 2020 Na 146   Xr --2020 Cr 1.7 - 1.6 -1.4     KEPPRA keppra 500.2 ()       PT DATA/BEST PRACTICE  ALLERGY    nka                 WT               2020 128 k                       BMI                2020 40             CrCl                                    ADVANCED DIRECTIVE     LINES TUBES POA.                 NONVIOLENT NONSELFDESTRUCTIVE LEVEL ONE 2020   HEAD OF BED ELEVATION Yes      INFECTION PPLX        PROCEDURE   Intubation )   Arevalo   DVT PROPHYLAXIS         hposc (2020)   SCHULTZ PROPHYLAXIS          Protonix 40 ()   DYSPHAGIA EVAL     DIET.     jevity 1.5 1600 () (og)                                                                         IV F       PROBLEMS.  POSSIBLE SEPSIS POA   2020 w 4.5   Pt was started on rocephin for poss uti   c neg Rocephin dced 2020    INTUBATED 2020    VENT  Lung protective ventilation  Target PaO2 60(+) FIO2 Least needed VT 6/k pH 7.30(+)PPLAT 35(-)  Wean as tolertd     RO VTE   SUSPICION OF RA THROMBUS ON CTA HEAD   2020 cta head filling defect r atrium   ECHO  echo ef 45% dd biatrial enlargement   No clot seen   Pt taken off IV hep      POSSIBLE MI   POSSIBLE RHABDOMYOLYSIS  2020 ck 769   2020 Tr .026   Monitor c enz   sp albumin  On lasix     HEMODYNAMICS  Target MAP 65 (+)  2020 pocus ivc eval indicated nonfluid responsive state     ANEMIA poa   2020 Hb 11.2   Monmitor serially     LASHELL POA   2020 Cr 1.5  Likely sec sepsis and rhabdo   IV fluids     MULTINODULAR GOITER    Seen on cta head     UNRESPONSIVE poa   RO HYPOGLYCEMIA    RO CVA   POSSIBLE ANOXIC ENCEPHALOPATHY  SEVERAL WITNESSED SZ IN ER 2020 cta Head Neg-betzy   2020 CT h No ac infarcts 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 Risks, benefits, alternatives of planned interventions when applicable were discussed in detail and questions answered as best as possible    LAURA MORGAN [facility] P [phone]  1940 [address]A 2020 DR SHAREE PUTNAM

## 2020-07-31 NOTE — PROGRESS NOTE ADULT - SUBJECTIVE AND OBJECTIVE BOX
Chief Complaint: Unresponsiveness    Interval Events: No significant changes.    Review of Systems:  Unable to obtain    Physical Exam:  Vital Signs Last 24 Hrs  T(C): 36.7 (31 Jul 2020 03:43), Max: 37.1 (30 Jul 2020 15:50)  T(F): 98.1 (31 Jul 2020 03:43), Max: 98.7 (30 Jul 2020 15:50)  HR: 70 (31 Jul 2020 05:46) (62 - 79)  BP: 165/77 (31 Jul 2020 05:00) (144/68 - 176/86)  BP(mean): 111 (31 Jul 2020 05:00) (94 - 123)  RR: 20 (31 Jul 2020 05:00) (17 - 33)  SpO2: 97% (31 Jul 2020 05:46) (94% - 99%)  General: NAD  HEENT: ET tube in place  Neck: No JVD, no carotid bruit  Lungs: CTAB  CV: RRR, nl S1/S2, no M/R/G  Abdomen: S/NT/ND, +BS  Extremities: 2+ LE edema, no cyanosis  Neuro: Non-focal  Skin: No rash    Labs:             07-31    146<H>  |  109<H>  |  29<H>  ----------------------------<  91  3.7   |  33<H>  |  1.40<H>    Ca    8.5      31 Jul 2020 05:35  Phos  3.3     07-31  Mg     1.8     07-31    TPro  6.2  /  Alb  2.7<L>  /  TBili  2.2<H>  /  DBili  x   /  AST  52<H>  /  ALT  65  /  AlkPhos  63  07-31                        8.3    6.39  )-----------( 141      ( 31 Jul 2020 05:35 )             26.2     Telemetry: Sinus rhythm

## 2020-08-01 LAB
ALBUMIN SERPL ELPH-MCNC: 2.8 G/DL — LOW (ref 3.3–5)
ALP SERPL-CCNC: 68 U/L — SIGNIFICANT CHANGE UP (ref 40–120)
ALT FLD-CCNC: 62 U/L — SIGNIFICANT CHANGE UP (ref 12–78)
ANION GAP SERPL CALC-SCNC: 4 MMOL/L — LOW (ref 5–17)
AST SERPL-CCNC: 38 U/L — HIGH (ref 15–37)
BASE EXCESS BLDA CALC-SCNC: 9.4 MMOL/L — HIGH (ref -2–2)
BASOPHILS # BLD AUTO: 0.01 K/UL — SIGNIFICANT CHANGE UP (ref 0–0.2)
BASOPHILS NFR BLD AUTO: 0.2 % — SIGNIFICANT CHANGE UP (ref 0–2)
BILIRUB SERPL-MCNC: 2.3 MG/DL — HIGH (ref 0.2–1.2)
BLOOD GAS COMMENTS ARTERIAL: SIGNIFICANT CHANGE UP
BLOOD GAS COMMENTS ARTERIAL: SIGNIFICANT CHANGE UP
BUN SERPL-MCNC: 28 MG/DL — HIGH (ref 7–23)
CALCIUM SERPL-MCNC: 9 MG/DL — SIGNIFICANT CHANGE UP (ref 8.5–10.1)
CHLORIDE SERPL-SCNC: 106 MMOL/L — SIGNIFICANT CHANGE UP (ref 96–108)
CK SERPL-CCNC: 204 U/L — SIGNIFICANT CHANGE UP (ref 26–308)
CO2 SERPL-SCNC: 35 MMOL/L — HIGH (ref 22–31)
CREAT SERPL-MCNC: 1.4 MG/DL — HIGH (ref 0.5–1.3)
EOSINOPHIL # BLD AUTO: 0.2 K/UL — SIGNIFICANT CHANGE UP (ref 0–0.5)
EOSINOPHIL NFR BLD AUTO: 3.4 % — SIGNIFICANT CHANGE UP (ref 0–6)
GLUCOSE SERPL-MCNC: 111 MG/DL — HIGH (ref 70–99)
HCO3 BLDA-SCNC: 32 MMOL/L — HIGH (ref 23–27)
HCT VFR BLD CALC: 28.5 % — LOW (ref 39–50)
HGB BLD-MCNC: 8.8 G/DL — LOW (ref 13–17)
HOROWITZ INDEX BLDA+IHG-RTO: 25 — SIGNIFICANT CHANGE UP
IMM GRANULOCYTES NFR BLD AUTO: 0.7 % — SIGNIFICANT CHANGE UP (ref 0–1.5)
LYMPHOCYTES # BLD AUTO: 0.76 K/UL — LOW (ref 1–3.3)
LYMPHOCYTES # BLD AUTO: 12.9 % — LOW (ref 13–44)
MAGNESIUM SERPL-MCNC: 2.2 MG/DL — SIGNIFICANT CHANGE UP (ref 1.6–2.6)
MCHC RBC-ENTMCNC: 28.1 PG — SIGNIFICANT CHANGE UP (ref 27–34)
MCHC RBC-ENTMCNC: 30.9 GM/DL — LOW (ref 32–36)
MCV RBC AUTO: 91.1 FL — SIGNIFICANT CHANGE UP (ref 80–100)
MONOCYTES # BLD AUTO: 0.86 K/UL — SIGNIFICANT CHANGE UP (ref 0–0.9)
MONOCYTES NFR BLD AUTO: 14.6 % — HIGH (ref 2–14)
NEUTROPHILS # BLD AUTO: 4.02 K/UL — SIGNIFICANT CHANGE UP (ref 1.8–7.4)
NEUTROPHILS NFR BLD AUTO: 68.2 % — SIGNIFICANT CHANGE UP (ref 43–77)
NRBC # BLD: 0 /100 WBCS — SIGNIFICANT CHANGE UP (ref 0–0)
PCO2 BLDA: 56 MMHG — HIGH (ref 32–46)
PH BLDA: 7.41 — SIGNIFICANT CHANGE UP (ref 7.35–7.45)
PHOSPHATE SERPL-MCNC: 3.2 MG/DL — SIGNIFICANT CHANGE UP (ref 2.5–4.5)
PLATELET # BLD AUTO: 142 K/UL — LOW (ref 150–400)
PO2 BLDA: 64 MMHG — LOW (ref 74–108)
POTASSIUM SERPL-MCNC: 3.9 MMOL/L — SIGNIFICANT CHANGE UP (ref 3.5–5.3)
POTASSIUM SERPL-SCNC: 3.9 MMOL/L — SIGNIFICANT CHANGE UP (ref 3.5–5.3)
PROT SERPL-MCNC: 6.6 G/DL — SIGNIFICANT CHANGE UP (ref 6–8.3)
RBC # BLD: 3.13 M/UL — LOW (ref 4.2–5.8)
RBC # FLD: 15.7 % — HIGH (ref 10.3–14.5)
SAO2 % BLDA: 91 % — LOW (ref 92–96)
SODIUM SERPL-SCNC: 145 MMOL/L — SIGNIFICANT CHANGE UP (ref 135–145)
WBC # BLD: 5.89 K/UL — SIGNIFICANT CHANGE UP (ref 3.8–10.5)
WBC # FLD AUTO: 5.89 K/UL — SIGNIFICANT CHANGE UP (ref 3.8–10.5)

## 2020-08-01 PROCEDURE — 99291 CRITICAL CARE FIRST HOUR: CPT

## 2020-08-01 RX ORDER — FUROSEMIDE 40 MG
40 TABLET ORAL ONCE
Refills: 0 | Status: COMPLETED | OUTPATIENT
Start: 2020-08-01 | End: 2020-08-01

## 2020-08-01 RX ADMIN — LEVETIRACETAM 420 MILLIGRAM(S): 250 TABLET, FILM COATED ORAL at 00:29

## 2020-08-01 RX ADMIN — HEPARIN SODIUM 5000 UNIT(S): 5000 INJECTION INTRAVENOUS; SUBCUTANEOUS at 21:26

## 2020-08-01 RX ADMIN — Medication 4 PUFF(S): at 13:25

## 2020-08-01 RX ADMIN — Medication 60 MILLIGRAM(S): at 05:13

## 2020-08-01 RX ADMIN — Medication 40 MILLIGRAM(S): at 15:41

## 2020-08-01 RX ADMIN — CARVEDILOL PHOSPHATE 6.25 MILLIGRAM(S): 80 CAPSULE, EXTENDED RELEASE ORAL at 17:07

## 2020-08-01 RX ADMIN — Medication 50 MILLIGRAM(S): at 05:14

## 2020-08-01 RX ADMIN — ATORVASTATIN CALCIUM 10 MILLIGRAM(S): 80 TABLET, FILM COATED ORAL at 21:26

## 2020-08-01 RX ADMIN — Medication 4 PUFF(S): at 00:48

## 2020-08-01 RX ADMIN — HEPARIN SODIUM 5000 UNIT(S): 5000 INJECTION INTRAVENOUS; SUBCUTANEOUS at 13:07

## 2020-08-01 RX ADMIN — POLYETHYLENE GLYCOL 3350 17 GRAM(S): 17 POWDER, FOR SOLUTION ORAL at 05:13

## 2020-08-01 RX ADMIN — Medication 81 MILLIGRAM(S): at 12:27

## 2020-08-01 RX ADMIN — Medication 50 MILLIGRAM(S): at 21:26

## 2020-08-01 RX ADMIN — CHLORHEXIDINE GLUCONATE 15 MILLILITER(S): 213 SOLUTION TOPICAL at 05:14

## 2020-08-01 RX ADMIN — ALBUTEROL 4 PUFF(S): 90 AEROSOL, METERED ORAL at 13:25

## 2020-08-01 RX ADMIN — LEVETIRACETAM 420 MILLIGRAM(S): 250 TABLET, FILM COATED ORAL at 12:28

## 2020-08-01 RX ADMIN — Medication 10 MILLIGRAM(S): at 10:08

## 2020-08-01 RX ADMIN — HEPARIN SODIUM 5000 UNIT(S): 5000 INJECTION INTRAVENOUS; SUBCUTANEOUS at 05:13

## 2020-08-01 RX ADMIN — PANTOPRAZOLE SODIUM 40 MILLIGRAM(S): 20 TABLET, DELAYED RELEASE ORAL at 12:27

## 2020-08-01 RX ADMIN — DEXMEDETOMIDINE HYDROCHLORIDE IN 0.9% SODIUM CHLORIDE 6.45 MICROGRAM(S)/KG/HR: 4 INJECTION INTRAVENOUS at 05:14

## 2020-08-01 RX ADMIN — ALBUTEROL 4 PUFF(S): 90 AEROSOL, METERED ORAL at 00:48

## 2020-08-01 RX ADMIN — Medication 4 PUFF(S): at 07:57

## 2020-08-01 RX ADMIN — ALBUTEROL 4 PUFF(S): 90 AEROSOL, METERED ORAL at 07:57

## 2020-08-01 RX ADMIN — Medication 50 MILLIGRAM(S): at 13:06

## 2020-08-01 NOTE — PROGRESS NOTE ADULT - ATTENDING COMMENTS
78yo Male PMHx CVA 2012, HTN, HLD , hernia surgery with testicle resection 2015, umbilical hernia repair 12/2019,  transferred from [place] ED to [place] ED with s/p seizure activity  and bradycardia into the 30s.  Arrived to [place] hypothermic and hypotensive .Patient  required intubation ph 7.1 with CO2 85 due to acute hypoxic respiratory failure on arrival to ER ,placed on Levophed drip due to hypotension Admitted for septic workup and evaluation ,send blood and urine cx, serial lactate levels, monitor vitals closely hydration ,monitor urine output and renal profile,iv abx initiated -given ceftriaxone in ER and seen by ID consult .Pulmonology and cardiology consults are called .Patient admitted  to ICU with septic shock ,noted to have  tongue bite and AMS ,neurology evaluation called  Admitted  to intensive care  unit for monitoring , to rule out ami -send 3 sets of cardiac enzymes to rule out acute coronary event, obtain ECHO to evaluate LVEF, cardiology consult  ,continue current sepsis management, ventilator management /O2 supply, anticoagulation plan as per cardiology consult Palliative care consult requested ,to discuss advance directives and complete MOLST .Tried to reach family to obtain details of past medical hx - left a message for daughter .not able to reach son ( non valid number )[place]  ED team spoke with patient's daughter, who stated her father fell to the ground on the floor of his motel room about 4 days ago and was unable to get up. She had been caring for him on the floor since and did not call 911 for assistance. Today she found him unresponsive, with blood in his mouth, and incontinent of urine.  CT brain was negative. A CODE STROKE was called, and a CTA head/neck were performed, which was negative for large vessel occlusion but concerning for a density anterior to the ascending aorta possibly right atrial thrombus. Also revealed enlarged, multinodular thyroid. TPA criteria was not met, as patient had unknown onset of symptoms. Patient was transferred to [facility] ED for further care .He developed  multiple witnessed seizures in [place] ED, temporarily resolved with Ativan administration . CT/CTA brain NAD . Seen by neurologist and EEG ordered to r/o status epilepticus as etiology of ongoing encephalopathy, probably will require  MRI once stabilized from cardiac and respiratory standpoint.

## 2020-08-01 NOTE — PROGRESS NOTE ADULT - ASSESSMENT
cont rx cont rx      PAPITOS CATHY [facility] P [phone]  1940 DOA 2020 DR SHAREE PUTNAM     REVIEW OF SYMPTOMS      Able to give ROS  NO     PHYSICAL EXAM    HEENT Unremarkable PERRLA atraumatic   RESP Fair air entry EXP prolonged    Harsh breath sound Resp distres mild   CARDIAC S1 S2 No S3     NO JVD    ABDOMEN SOFT BS PRESENT NOT DISTENDED No hepatosplenomegaly PEDAL EDEMA present No calf tenderness  NO rash   GENERAL Not TOXIC looking    HPI/PMH.       79 m lving in hotel (evicted recently) was found unresponsive and brought to [facility] and transferred to [facility] P 2020 and Pulm consulted     Pt was noted unresponsive on hotel floor with possible tongue bite ? seizure incontinent   er vitals [facility] S 149/65 57       OXYGENATION      2020 ra 93%     VITALS/LABS     2020 afeb 62 140/60   2020 7a cpap 25   2020 u 900      EVENTS.     2020 cpap being tried       RELEVANT INFORMATION       ABG 2020 10a cpap  741/56/64   ABG 16/5/500/.3 746/37/99  ABG  11a ac 20/550/.3/5 728/58/68     ALBUTEROL albuterol ()     COVID igg  covid igg pos+betzy   COVID pcr  Neg-betzy     W 2020 w 6.3   BLOD C  bc Neg-betzy   URINE c  uc Neg-betzy   ROCEPHIN rocephin (-)     CK 2020 ck 335   ECHO  echo ef 45% dd biatrial enlargement  ASA  asa 81 ()  LASIX lasix 60 ()   HYDRALAZINE hydralaz 50.3 ()    CARVEDILOL carvedilol 6.25x2(2020)     Hb -2020 Hb 9.2 - 8.6   Plt -2020 plt 154 - 134   Na 2020 Na 146   Xr --2020 Cr 1.7 - 1.6 -1.4     KEPPRA keppra 500.2 ()       PT DATA/BEST PRACTICE  ALLERGY    nka                 WT               2020 128 k                       BMI                2020 40             CrCl                                    ADVANCED DIRECTIVE     LINES TUBES POA.                 NONVIOLENT NONSELFDESTRUCTIVE LEVEL ONE 2020   HEAD OF BED ELEVATION Yes      INFECTION PPLX          PROCEDURE   Intubation )   Arevalo   DVT PROPHYLAXIS         hposc (2020)   SCHULTZ PROPHYLAXIS          Protonix 40 ()   DYSPHAGIA EVAL     DIET.     jevity 1.5 1600 () (og)                                                                           PROBLEMS.  POSSIBLE SEPSIS POA   2020 w 4.5   Pt was started on rocephin for poss uti   c neg Rocephin dced 2020    INTUBATED 2020    VENT  Lung protective ventilation  Target PaO2 60(+) FIO2 Least needed VT 6/k pH 7.30(+)PPLAT 35(-)  Wean as tolertd     RO VTE   SUSPICION OF RA THROMBUS ON CTA HEAD   2020 cta head filling defect r atrium   ECHO  echo ef 45% dd biatrial enlargement   No clot seen   Pt taken off IV hep      POSSIBLE MI   POSSIBLE RHABDOMYOLYSIS  2020 ck 769   2020 Tr .026   Monitor c enz   sp albumin  On lasix     HEMODYNAMICS  Target MAP 65 (+)  2020 pocus ivc eval indicated nonfluid responsive state     ANEMIA poa   2020 Hb 11.2   Monmitor serially     LASHELL POA   2020 Cr 1.5  Likely sec sepsis and rhabdo   Improved      MULTINODULAR GOITER    Seen on cta head     UNRESPONSIVE poa   RO HYPOGLYCEMIA    RO CVA   POSSIBLE ANOXIC ENCEPHALOPATHY  SEVERAL WITNESSED SZ IN ER 2020 cta Head Neg-betzy   2020 CT h No ac infarcts Multiple chr infarcts r mca and cerebellar   Monitor bgl   neuro eval    PLAN Control sz Wean off vent Follow c     TIME SPENT Over 25 minutes aggregate care time spent on encounter; activities included combinations of  direct pt care, counseling and/or coordinating care reviewing notes, lab data/ imaging, discussion with multidisciplinary team/ pt /family. Risks, benefits, alternatives of planned interventions when applicable were discussed in detail and questions answered as best as possible    LAURA MORGAN [facility] P [phone]  1940 DOA 2020 DR SHAREE PUTNAM

## 2020-08-01 NOTE — PROGRESS NOTE ADULT - SUBJECTIVE AND OBJECTIVE BOX
Date/Time Patient Seen:  		  Referring MD:   Data Reviewed	       Patient is a 79y old  Male who presents with a chief complaint of UNRESPONSIVENESS (01 Aug 2020 08:49)      Subjective/HPI     PAST MEDICAL & SURGICAL HISTORY:  Chronic GERD  HTN (hypertension)  HLD (hyperlipidemia)  CVA (cerebral vascular accident)  High cholesterol  HTN (hypertension)  Unknown and unspecified causes of morbidity  History of surgical removal of testicle  H/O hernia repair  No significant past surgical history        Medication list         MEDICATIONS  (STANDING):  ALBUTerol    90 MICROgram(s) HFA Inhaler 4 Puff(s) Inhalation every 6 hours  aspirin  chewable 81 milliGRAM(s) Oral daily  atorvastatin 10 milliGRAM(s) Oral at bedtime  carvedilol 6.25 milliGRAM(s) Oral every 12 hours  chlorhexidine 0.12% Liquid 15 milliLiter(s) Oral Mucosa every 12 hours  dexMEDEtomidine Infusion 0.2 MICROgram(s)/kG/Hr (6.45 mL/Hr) IV Continuous <Continuous>  furosemide   Injectable 60 milliGRAM(s) IV Push daily  heparin   Injectable 5000 Unit(s) SubCutaneous every 8 hours  hydrALAZINE 50 milliGRAM(s) Oral every 8 hours  ipratropium 17 MICROgram(s) HFA Inhaler 4 Puff(s) Inhalation every 6 hours  levETIRAcetam  IVPB 500 milliGRAM(s) IV Intermittent every 12 hours  pantoprazole  Injectable 40 milliGRAM(s) IV Push daily  polyethylene glycol 3350 17 Gram(s) Oral two times a day  QUEtiapine 25 milliGRAM(s) Oral at bedtime  senna 2 Tablet(s) Oral at bedtime    MEDICATIONS  (PRN):         Vitals log        ICU Vital Signs Last 24 Hrs  T(C): 37 (01 Aug 2020 07:39), Max: 37.1 (31 Jul 2020 13:00)  T(F): 98.6 (01 Aug 2020 07:39), Max: 98.8 (31 Jul 2020 13:00)  HR: 47 (01 Aug 2020 07:00) (45 - 86)  BP: 152/72 (01 Aug 2020 07:00) (124/58 - 152/72)  BP(mean): 103 (01 Aug 2020 07:00) (84 - 103)  ABP: --  ABP(mean): --  RR: 28 (01 Aug 2020 07:00) (16 - 28)  SpO2: 97% (01 Aug 2020 07:00) (93% - 98%)       Mode: CPAP with PS  FiO2: 25  PEEP: 5  PS: 10  ITime: 1  MAP: 9.4  PIP: 15      Input and Output:  I&O's Detail    31 Jul 2020 07:01  -  01 Aug 2020 07:00  --------------------------------------------------------  IN:    dexmedetomidine Infusion: 177.4 mL    Enteral Tube Flush: 280 mL    Glucerna 1.5: 1490 mL    Solution: 50 mL    Solution: 200 mL  Total IN: 2197.4 mL    OUT:    Indwelling Catheter - Urethral: 4635 mL  Total OUT: 4635 mL    Total NET: -2437.6 mL          Lab Data                        8.8    5.89  )-----------( 142      ( 01 Aug 2020 05:35 )             28.5     08-01    145  |  106  |  28<H>  ----------------------------<  111<H>  3.9   |  35<H>  |  1.40<H>    Ca    9.0      01 Aug 2020 05:35  Phos  3.2     08-01  Mg     2.2     08-01    TPro  6.6  /  Alb  2.8<L>  /  TBili  2.3<H>  /  DBili  x   /  AST  38<H>  /  ALT  62  /  AlkPhos  68  08-01      CARDIAC MARKERS ( 01 Aug 2020 05:35 )  x     / x     / 204 U/L / x     / x      CARDIAC MARKERS ( 31 Jul 2020 05:35 )  x     / x     / 335 U/L / x     / x            Review of Systems	      Objective     Physical Examination    heart s1s2  lung dec BS  abd soft      Pertinent Lab findings & Imaging      Mickey:  NO   Adequate UO     I&O's Detail    31 Jul 2020 07:01  -  01 Aug 2020 07:00  --------------------------------------------------------  IN:    dexmedetomidine Infusion: 177.4 mL    Enteral Tube Flush: 280 mL    Glucerna 1.5: 1490 mL    Solution: 50 mL    Solution: 200 mL  Total IN: 2197.4 mL    OUT:    Indwelling Catheter - Urethral: 4635 mL  Total OUT: 4635 mL    Total NET: -2437.6 mL               Discussed with:     Cultures:	        Radiology

## 2020-08-01 NOTE — CHART NOTE - NSCHARTNOTEFT_GEN_A_CORE
Assessment: 80 yo male, PMHx CVA 2012, HTN, who presented as a transfer from [facility] with altered mental status, acute hypercapnic respiratory failure, transient shock distributive vs cardiogenic, LASHELL, and transaminitis. Hypothermia, bradycardia to 30's and multiple witnessed seizures in [place] ED. 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    Pt visited at bedside. Intubated/sedated, at this time unable to provide meaningful subjective information.     Factors impacting intake: [ ] none [ ] nausea  [ ] vomiting [ ] diarrhea [ ] constipation  [ ]chewing problems [X] swallowing issues  [X] other:     Diet Presciption: Diet, NPO with Tube Feed:   Tube Feeding Modality: Orogastric  Jevity 1.5  Total Volume for 24 Hours (mL): 1600  Continuous  Starting Tube Feed Rate {mL per Hour}: 30  Increase Tube Feed Rate by (mL): 10     Every 8 hours  Until Goal Tube Feed Rate (mL per Hour): 80  Tube Feed Duration (in Hours): 20  Tube Feed Start Time: 12:00 (07-29-20 @ 11:49)    Intake:     Current Weight: Weight (kg): 128.9 (07-28 @ 12:42), 129.3 (07-28 @ 05:44)  % Weight Change    Pertinent Medications: MEDICATIONS  (STANDING):  ALBUTerol    90 MICROgram(s) HFA Inhaler 4 Puff(s) Inhalation every 6 hours  aspirin  chewable 81 milliGRAM(s) Oral daily  atorvastatin 10 milliGRAM(s) Oral at bedtime  carvedilol 6.25 milliGRAM(s) Oral every 12 hours  chlorhexidine 0.12% Liquid 15 milliLiter(s) Oral Mucosa every 12 hours  dexMEDEtomidine Infusion 0.2 MICROgram(s)/kG/Hr (6.45 mL/Hr) IV Continuous <Continuous>  furosemide   Injectable 60 milliGRAM(s) IV Push daily  heparin   Injectable 5000 Unit(s) SubCutaneous every 8 hours  hydrALAZINE 50 milliGRAM(s) Oral every 8 hours  ipratropium 17 MICROgram(s) HFA Inhaler 4 Puff(s) Inhalation every 6 hours  levETIRAcetam  IVPB 500 milliGRAM(s) IV Intermittent every 12 hours  pantoprazole  Injectable 40 milliGRAM(s) IV Push daily  polyethylene glycol 3350 17 Gram(s) Oral two times a day  QUEtiapine 25 milliGRAM(s) Oral at bedtime  senna 2 Tablet(s) Oral at bedtime    MEDICATIONS  (PRN):    Pertinent Labs: 08-01 Na145 mmol/L Glu 111 mg/dL<H> K+ 3.9 mmol/L Cr  1.40 mg/dL<H> BUN 28 mg/dL<H> 08-01 Phos 3.2 mg/dL 08-01 Alb 2.8 g/dL<L>     CAPILLARY BLOOD GLUCOSE        Skin: B/L heels stage 1, sacrum stage 1    Estimated Needs:   [X] no change since previous assessment  [ ] recalculated:     Previous Nutrition Diagnosis:   [X] Inadequate Energy Intake [ ]Inadequate Oral Intake [ ] Excessive Energy Intake   [ ] Underweight [ ] Increased Nutrient Needs [ ] Overweight/Obesity   [ ] Altered GI Function [ ] Unintended Weight Loss [ ] Food & Nutrition Related Knowledge Deficit [ ] Malnutrition     Nutrition Diagnosis is [X] ongoing  [ ] resolved [ ] not applicable     New Nutrition Diagnosis: [X] not applicable       Interventions:   Recommend  [ ] Change Diet To:  [ ] Nutrition Supplement  [X] Nutrition Support  [ ] Other:     Monitoring and Evaluation:   [ ] PO intake [ x ] Tolerance to diet prescription [ x ] weights [ x ] labs[ x ] follow up per protocol  [ ] other: Assessment: 80 yo male, PMHx CVA 2012, HTN, who presented as a transfer from [facility] with altered mental status, acute hypercapnic respiratory failure, transient shock distributive vs cardiogenic, LASHELL, and transaminitis. Hypothermia, bradycardia to 30's and multiple witnessed seizures in [place] ED. 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    Pt visited at bedside. Intubated/sedated, at this time unable to provide meaningful subjective information. On tube feeds, tolerating at goal rate of Jevity 1.5 @ 80 ml/hr. No bowel regimen noted yet, started on senna/miralax yesterday. Had a BM when he first came in per RN.      Factors impacting intake: [ ] none [ ] nausea  [ ] vomiting [ ] diarrhea [ ] constipation  [ ]chewing problems [X] swallowing issues  [X] other: respiratory status (intubated/sedated)    Diet Presciption: Diet, NPO with Tube Feed:   Tube Feeding Modality: Orogastric  Jevity 1.5  Total Volume for 24 Hours (mL): 1600  Continuous  Starting Tube Feed Rate {mL per Hour}: 30  Increase Tube Feed Rate by (mL): 10     Every 8 hours  Until Goal Tube Feed Rate (mL per Hour): 80  Tube Feed Duration (in Hours): 20  Tube Feed Start Time: 12:00 (07-29-20 @ 11:49)    Intake: tolerating feeds at goal rate presently. Unable to perform pump study at this time.     Current Weight: Weight (kg): 128.9 (07-28 @ 12:42), 129.3 (07-28 @ 05:44)  % Weight Change    Pertinent Medications: MEDICATIONS  (STANDING):  ALBUTerol    90 MICROgram(s) HFA Inhaler 4 Puff(s) Inhalation every 6 hours  aspirin  chewable 81 milliGRAM(s) Oral daily  atorvastatin 10 milliGRAM(s) Oral at bedtime  carvedilol 6.25 milliGRAM(s) Oral every 12 hours  chlorhexidine 0.12% Liquid 15 milliLiter(s) Oral Mucosa every 12 hours  dexMEDEtomidine Infusion 0.2 MICROgram(s)/kG/Hr (6.45 mL/Hr) IV Continuous <Continuous>  furosemide   Injectable 60 milliGRAM(s) IV Push daily  heparin   Injectable 5000 Unit(s) SubCutaneous every 8 hours  hydrALAZINE 50 milliGRAM(s) Oral every 8 hours  ipratropium 17 MICROgram(s) HFA Inhaler 4 Puff(s) Inhalation every 6 hours  levETIRAcetam  IVPB 500 milliGRAM(s) IV Intermittent every 12 hours  pantoprazole  Injectable 40 milliGRAM(s) IV Push daily  polyethylene glycol 3350 17 Gram(s) Oral two times a day  QUEtiapine 25 milliGRAM(s) Oral at bedtime  senna 2 Tablet(s) Oral at bedtime    MEDICATIONS  (PRN):    Pertinent Labs: 08-01 Na145 mmol/L Glu 111 mg/dL<H> K+ 3.9 mmol/L Cr  1.40 mg/dL<H> BUN 28 mg/dL<H> 08-01 Phos 3.2 mg/dL 08-01 Alb 2.8 g/dL<L>     CAPILLARY BLOOD GLUCOSE        Skin: B/L heels stage 1, sacrum stage 1    Estimated Needs:   [X] no change since previous assessment  [ ] recalculated:     Previous Nutrition Diagnosis:   [X] Inadequate Energy Intake [ ]Inadequate Oral Intake [ ] Excessive Energy Intake   [ ] Underweight [ ] Increased Nutrient Needs [ ] Overweight/Obesity   [ ] Altered GI Function [ ] Unintended Weight Loss [ ] Food & Nutrition Related Knowledge Deficit [ ] Malnutrition     Nutrition Diagnosis is [ ] ongoing  [X] resolved [ ] not applicable     New Nutrition Diagnosis: [X] not applicable       Interventions:   Recommend  [ ] Change Diet To:  [ ] Nutrition Supplement  [X] Nutrition Support: Continue OGT feeds of Jevity 1.5 @ 80 ml/hr  [X] Other: Monitor tolerance of tube feeds. Elevate HOB. Bowel regimen as ordered.     Monitoring and Evaluation:   [ ] PO intake [ x ] Tolerance to diet prescription [ x ] weights [ x ] labs[ x ] follow up per protocol  [ ] other: Assessment: 78 yo male, PMHx CVA 2012, HTN, who presented as a transfer from [facility] with altered mental status, acute hypercapnic respiratory failure, transient shock distributive vs cardiogenic, LASHELL, and transaminitis. Hypothermia, bradycardia to 30's and multiple witnessed seizures in [place] ED. 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    Pt visited at bedside. Intubated/sedated, at this time unable to provide meaningful subjective information. On tube feeds, tolerating at goal rate of Jevity 1.5 @ 80 ml/hr. No bowel regimen noted yet, started on senna/miralax yesterday. Had a BM when he first came in per RN.  Possible extubation today per RN/MD.    Factors impacting intake: [ ] none [ ] nausea  [ ] vomiting [ ] diarrhea [ ] constipation  [ ]chewing problems [X] swallowing issues  [X] other: respiratory status (intubated/sedated)    Diet Presciption: Diet, NPO with Tube Feed:   Tube Feeding Modality: Orogastric  Jevity 1.5  Total Volume for 24 Hours (mL): 1600  Continuous  Starting Tube Feed Rate {mL per Hour}: 30  Increase Tube Feed Rate by (mL): 10     Every 8 hours  Until Goal Tube Feed Rate (mL per Hour): 80  Tube Feed Duration (in Hours): 20  Tube Feed Start Time: 12:00 (07-29-20 @ 11:49)    Intake: tolerating feeds at goal rate presently. Unable to perform pump study at this time.     Current Weight: Weight (kg): 128.9 (07-28 @ 12:42), 129.3 (07-28 @ 05:44)  % Weight Change    Pertinent Medications: MEDICATIONS  (STANDING):  ALBUTerol    90 MICROgram(s) HFA Inhaler 4 Puff(s) Inhalation every 6 hours  aspirin  chewable 81 milliGRAM(s) Oral daily  atorvastatin 10 milliGRAM(s) Oral at bedtime  carvedilol 6.25 milliGRAM(s) Oral every 12 hours  chlorhexidine 0.12% Liquid 15 milliLiter(s) Oral Mucosa every 12 hours  dexMEDEtomidine Infusion 0.2 MICROgram(s)/kG/Hr (6.45 mL/Hr) IV Continuous <Continuous>  furosemide   Injectable 60 milliGRAM(s) IV Push daily  heparin   Injectable 5000 Unit(s) SubCutaneous every 8 hours  hydrALAZINE 50 milliGRAM(s) Oral every 8 hours  ipratropium 17 MICROgram(s) HFA Inhaler 4 Puff(s) Inhalation every 6 hours  levETIRAcetam  IVPB 500 milliGRAM(s) IV Intermittent every 12 hours  pantoprazole  Injectable 40 milliGRAM(s) IV Push daily  polyethylene glycol 3350 17 Gram(s) Oral two times a day  QUEtiapine 25 milliGRAM(s) Oral at bedtime  senna 2 Tablet(s) Oral at bedtime    MEDICATIONS  (PRN):    Pertinent Labs: 08-01 Na145 mmol/L Glu 111 mg/dL<H> K+ 3.9 mmol/L Cr  1.40 mg/dL<H> BUN 28 mg/dL<H> 08-01 Phos 3.2 mg/dL 08-01 Alb 2.8 g/dL<L>     CAPILLARY BLOOD GLUCOSE        Skin: B/L heels stage 1, sacrum stage 1    Estimated Needs:   [X] no change since previous assessment  [ ] recalculated:     Previous Nutrition Diagnosis:   [X] Inadequate Energy Intake [ ]Inadequate Oral Intake [ ] Excessive Energy Intake   [ ] Underweight [ ] Increased Nutrient Needs [ ] Overweight/Obesity   [ ] Altered GI Function [ ] Unintended Weight Loss [ ] Food & Nutrition Related Knowledge Deficit [ ] Malnutrition     Nutrition Diagnosis is [ ] ongoing  [X] resolved [ ] not applicable     New Nutrition Diagnosis: [X] not applicable       Interventions:   Recommend  [ ] Change Diet To:  [ ] Nutrition Supplement  [X] Nutrition Support: Continue OGT feeds of Jevity 1.5 @ 80 ml/hr.   [X] Other: Monitor tolerance of tube feeds. Elevate HOB. Bowel regimen as ordered. Obtain SLP evaluation prior to po diet initiation. Defer diet texture/fluid consistency to them. Recommend DASH/TLC restrictions.     Monitoring and Evaluation:   [ ] PO intake [ x ] Tolerance to diet prescription [ x ] weights [ x ] labs[ x ] follow up per protocol  [ ] other:

## 2020-08-01 NOTE — CHART NOTE - NSCHARTNOTEFT_GEN_A_CORE
Time of evaluation:     Called to evaluate patient for restraints        Other interventions attempted: de-escalation, orientation check, environment modification, medication assessment    REVIEW OF SYSTEMS:  CONSTITUTIONAL: [  ] fever   [  ] fatigue  EYES: [  ] visual disturbances  ENMT:  [  ]difficulty hearing  [  ] throat pain  RESPIRATORY:  [  ]cough  [   ] wheezing  [   ] hemoptysis [  ] shortness of breath  CARDIOVASCULAR: [  ]chest pain  [  ] palpitations   GASTROINTESTINAL: [  ]  abdominal pain [  ] nausea [  ] vomiting  [  ] diarrhea  [  ] constipation  GENITOURINARY: [  ] dysuria [  ] frequency  [  ] hematuria [  ] incontinence  NEUROLOGICAL: [  ] headaches [  ] new numbness  SKIN: [  ]itching [  ] new rash   MUSCULOSKELETAL: [  ] back pain  [  ] extremity pain  PSYCHIATRIC: [  ] depression  [  ] anxiety  [  ] mood swings [  ] difficulty sleeping  ALLERGY AND IMMUNOLOGIC: [  ] hives    [ X ]Unable to perfrom ROS due to:  [  ] ROS reviewed and all negative    PAST MEDICAL & SURGICAL HISTORY:  Chronic GERD  HTN (hypertension)  HLD (hyperlipidemia)  CVA (cerebral vascular accident)  High cholesterol  HTN (hypertension)  History of surgical removal of testicle  H/O hernia repair  No significant past surgical history    MEDICATIONS  (STANDING):  ALBUTerol    90 MICROgram(s) HFA Inhaler 4 Puff(s) Inhalation every 6 hours  aspirin  chewable 81 milliGRAM(s) Oral daily  atorvastatin 10 milliGRAM(s) Oral at bedtime  carvedilol 6.25 milliGRAM(s) Oral every 12 hours  chlorhexidine 0.12% Liquid 15 milliLiter(s) Oral Mucosa every 12 hours  dexMEDEtomidine Infusion 0.2 MICROgram(s)/kG/Hr (6.45 mL/Hr) IV Continuous <Continuous>  furosemide   Injectable 60 milliGRAM(s) IV Push daily  heparin   Injectable 5000 Unit(s) SubCutaneous every 8 hours  hydrALAZINE 50 milliGRAM(s) Oral every 8 hours  ipratropium 17 MICROgram(s) HFA Inhaler 4 Puff(s) Inhalation every 6 hours  levETIRAcetam  IVPB 500 milliGRAM(s) IV Intermittent every 12 hours  pantoprazole  Injectable 40 milliGRAM(s) IV Push daily  polyethylene glycol 3350 17 Gram(s) Oral two times a day  QUEtiapine 25 milliGRAM(s) Oral at bedtime  senna 2 Tablet(s) Oral at bedtime    MEDICATIONS  (PRN):                          8.8    5.89  )-----------( 142      ( 01 Aug 2020 05:35 )             28.5     08-01    145  |  106  |  28<H>  ----------------------------<  111<H>  3.9   |  35<H>  |  1.40<H>    Ca    9.0      01 Aug 2020 05:35  Phos  3.2     08-01  Mg     2.2     08-01    TPro  6.6  /  Alb  2.8<L>  /  TBili  2.3<H>  /  DBili  x   /  AST  38<H>  /  ALT  62  /  AlkPhos  68  08-01      Vital Signs Last 24 Hrs  T(C): 36.4 (01 Aug 2020 04:00), Max: 37.1 (31 Jul 2020 13:00)  T(F): 97.6 (01 Aug 2020 04:00), Max: 98.8 (31 Jul 2020 13:00)  HR: 46 (01 Aug 2020 06:14) (45 - 86)  BP: 143/68 (01 Aug 2020 05:00) (124/58 - 173/75)  BP(mean): 98 (01 Aug 2020 05:00) (84 - 108)  RR: 17 (01 Aug 2020 05:00) (16 - 26)  SpO2: 94% (01 Aug 2020 06:14) (93% - 98%)    Physical Examination:  General: Inubated, No acute distress.    HEENT:  Symmetric.  PULM: Clear to auscultation bilaterally  CVS: Regular rate and rhythm, no murmurs, rubs, or gallops  ABD: Soft, nondistended, nontender, normoactive bowel sounds, no masses  EXT: +2 edema  SKIN: Warm and well perfused.      [  ] Unable to perform physical exam due to    [X ] I have evaluated this patient and have determined that restraints are warranted to optimize medical care. Patient was assessed for current physical and psychological risk factors as well as special needs. There are no medical conditions or limitations that would place this patient at risk while in restraints.    Type of restraint: Bilateral soft wrist restraints    Behavioral criteria for discontinuation of restraint: [ X ] See order    Attending MD Aware

## 2020-08-01 NOTE — PROGRESS NOTE ADULT - SUBJECTIVE AND OBJECTIVE BOX
Patient is a 79y Male with a known history of :  COVID-19 virus IgG antibody detected (Z01.84)  LASHELL (acute kidney injury) (N17.9)  CHF (congestive heart failure) (I50.9)  Leg edema (R60.0)  Atrial thrombus (I51.3)  Unresponsiveness (R41.89)  Palliative care encounter (Z51.5)  Prophylactic measure (Z29.9)  Bradycardia (R00.1)  Hypothermia (T68.XXXA)  Sepsis (A41.9)  Seizure (R56.9)  Hypotension (I95.9)  Acute respiratory failure (J96.00)    HPI:  80yo Male PMHx CVA 2012, HTN, HLD , hernia surgery with testicle resection 2015, umbilical hernia repair 12/2019,  transferred from [place] ED to [place] ED with s/p seizure activity  and bradycardia into the 30s.  Arrived to [facility] hypothermic and hypotensive .Patient  required intubation ph 7.1 with CO2 85 due to acute hypoxic respiratory failure on arrival to ER ,placed on Levophed drip due to hypotension Admitted for septic workup and evaluation ,send blood and urine cx, serial lactate levels, monitor vitals closely hydration ,monitor urine output and renal profile,iv abx initiated -given ceftriaxone in ER and seen by ID consult .Pulmonology and cardiology consults are called .Patient admitted  to ICU with septic shock ,noted to have  tongue bite and AMS ,neurology evaluation called  Admitted  to intensive care  unit for monitoring , to rule out ami -send 3 sets of cardiac enzymes to rule out acute coronary event, obtain ECHO to evaluate LVEF, cardiology consult  ,continue current sepsis management, ventilator management /O2 supply, anticoagulation plan as per cardiology consult Palliative care consult requested ,to discuss advance directives and complete MOLST .Tried to reach family to obtain details of past medical hx - left a message for daughter .not able to reach son ( non valid number )[place]  ED team spoke with patient's daughter, who stated her father fell to the ground on the floor of his motel room about 4 days ago and was unable to get up. She had been caring for him on the floor since and did not call 911 for assistance. Today she found him unresponsive, with blood in his mouth, and incontinent of urine.  CT brain was negative. A CODE STROKE was called, and a CTA head/neck were performed, which was negative for large vessel occlusion but concerning for a density anterior to the ascending aorta possibly right atrial thrombus. Also revealed enlarged, multinodular thyroid. TPA criteria was not met, as patient had unknown onset of symptoms. Patient was transferred to [facility] ED for further care .He developed  multiple witnessed seizures in [facility] ED, temporarily resolved with Ativan administration . CT/CTA brain NAD . Seen by neurologist and EEG ordered to r/o status epilepticus as etiology of ongoing encephalopathy, probably will require  MRI once stabilized from cardiac and respiratory standpoint. (28 Jul 2020 10:57)      REVIEW OF SYSTEMS:    CONSTITUTIONAL: No fever, weight loss, or fatigue  EYES: No eye pain, visual disturbances, or discharge  ENMT:  No difficulty hearing, tinnitus, vertigo; No sinus or throat pain  NECK: No pain or stiffness  BREASTS: No pain, masses, or nipple discharge  RESPIRATORY: No cough, wheezing, chills or hemoptysis; No shortness of breath  CARDIOVASCULAR: No chest pain, palpitations, dizziness, or leg swelling  GASTROINTESTINAL: No abdominal or epigastric pain. No nausea, vomiting, or hematemesis; No diarrhea or constipation. No melena or hematochezia.  GENITOURINARY: No dysuria, frequency, hematuria, or incontinence  NEUROLOGICAL: No headaches, memory loss, loss of strength, numbness, or tremors  SKIN: No itching, burning, rashes, or lesions   LYMPH NODES: No enlarged glands  ENDOCRINE: No heat or cold intolerance; No hair loss  MUSCULOSKELETAL: No joint pain or swelling; No muscle, back, or extremity pain  PSYCHIATRIC: No depression, anxiety, mood swings, or difficulty sleeping  HEME/LYMPH: No easy bruising, or bleeding gums  ALLERGY AND IMMUNOLOGIC: No hives or eczema    MEDICATIONS  (STANDING):  ALBUTerol    90 MICROgram(s) HFA Inhaler 4 Puff(s) Inhalation every 6 hours  aspirin  chewable 81 milliGRAM(s) Oral daily  atorvastatin 10 milliGRAM(s) Oral at bedtime  bisacodyl Suppository 10 milliGRAM(s) Rectal once  carvedilol 6.25 milliGRAM(s) Oral every 12 hours  chlorhexidine 0.12% Liquid 15 milliLiter(s) Oral Mucosa every 12 hours  dexMEDEtomidine Infusion 0.2 MICROgram(s)/kG/Hr (6.45 mL/Hr) IV Continuous <Continuous>  furosemide   Injectable 60 milliGRAM(s) IV Push daily  heparin   Injectable 5000 Unit(s) SubCutaneous every 8 hours  hydrALAZINE 50 milliGRAM(s) Oral every 8 hours  ipratropium 17 MICROgram(s) HFA Inhaler 4 Puff(s) Inhalation every 6 hours  levETIRAcetam  IVPB 500 milliGRAM(s) IV Intermittent every 12 hours  pantoprazole  Injectable 40 milliGRAM(s) IV Push daily  polyethylene glycol 3350 17 Gram(s) Oral two times a day  QUEtiapine 25 milliGRAM(s) Oral at bedtime  senna 2 Tablet(s) Oral at bedtime    MEDICATIONS  (PRN):      ALLERGIES: Allergy Status Unknown  No Known Allergies      FAMILY HISTORY:      Social history:  Alochol:   Smoking:   Drug Use:   Marital Status:     PHYSICAL EXAMINATION:  -----------------------------  T(C): 37 (08-01-20 @ 07:39), Max: 37.1 (07-31-20 @ 13:00)  HR: 47 (08-01-20 @ 07:00) (45 - 86)  BP: 152/72 (08-01-20 @ 07:00) (124/58 - 152/72)  RR: 28 (08-01-20 @ 07:00) (17 - 28)  SpO2: 97% (08-01-20 @ 07:00) (93% - 98%)  Wt(kg): --    07-31 @ 07:01  -  08-01 @ 07:00  --------------------------------------------------------  IN:    dexmedetomidine Infusion: 177.4 mL    Enteral Tube Flush: 280 mL    Glucerna 1.5: 1490 mL    Solution: 50 mL    Solution: 200 mL  Total IN: 2197.4 mL    OUT:    Indwelling Catheter - Urethral: 4635 mL  Total OUT: 4635 mL    Total NET: -2437.6 mL            Constitutional: well developed, normal appearance, well groomed, well nourished, no deformities and no acute distress.   Eyes: the conjunctiva exhibited no abnormalities and the eyelids demonstrated no xanthelasmas.   HEENT: normal oral mucosa, no oral pallor and no oral cyanosis.   Neck: normal jugular venous A waves present, normal jugular venous V waves present and no jugular venous mancia A waves.   Pulmonary: no respiratory distress, normal respiratory rhythm and effort, no accessory muscle use and lungs were clear to auscultation bilaterally.   Cardiovascular: heart rate and rhythm were normal, normal S1 and S2 and no murmur, gallop, rub, heave or thrill are present.   Abdomen: soft, non-tender, no hepato-splenomegaly and no abdominal mass palpated.   Musculoskeletal: the gait could not be assessed..   Extremities: no clubbing of the fingernails, no localized cyanosis, no petechial hemorrhages and no ischemic changes.   Skin: normal skin color and pigmentation, no rash, no venous stasis, no skin lesions, no skin ulcer and no xanthoma was observed.   Psychiatric: oriented to person, place, and time, the affect was normal, the mood was normal and not feeling anxious.     LABS:   --------  08-01    145  |  106  |  28<H>  ----------------------------<  111<H>  3.9   |  35<H>  |  1.40<H>    Ca    9.0      01 Aug 2020 05:35  Phos  3.2     08-01  Mg     2.2     08-01    TPro  6.6  /  Alb  2.8<L>  /  TBili  2.3<H>  /  DBili  x   /  AST  38<H>  /  ALT  62  /  AlkPhos  68  08-01                         8.8    5.89  )-----------( 142      ( 01 Aug 2020 05:35 )             28.5               07-31 @ 12:07    Organism --   Gram Stain Blood -- Gram Stain   Moderate polymorphonuclear leukocytes per low power field  No Squamous epithelial cells per low power field  Rare Yeast like cells per oil power field  Specimen Source .Sputum Sputum  Culture-Blood --        Radiology:       EXAM:  ECHO TTE WO CON COMP W DOPP         PROCEDURE DATE:  07/28/2020        INTERPRETATION:  Ordering Physician: AMNA COSBY    Indication: Acute respiratory failure    Technician: BRANDAN    Study Quality: Good  A complete echocardiographic study was performed utilizing standard protocol including spectral and color Doppler in all echocardiographic windows.    Height: 177 cm  Weight: 129 kg  BSA: 2.42 m2  Blood Pressure: 131/61 mmHg    MEASUREMENTS  IVS: 1.6 cm  PWT: 1.5 cm  LA: 4.6 cm  AO: 3.3 cm  LVIDd: 5.9 cm  LVIDs: 4.5 cm    LVEF: 45%  RVSP: N/A  RA Pressure: N/A    FINDINGS  Left Ventricle: The left ventricle is dilated. Increased left ventricular wall thickness. There is global hypokinesis. The left ventricular systolic function is mildly reduced with an estimated EF of 45-50%.  Right Ventricle: The right ventricle is normal in size and function.  Left Atrium: The left atrium is dilated.  Right Atrium: The right atrium is dilated.  Mitral Valve: The mitral valve is structurally normal. Trace mitral regurgitation.  Aortic Valve: Aortic valve sclerosis. No aortic regurgitation.  Tricuspid Valve: The tricuspid valve is structurally normal. Trace tricuspid regurgitation. Inadequate tricuspid regurgitation to estimate pulmonary artery systolic pressure.  Pulmonic Valve: The pulmonic valve is grossly normal. Trace pulmonic regurgitation.  Diastolic Function: Diastolic dysfunction with elevated left ventricular filling pressures.  Pericardium/Pleura: No pericardial effusion visualized.  Aorta: The aortic root is normal in size.    CONCLUSIONS:  1. Mildly reduced left ventricular systolic function.  2. Dilated left ventricle with increased left ventricular wall thickness.  3. Bi-atrial enlargement.                  AMISHA RODRIGEZ   This document has been electronically signed. Jul 29 2020  8:39AM         < from: CT Angio Head w/ IV Cont (07.28.20 @ 06:45) >    EXAM:  CT ANGIO BRAIN (W)AW IC                          EXAM:  CT ANGIO NECK (W)AW IC                                  PROCEDURE DATE:  07/28/2020          INTERPRETATION:  INDICATION: Code stroke, altered mental status    COMPARISON: Noncontrast CT head from the same day    TECHNIQUE: Multi-detector CTA of the head and neck was performed after administration of 95 mL Omnipaque 350 IV contrast. 3D MIP and volume-rendered images were created on a separate workstation.    FINDINGS:  Study is extremely limited by motion.    Severe diffuse atherosclerosis.    CTA head:  Mild, smooth narrowing of the basilar artery. Fetal origin of the right posterior cerebral artery. No major branch occlusion. No aneurysm.    CTA neck:  The cervical vessels areextremely limited in evaluation. Bovine arch. The origin of the right vertebral artery was not well seen. There are likely stenoses at the origins of the internal carotid arteries bilaterally, which appear to involve less than 50% of the lumen.    Nodefinite acute abnormalities involving the carotid or vertebral arteries, allowing for significant limitation.    At least moderate cardiomegaly. Enlarged pulmonary outflow tract, which can represent chronic pulmonary hypertension. Pulmonary outflow tract measures 4.1 cm in diameter. Minimal patchy opacity in the left apex probably atelectasis but infection not excluded. At the inferior margin of the examination, there is a questionable area of fluid and contrast density anterior to the ascendingaorta, probably the right atrium. Filling defect is not excluded. Numerous thyroid nodules, particularly on the left. Left thyroid lobe is enlarged.    IMPRESSION:  Extremely limited study due to extensive motion. No major branch occlusion or aneurysm noted within the intracranial vasculature. Atherosclerotic stenosis involving the origins of the internal carotid arteries, likely less than 50%.    At the inferior margin of the examination, there is a questionable area of fluid and contrast density anterior to the ascending aorta, probably the right atrium. Apparent filling defect likely related to contrast mixing, but thrombus is not entirely excluded. Consider dedicated CT chest.    Enlarged, multinodular thyroid.    This case was subjected to intradepartmental review.                YAMILET KHAN   This document has been electronically signed. Jul 28 2020  7:13AM                < end of copied text > Patient is a 79y Male with a known history of :  COVID-19 virus IgG antibody detected (Z01.84)  LASHELL (acute kidney injury) (N17.9)  CHF (congestive heart failure) (I50.9)  Leg edema (R60.0)  Atrial thrombus (I51.3)  Unresponsiveness (R41.89)  Palliative care encounter (Z51.5)  Prophylactic measure (Z29.9)  Bradycardia (R00.1)  Hypothermia (T68.XXXA)  Sepsis (A41.9)  Seizure (R56.9)  Hypotension (I95.9)  Acute respiratory failure (J96.00)    HPI:  78yo Male PMHx CVA 2012, HTN, HLD , hernia surgery with testicle resection 2015, umbilical hernia repair 12/2019,  transferred from [place] ED to [place] ED with s/p seizure activity  and bradycardia into the 30s.  Arrived to [place] hypothermic and hypotensive .Patient  required intubation ph 7.1 with CO2 85 due to acute hypoxic respiratory failure on arrival to ER ,placed on Levophed drip due to hypotension Admitted for septic workup and evaluation ,send blood and urine cx, serial lactate levels, monitor vitals closely hydration ,monitor urine output and renal profile,iv abx initiated -given ceftriaxone in ER and seen by ID consult .Pulmonology and cardiology consults are called .Patient admitted  to ICU with septic shock ,noted to have  tongue bite and AMS ,neurology evaluation called  Admitted  to intensive care  unit for monitoring , to rule out ami -send 3 sets of cardiac enzymes to rule out acute coronary event, obtain ECHO to evaluate LVEF, cardiology consult  ,continue current sepsis management, ventilator management /O2 supply, anticoagulation plan as per cardiology consult Palliative care consult requested ,to discuss advance directives and complete MOLST .Tried to reach family to obtain details of past medical hx - left a message for daughter .not able to reach son ( non valid number )[place]  ED team spoke with patient's daughter, who stated her father fell to the ground on the floor of his motel room about 4 days ago and was unable to get up. She had been caring for him on the floor since and did not call 911 for assistance. Today she found him unresponsive, with blood in his mouth, and incontinent of urine.  CT brain was negative. A CODE STROKE was called, and a CTA head/neck were performed, which was negative for large vessel occlusion but concerning for a density anterior to the ascending aorta possibly right atrial thrombus. Also revealed enlarged, multinodular thyroid. TPA criteria was not met, as patient had unknown onset of symptoms. Patient was transferred to [facility] ED for further care .He developed  multiple witnessed seizures in [place] ED, temporarily resolved with Ativan administration . CT/CTA brain NAD . Seen by neurologist and EEG ordered to r/o status epilepticus as etiology of ongoing encephalopathy, probably will require  MRI once stabilized from cardiac and respiratory standpoint. (28 Jul 2020 10:57)      REVIEW OF SYSTEMS:    CONSTITUTIONAL: No fever, weight loss, or fatigue  EYES: No eye pain, visual disturbances, or discharge  ENMT:  No difficulty hearing, tinnitus, vertigo; No sinus or throat pain  NECK: No pain or stiffness  BREASTS: No pain, masses, or nipple discharge  RESPIRATORY: No cough, wheezing, chills or hemoptysis; No shortness of breath  CARDIOVASCULAR: No chest pain, palpitations, dizziness, or leg swelling  GASTROINTESTINAL: No abdominal or epigastric pain. No nausea, vomiting, or hematemesis; No diarrhea or constipation. No melena or hematochezia.  GENITOURINARY: No dysuria, frequency, hematuria, or incontinence  NEUROLOGICAL: No headaches, memory loss, loss of strength, numbness, or tremors  SKIN: No itching, burning, rashes, or lesions   LYMPH NODES: No enlarged glands  ENDOCRINE: No heat or cold intolerance; No hair loss  MUSCULOSKELETAL: No joint pain or swelling; No muscle, back, or extremity pain  PSYCHIATRIC: No depression, anxiety, mood swings, or difficulty sleeping  HEME/LYMPH: No easy bruising, or bleeding gums  ALLERGY AND IMMUNOLOGIC: No hives or eczema    MEDICATIONS  (STANDING):  ALBUTerol    90 MICROgram(s) HFA Inhaler 4 Puff(s) Inhalation every 6 hours  aspirin  chewable 81 milliGRAM(s) Oral daily  atorvastatin 10 milliGRAM(s) Oral at bedtime  bisacodyl Suppository 10 milliGRAM(s) Rectal once  carvedilol 6.25 milliGRAM(s) Oral every 12 hours  chlorhexidine 0.12% Liquid 15 milliLiter(s) Oral Mucosa every 12 hours  dexMEDEtomidine Infusion 0.2 MICROgram(s)/kG/Hr (6.45 mL/Hr) IV Continuous <Continuous>  furosemide   Injectable 60 milliGRAM(s) IV Push daily  heparin   Injectable 5000 Unit(s) SubCutaneous every 8 hours  hydrALAZINE 50 milliGRAM(s) Oral every 8 hours  ipratropium 17 MICROgram(s) HFA Inhaler 4 Puff(s) Inhalation every 6 hours  levETIRAcetam  IVPB 500 milliGRAM(s) IV Intermittent every 12 hours  pantoprazole  Injectable 40 milliGRAM(s) IV Push daily  polyethylene glycol 3350 17 Gram(s) Oral two times a day  QUEtiapine 25 milliGRAM(s) Oral at bedtime  senna 2 Tablet(s) Oral at bedtime    MEDICATIONS  (PRN):      ALLERGIES: Allergy Status Unknown  No Known Allergies      FAMILY HISTORY:      Social history:  Alochol:   Smoking:   Drug Use:   Marital Status:     PHYSICAL EXAMINATION:  -----------------------------  T(C): 37 (08-01-20 @ 07:39), Max: 37.1 (07-31-20 @ 13:00)  HR: 47 (08-01-20 @ 07:00) (45 - 86)  BP: 152/72 (08-01-20 @ 07:00) (124/58 - 152/72)  RR: 28 (08-01-20 @ 07:00) (17 - 28)  SpO2: 97% (08-01-20 @ 07:00) (93% - 98%)  Wt(kg): --    07-31 @ 07:01  -  08-01 @ 07:00  --------------------------------------------------------  IN:    dexmedetomidine Infusion: 177.4 mL    Enteral Tube Flush: 280 mL    Glucerna 1.5: 1490 mL    Solution: 50 mL    Solution: 200 mL  Total IN: 2197.4 mL    OUT:    Indwelling Catheter - Urethral: 4635 mL  Total OUT: 4635 mL    Total NET: -2437.6 mL            Constitutional: well developed, normal appearance, well groomed, well nourished, no deformities and no acute distress.   Eyes: the conjunctiva exhibited no abnormalities and the eyelids demonstrated no xanthelasmas.   HEENT: normal oral mucosa, no oral pallor and no oral cyanosis.   Neck: normal jugular venous A waves present, normal jugular venous V waves present and no jugular venous mancia A waves.   Pulmonary: no respiratory distress, normal respiratory rhythm and effort, no accessory muscle use and lungs were clear to auscultation bilaterally. Anteriorly  Cardiovascular: heart rate and rhythm were normal, normal S1 and S2 and no murmur, gallop, rub, heave or thrill are present.    Musculoskeletal: the gait could not be assessed.   Extremities: no clubbing of the fingernails, no localized cyanosis, no petechial hemorrhages and no ischemic changes.   Skin: normal skin color and pigmentation, no rash, no venous stasis, no skin lesions, no skin ulcer and no xanthoma was observed.     LABS:   --------  08-01    145  |  106  |  28<H>  ----------------------------<  111<H>  3.9   |  35<H>  |  1.40<H>    Ca    9.0      01 Aug 2020 05:35  Phos  3.2     08-01  Mg     2.2     08-01    TPro  6.6  /  Alb  2.8<L>  /  TBili  2.3<H>  /  DBili  x   /  AST  38<H>  /  ALT  62  /  AlkPhos  68  08-01                         8.8    5.89  )-----------( 142      ( 01 Aug 2020 05:35 )             28.5               07-31 @ 12:07    Organism --   Gram Stain Blood -- Gram Stain   Moderate polymorphonuclear leukocytes per low power field  No Squamous epithelial cells per low power field  Rare Yeast like cells per oil power field  Specimen Source .Sputum Sputum  Culture-Blood --        Radiology:       EXAM:  ECHO TTE WO CON COMP W DOPP         PROCEDURE DATE:  07/28/2020        INTERPRETATION:  Ordering Physician: AMNA COSBY    Indication: Acute respiratory failure    Technician: BRANDAN    Study Quality: Good  A complete echocardiographic study was performed utilizing standard protocol including spectral and color Doppler in all echocardiographic windows.    Height: 177 cm  Weight: 129 kg  BSA: 2.42 m2  Blood Pressure: 131/61 mmHg    MEASUREMENTS  IVS: 1.6 cm  PWT: 1.5 cm  LA: 4.6 cm  AO: 3.3 cm  LVIDd: 5.9 cm  LVIDs: 4.5 cm    LVEF: 45%  RVSP: N/A  RA Pressure: N/A    FINDINGS  Left Ventricle: The left ventricle is dilated. Increased left ventricular wall thickness. There is global hypokinesis. The left ventricular systolic function is mildly reduced with an estimated EF of 45-50%.  Right Ventricle: The right ventricle is normal in size and function.  Left Atrium: The left atrium is dilated.  Right Atrium: The right atrium is dilated.  Mitral Valve: The mitral valve is structurally normal. Trace mitral regurgitation.  Aortic Valve: Aortic valve sclerosis. No aortic regurgitation.  Tricuspid Valve: The tricuspid valve is structurally normal. Trace tricuspid regurgitation. Inadequate tricuspid regurgitation to estimate pulmonary artery systolic pressure.  Pulmonic Valve: The pulmonic valve is grossly normal. Trace pulmonic regurgitation.  Diastolic Function: Diastolic dysfunction with elevated left ventricular filling pressures.  Pericardium/Pleura: No pericardial effusion visualized.  Aorta: The aortic root is normal in size.    CONCLUSIONS:  1. Mildly reduced left ventricular systolic function.  2. Dilated left ventricle with increased left ventricular wall thickness.  3. Bi-atrial enlargement.                  AMISHA RODRIGEZ   This document has been electronically signed. Jul 29 2020  8:39AM         < from: CT Angio Head w/ IV Cont (07.28.20 @ 06:45) >    EXAM:  CT ANGIO BRAIN (W)AW IC                          EXAM:  CT ANGIO NECK (W)AW IC                                  PROCEDURE DATE:  07/28/2020          INTERPRETATION:  INDICATION: Code stroke, altered mental status    COMPARISON: Noncontrast CT head from the same day    TECHNIQUE: Multi-detector CTA of the head and neck was performed after administration of 95 mL Omnipaque 350 IV contrast. 3D MIP and volume-rendered images were created on a separate workstation.    FINDINGS:  Study is extremely limited by motion.    Severe diffuse atherosclerosis.    CTA head:  Mild, smooth narrowing of the basilar artery. Fetal origin of the right posterior cerebral artery. No major branch occlusion. No aneurysm.    CTA neck:  The cervical vessels areextremely limited in evaluation. Bovine arch. The origin of the right vertebral artery was not well seen. There are likely stenoses at the origins of the internal carotid arteries bilaterally, which appear to involve less than 50% of the lumen.    Nodefinite acute abnormalities involving the carotid or vertebral arteries, allowing for significant limitation.    At least moderate cardiomegaly. Enlarged pulmonary outflow tract, which can represent chronic pulmonary hypertension. Pulmonary outflow tract measures 4.1 cm in diameter. Minimal patchy opacity in the left apex probably atelectasis but infection not excluded. At the inferior margin of the examination, there is a questionable area of fluid and contrast density anterior to the ascendingaorta, probably the right atrium. Filling defect is not excluded. Numerous thyroid nodules, particularly on the left. Left thyroid lobe is enlarged.    IMPRESSION:  Extremely limited study due to extensive motion. No major branch occlusion or aneurysm noted within the intracranial vasculature. Atherosclerotic stenosis involving the origins of the internal carotid arteries, likely less than 50%.    At the inferior margin of the examination, there is a questionable area of fluid and contrast density anterior to the ascending aorta, probably the right atrium. Apparent filling defect likely related to contrast mixing, but thrombus is not entirely excluded. Consider dedicated CT chest.    Enlarged, multinodular thyroid.    This case was subjected to intradepartmental review.                YAMILET KHAN   This document has been electronically signed. Jul 28 2020  7:13AM                < end of copied text >

## 2020-08-01 NOTE — PROGRESS NOTE ADULT - ASSESSMENT
78yo Male PMHx CVA 2012, HTN, HLD , hernia surgery with testicle resection 2015, umbilical hernia repair 12/2019,  transferred from [place] ED to [place] ED with s/p seizure activity  and bradycardia into the 30s.  Arrived to [facility] hypothermic and hypotensive .Patient  required intubation ph 7.1 with CO2 85 due to acute hypoxic respiratory failure on arrival to ER ,placed on Levophed drip due to hypotension Admitted for septic workup and evaluation ,send blood and urine cx, serial lactate levels, monitor vitals closely hydration ,monitor urine output and renal profile,iv abx initiated -given ceftriaxone in ER and seen by ID consult .Pulmonology and cardiology consults are called .Patient admitted  to ICU with septic shock ,noted to have  tongue bite and AMS ,neurology evaluation called  Admitted  to intensive care  unit for monitoring , to rule out ami -send 3 sets of cardiac enzymes to rule out acute coronary event, obtain ECHO to evaluate LVEF, cardiology consult  ,continue current sepsis management, ventilator management /O2 supply, anticoagulation plan as per cardiology consult Palliative care consult requested ,to discuss advance directives and complete MOLST .Tried to reach family to obtain details of past medical hx - left a message for daughter .not able to reach son ( non valid number )[place]  ED team spoke with patient's daughter, who stated her father fell to the ground on the floor of his motel room about 4 days ago and was unable to get up. She had been caring for him on the floor since and did not call 911 for assistance. Today she found him unresponsive, with blood in his mouth, and incontinent of urine.  CT brain was negative. A CODE STROKE was called, and a CTA head/neck were performed, which was negative for large vessel occlusion but concerning for a density anterior to the ascending aorta possibly right atrial thrombus. Also revealed enlarged, multinodular thyroid. TPA criteria was not met, as patient had unknown onset of symptoms. Patient was transferred to [facility] ED for further care .He developed  multiple witnessed seizures in [place] ED, temporarily resolved with Ativan administration . CT/CTA brain NAD . Seen by neurologist and EEG ordered to r/o status epilepticus as etiology of ongoing encephalopathy, probably will require  MRI once stabilized from cardiac and respiratory standpoint.

## 2020-08-01 NOTE — PROGRESS NOTE ADULT - ATTENDING COMMENTS
Patient seen and examined, agree with above     78 y/o male with hx CVA and HTN, admitted with status epilepticus and acute resp failure, found to be hypothermic and bradycardic which improved with passive warming. CT head neg for bleed, CTA suspicious for RA thrombus but ECHO neg for thrombus.     Recs:   Neuro: continue Keppra 500 mg q12, EEG neg for seizure, MRI brain neg for acute pathology, responsiveness continues to improve and he follows all commands, d/c'd Precedex   CV: continue aspirin, BP well controlled with hydralazine, Coreg    Resp: passed weaning trial - extubated now, maintain aspiration precautions  GI: NPO post-extubation today, will obtain swallow eval, may have to place NGT for meds, bowel regimen for constipation   ID: monitoring off abx, elevated COVID IgG titre suggestive of recent exposure   Renal: LASHELL from rhabdomyolysis - improving  PPx: sc heparin   MSK: PT eval, OOB     Patient critically ill, 45 mins spent

## 2020-08-01 NOTE — PROGRESS NOTE ADULT - SUBJECTIVE AND OBJECTIVE BOX
CATHY SANCHEZ    [facility] ICU1 11    Patient is a 79y old  Male who presents with a chief complaint of UNRESPONSIVENESS (01 Aug 2020 07:09)       Allergies    Allergy Status Unknown  No Known Allergies    Intolerances        HPI:  80yo Male PMHx CVA 2012, HTN, HLD , hernia surgery with testicle resection 2015, umbilical hernia repair 12/2019,  transferred from [place] ED to [place] ED with s/p seizure activity  and bradycardia into the 30s.  Arrived to [place] hypothermic and hypotensive .Patient  required intubation ph 7.1 with CO2 85 due to acute hypoxic respiratory failure on arrival to ER ,placed on Levophed drip due to hypotension Admitted for septic workup and evaluation ,send blood and urine cx, serial lactate levels, monitor vitals closely hydration ,monitor urine output and renal profile,iv abx initiated -given ceftriaxone in ER and seen by ID consult .Pulmonology and cardiology consults are called .Patient admitted  to ICU with septic shock ,noted to have  tongue bite and AMS ,neurology evaluation called  Admitted  to intensive care  unit for monitoring , to rule out ami -send 3 sets of cardiac enzymes to rule out acute coronary event, obtain ECHO to evaluate LVEF, cardiology consult  ,continue current sepsis management, ventilator management /O2 supply, anticoagulation plan as per cardiology consult Palliative care consult requested ,to discuss advance directives and complete MOLST .Tried to reach family to obtain details of past medical hx - left a message for daughter .not able to reach son ( non valid number )[place]  ED team spoke with patient's daughter, who stated her father fell to the ground on the floor of his motel room about 4 days ago and was unable to get up. She had been caring for him on the floor since and did not call 911 for assistance. Today she found him unresponsive, with blood in his mouth, and incontinent of urine.  CT brain was negative. A CODE STROKE was called, and a CTA head/neck were performed, which was negative for large vessel occlusion but concerning for a density anterior to the ascending aorta possibly right atrial thrombus. Also revealed enlarged, multinodular thyroid. TPA criteria was not met, as patient had unknown onset of symptoms. Patient was transferred to [facility] ED for further care .He developed  multiple witnessed seizures in [place] ED, temporarily resolved with Ativan administration . CT/CTA brain NAD . Seen by neurologist and EEG ordered to r/o status epilepticus as etiology of ongoing encephalopathy, probably will require  MRI once stabilized from cardiac and respiratory standpoint. (28 Jul 2020 10:57)      PAST MEDICAL & SURGICAL HISTORY:  Chronic GERD  HTN (hypertension)  HLD (hyperlipidemia)  CVA (cerebral vascular accident)  High cholesterol  HTN (hypertension)  History of surgical removal of testicle  H/O hernia repair  No significant past surgical history      FAMILY HISTORY:        MEDICATIONS   ALBUTerol    90 MICROgram(s) HFA Inhaler 4 Puff(s) Inhalation every 6 hours  aspirin  chewable 81 milliGRAM(s) Oral daily  atorvastatin 10 milliGRAM(s) Oral at bedtime  carvedilol 6.25 milliGRAM(s) Oral every 12 hours  chlorhexidine 0.12% Liquid 15 milliLiter(s) Oral Mucosa every 12 hours  dexMEDEtomidine Infusion 0.2 MICROgram(s)/kG/Hr IV Continuous <Continuous>  furosemide   Injectable 60 milliGRAM(s) IV Push daily  heparin   Injectable 5000 Unit(s) SubCutaneous every 8 hours  hydrALAZINE 50 milliGRAM(s) Oral every 8 hours  ipratropium 17 MICROgram(s) HFA Inhaler 4 Puff(s) Inhalation every 6 hours  levETIRAcetam  IVPB 500 milliGRAM(s) IV Intermittent every 12 hours  pantoprazole  Injectable 40 milliGRAM(s) IV Push daily  polyethylene glycol 3350 17 Gram(s) Oral two times a day  QUEtiapine 25 milliGRAM(s) Oral at bedtime  senna 2 Tablet(s) Oral at bedtime      Vital Signs Last 24 Hrs  T(C): 37 (01 Aug 2020 07:39), Max: 37.1 (31 Jul 2020 13:00)  T(F): 98.6 (01 Aug 2020 07:39), Max: 98.8 (31 Jul 2020 13:00)  HR: 47 (01 Aug 2020 07:00) (45 - 86)  BP: 152/72 (01 Aug 2020 07:00) (124/58 - 152/72)  BP(mean): 103 (01 Aug 2020 07:00) (84 - 103)  RR: 28 (01 Aug 2020 07:00) (16 - 28)  SpO2: 97% (01 Aug 2020 07:00) (93% - 98%)      07-31-20 @ 07:01  -  08-01-20 @ 07:00  --------------------------------------------------------  IN: 2197.4 mL / OUT: 4635 mL / NET: -2437.6 mL        Mode: CPAP with PS, FiO2: 25, PEEP: 5, PS: 10, ITime: 1, MAP: 9.4, PIP: 15    LABS:                        8.8    5.89  )-----------( 142      ( 01 Aug 2020 05:35 )             28.5     08-01    145  |  106  |  28<H>  ----------------------------<  111<H>  3.9   |  35<H>  |  1.40<H>    Ca    9.0      01 Aug 2020 05:35  Phos  3.2     08-01  Mg     2.2     08-01    TPro  6.6  /  Alb  2.8<L>  /  TBili  2.3<H>  /  DBili  x   /  AST  38<H>  /  ALT  62  /  AlkPhos  68  08-01              WBC:  WBC Count: 5.89 K/uL (08-01 @ 05:35)  WBC Count: 6.39 K/uL (07-31 @ 05:35)  WBC Count: 6.41 K/uL (07-30 @ 05:35)  WBC Count: 6.51 K/uL (07-29 @ 17:37)  WBC Count: 6.35 K/uL (07-29 @ 05:58)  WBC Count: 5.66 K/uL (07-28 @ 23:45)  WBC Count: 3.87 K/uL (07-28 @ 13:01)      MICROBIOLOGY:  RECENT CULTURES:  07-31 .Sputum Sputum XXXX   Moderate polymorphonuclear leukocytes per low power field  No Squamous epithelial cells per low power field  Rare Yeast like cells per oil power field XXXX    07-29 .Urine Clean Catch (Midstream) XXXX XXXX   No growth    07-28 .Blood Blood-Peripheral XXXX XXXX   No growth to date.          CARDIAC MARKERS ( 01 Aug 2020 05:35 )  x     / x     / 204 U/L / x     / x      CARDIAC MARKERS ( 31 Jul 2020 05:35 )  x     / x     / 335 U/L / x     / x                Sodium:  Sodium, Serum: 145 mmol/L (08-01 @ 05:35)  Sodium, Serum: 145 mmol/L (07-31 @ 18:29)  Sodium, Serum: 146 mmol/L (07-31 @ 05:35)  Sodium, Serum: 146 mmol/L (07-30 @ 05:35)  Sodium, Serum: 145 mmol/L (07-29 @ 17:53)  Sodium, Serum: 146 mmol/L (07-29 @ 05:58)  Sodium, Serum: 145 mmol/L (07-28 @ 13:01)      1.40 mg/dL 08-01 @ 05:35  1.40 mg/dL 07-31 @ 18:29  1.40 mg/dL 07-31 @ 05:35  1.60 mg/dL 07-30 @ 05:35  1.70 mg/dL 07-29 @ 17:53  1.70 mg/dL 07-29 @ 05:58  1.30 mg/dL 07-28 @ 13:01      Hemoglobin:  Hemoglobin: 8.8 g/dL (08-01 @ 05:35)  Hemoglobin: 8.3 g/dL (07-31 @ 05:35)  Hemoglobin: 8.6 g/dL (07-30 @ 05:35)  Hemoglobin: 9.0 g/dL (07-29 @ 17:37)  Hemoglobin: 9.2 g/dL (07-29 @ 05:58)  Hemoglobin: 9.2 g/dL (07-28 @ 23:45)  Hemoglobin: 9.5 g/dL (07-28 @ 13:01)      Platelets: Platelet Count - Automated: 142 K/uL (08-01 @ 05:35)  Platelet Count - Automated: 141 K/uL (07-31 @ 05:35)  Platelet Count - Automated: 134 K/uL (07-30 @ 05:35)  Platelet Count - Automated: 135 K/uL (07-29 @ 17:37)  Platelet Count - Automated: 154 K/uL (07-29 @ 05:58)  Platelet Count - Automated: 147 K/uL (07-28 @ 23:45)  Platelet Count - Automated: 135 K/uL (07-28 @ 13:01)      LIVER FUNCTIONS - ( 01 Aug 2020 05:35 )  Alb: 2.8 g/dL / Pro: 6.6 g/dL / ALK PHOS: 68 U/L / ALT: 62 U/L / AST: 38 U/L / GGT: x                 RADIOLOGY & ADDITIONAL STUDIES:

## 2020-08-01 NOTE — PROGRESS NOTE ADULT - SUBJECTIVE AND OBJECTIVE BOX
Patient is a 79y old  Male who presents with a chief complaint of UNRESPONSIVENESS (31 Jul 2020 15:53)    24 hour events: ***    REVIEW OF SYSTEMS  Constitutional: No fever, chills, fatigue  Neuro: No headache, numbness, weakness  Resp: No cough, wheezing, shortness of breath  CVS: No chest pain, palpitations, leg swelling  GI: No abdominal pain, nausea, vomiting, diarrhea   : No dysuria, frequency, incontinence  Skin: No itching, burning, rashes, or lesions   Msk: No joint pain or swelling  Psych: No depression, anxiety, mood swings  Heme: No bleeding    T(F): 97.6 (08-01-20 @ 04:00), Max: 98.8 (07-31-20 @ 13:00)  HR: 47 (08-01-20 @ 07:00) (45 - 86)  BP: 152/72 (08-01-20 @ 07:00) (124/58 - 152/72)  RR: 28 (08-01-20 @ 07:00) (16 - 28)  SpO2: 97% (08-01-20 @ 07:00) (93% - 98%)  Wt(kg): --    Mode: CPAP with PS, FiO2: 25, PEEP: 5, PS: 10  CAPILLARY BLOOD GLUCOSE        I&O's Summary    07-31 @ 07:01  -  08-01 @ 07:00  --------------------------------------------------------  IN: 2197.4 mL / OUT: 4635 mL / NET: -2437.6 mL      PHYSICAL EXAM  General:   CNS:   HEENT:   Resp:   CVS:   Abd:   Ext:   Skin:     MEDICATIONS    carvedilol Oral  furosemide   Injectable IV Push  hydrALAZINE Oral    atorvastatin Oral    ALBUTerol    90 MICROgram(s) HFA Inhaler Inhalation  ipratropium 17 MICROgram(s) HFA Inhaler Inhalation    dexMEDEtomidine Infusion IV Continuous  levETIRAcetam  IVPB IV Intermittent  QUEtiapine Oral      aspirin  chewable Oral  heparin   Injectable SubCutaneous    pantoprazole  Injectable IV Push  polyethylene glycol 3350 Oral  senna Oral          chlorhexidine 0.12% Liquid Oral Mucosa                            8.8    5.89  )-----------( 142      ( 01 Aug 2020 05:35 )             28.5       08-01    145  |  106  |  28<H>  ----------------------------<  111<H>  3.9   |  35<H>  |  1.40<H>    Ca    9.0      01 Aug 2020 05:35  Phos  3.2     08-01  Mg     2.2     08-01    TPro  6.6  /  Alb  2.8<L>  /  TBili  2.3<H>  /  DBili  x   /  AST  38<H>  /  ALT  62  /  AlkPhos  68  08-01      CARDIAC MARKERS ( 01 Aug 2020 05:35 )  x     / x     / 204 U/L / x     / x      CARDIAC MARKERS ( 31 Jul 2020 05:35 )  x     / x     / 335 U/L / x     / x              .Sputum Sputum --   Moderate polymorphonuclear leukocytes per low power field  No Squamous epithelial cells per low power field  Rare Yeast like cells per oil power field 07-31 @ 12:07  .Urine Clean Catch (Midstream)   No growth -- 07-29 @ 01:14  .Blood Blood-Peripheral   No growth to date. -- 07-28 @ 16:18        Radiology: ***  Bedside lung ultrasound: ***  Bedside ECHO: ***    CENTRAL LINE: Y/N          DATE INSERTED:              REMOVE: Y/N  CANTOR: Y/N                        DATE INSERTED:              REMOVE: Y/N  A-LINE: Y/N                       DATE INSERTED:              REMOVE: Y/N    GLOBAL ISSUE/BEST PRACTICE  Analgesia:   Sedation:   CAM-ICU:   HOB elevation: yes  Stress ulcer prophylaxis:   VTE prophylaxis:   Glycemic control:   Nutrition:     CODE STATUS: ***  [facility] discussion: Y Patient is a 79y old  Male who presents with a chief complaint of UNRESPONSIVENESS (31 Jul 2020 15:53)    24 hour events: Patient seen and examined at bedside. No acute overnight events.     REVIEW OF SYSTEMS  Constitutional: No fever, chills, fatigue  Neuro: No headache, numbness, weakness  Resp: No cough, wheezing, shortness of breath  CVS: No chest pain, palpitations, leg swelling  GI: No abdominal pain, nausea, vomiting, diarrhea   : No dysuria, frequency, incontinence  Skin: No itching, burning, rashes, or lesions   Msk: No joint pain or swelling  Heme: No bleeding    T(F): 97.6 (08-01-20 @ 04:00), Max: 98.8 (07-31-20 @ 13:00)  HR: 47 (08-01-20 @ 07:00) (45 - 86)  BP: 152/72 (08-01-20 @ 07:00) (124/58 - 152/72)  RR: 28 (08-01-20 @ 07:00) (16 - 28)  SpO2: 97% (08-01-20 @ 07:00) (93% - 98%)  Wt(kg): --    Mode: CPAP with PS, FiO2: 25, PEEP: 5, PS: 10  CAPILLARY BLOOD GLUCOSE        I&O's Summary    07-31 @ 07:01  -  08-01 @ 07:00  --------------------------------------------------------  IN: 2197.4 mL / OUT: 4635 mL / NET: -2437.6 mL      PHYSICAL EXAM  General: Intubated, off sedation  CNS: awake, responds to commands and questions, able to shake head yes/no  HEENT: pupils responsive to light, strabismus resolved, ET tube in place  Resp: course rhonchi b/l, no wheezing  CVS: RRR, nl S1/S2, no M/R/G  Abd: ascitic, pitting abdominal edema, firm likely 2/2 underlying fluid; impressive scrotal edema  Ext: 2+ LE edema, WWP, no cyanosis  Skin: No rash      MEDICATIONS    carvedilol Oral  furosemide   Injectable IV Push  hydrALAZINE Oral    atorvastatin Oral    ALBUTerol    90 MICROgram(s) HFA Inhaler Inhalation  ipratropium 17 MICROgram(s) HFA Inhaler Inhalation    dexMEDEtomidine Infusion IV Continuous  levETIRAcetam  IVPB IV Intermittent  QUEtiapine Oral      aspirin  chewable Oral  heparin   Injectable SubCutaneous    pantoprazole  Injectable IV Push  polyethylene glycol 3350 Oral  senna Oral          chlorhexidine 0.12% Liquid Oral Mucosa                            8.8    5.89  )-----------( 142      ( 01 Aug 2020 05:35 )             28.5       08-01    145  |  106  |  28<H>  ----------------------------<  111<H>  3.9   |  35<H>  |  1.40<H>    Ca    9.0      01 Aug 2020 05:35  Phos  3.2     08-01  Mg     2.2     08-01    TPro  6.6  /  Alb  2.8<L>  /  TBili  2.3<H>  /  DBili  x   /  AST  38<H>  /  ALT  62  /  AlkPhos  68  08-01      CARDIAC MARKERS ( 01 Aug 2020 05:35 )  x     / x     / 204 U/L / x     / x      CARDIAC MARKERS ( 31 Jul 2020 05:35 )  x     / x     / 335 U/L / x     / x              .Sputum Sputum --   Moderate polymorphonuclear leukocytes per low power field  No Squamous epithelial cells per low power field  Rare Yeast like cells per oil power field 07-31 @ 12:07  .Urine Clean Catch (Midstream)   No growth -- 07-29 @ 01:14  .Blood Blood-Peripheral   No growth to date. -- 07-28 @ 16:18    CENTRAL LINE: N          DATE INSERTED:              REMOVE: Y/N  CANTOR: Y                       DATE INSERTED:              REMOVE: Y/N  A-LINE: N                       DATE INSERTED:              REMOVE: Y/N    GLOBAL ISSUE/BEST PRACTICE  Analgesia: yes  Sedation: yes  HOB elevation: yes  Stress ulcer prophylaxis: yes  VTE prophylaxis: scd  Glycemic control: ss  Nutrition: tube feeds    CODE STATUS: Full  [facility] discussion: Y Patient is a 79y old  Male who presents with a chief complaint of UNRESPONSIVENESS (31 Jul 2020 15:53)    24 hour events: Patient seen and examined at bedside. No acute overnight events.     REVIEW OF SYSTEMS: Limited ROS, able to gesture answers to questions  Constitutional: No fever, chills  Neuro: No headache  Resp: No shortness of breath  CVS: No chest pain  GI: No abdominal pain  : No dysuria  Skin: No itching  Msk: No joint pain or swelling  Heme: No bleeding    T(F): 97.6 (08-01-20 @ 04:00), Max: 98.8 (07-31-20 @ 13:00)  HR: 47 (08-01-20 @ 07:00) (45 - 86)  BP: 152/72 (08-01-20 @ 07:00) (124/58 - 152/72)  RR: 28 (08-01-20 @ 07:00) (16 - 28)  SpO2: 97% (08-01-20 @ 07:00) (93% - 98%)  Wt(kg): --    Mode: CPAP with PS, FiO2: 25, PEEP: 5, PS: 10  CAPILLARY BLOOD GLUCOSE        I&O's Summary    07-31 @ 07:01  -  08-01 @ 07:00  --------------------------------------------------------  IN: 2197.4 mL / OUT: 4635 mL / NET: -2437.6 mL      PHYSICAL EXAM  General: Intubated, off sedation  CNS: awake, responds to commands and questions, able to shake head yes/no  HEENT: pupils responsive to light, strabismus resolved, ET tube in place  Resp: course rhonchi b/l, no wheezing  CVS: RRR, nl S1/S2, no M/R/G  Abd: ascitic, pitting abdominal edema, firm likely 2/2 underlying fluid; impressive scrotal edema  Ext: 2+ LE edema, WWP, no cyanosis  Skin: No rash      MEDICATIONS    carvedilol Oral  furosemide   Injectable IV Push  hydrALAZINE Oral    atorvastatin Oral    ALBUTerol    90 MICROgram(s) HFA Inhaler Inhalation  ipratropium 17 MICROgram(s) HFA Inhaler Inhalation    dexMEDEtomidine Infusion IV Continuous  levETIRAcetam  IVPB IV Intermittent  QUEtiapine Oral      aspirin  chewable Oral  heparin   Injectable SubCutaneous    pantoprazole  Injectable IV Push  polyethylene glycol 3350 Oral  senna Oral          chlorhexidine 0.12% Liquid Oral Mucosa                            8.8    5.89  )-----------( 142      ( 01 Aug 2020 05:35 )             28.5       08-01    145  |  106  |  28<H>  ----------------------------<  111<H>  3.9   |  35<H>  |  1.40<H>    Ca    9.0      01 Aug 2020 05:35  Phos  3.2     08-01  Mg     2.2     08-01    TPro  6.6  /  Alb  2.8<L>  /  TBili  2.3<H>  /  DBili  x   /  AST  38<H>  /  ALT  62  /  AlkPhos  68  08-01      CARDIAC MARKERS ( 01 Aug 2020 05:35 )  x     / x     / 204 U/L / x     / x      CARDIAC MARKERS ( 31 Jul 2020 05:35 )  x     / x     / 335 U/L / x     / x              .Sputum Sputum --   Moderate polymorphonuclear leukocytes per low power field  No Squamous epithelial cells per low power field  Rare Yeast like cells per oil power field 07-31 @ 12:07  .Urine Clean Catch (Midstream)   No growth -- 07-29 @ 01:14  .Blood Blood-Peripheral   No growth to date. -- 07-28 @ 16:18    CENTRAL LINE: N          DATE INSERTED:              REMOVE: Y/N  CANTOR: Y                       DATE INSERTED:              REMOVE: Y/N  A-LINE: N                       DATE INSERTED:              REMOVE: Y/N    GLOBAL ISSUE/BEST PRACTICE  Analgesia: yes  Sedation: yes  HOB elevation: yes  Stress ulcer prophylaxis: yes  VTE prophylaxis: scd  Glycemic control: ss  Nutrition: tube feeds    CODE STATUS: Full  [facility] discussion: Y Patient is a 79y old  Male who presents with a chief complaint of UNRESPONSIVENESS (31 Jul 2020 15:53)    24 hour events: Patient seen and examined at bedside. No acute overnight events.     REVIEW OF SYSTEMS: Limited ROS due to intubation, able to gesture answers to questions  Constitutional: No fever, chills  Neuro: No headache  Resp: No shortness of breath  CVS: No chest pain  GI: No abdominal pain  : No dysuria  Skin: No itching  Msk: No joint pain or swelling  Heme: No bleeding    T(F): 97.6 (08-01-20 @ 04:00), Max: 98.8 (07-31-20 @ 13:00)  HR: 47 (08-01-20 @ 07:00) (45 - 86)  BP: 152/72 (08-01-20 @ 07:00) (124/58 - 152/72)  RR: 28 (08-01-20 @ 07:00) (16 - 28)  SpO2: 97% (08-01-20 @ 07:00) (93% - 98%)  Wt(kg): --    Mode: CPAP with PS, FiO2: 25, PEEP: 5, PS: 10  CAPILLARY BLOOD GLUCOSE        I&O's Summary    07-31 @ 07:01  -  08-01 @ 07:00  --------------------------------------------------------  IN: 2197.4 mL / OUT: 4635 mL / NET: -2437.6 mL      PHYSICAL EXAM  General: Intubated, off sedation  CNS: awake, responds to commands and questions, able to shake head yes/no  HEENT: pupils responsive to light, strabismus resolved, ET tube in place  Resp: course rhonchi b/l, no wheezing  CVS: RRR, nl S1/S2, no M/R/G  Abd: ascitic, pitting abdominal edema, firm likely 2/2 underlying fluid; impressive scrotal edema  Ext: 2+ LE edema, WWP, no cyanosis  Skin: No rash      MEDICATIONS    carvedilol Oral  furosemide   Injectable IV Push  hydrALAZINE Oral    atorvastatin Oral    ALBUTerol    90 MICROgram(s) HFA Inhaler Inhalation  ipratropium 17 MICROgram(s) HFA Inhaler Inhalation    dexMEDEtomidine Infusion IV Continuous  levETIRAcetam  IVPB IV Intermittent  QUEtiapine Oral      aspirin  chewable Oral  heparin   Injectable SubCutaneous    pantoprazole  Injectable IV Push  polyethylene glycol 3350 Oral  senna Oral          chlorhexidine 0.12% Liquid Oral Mucosa                            8.8    5.89  )-----------( 142      ( 01 Aug 2020 05:35 )             28.5       08-01    145  |  106  |  28<H>  ----------------------------<  111<H>  3.9   |  35<H>  |  1.40<H>    Ca    9.0      01 Aug 2020 05:35  Phos  3.2     08-01  Mg     2.2     08-01    TPro  6.6  /  Alb  2.8<L>  /  TBili  2.3<H>  /  DBili  x   /  AST  38<H>  /  ALT  62  /  AlkPhos  68  08-01      CARDIAC MARKERS ( 01 Aug 2020 05:35 )  x     / x     / 204 U/L / x     / x      CARDIAC MARKERS ( 31 Jul 2020 05:35 )  x     / x     / 335 U/L / x     / x              .Sputum Sputum --   Moderate polymorphonuclear leukocytes per low power field  No Squamous epithelial cells per low power field  Rare Yeast like cells per oil power field 07-31 @ 12:07  .Urine Clean Catch (Midstream)   No growth -- 07-29 @ 01:14  .Blood Blood-Peripheral   No growth to date. -- 07-28 @ 16:18    CENTRAL LINE: N          DATE INSERTED:              REMOVE: Y/N  CANTOR: Y                       DATE INSERTED:              REMOVE: Y/N  A-LINE: N                       DATE INSERTED:              REMOVE: Y/N    GLOBAL ISSUE/BEST PRACTICE  Analgesia: yes  Sedation: yes  HOB elevation: yes  Stress ulcer prophylaxis: yes  VTE prophylaxis: scd  Glycemic control: ss  Nutrition: tube feeds    CODE STATUS: Full  [facility] discussion: Y Patient is a 79y old  Male who presents with a chief complaint of UNRESPONSIVENESS (31 Jul 2020 15:53)    24 hour events: Patient seen and examined at bedside. No acute overnight events. Plan for extubation today.     REVIEW OF SYSTEMS: Limited ROS due to intubation, able to gesture answers to questions  Constitutional: No fever, chills  Neuro: No headache  Resp: No shortness of breath  CVS: No chest pain  GI: No abdominal pain  : No dysuria  Skin: No itching  Msk: No joint pain or swelling  Heme: No bleeding    T(F): 97.6 (08-01-20 @ 04:00), Max: 98.8 (07-31-20 @ 13:00)  HR: 47 (08-01-20 @ 07:00) (45 - 86)  BP: 152/72 (08-01-20 @ 07:00) (124/58 - 152/72)  RR: 28 (08-01-20 @ 07:00) (16 - 28)  SpO2: 97% (08-01-20 @ 07:00) (93% - 98%)  Wt(kg): --    Mode: CPAP with PS, FiO2: 25, PEEP: 5, PS: 10  CAPILLARY BLOOD GLUCOSE        I&O's Summary    07-31 @ 07:01  -  08-01 @ 07:00  --------------------------------------------------------  IN: 2197.4 mL / OUT: 4635 mL / NET: -2437.6 mL      PHYSICAL EXAM  General: Intubated, off sedation  CNS: awake, responds to commands and questions, able to shake head yes/no  HEENT: pupils responsive to light, strabismus resolved, ET tube in place  Resp: course rhonchi b/l, no wheezing  CVS: RRR, nl S1/S2, no M/R/G  Abd: ascitic, pitting abdominal edema, firm likely 2/2 underlying fluid; impressive scrotal edema  Ext: 2+ LE edema, WWP, no cyanosis  Skin: No rash      MEDICATIONS    carvedilol Oral  furosemide   Injectable IV Push  hydrALAZINE Oral    atorvastatin Oral    ALBUTerol    90 MICROgram(s) HFA Inhaler Inhalation  ipratropium 17 MICROgram(s) HFA Inhaler Inhalation    dexMEDEtomidine Infusion IV Continuous  levETIRAcetam  IVPB IV Intermittent  QUEtiapine Oral      aspirin  chewable Oral  heparin   Injectable SubCutaneous    pantoprazole  Injectable IV Push  polyethylene glycol 3350 Oral  senna Oral          chlorhexidine 0.12% Liquid Oral Mucosa                            8.8    5.89  )-----------( 142      ( 01 Aug 2020 05:35 )             28.5       08-01    145  |  106  |  28<H>  ----------------------------<  111<H>  3.9   |  35<H>  |  1.40<H>    Ca    9.0      01 Aug 2020 05:35  Phos  3.2     08-01  Mg     2.2     08-01    TPro  6.6  /  Alb  2.8<L>  /  TBili  2.3<H>  /  DBili  x   /  AST  38<H>  /  ALT  62  /  AlkPhos  68  08-01      CARDIAC MARKERS ( 01 Aug 2020 05:35 )  x     / x     / 204 U/L / x     / x      CARDIAC MARKERS ( 31 Jul 2020 05:35 )  x     / x     / 335 U/L / x     / x              .Sputum Sputum --   Moderate polymorphonuclear leukocytes per low power field  No Squamous epithelial cells per low power field  Rare Yeast like cells per oil power field 07-31 @ 12:07  .Urine Clean Catch (Midstream)   No growth -- 07-29 @ 01:14  .Blood Blood-Peripheral   No growth to date. -- 07-28 @ 16:18    CENTRAL LINE: N          DATE INSERTED:              REMOVE: Y/N  CANTOR: Y                       DATE INSERTED:              REMOVE: Y/N  A-LINE: N                       DATE INSERTED:              REMOVE: Y/N    GLOBAL ISSUE/BEST PRACTICE  Analgesia: yes  Sedation: yes  HOB elevation: yes  Stress ulcer prophylaxis: yes  VTE prophylaxis: scd  Glycemic control: ss  Nutrition: tube feeds    CODE STATUS: Full  [facility]OC discussion: Y

## 2020-08-01 NOTE — PROGRESS NOTE ADULT - ASSESSMENT
80 yo male, PMHx CVA 2012, HTN, who presented as a transfer from [facility] with altered mental status, acute hypercapnic respiratory failure, transient shock distributive vs cardiogenic, LASHELL, and transaminitis. Hypothermia, bradycardia to 30's and multiple witnessed seizures in [place] ED. 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    Neuro: Multiple witnessed seizures in ED, resolving with Ativan; likely metabolic encephalopathy CTA head and neck with motion artifact, no obvious stenosis, questionable filling defect noted, possibly right atrial thrombus. Off Propofol.   - marked improvement in mental status  - Continue Keppra 500 mg BID  - On Quetiapine 25mg bedtime for agitation  - EEG w/o acute pathology  - MRI brain w/o acute pathology  - Ativan 1 mg IVP 1 6 prn for breakthrough seizures  - Keep off Propofol  Pulm: Intubated for acute hypercapnic respiratory failure, vent augmented to manipulate acid-base balance. Didn't pass SBT 7/30 AM, now on PS. POCUS w a-line predominance, b/l basilar consolidations, likely atelectasis vs aspiration pneumonitis  - Sputum culture w Moderate pmls, rare Yeast like cells  - Albumin 25% 50 mL q6h  - SBT today failed, now on AC-volume control  - Cont. Albuterol 90 mcg inhaler  - Cont. Atrovent  - aggressive Chest PT  - Plan for TOV in PM  CV: Aggressive diuresis given anasarca and heart failure once renal fn stable; Questionable R atrial thrombus on CTA neck, now off Heparin gtt 2/2 oral bleeding and hematuria. Echocardiogram with mild LV systolic dysfunction, dilated LV w LVH, b/l atrial enlargement, no obvious right atrial thrombus. On POCUS (7/29) of IVC 2.3 cm w/o variability.   - Received IV Lasix 60 mg in PM; consider repeat overnight; fluid target: net -2 to 2.5L over 24 hrs  - Cont Carvedilol  - Cont ASA  - Continue Hydralazine 50 mg q8  - Continue Atorvastatin  GI: Cont tube feeds. AST/ALT downtrending  - on Miralax / Senna  - on protonix  Renal: LASHELL likely ATN due to hypoperfusion during shock state, complicated by rhabdomyolysis (CK downtrending, hematuria resolved), monitoring renal indices and UOP. On POCUS of IVC appears non-fluid responsive. Heavy proteinuria noted on UA, possible underlying CKD.   - UO improved s/p albumin, IVF, and lasix.  - Start daily lasix  - DC IVF, DC albumin, hematuria resolved, CK now ??  - Monitor urine output  - Albumin 25% 50ml q6  - Continue protonix  ID: No evidence of infection. UA trace LE.   - Sputum culture w Moderate pmls, rare Yeast like cells  - BCx and UCx NGTD  - D/C'd ceftriaxone, d/w ID, monitor off Abx  - f/u BCx, UCx, sputum cx  Endo: TSH wnl, T3 low, cortisol wnl; CT Angio w/ multinodular thyroid; will require outpatient workup  Heme: Oral bleed and hematuria resolved.   - On Heparin  - On ASA  PPx: SCDs  Diet:  - Started tube feeds, s/p dietitian eval  Dispo: Monitor in ICU;   - contact daughter for collateral history and post-discharge planning 78 yo male, PMHx CVA 2012, HTN, who presented as a transfer from [facility] with altered mental status, acute hypercapnic respiratory failure, transient shock distributive vs cardiogenic, LASHELL, and transaminitis. Hypothermia, bradycardia to 30's and multiple witnessed seizures in [place] ED. 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    Neuro: Multiple witnessed seizures in ED, resolving with Ativan; likely metabolic encephalopathy CTA head and neck with motion artifact, no obvious stenosis, questionable filling defect noted, possibly right atrial thrombus. Off Propofol. Mental status improved. Continue Keppra 500 mg BID. On Quetiapine 25mg bedtime for agitation. EEG w/o acute pathology. MRI brain w/o acute pathology. Ativan 1 mg IVP 1 6 prn for breakthrough seizures. Keep off Propofol  Pulm: Intubated for acute hypercapnic respiratory failure, vent augmented to manipulate acid-base balance. Didn't pass SBT 7/30 AM, now on PS. POCUS w a-line predominance, b/l basilar consolidations, likely atelectasis vs aspiration pneumonitis. Sputum culture w Moderate pmls, rare Yeast like cells. Albumin 25% 50 mL q6h. Cont. Albuterol 90 mcg inhaler, Atrovent, chest PT  CV: Aggressive diuresis given anasarca and heart failure once renal fn stable; Questionable R atrial thrombus on CTA neck, now off Heparin gtt 2/2 oral bleeding and hematuria. Echocardiogram with mild LV systolic dysfunction, dilated LV w LVH, b/l atrial enlargement, no obvious right atrial thrombus. On POCUS (7/29) of IVC 2.3 cm w/o variability. Cont Carvedilol, ASA, Hydralazine 50 mg q8, Atorvastatin  GI: Cont tube feeds. AST/ALT downtrending. Continue Miralax/Senna, protonix  Renal: LASHELL likely ATN due to hypoperfusion during shock state, complicated by rhabdomyolysis (CK downtrending, hematuria resolved), monitoring renal indices and UOP. On POCUS of IVC appears non-fluid responsive. Heavy proteinuria noted on UA, possible underlying CKD. Continue Lasix 60 mg IVP qd  ID: No evidence of infection. UA trace LE. Sputum culture w Moderate pmls, rare Yeast like cells. BCx and UCx NGTD. Monitor off Abx  Endo: TSH wnl, T3 low, cortisol wnl; CT Angio w/ multinodular thyroid; will require outpatient workup  Heme: Oral bleed and hematuria resolved. DVT ppx Heparin. Continue ASA  PPx: SCDs  Diet: Started tube feeds, s/p dietitian eval  Dispo: Monitor in ICU 78 yo male, PMHx CVA 2012, HTN, who presented as a transfer from [facility] with altered mental status, acute hypercapnic respiratory failure, transient shock distributive vs cardiogenic, LASHELL, and transaminitis. Hypothermia, bradycardia to 30's and multiple witnessed seizures in [place] ED. 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    Neuro: Multiple witnessed seizures in ED, resolving with Ativan; likely metabolic encephalopathy CTA head and neck with motion artifact, no obvious stenosis, questionable filling defect noted, possibly right atrial thrombus. Off Propofol. Mental status improved. Continue Keppra 500 mg BID. On Quetiapine 25mg bedtime for agitation. EEG w/o acute pathology. MRI brain w/o acute pathology. Ativan 1 mg IVP 1 6 prn for breakthrough seizures.  Pulm: Intubated for acute hypercapnic respiratory failure, vent augmented to manipulate acid-base balance. Didn't pass SBT 7/30 AM, now on PS. Sputum culture w Moderate pmls, rare Yeast like cells. Albumin 25% 50 mL q6h. Cont. Albuterol 90 mcg inhaler, Atrovent, chest PT  CV: Aggressive diuresis given anasarca and heart failure once renal fn stable; Questionable R atrial thrombus on CTA neck, now off Heparin gtt 2/2 oral bleeding and hematuria. Echocardiogram with mild LV systolic dysfunction, dilated LV w LVH, b/l atrial enlargement, no obvious right atrial thrombus. On POCUS (7/29) of IVC 2.3 cm w/o variability. Cont Carvedilol, ASA, Hydralazine 50 mg q8, Atorvastatin  GI: Cont tube feeds. AST/ALT downtrending. Continue Miralax/Senna, protonix  Renal: LASHELL likely ATN due to hypoperfusion during shock state, complicated by rhabdomyolysis (CK downtrending, hematuria resolved), monitoring renal indices and UOP. On POCUS of IVC appears non-fluid responsive. Heavy proteinuria noted on UA, possible underlying CKD. Continue Lasix 60 mg IVP qd  ID: No evidence of infection. UA trace LE. Sputum culture w Moderate pmls, rare Yeast like cells. BCx and UCx NGTD. Monitor off Abx  Endo: TSH wnl, T3 low, cortisol wnl; CT Angio w/ multinodular thyroid; will require outpatient workup  Heme: Oral bleed and hematuria resolved. DVT ppx Heparin. Continue ASA  PPx: SCDs  Diet: Started tube feeds, s/p dietitian eval  Dispo: Monitor in ICU 80 yo male, PMHx CVA 2012, HTN, who presented as a transfer from [facility] with altered mental status, acute hypercapnic respiratory failure, transient shock distributive vs cardiogenic, LASHELL, and transaminitis. Hypothermia, bradycardia to 30's and multiple witnessed seizures in [place] ED. 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    Neuro: Multiple witnessed seizures in ED, resolving with Ativan; likely metabolic encephalopathy CTA head and neck with motion artifact, no obvious stenosis, questionable filling defect noted, possibly right atrial thrombus. Off Propofol. Mental status improved. Continue Keppra 500 mg BID. On Quetiapine 25mg bedtime for agitation. EEG w/o acute pathology. MRI brain w/o acute pathology. Ativan 1 mg IVP 1 6 prn for breakthrough seizures.  Pulm: Intubated for acute hypercapnic respiratory failure, vent augmented to manipulate acid-base balance. Didn't pass SBT 7/30 AM, now on PS. Sputum culture w Moderate pmls, rare Yeast like cells. Albumin 25% 50 mL q6h. Cont. Albuterol 90 mcg inhaler, Atrovent, chest PT  CV: Aggressive diuresis given anasarca and heart failure once renal fn stable; Questionable R atrial thrombus on CTA neck, now off Heparin gtt 2/2 oral bleeding and hematuria. Echo with mild LV systolic dysfunction, dilated LV w LVH, b/l atrial enlargement, no obvious right atrial thrombus. Cont Carvedilol, ASA, Hydralazine 50 mg q8, Atorvastatin  GI: Cont tube feeds. AST/ALT downtrending. Continue Miralax/Senna, protonix  Renal: LASHELL likely ATN due to hypoperfusion during shock state, complicated by rhabdomyolysis (CK downtrending, hematuria resolved), monitor renal indices and UOP. Heavy proteinuria noted on UA, possible underlying CKD. Continue Lasix 60 mg IVP qd  ID: No evidence of infection. UA trace LE. Sputum culture w Moderate pmls, rare Yeast like cells. BCx and UCx NGTD. Monitor off Abx  Endo: TSH wnl, T3 low, cortisol wnl; CT Angio w/ multinodular thyroid; will require outpatient workup  Heme: Oral bleed and hematuria resolved. DVT ppx Heparin. Continue ASA  PPx: SCDs  Diet: Started tube feeds  Dispo: Monitor in ICU 80 yo male, PMHx CVA 2012, HTN, who presented as a transfer from [facility] with altered mental status, acute hypercapnic respiratory failure, transient shock distributive vs cardiogenic, LASHELL, and transaminitis. Hypothermia, bradycardia to 30's and multiple witnessed seizures in [place] ED. 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    Neuro: continue Keppra 500 mg q12, EEG neg for seizure, MRI brain neg for acute pathology, responsiveness continues to improve and he follows all commands, on Quetiapine 25mg bedtime for agitation  Pulm: plan for extubation today, continue Albuterol 90 mcg inhaler, Atrovent, chest PT  CV: continue aspirin, atorvastatin BP well controlled with hydralazine, Coreg  GI: continue TF, bowel regimen for constipation, AST/ALT downtrending  Renal: LASHELL likely ATN due to hypoperfusion during shock state, complicated by rhabdomyolysis (CK downtrending, hematuria resolved), continue Lasix 60 mg IVP qd  ID: urine and blood culture neg, no other signs of bacterial infection, monitor off abx, elevated COVID IgG titre suggestive of recent exposure, sputum culture w Moderate pmls, rare yeast like cells  Endo: TSH wnl, T3 low, cortisol wnl; CT Angio w/ multinodular thyroid; will require outpatient workup  Heme: DVT ppx Heparin, continue ASA  Dispo: Monitor in ICU

## 2020-08-01 NOTE — PROGRESS NOTE ADULT - ASSESSMENT
The patient is an 79 year old male with a history of HTN, CVA who presents with unresponsiveness.    8/1/20 Patient intubated and sleeping comfortably  Monitor compatible with NSR    Plan:  - Continue carvedilol 6.25 mg bid; titrate up if needed  - Continue hydralazine 50 mg q8h  - furosemide 60mg IV  - Atorvastatin-10mg HS  - CTA head and neck with motion artifact, no obvious stenosis. There is a questionable filling defect noted, possibly right atrial thrombus.  - Echocardiogram with mild LV systolic dysfunction, no obvious right atrial thrombus  - Off ceftriaxone  - Being followed by Neurology, ID, Pulmonary  - MRI with old CVA  - Follow labs  - ICU care

## 2020-08-02 LAB
ALBUMIN SERPL ELPH-MCNC: 2.5 G/DL — LOW (ref 3.3–5)
ALP SERPL-CCNC: 63 U/L — SIGNIFICANT CHANGE UP (ref 40–120)
ALT FLD-CCNC: 55 U/L — SIGNIFICANT CHANGE UP (ref 12–78)
ANION GAP SERPL CALC-SCNC: 4 MMOL/L — LOW (ref 5–17)
AST SERPL-CCNC: 31 U/L — SIGNIFICANT CHANGE UP (ref 15–37)
BASOPHILS # BLD AUTO: 0.01 K/UL — SIGNIFICANT CHANGE UP (ref 0–0.2)
BASOPHILS NFR BLD AUTO: 0.1 % — SIGNIFICANT CHANGE UP (ref 0–2)
BILIRUB SERPL-MCNC: 2 MG/DL — HIGH (ref 0.2–1.2)
BUN SERPL-MCNC: 26 MG/DL — HIGH (ref 7–23)
CALCIUM SERPL-MCNC: 8.9 MG/DL — SIGNIFICANT CHANGE UP (ref 8.5–10.1)
CHLORIDE SERPL-SCNC: 104 MMOL/L — SIGNIFICANT CHANGE UP (ref 96–108)
CO2 SERPL-SCNC: 37 MMOL/L — HIGH (ref 22–31)
CREAT SERPL-MCNC: 1.3 MG/DL — SIGNIFICANT CHANGE UP (ref 0.5–1.3)
CULTURE RESULTS: SIGNIFICANT CHANGE UP
EOSINOPHIL # BLD AUTO: 0.42 K/UL — SIGNIFICANT CHANGE UP (ref 0–0.5)
EOSINOPHIL NFR BLD AUTO: 5.9 % — SIGNIFICANT CHANGE UP (ref 0–6)
GLUCOSE SERPL-MCNC: 82 MG/DL — SIGNIFICANT CHANGE UP (ref 70–99)
HCT VFR BLD CALC: 27.3 % — LOW (ref 39–50)
HGB BLD-MCNC: 8.4 G/DL — LOW (ref 13–17)
IMM GRANULOCYTES NFR BLD AUTO: 0.3 % — SIGNIFICANT CHANGE UP (ref 0–1.5)
LYMPHOCYTES # BLD AUTO: 0.74 K/UL — LOW (ref 1–3.3)
LYMPHOCYTES # BLD AUTO: 10.4 % — LOW (ref 13–44)
MAGNESIUM SERPL-MCNC: 1.8 MG/DL — SIGNIFICANT CHANGE UP (ref 1.6–2.6)
MCHC RBC-ENTMCNC: 28.4 PG — SIGNIFICANT CHANGE UP (ref 27–34)
MCHC RBC-ENTMCNC: 30.8 GM/DL — LOW (ref 32–36)
MCV RBC AUTO: 92.2 FL — SIGNIFICANT CHANGE UP (ref 80–100)
MONOCYTES # BLD AUTO: 0.97 K/UL — HIGH (ref 0–0.9)
MONOCYTES NFR BLD AUTO: 13.6 % — SIGNIFICANT CHANGE UP (ref 2–14)
NEUTROPHILS # BLD AUTO: 4.97 K/UL — SIGNIFICANT CHANGE UP (ref 1.8–7.4)
NEUTROPHILS NFR BLD AUTO: 69.7 % — SIGNIFICANT CHANGE UP (ref 43–77)
NRBC # BLD: 0 /100 WBCS — SIGNIFICANT CHANGE UP (ref 0–0)
PHOSPHATE SERPL-MCNC: 3.5 MG/DL — SIGNIFICANT CHANGE UP (ref 2.5–4.5)
PLATELET # BLD AUTO: 156 K/UL — SIGNIFICANT CHANGE UP (ref 150–400)
POTASSIUM SERPL-MCNC: 3.7 MMOL/L — SIGNIFICANT CHANGE UP (ref 3.5–5.3)
POTASSIUM SERPL-SCNC: 3.7 MMOL/L — SIGNIFICANT CHANGE UP (ref 3.5–5.3)
PROT SERPL-MCNC: 6.3 G/DL — SIGNIFICANT CHANGE UP (ref 6–8.3)
RBC # BLD: 2.96 M/UL — LOW (ref 4.2–5.8)
RBC # FLD: 15.9 % — HIGH (ref 10.3–14.5)
SODIUM SERPL-SCNC: 145 MMOL/L — SIGNIFICANT CHANGE UP (ref 135–145)
SPECIMEN SOURCE: SIGNIFICANT CHANGE UP
WBC # BLD: 7.13 K/UL — SIGNIFICANT CHANGE UP (ref 3.8–10.5)
WBC # FLD AUTO: 7.13 K/UL — SIGNIFICANT CHANGE UP (ref 3.8–10.5)

## 2020-08-02 PROCEDURE — 99233 SBSQ HOSP IP/OBS HIGH 50: CPT | Mod: GC

## 2020-08-02 RX ORDER — PANTOPRAZOLE SODIUM 20 MG/1
40 TABLET, DELAYED RELEASE ORAL
Refills: 0 | Status: DISCONTINUED | OUTPATIENT
Start: 2020-08-02 | End: 2020-08-06

## 2020-08-02 RX ORDER — FUROSEMIDE 40 MG
40 TABLET ORAL DAILY
Refills: 0 | Status: DISCONTINUED | OUTPATIENT
Start: 2020-08-03 | End: 2020-08-06

## 2020-08-02 RX ORDER — HYDRALAZINE HCL 50 MG
10 TABLET ORAL ONCE
Refills: 0 | Status: COMPLETED | OUTPATIENT
Start: 2020-08-02 | End: 2020-08-02

## 2020-08-02 RX ORDER — HYDRALAZINE HCL 50 MG
50 TABLET ORAL EVERY 6 HOURS
Refills: 0 | Status: DISCONTINUED | OUTPATIENT
Start: 2020-08-02 | End: 2020-08-03

## 2020-08-02 RX ORDER — CARVEDILOL PHOSPHATE 80 MG/1
12.5 CAPSULE, EXTENDED RELEASE ORAL EVERY 12 HOURS
Refills: 0 | Status: DISCONTINUED | OUTPATIENT
Start: 2020-08-02 | End: 2020-08-06

## 2020-08-02 RX ADMIN — HEPARIN SODIUM 5000 UNIT(S): 5000 INJECTION INTRAVENOUS; SUBCUTANEOUS at 21:26

## 2020-08-02 RX ADMIN — Medication 50 MILLIGRAM(S): at 19:03

## 2020-08-02 RX ADMIN — Medication 10 MILLIGRAM(S): at 00:10

## 2020-08-02 RX ADMIN — Medication 50 MILLIGRAM(S): at 05:39

## 2020-08-02 RX ADMIN — CARVEDILOL PHOSPHATE 12.5 MILLIGRAM(S): 80 CAPSULE, EXTENDED RELEASE ORAL at 19:03

## 2020-08-02 RX ADMIN — Medication 81 MILLIGRAM(S): at 11:34

## 2020-08-02 RX ADMIN — HEPARIN SODIUM 5000 UNIT(S): 5000 INJECTION INTRAVENOUS; SUBCUTANEOUS at 05:40

## 2020-08-02 RX ADMIN — Medication 50 MILLIGRAM(S): at 13:01

## 2020-08-02 RX ADMIN — LEVETIRACETAM 420 MILLIGRAM(S): 250 TABLET, FILM COATED ORAL at 13:01

## 2020-08-02 RX ADMIN — PANTOPRAZOLE SODIUM 40 MILLIGRAM(S): 20 TABLET, DELAYED RELEASE ORAL at 11:35

## 2020-08-02 RX ADMIN — CARVEDILOL PHOSPHATE 6.25 MILLIGRAM(S): 80 CAPSULE, EXTENDED RELEASE ORAL at 05:40

## 2020-08-02 RX ADMIN — ATORVASTATIN CALCIUM 10 MILLIGRAM(S): 80 TABLET, FILM COATED ORAL at 21:26

## 2020-08-02 RX ADMIN — LEVETIRACETAM 420 MILLIGRAM(S): 250 TABLET, FILM COATED ORAL at 00:11

## 2020-08-02 RX ADMIN — Medication 50 MILLIGRAM(S): at 23:53

## 2020-08-02 RX ADMIN — HEPARIN SODIUM 5000 UNIT(S): 5000 INJECTION INTRAVENOUS; SUBCUTANEOUS at 13:01

## 2020-08-02 RX ADMIN — Medication 60 MILLIGRAM(S): at 05:39

## 2020-08-02 NOTE — PROGRESS NOTE ADULT - SUBJECTIVE AND OBJECTIVE BOX
Patient is a 79y old  Male who presents with a chief complaint of UNRESPONSIVENESS (02 Aug 2020 08:54)    24 hour events: Patient seen and examined at bedside. No acute overnight events.     REVIEW OF SYSTEMS  Constitutional: No fever, chills, fatigue  Neuro: No headache, numbness, weakness  Resp: No cough, wheezing, shortness of breath  CVS: No chest pain, palpitations, leg swelling  GI: No abdominal pain, nausea, vomiting, diarrhea   : No dysuria, frequency, incontinence  Skin: No itching, burning, rashes, or lesions   Msk: Notes b/l ankle pain, No joint pain or swelling  Psych: No depression, anxiety, mood swings  Heme: No bleeding    VITALS:  T(F): 98.2 (08-02-20 @ 00:31), Max: 98.4 (08-01-20 @ 20:33)  HR: 72 (08-02-20 @ 12:00) (63 - 77)  BP: 181/84 (08-02-20 @ 12:00) (143/78 - 185/80)  RR: 38 (08-02-20 @ 12:00) (37 - 43)  SpO2: 96% (08-02-20 @ 12:00) (90% - 98%)  Wt(kg): --    VENT:    CAPILLARY BLOOD GLUCOSE        I&O's Summary    08-01 @ 07:01  -  08-02 @ 07:00  --------------------------------------------------------  IN: 432.8 mL / OUT: 5850 mL / NET: -5417.2 mL    08-02 @ 07:01  -  08-02 @ 15:09  --------------------------------------------------------  IN: 100 mL / OUT: 2400 mL / NET: -2300 mL      PHYSICAL EXAM  General: Intubated, off sedation  CNS: awake, responds to commands and questions, able to shake head yes/no  HEENT: pupils responsive to light, strabismus resolved, ET tube in place  Resp: course rhonchi b/l, no wheezing  CVS: RRR, nl S1/S2, no M/R/G  Abd: ascitic, pitting abdominal edema, firm likely 2/2 underlying fluid; impressive scrotal edema  Ext: ttp ankles b/l, + LE edema, WWP, no cyanosis  Skin: No rash      MEDICATIONS    carvedilol Oral  furosemide   Injectable IV Push  hydrALAZINE Oral    atorvastatin Oral      levETIRAcetam  IVPB IV Intermittent      aspirin  chewable Oral  heparin   Injectable SubCutaneous    pantoprazole  Injectable IV Push                                    8.4    7.13  )-----------( 156      ( 02 Aug 2020 05:28 )             27.3       08-02    145  |  104  |  26<H>  ----------------------------<  82  3.7   |  37<H>  |  1.30    Ca    8.9      02 Aug 2020 05:28  Phos  3.5     08-02  Mg     1.8     08-02    TPro  6.3  /  Alb  2.5<L>  /  TBili  2.0<H>  /  DBili  x   /  AST  31  /  ALT  55  /  AlkPhos  63  08-02      CARDIAC MARKERS ( 01 Aug 2020 05:35 )  x     / x     / 204 U/L / x     / x          .Sputum Sputum   Normal Respiratory Jumana present   Moderate polymorphonuclear leukocytes per low power field  No Squamous epithelial cells per low power field  Rare Yeast like cells per oil power field 07-31 @ 12:07  .Urine Clean Catch (Midstream)   No growth -- 07-29 @ 01:14  .Blood Blood-Peripheral   No growth to date. -- 07-28 @ 16:18    CENTRAL LINE: N          DATE INSERTED:              REMOVE: Y/N  CANTOR: DC'd                     DATE INSERTED:              REMOVE: Y/N  A-LINE: N                       DATE INSERTED:              REMOVE: Y/N    GLOBAL ISSUE/BEST PRACTICE  Analgesia: yes  Sedation: no  HOB elevation: yes  Stress ulcer prophylaxis: yes  VTE prophylaxis: scd  Glycemic control: ss  Nutrition: tube feeds    CODE STATUS: Full  GOC discussion: Y

## 2020-08-02 NOTE — PROGRESS NOTE ADULT - SUBJECTIVE AND OBJECTIVE BOX
Neurology Follow up note    CATHY [id number]    HPI:  80yo Male PMHx CVA 2012, HTN, HLD , hernia surgery with testicle resection 2015, umbilical hernia repair 12/2019,  transferred from [place] ED to [place] ED with s/p seizure activity  and bradycardia into the 30s.  Arrived to [facility] hypothermic and hypotensive .Patient  required intubation ph 7.1 with CO2 85 due to acute hypoxic respiratory failure on arrival to ER ,placed on Levophed drip due to hypotension Admitted for septic workup and evaluation ,send blood and urine cx, serial lactate levels, monitor vitals closely hydration ,monitor urine output and renal profile,iv abx initiated -given ceftriaxone in ER and seen by ID consult .Pulmonology and cardiology consults are called .Patient admitted  to ICU with septic shock ,noted to have  tongue bite and AMS ,neurology evaluation called  Admitted  to intensive care  unit for monitoring , to rule out ami -send 3 sets of cardiac enzymes to rule out acute coronary event, obtain ECHO to evaluate LVEF, cardiology consult  ,continue current sepsis management, ventilator management /O2 supply, anticoagulation plan as per cardiology consult Palliative care consult requested ,to discuss advance directives and complete MOLST .Tried to reach family to obtain details of past medical hx - left a message for daughter .not able to reach son ( non valid number )[place]  ED team spoke with patient's daughter, who stated her father fell to the ground on the floor of his motel room about 4 days ago and was unable to get up. She had been caring for him on the floor since and did not call 911 for assistance. Today she found him unresponsive, with blood in his mouth, and incontinent of urine.  CT brain was negative. A CODE STROKE was called, and a CTA head/neck were performed, which was negative for large vessel occlusion but concerning for a density anterior to the ascending aorta possibly right atrial thrombus. Also revealed enlarged, multinodular thyroid. TPA criteria was not met, as patient had unknown onset of symptoms. Patient was transferred to [facility] ED for further care .He developed  multiple witnessed seizures in [place] ED, temporarily resolved with Ativan administration . CT/CTA brain NAD . Seen by neurologist and EEG ordered to r/o status epilepticus as etiology of ongoing encephalopathy, probably will require  MRI once stabilized from cardiac and respiratory standpoint. (28 Jul 2020 10:57)      Interval History - doing better; no seizure reported    Patient is seen, chart was reviewed and case was discussed with the treatment team.  Pt is not in any distress.   Lying on bed comfortably.       Vital Signs Last 24 Hrs  T(C): 36.8 (02 Aug 2020 15:30), Max: 36.9 (01 Aug 2020 20:33)  T(F): 98.3 (02 Aug 2020 15:30), Max: 98.4 (01 Aug 2020 20:33)  HR: 74 (02 Aug 2020 15:00) (63 - 77)  BP: 153/69 (02 Aug 2020 15:00) (143/78 - 185/80)  BP(mean): 99 (02 Aug 2020 15:00) (95 - 131)  RR: 43 (02 Aug 2020 15:00) (37 - 43)  SpO2: 92% (02 Aug 2020 15:00) (90% - 98%)        REVIEW OF SYSTEMS:    Constitutional: No fever  Eyes: No eye pain, visual disturbances, or discharge  ENT:  No difficulty hearing, tinnitus,   Neck: No pain or stiffness  Respiratory: No chills or hemoptysis  Cardiovascular: No chest pain, palpitations,   Gastrointestinal: No abdominal or epigastric pain. No nausea, vomiting or hematemesis  Genitourinary: No , hematuria or incontinence  Neurological: No headaches  Musculoskeletal: No joint pain or swelling  Skin: No itching, burning, rashes or lesions   Lymph Nodes: No enlarged glands  Endocrine: No heat or cold intolerance;   Allergy and Immunologic: No hives or eczema    On Neurological Examination:    Mental Status - Pt is alert, awake,  Follows commands well     Speech -   Pt has dysarthria.    Cranial Nerves - Pupils 3 mm equal and reactive to light, extraocular eye movements intact.  Pt has no facial asymmetry. Facial sensation is intact.Tongue - is in midline.    Muscle tone - is normal     Motor Exam - 4/5 all over, No drift. No shaking or tremors.    Sensory Exam - Pin prick, temperature, joint position and vibration are intact on either side. Pt withdraws all extremities equally on stimulation. No asymmetry seen. No complaints of tingling, numbness.          Deep tendon Reflexes - 2 plus all over.    Neck Supple -  Yes.     MEDICATIONS    aspirin  chewable 81 milliGRAM(s) Oral daily  atorvastatin 10 milliGRAM(s) Oral at bedtime  carvedilol 12.5 milliGRAM(s) Oral every 12 hours  furosemide   Injectable 60 milliGRAM(s) IV Push daily  heparin   Injectable 5000 Unit(s) SubCutaneous every 8 hours  hydrALAZINE 50 milliGRAM(s) Oral every 6 hours  levETIRAcetam  IVPB 500 milliGRAM(s) IV Intermittent every 12 hours  pantoprazole  Injectable 40 milliGRAM(s) IV Push daily      Allergies    Allergy Status Unknown  No Known Allergies    Intolerances        LABS:  CBC Full  -  ( 02 Aug 2020 05:28 )  WBC Count : 7.13 K/uL  RBC Count : 2.96 M/uL  Hemoglobin : 8.4 g/dL  Hematocrit : 27.3 %  Platelet Count - Automated : 156 K/uL  Mean Cell Volume : 92.2 fl  Mean Cell Hemoglobin : 28.4 pg  Mean Cell Hemoglobin Concentration : 30.8 gm/dL  Auto Neutrophil # : 4.97 K/uL  Auto Lymphocyte # : 0.74 K/uL  Aut      08-02    145  |  104  |  26<H>  ----------------------------<  82  3.7   |  37<H>  |  1.30    Ca    8.9      02 Aug 2020 05:28  Phos  3.5     08-02  Mg     1.8     08-02    TPro  6.3  /  Alb  2.5<L>  /  TBili  2.0<H>  /  DBili  x   /  AST  31  /  ALT  55  /  AlkPhos  63  08-02    Hemoglobin A1C:     Vitamin B12     RADIOLOGY    ASSESSMENT AND PLAN:      status epilepticus  sp respiratory failure    on keppra 500 mg bid for seizure  ativan prn for breakthrough seizure  Physical therapy evaluation.  OOB to chair/ambulation with assistance only.  Pain is accessed and addressed.  Would continue to follow.

## 2020-08-02 NOTE — PROGRESS NOTE ADULT - ATTENDING COMMENTS
Patient seen and examined, agree with above     78 y/o male with hx CVA and HTN, admitted with status epilepticus and acute resp failure, found to be hypothermic and bradycardic which improved with passive warming. CT head neg for bleed, CTA suspicious for RA thrombus but ECHO neg for thrombus.     Successfully extubated 8/1   Doing well     Recs:   Neuro: continue Keppra 500 mg q12, EEG neg for seizure, MRI brain neg for acute pathology, AAOx2-3, PT eval, OOB, on aspirin for hx CVAs  CV: continue aspirin, hypertensive - increase Coreg and hydralazine, if BP continues to remains elevated will consider adding amlodipine or lisinopril, decrease daily Lasix to 40 mg and change to po   Resp: passed weaning trial - extubated now, maintain aspiration precautions  GI: passed bedside dysphagia screen, will start nectar consistency diet, ordered official swallow eval    ID: monitoring off abx, elevated COVID IgG titre suggestive of recent exposure   Renal: LASHELL from rhabdomyolysis - improving  PPx: sc heparin   MSK: PT eval, OOB     Stable for tele

## 2020-08-02 NOTE — PROGRESS NOTE ADULT - SUBJECTIVE AND OBJECTIVE BOX
Date/Time Patient Seen:  		  Referring MD:   Data Reviewed	       Patient is a 79y old  Male who presents with a chief complaint of UNRESPONSIVENESS (01 Aug 2020 10:05)      Subjective/HPI     PAST MEDICAL & SURGICAL HISTORY:  Chronic GERD  HTN (hypertension)  HLD (hyperlipidemia)  CVA (cerebral vascular accident)  High cholesterol  HTN (hypertension)  Unknown and unspecified causes of morbidity  History of surgical removal of testicle  H/O hernia repair  No significant past surgical history        Medication list         MEDICATIONS  (STANDING):  aspirin  chewable 81 milliGRAM(s) Oral daily  atorvastatin 10 milliGRAM(s) Oral at bedtime  carvedilol 6.25 milliGRAM(s) Oral every 12 hours  furosemide   Injectable 60 milliGRAM(s) IV Push daily  heparin   Injectable 5000 Unit(s) SubCutaneous every 8 hours  hydrALAZINE 50 milliGRAM(s) Oral every 8 hours  levETIRAcetam  IVPB 500 milliGRAM(s) IV Intermittent every 12 hours  pantoprazole  Injectable 40 milliGRAM(s) IV Push daily    MEDICATIONS  (PRN):         Vitals log        ICU Vital Signs Last 24 Hrs  T(C): 36.8 (02 Aug 2020 00:31), Max: 37 (01 Aug 2020 07:39)  T(F): 98.2 (02 Aug 2020 00:31), Max: 98.6 (01 Aug 2020 07:39)  HR: 66 (02 Aug 2020 07:00) (45 - 97)  BP: 151/69 (02 Aug 2020 07:00) (115/58 - 185/80)  BP(mean): 99 (02 Aug 2020 07:00) (84 - 117)  ABP: --  ABP(mean): --  RR: 40 (02 Aug 2020 07:00) (29 - 43)  SpO2: 96% (02 Aug 2020 07:00) (91% - 100%)       Mode: CPAP with PS  FiO2: 25  PEEP: 5  PS: 8  ITime: 1  MAP: 9  PIP: 14      Input and Output:  I&O's Detail    01 Aug 2020 07:01  -  02 Aug 2020 07:00  --------------------------------------------------------  IN:    dexmedetomidine Infusion: 12.8 mL    Glucerna 1.5: 160 mL    Oral Fluid: 60 mL    Solution: 200 mL  Total IN: 432.8 mL    OUT:    Indwelling Catheter - Urethral: 5850 mL  Total OUT: 5850 mL    Total NET: -5417.2 mL          Lab Data                        8.4    7.13  )-----------( 156      ( 02 Aug 2020 05:28 )             27.3     08-02    145  |  104  |  26<H>  ----------------------------<  82  3.7   |  37<H>  |  1.30    Ca    8.9      02 Aug 2020 05:28  Phos  3.5     08-02  Mg     1.8     08-02    TPro  6.3  /  Alb  2.5<L>  /  TBili  2.0<H>  /  DBili  x   /  AST  31  /  ALT  55  /  AlkPhos  63  08-02    ABG - ( 01 Aug 2020 10:40 )  pH, Arterial: 7.41  pH, Blood: x     /  pCO2: 56    /  pO2: 64    / HCO3: 32    / Base Excess: 9.4   /  SaO2: 91                CARDIAC MARKERS ( 01 Aug 2020 05:35 )  x     / x     / 204 U/L / x     / x            Review of Systems	      Objective     Physical Examination    heart s1s2  lung dec BS  abd soft      Pertinent Lab findings & Imaging      Mickey:  NO   Adequate UO     I&O's Detail    01 Aug 2020 07:01  -  02 Aug 2020 07:00  --------------------------------------------------------  IN:    dexmedetomidine Infusion: 12.8 mL    Glucerna 1.5: 160 mL    Oral Fluid: 60 mL    Solution: 200 mL  Total IN: 432.8 mL    OUT:    Indwelling Catheter - Urethral: 5850 mL  Total OUT: 5850 mL    Total NET: -5417.2 mL               Discussed with:     Cultures:	        Radiology

## 2020-08-02 NOTE — PROGRESS NOTE ADULT - ASSESSMENT
LAURA MORGAN [facility] [phone]  1940 DOA 2020 DR SHAREE PUTNAM     REVIEW OF SYMPTOMS      Able to give ROS  NO     PHYSICAL EXAM    HEENT Unremarkable PERRLA atraumatic   RESP Fair air entry EXP prolonged    Harsh breath sound Resp distres mild   CARDIAC S1 S2 No S3     NO JVD    ABDOMEN SOFT BS PRESENT NOT DISTENDED No hepatosplenomegaly PEDAL EDEMA present No calf tenderness  NO rash   GENERAL Not TOXIC looking      OXYGENATION      2020 ra 93%     VITALS/LABS     2020 afeb 70 150/60       EVENTS.     2020 po diet started  2020 lasix 40 started   2020 extubated       PT DATA/BEST PRACTICE  ALLERGY    nka                 WT               2020 128 k                       BMI                2020 40             CrCl                                    ADVANCED DIRECTIVE     LINES TUBES POA.                 NONVIOLENT NONSELFDESTRUCTIVE LEVEL ONE 2020   HEAD OF BED ELEVATION Yes      INFECTION PPLX          PROCEDURE   Intubation )   Arevalo   Extubation   DVT PROPHYLAXIS         hposc (2020)   SCHULTZ PROPHYLAXIS          Protonix 40 ()   DYSPHAGIA EVAL     DIET.     jevity 1.5 1600 () (og)  --> dys 1 puree nectar ()                                                                        IV F       PATIENT DATA/ASSESSMENT/RECOMMENDATIONS     79 m found unresponsive with possible clot in r atrium on cta head resp failure pH 711 pco2 80 possible rhabdo possible mi dehydrated in [place] No ac cva found on ct head   Pt intubated in er 2020 Extubated  started on po diet 2020    RESP stable off vent  COVID Prior infection   INFECTION ROCEPHIN rocephin (-)    No active infection   COPD on bd   S CHF ECHO  echo ef 45% dd biatrial enlargement  lasix 40 ()   HYDRALAZINE hydralaz 50.3 ()    CARVEDILOL carvedilol 6.25x2(2020)      LASHELL Inmproved  2020 cr 1.3      Sz Keppra                                 TIME SPENT Over 25 minutes aggregate care time spent on encounter; activities included combinations of  direct pt care, counseling and/or coordinating care reviewing notes, lab data/ imaging, discussion with multidisciplinary team/ pt /family. Risks, benefits, alternatives of planned interventions when applicable were discussed in detail and questions answered as best as possible    LAURA MORGAN [facility] [phone]  1940 DOA 2020 DR SHAREE PUTNAM

## 2020-08-02 NOTE — PROGRESS NOTE ADULT - SUBJECTIVE AND OBJECTIVE BOX
CATHY SANCHEZ    [facility] ICU1 11    Patient is a 79y old  Male who presents with a chief complaint of UNRESPONSIVENESS (02 Aug 2020 16:52)       Allergies    Allergy Status Unknown  No Known Allergies    Intolerances        HPI:  78yo Male PMHx CVA 2012, HTN, HLD , hernia surgery with testicle resection 2015, umbilical hernia repair 12/2019,  transferred from [place] ED to [place] ED with s/p seizure activity  and bradycardia into the 30s.  Arrived to [place] hypothermic and hypotensive .Patient  required intubation ph 7.1 with CO2 85 due to acute hypoxic respiratory failure on arrival to ER ,placed on Levophed drip due to hypotension Admitted for septic workup and evaluation ,send blood and urine cx, serial lactate levels, monitor vitals closely hydration ,monitor urine output and renal profile,iv abx initiated -given ceftriaxone in ER and seen by ID consult .Pulmonology and cardiology consults are called .Patient admitted  to ICU with septic shock ,noted to have  tongue bite and AMS ,neurology evaluation called  Admitted  to intensive care  unit for monitoring , to rule out ami -send 3 sets of cardiac enzymes to rule out acute coronary event, obtain ECHO to evaluate LVEF, cardiology consult  ,continue current sepsis management, ventilator management /O2 supply, anticoagulation plan as per cardiology consult Palliative care consult requested ,to discuss advance directives and complete MOLST .Tried to reach family to obtain details of past medical hx - left a message for daughter .not able to reach son ( non valid number )[place]  ED team spoke with patient's daughter, who stated her father fell to the ground on the floor of his motel room about 4 days ago and was unable to get up. She had been caring for him on the floor since and did not call 911 for assistance. Today she found him unresponsive, with blood in his mouth, and incontinent of urine.  CT brain was negative. A CODE STROKE was called, and a CTA head/neck were performed, which was negative for large vessel occlusion but concerning for a density anterior to the ascending aorta possibly right atrial thrombus. Also revealed enlarged, multinodular thyroid. TPA criteria was not met, as patient had unknown onset of symptoms. Patient was transferred to [facility] ED for further care .He developed  multiple witnessed seizures in [place] ED, temporarily resolved with Ativan administration . CT/CTA brain NAD . Seen by neurologist and EEG ordered to r/o status epilepticus as etiology of ongoing encephalopathy, probably will require  MRI once stabilized from cardiac and respiratory standpoint. (28 Jul 2020 10:57)      PAST MEDICAL & SURGICAL HISTORY:  Chronic GERD  HTN (hypertension)  HLD (hyperlipidemia)  CVA (cerebral vascular accident)  High cholesterol  HTN (hypertension)  History of surgical removal of testicle  H/O hernia repair  No significant past surgical history      FAMILY HISTORY:        MEDICATIONS   aspirin  chewable 81 milliGRAM(s) Oral daily  atorvastatin 10 milliGRAM(s) Oral at bedtime  carvedilol 12.5 milliGRAM(s) Oral every 12 hours  heparin   Injectable 5000 Unit(s) SubCutaneous every 8 hours  hydrALAZINE 50 milliGRAM(s) Oral every 6 hours  levETIRAcetam  IVPB 500 milliGRAM(s) IV Intermittent every 12 hours  pantoprazole    Tablet 40 milliGRAM(s) Oral before breakfast      Vital Signs Last 24 Hrs  T(C): 36.8 (02 Aug 2020 15:30), Max: 36.9 (01 Aug 2020 20:33)  T(F): 98.3 (02 Aug 2020 15:30), Max: 98.4 (01 Aug 2020 20:33)  HR: 74 (02 Aug 2020 15:00) (63 - 77)  BP: 153/69 (02 Aug 2020 15:00) (143/78 - 185/80)  BP(mean): 99 (02 Aug 2020 15:00) (95 - 131)  RR: 43 (02 Aug 2020 15:00) (37 - 43)  SpO2: 92% (02 Aug 2020 15:00) (90% - 98%)      08-01-20 @ 07:01  -  08-02-20 @ 07:00  --------------------------------------------------------  IN: 432.8 mL / OUT: 5850 mL / NET: -5417.2 mL    08-02-20 @ 07:01  -  08-02-20 @ 17:35  --------------------------------------------------------  IN: 100 mL / OUT: 2400 mL / NET: -2300 mL            LABS:                        8.4    7.13  )-----------( 156      ( 02 Aug 2020 05:28 )             27.3     08-02    145  |  104  |  26<H>  ----------------------------<  82  3.7   |  37<H>  |  1.30    Ca    8.9      02 Aug 2020 05:28  Phos  3.5     08-02  Mg     1.8     08-02    TPro  6.3  /  Alb  2.5<L>  /  TBili  2.0<H>  /  DBili  x   /  AST  31  /  ALT  55  /  AlkPhos  63  08-02          ABG - ( 01 Aug 2020 10:40 )  pH, Arterial: 7.41  pH, Blood: x     /  pCO2: 56    /  pO2: 64    / HCO3: 32    / Base Excess: 9.4   /  SaO2: 91                  WBC:  WBC Count: 7.13 K/uL (08-02 @ 05:28)  WBC Count: 5.89 K/uL (08-01 @ 05:35)  WBC Count: 6.39 K/uL (07-31 @ 05:35)  WBC Count: 6.41 K/uL (07-30 @ 05:35)  WBC Count: 6.51 K/uL (07-29 @ 17:37)      MICROBIOLOGY:  RECENT CULTURES:  07-31 .Sputum Sputum XXXX   Moderate polymorphonuclear leukocytes per low power field  No Squamous epithelial cells per low power field  Rare Yeast like cells per oil power field   Normal Respiratory Jumana present    07-29 .Urine Clean Catch (Midstream) XXXX XXXX   No growth    07-28 .Blood Blood-Peripheral XXXX XXXX   No Growth Final          CARDIAC MARKERS ( 01 Aug 2020 05:35 )  x     / x     / 204 U/L / x     / x                Sodium:  Sodium, Serum: 145 mmol/L (08-02 @ 05:28)  Sodium, Serum: 145 mmol/L (08-01 @ 05:35)  Sodium, Serum: 145 mmol/L (07-31 @ 18:29)  Sodium, Serum: 146 mmol/L (07-31 @ 05:35)  Sodium, Serum: 146 mmol/L (07-30 @ 05:35)  Sodium, Serum: 145 mmol/L (07-29 @ 17:53)      1.30 mg/dL 08-02 @ 05:28  1.40 mg/dL 08-01 @ 05:35  1.40 mg/dL 07-31 @ 18:29  1.40 mg/dL 07-31 @ 05:35  1.60 mg/dL 07-30 @ 05:35  1.70 mg/dL 07-29 @ 17:53      Hemoglobin:  Hemoglobin: 8.4 g/dL (08-02 @ 05:28)  Hemoglobin: 8.8 g/dL (08-01 @ 05:35)  Hemoglobin: 8.3 g/dL (07-31 @ 05:35)  Hemoglobin: 8.6 g/dL (07-30 @ 05:35)  Hemoglobin: 9.0 g/dL (07-29 @ 17:37)      Platelets: Platelet Count - Automated: 156 K/uL (08-02 @ 05:28)  Platelet Count - Automated: 142 K/uL (08-01 @ 05:35)  Platelet Count - Automated: 141 K/uL (07-31 @ 05:35)  Platelet Count - Automated: 134 K/uL (07-30 @ 05:35)  Platelet Count - Automated: 135 K/uL (07-29 @ 17:37)      LIVER FUNCTIONS - ( 02 Aug 2020 05:28 )  Alb: 2.5 g/dL / Pro: 6.3 g/dL / ALK PHOS: 63 U/L / ALT: 55 U/L / AST: 31 U/L / GGT: x                 RADIOLOGY & ADDITIONAL STUDIES:

## 2020-08-02 NOTE — PHYSICAL THERAPY INITIAL EVALUATION ADULT - PERTINENT HX OF CURRENT PROBLEM, REHAB EVAL
78yo Male PMHx CVA 2012, HTN, HLD , hernia surgery with testicle resection 2015, umbilical hernia repair 12/2019,  transferred from [place] ED to [place] ED with s/p seizure activity  and bradycardia into the 30s.

## 2020-08-02 NOTE — PROGRESS NOTE ADULT - ATTENDING COMMENTS
78yo Male PMHx CVA 2012, HTN, HLD , hernia surgery with testicle resection 2015, umbilical hernia repair 12/2019,  transferred from [place] ED to [place] ED with s/p seizure activity  and bradycardia into the 30s.  Arrived to [place] hypothermic and hypotensive .Patient  required intubation ph 7.1 with CO2 85 due to acute hypoxic respiratory failure on arrival to ER ,placed on Levophed drip due to hypotension Admitted for septic workup and evaluation ,send blood and urine cx, serial lactate levels, monitor vitals closely hydration ,monitor urine output and renal profile,iv abx initiated -given ceftriaxone in ER and seen by ID consult .Pulmonology and cardiology consults are called .Patient admitted  to ICU with septic shock ,noted to have  tongue bite and AMS ,neurology evaluation called  Admitted  to intensive care  unit for monitoring , to rule out ami -send 3 sets of cardiac enzymes to rule out acute coronary event, obtain ECHO to evaluate LVEF, cardiology consult  ,continue current sepsis management, ventilator management /O2 supply, anticoagulation plan as per cardiology consult Palliative care consult requested ,to discuss advance directives and complete MOLST .Tried to reach family to obtain details of past medical hx - left a message for daughter .not able to reach son ( non valid number )[place]  ED team spoke with patient's daughter, who stated her father fell to the ground on the floor of his motel room about 4 days ago and was unable to get up. She had been caring for him on the floor since and did not call 911 for assistance. Today she found him unresponsive, with blood in his mouth, and incontinent of urine.  CT brain was negative. A CODE STROKE was called, and a CTA head/neck were performed, which was negative for large vessel occlusion but concerning for a density anterior to the ascending aorta possibly right atrial thrombus. Also revealed enlarged, multinodular thyroid. TPA criteria was not met, as patient had unknown onset of symptoms. Patient was transferred to [facility] ED for further care .He developed  multiple witnessed seizures in [place] ED, temporarily resolved with Ativan administration . CT/CTA brain NAD . Seen by neurologist and EEG ordered to r/o status epilepticus as etiology of ongoing encephalopathy, probably will require  MRI once stabilized from cardiac and respiratory standpoint.

## 2020-08-02 NOTE — PROGRESS NOTE ADULT - ASSESSMENT
80 yo male, PMHx CVA 2012, HTN, who presented as a transfer from [facility] with altered mental status, acute hypercapnic respiratory failure, transient shock distributive vs cardiogenic, LASHELL, and transaminitis. Hypothermia, bradycardia to 30's and multiple witnessed seizures in [place] ED. 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    Neuro: continue Keppra 500 mg q12, EEG neg for seizure, MRI brain neg for acute pathology, responds and follows all commands, on Quetiapine 25mg prn for agitation; per son progressive difficulty with speech at home recently  Pulm: chest PT, maintain aspiration precautions  CV: continue aspirin, atorvastatin; increased hydralazine to q6, Coreg increased to 12.5mg daily; monitor BP, if still not resolved will consider adding 3rd agent  GI: on dysphagia 1 diet, transaminitis resolved, Speech and swallow eval 8/3, will follow recs  Renal: resolved LASHELL, continue Lasix 60 mg IVP qd, Arevalo now DC'ed  ID: urine and blood culture neg, no other signs of bacterial infection, monitor off abx, elevated COVID IgG titre suggestive of recent exposure, sputum culture w Moderate pmls, rare yeast like cells  Endo: TSH wnl, T3 low, cortisol wnl; CT Angio w/ multinodular thyroid; will require outpatient workup  MSK: PT eval f/u recs, OOB  Heme: DVT ppx Heparin, continue ASA  Dispo: dispo to tele

## 2020-08-02 NOTE — PROGRESS NOTE ADULT - SUBJECTIVE AND OBJECTIVE BOX
PROGRESS NOTE  Patient is a 79y old  Male who presents with a chief complaint of UNRESPONSIVENESS (02 Aug 2020 08:16)  Chart and available morning labs /imaging are reviewed electronically , urgent issues addressed . More information  is being added upon completion of rounds , when more information is collected and management discussed with consultants , medical staff and social service/case management on the floor     OVERNIGHT      HPI:  80yo Male PMHx CVA 2012, HTN, HLD , hernia surgery with testicle resection 2015, umbilical hernia repair 12/2019,  transferred from [facility] to [facility] ED with s/p seizure activity  and bradycardia into the 30s.  Arrived to [facility] hypothermic and hypotensive .Patient  required intubation ph 7.1 with CO2 85 due to acute hypoxic respiratory failure on arrival to ER ,placed on Levophed drip due to hypotension Admitted for septic workup and evaluation ,send blood and urine cx, serial lactate levels, monitor vitals closely hydration ,monitor urine output and renal profile,iv abx initiated -given ceftriaxone in ER and seen by ID consult .Pulmonology and cardiology consults are called .Patient admitted  to ICU with septic shock ,noted to have  tongue bite and AMS ,neurology evaluation called  Admitted  to intensive care  unit for monitoring , to rule out ami -send 3 sets of cardiac enzymes to rule out acute coronary event, obtain ECHO to evaluate LVEF, cardiology consult  ,continue current sepsis management, ventilator management /O2 supply, anticoagulation plan as per cardiology consult Palliative care consult requested ,to discuss advance directives and complete MOLST .Tried to reach family to obtain details of past medical hx - left a message for daughter .not able to reach son ( non valid number )[place]  ED team spoke with patient's daughter, who stated her father fell to the ground on the floor of his motel room about 4 days ago and was unable to get up. She had been caring for him on the floor since and did not call 911 for assistance. Today she found him unresponsive, with blood in his mouth, and incontinent of urine.  CT brain was negative. A CODE STROKE was called, and a CTA head/neck were performed, which was negative for large vessel occlusion but concerning for a density anterior to the ascending aorta possibly right atrial thrombus. Also revealed enlarged, multinodular thyroid. TPA criteria was not met, as patient had unknown onset of symptoms. Patient was transferred to [facility] ED for further care .He developed  multiple witnessed seizures in [facility] ED, temporarily resolved with Ativan administration . CT/CTA brain NAD . Seen by neurologist and EEG ordered to r/o status epilepticus as etiology of ongoing encephalopathy, probably will require  MRI once stabilized from cardiac and respiratory standpoint. (28 Jul 2020 10:57)    PAST MEDICAL & SURGICAL HISTORY:  Chronic GERD  HTN (hypertension)  HLD (hyperlipidemia)  CVA (cerebral vascular accident)  High cholesterol  HTN (hypertension)  History of surgical removal of testicle  H/O hernia repair  No significant past surgical history      MEDICATIONS  (STANDING):  aspirin  chewable 81 milliGRAM(s) Oral daily  atorvastatin 10 milliGRAM(s) Oral at bedtime  carvedilol 6.25 milliGRAM(s) Oral every 12 hours  furosemide   Injectable 60 milliGRAM(s) IV Push daily  heparin   Injectable 5000 Unit(s) SubCutaneous every 8 hours  hydrALAZINE 50 milliGRAM(s) Oral every 8 hours  levETIRAcetam  IVPB 500 milliGRAM(s) IV Intermittent every 12 hours  pantoprazole  Injectable 40 milliGRAM(s) IV Push daily    MEDICATIONS  (PRN):      OBJECTIVE    T(C): 36.8 (08-02-20 @ 00:31), Max: 36.9 (08-01-20 @ 13:32)  HR: 66 (08-02-20 @ 07:00) (45 - 77)  BP: 151/69 (08-02-20 @ 07:00) (115/58 - 185/80)  RR: 40 (08-02-20 @ 07:00) (31 - 43)  SpO2: 96% (08-02-20 @ 07:00) (91% - 99%)  Wt(kg): --  I&O's Summary    01 Aug 2020 07:01  -  02 Aug 2020 07:00  --------------------------------------------------------  IN: 432.8 mL / OUT: 5850 mL / NET: -5417.2 mL          REVIEW OF SYSTEMS:  CONSTITUTIONAL: No fever, weight loss, or fatigue  EYES: No eye pain, visual disturbances, or discharge  ENMT:   No sinus or throat pain  NECK: No pain or stiffness  RESPIRATORY: No cough, wheezing, chills or hemoptysis; No shortness of breath  CARDIOVASCULAR: No chest pain, palpitations, dizziness, or leg swelling  GASTROINTESTINAL: No abdominal pain. No nausea, vomiting; No diarrhea or constipation. No melena or hematochezia.  GENITOURINARY: No dysuria, frequency, hematuria, or incontinence  NEUROLOGICAL: No headaches, memory loss, loss of strength, numbness, or tremors  SKIN: No itching, burning, rashes, or lesions   MUSCULOSKELETAL: No joint pain or swelling; No muscle, back, or extremity pain    PHYSICAL EXAM:  Appearance: NAD. VS past 24 hrs -as above   HEENT:   Moist oral mucosa. Conjunctiva clear b/l.   Neck : supple  Respiratory: Lungs CTAB.  Gastrointestinal:  Soft, nontender. No rebound. No rigidity. BS present	  Cardiovascular: RRR ,S1S2 present  Neurologic: Non-focal. Moving all extremities.  Extremities: No edema. No erythema. No calf tenderness.  Skin: No rashes, No ecchymoses, No cyanosis.	  wounds ,skin lesions-See skin assesment flow sheet   LABS:                        8.4    7.13  )-----------( 156      ( 02 Aug 2020 05:28 )             27.3     08-02    145  |  104  |  26<H>  ----------------------------<  82  3.7   |  37<H>  |  1.30    Ca    8.9      02 Aug 2020 05:28  Phos  3.5     08-02  Mg     1.8     08-02    TPro  6.3  /  Alb  2.5<L>  /  TBili  2.0<H>  /  DBili  x   /  AST  31  /  ALT  55  /  AlkPhos  63  08-02    CAPILLARY BLOOD GLUCOSE              Culture - Sputum (collected 31 Jul 2020 12:07)  Source: .Sputum Sputum  Gram Stain (31 Jul 2020 15:15):    Moderate polymorphonuclear leukocytes per low power field    No Squamous epithelial cells per low power field    Rare Yeast like cells per oil power field  Preliminary Report (01 Aug 2020 11:25):    Normal Respiratory Jumana present    Culture - Urine (collected 29 Jul 2020 01:14)  Source: .Urine Clean Catch (Midstream)  Final Report (30 Jul 2020 04:38):    No growth    Culture - Blood (collected 28 Jul 2020 16:18)  Source: .Blood Blood-Peripheral  Preliminary Report (29 Jul 2020 17:01):    No growth to date.    Culture - Blood (collected 28 Jul 2020 16:18)  Source: .Blood Blood-Peripheral  Preliminary Report (29 Jul 2020 17:01):    No growth to date.      RADIOLOGY & ADDITIONAL TESTS:   reviewed elctronically  ASSESSMENT/PLAN: PROGRESS NOTE  Patient is a 79y old  Male who presents with a chief complaint of UNRESPONSIVENESS (02 Aug 2020 08:16)  Chart and available morning labs /imaging are reviewed electronically , urgent issues addressed . More information  is being added upon completion of rounds , when more information is collected and management discussed with consultants , medical staff and social service/case management on the floor   LATE ENTRY- patient was seen and examined earlier today around 9 am  . Plan of care was discussed with med staff and unit coordinator .   OVERNIGHT  Patient is awake and alert Patient is resting in a bed comfortably .Confused ,poor mentation .No distress noted   On nasal cannula ,s/p extubation 08/01/20  HPI:  78yo Male PMHx CVA 2012, HTN, HLD , hernia surgery with testicle resection 2015, umbilical hernia repair 12/2019,  transferred from [place] ED to [place] ED with s/p seizure activity  and bradycardia into the 30s.  Arrived to [place] hypothermic and hypotensive .Patient  required intubation ph 7.1 with CO2 85 due to acute hypoxic respiratory failure on arrival to ER ,placed on Levophed drip due to hypotension Admitted for septic workup and evaluation ,send blood and urine cx, serial lactate levels, monitor vitals closely hydration ,monitor urine output and renal profile,iv abx initiated -given ceftriaxone in ER and seen by ID consult .Pulmonology and cardiology consults are called .Patient admitted  to ICU with septic shock ,noted to have  tongue bite and AMS ,neurology evaluation called  Admitted  to intensive care  unit for monitoring , to rule out ami -send 3 sets of cardiac enzymes to rule out acute coronary event, obtain ECHO to evaluate LVEF, cardiology consult  ,continue current sepsis management, ventilator management /O2 supply, anticoagulation plan as per cardiology consult Palliative care consult requested ,to discuss advance directives and complete MOLST .Tried to reach family to obtain details of past medical hx - left a message for daughter .not able to reach son ( non valid number )[place]  ED team spoke with patient's daughter, who stated her father fell to the ground on the floor of his motel room about 4 days ago and was unable to get up. She had been caring for him on the floor since and did not call 911 for assistance. Today she found him unresponsive, with blood in his mouth, and incontinent of urine.  CT brain was negative. A CODE STROKE was called, and a CTA head/neck were performed, which was negative for large vessel occlusion but concerning for a density anterior to the ascending aorta possibly right atrial thrombus. Also revealed enlarged, multinodular thyroid. TPA criteria was not met, as patient had unknown onset of symptoms. Patient was transferred to [facility] ED for further care .He developed  multiple witnessed seizures in [place] ED, temporarily resolved with Ativan administration . CT/CTA brain NAD . Seen by neurologist and EEG ordered to r/o status epilepticus as etiology of ongoing encephalopathy, probably will require  MRI once stabilized from cardiac and respiratory standpoint. (28 Jul 2020 10:57)    PAST MEDICAL & SURGICAL HISTORY:  Chronic GERD  HTN (hypertension)  HLD (hyperlipidemia)  CVA (cerebral vascular accident)  High cholesterol  HTN (hypertension)  History of surgical removal of testicle  H/O hernia repair  No significant past surgical history      MEDICATIONS  (STANDING):  aspirin  chewable 81 milliGRAM(s) Oral daily  atorvastatin 10 milliGRAM(s) Oral at bedtime  carvedilol 6.25 milliGRAM(s) Oral every 12 hours  furosemide   Injectable 60 milliGRAM(s) IV Push daily  heparin   Injectable 5000 Unit(s) SubCutaneous every 8 hours  hydrALAZINE 50 milliGRAM(s) Oral every 8 hours  levETIRAcetam  IVPB 500 milliGRAM(s) IV Intermittent every 12 hours  pantoprazole  Injectable 40 milliGRAM(s) IV Push daily    MEDICATIONS  (PRN):      OBJECTIVE    T(C): 36.8 (08-02-20 @ 00:31), Max: 36.9 (08-01-20 @ 13:32)  HR: 66 (08-02-20 @ 07:00) (45 - 77)  BP: 151/69 (08-02-20 @ 07:00) (115/58 - 185/80)  RR: 40 (08-02-20 @ 07:00) (31 - 43)  SpO2: 96% (08-02-20 @ 07:00) (91% - 99%)  Wt(kg): --  I&O's Summary    01 Aug 2020 07:01  -  02 Aug 2020 07:00  --------------------------------------------------------  IN: 432.8 mL / OUT: 5850 mL / NET: -5417.2 mL          REVIEW OF SYSTEMS:  ROS is unobtainable due to dementia and confusion PATIENT IS CONFUSED AND IS UNABLE TO GIVE ANY/RELIABLE HISTORY OR REVIEW OF SYSTEMS PLEASE ALSO SEE UNDER HPI AND ASSESSMENT FOR OBTAINED REVIEW OF SYSTEMS     PHYSICAL EXAM:  Appearance: NAD. VS past 24 hrs -as above   HEENT:   Moist oral mucosa. Conjunctiva clear b/l.   Neck : supple  Respiratory: Lungs diminished bs at bases   Gastrointestinal:  Soft, nontender. No rebound. No rigidity. BS present	  Cardiovascular: RRR ,S1S2 present  Neurologic: Non-focal. Moving all extremities.  Extremities: b/l  edema. No erythema. No calf tenderness.  Skin: No rashes, No ecchymoses, No cyanosis.	  wounds ,skin lesions-See skin assesment flow sheet   LABS:                        8.4    7.13  )-----------( 156      ( 02 Aug 2020 05:28 )             27.3     08-02    145  |  104  |  26<H>  ----------------------------<  82  3.7   |  37<H>  |  1.30    Ca    8.9      02 Aug 2020 05:28  Phos  3.5     08-02  Mg     1.8     08-02    TPro  6.3  /  Alb  2.5<L>  /  TBili  2.0<H>  /  DBili  x   /  AST  31  /  ALT  55  /  AlkPhos  63  08-02    CAPILLARY BLOOD GLUCOSE              Culture - Sputum (collected 31 Jul 2020 12:07)  Source: .Sputum Sputum  Gram Stain (31 Jul 2020 15:15):    Moderate polymorphonuclear leukocytes per low power field    No Squamous epithelial cells per low power field    Rare Yeast like cells per oil power field  Preliminary Report (01 Aug 2020 11:25):    Normal Respiratory Jumana present    Culture - Urine (collected 29 Jul 2020 01:14)  Source: .Urine Clean Catch (Midstream)  Final Report (30 Jul 2020 04:38):    No growth    Culture - Blood (collected 28 Jul 2020 16:18)  Source: .Blood Blood-Peripheral  Preliminary Report (29 Jul 2020 17:01):    No growth to date.    Culture - Blood (collected 28 Jul 2020 16:18)  Source: .Blood Blood-Peripheral  Preliminary Report (29 Jul 2020 17:01):    No growth to date.      RADIOLOGY & ADDITIONAL TESTS:< from: MR Head No Cont (07.30.20 @ 18:06) >      INTERPRETATION:  CLINICAL INDICATION: Weakness, lethargy, speech abnormality. Evaluate for CVA.    TECHNIQUE: Multi-planar multi-sequential MR imaging of the brain was performed without intravenous contrast.    COMPARISON: CT head, CT angiography head and neck 7/28/2020.    FINDINGS:    MRI BRAIN:    There is gliosis and encephalomalacia in the right occipital lobe with associated ex-vacuo dilatation of the occipital horn of right lateral ventricle, findings consistent with chronic infarct in the right PCA vascular territory. There is also cortically-based encephalomalacia/gliosis with associated hemosiderin deposition and chronic blood products in the right posterior frontal lobe and right perirolandic region, findings consistent with small chronic infarct in the right distal MCA vascular territory. There are chronic lacunar infarcts in the right subinsular white matter and in the cerebellar hemispheres bilaterally. There are scattered and patchy T2/FLAIR hyperintense changes in the periventricular and subcortical white matter and also in the central bonnie, nonspecific finding, but most likely representing sequelae of moderately severe chronic microvascular ischemic disease.    There is diffuse cortical sulcal prominence related to underlying brain parenchymal volume.    No acute infarction, intracranial hemorrhage or mass. There is no evidence of obstructive hydrocephalus. There are no extra-axial fluid collections.    The visualized skull base flow voids appear patent.    There is an enlarged, partially empty sella turcica.    The visualized intraorbital contents are normal. There are scattered mild mucosal inflammatory changes of the ethmoid air cells and maxillary sinuses. There are tiny polyps versus mucus retention cysts in the bilateral maxillary sinuses. The mastoid air cells are grossly clear. The visualized soft tissues and osseous structures appear unremarkable.    IMPRESSION:    No acute intracranial findings.    Chronic infarct in the right occipital lobe (right PCA territory). Cortically-based chronic infarcts in the right posterior frontal lobe and right perirolandic region (right distal MCA territory). Chronic lacunar infarcts in the right subinsular white matter and cerebellar hemispheres bilaterally.    < end of copied text >     reviewed elctronically  ASSESSMENT/PLAN:

## 2020-08-02 NOTE — PROGRESS NOTE ADULT - ASSESSMENT
78yo Male PMHx CVA 2012, HTN, HLD , hernia surgery with testicle resection 2015, umbilical hernia repair 12/2019,  transferred from [place] ED to [place] ED with s/p seizure activity  and bradycardia into the 30s.  Arrived to [place] hypothermic and hypotensive .Patient  required intubation ph 7.1 with CO2 85 due to acute hypoxic respiratory failure on arrival to ER ,placed on Levophed drip due to hypotension Admitted for septic workup and evaluation ,send blood and urine cx, serial lactate levels, monitor vitals closely hydration ,monitor urine output and renal profile,iv abx initiated -given ceftriaxone in ER and seen by ID consult .Pulmonology and cardiology consults are called .Patient admitted  to ICU with septic shock ,noted to have  tongue bite and AMS ,neurology evaluation called  Admitted  to intensive care  unit for monitoring , to rule out ami -send 3 sets of cardiac enzymes to rule out acute coronary event, obtain ECHO to evaluate LVEF, cardiology consult  ,continue current sepsis management, ventilator management /O2 supply, anticoagulation plan as per cardiology consult Palliative care consult requested ,to discuss advance directives and complete MOLST .Tried to reach family to obtain details of past medical hx - left a message for daughter .not able to reach son ( non valid number )[place]  ED team spoke with patient's daughter, who stated her father fell to the ground on the floor of his motel room about 4 days ago and was unable to get up. She had been caring for him on the floor since and did not call 911 for assistance. Today she found him unresponsive, with blood in his mouth, and incontinent of urine.  CT brain was negative. A CODE STROKE was called, and a CTA head/neck were performed, which was negative for large vessel occlusion but concerning for a density anterior to the ascending aorta possibly right atrial thrombus. Also revealed enlarged, multinodular thyroid. TPA criteria was not met, as patient had unknown onset of symptoms. Patient was transferred to [facility] ED for further care .He developed  multiple witnessed seizures in [place] ED, temporarily resolved with Ativan administration . CT/CTA brain NAD . Seen by neurologist and EEG ordered to r/o status epilepticus as etiology of ongoing encephalopathy, probably will require  MRI once stabilized from cardiac and respiratory standpoint.

## 2020-08-02 NOTE — PROGRESS NOTE ADULT - SUBJECTIVE AND OBJECTIVE BOX
Patient is a 79y Male with a known history of :  COVID-19 virus IgG antibody detected (Z01.84)  LASHELL (acute kidney injury) (N17.9)  CHF (congestive heart failure) (I50.9)  Leg edema (R60.0)  Atrial thrombus (I51.3)  Unresponsiveness (R41.89)  Palliative care encounter (Z51.5)  Prophylactic measure (Z29.9)  Bradycardia (R00.1)  Hypothermia (T68.XXXA)  Sepsis (A41.9)  Seizure (R56.9)  Hypotension (I95.9)  Acute respiratory failure (J96.00)    HPI:  80yo Male PMHx CVA 2012, HTN, HLD , hernia surgery with testicle resection 2015, umbilical hernia repair 12/2019,  transferred from [place] [facility] to [place] ED with s/p seizure activity  and bradycardia into the 30s.  Arrived to [place] hypothermic and hypotensive .Patient  required intubation ph 7.1 with CO2 85 due to acute hypoxic respiratory failure on arrival to ER ,placed on Levophed drip due to hypotension Admitted for septic workup and evaluation ,send blood and urine cx, serial lactate levels, monitor vitals closely hydration ,monitor urine output and renal profile,iv abx initiated -given ceftriaxone in ER and seen by ID consult .Pulmonology and cardiology consults are called .Patient admitted  to ICU with septic shock ,noted to have  tongue bite and AMS ,neurology evaluation called  Admitted  to intensive care  unit for monitoring , to rule out ami -send 3 sets of cardiac enzymes to rule out acute coronary event, obtain ECHO to evaluate LVEF, cardiology consult  ,continue current sepsis management, ventilator management /O2 supply, anticoagulation plan as per cardiology consult Palliative care consult requested ,to discuss advance directives and complete MOLST .Tried to reach family to obtain details of past medical hx - left a message for daughter .not able to reach son ( non valid number )[place]  ED team spoke with patient's daughter, who stated her father fell to the ground on the floor of his motel room about 4 days ago and was unable to get up. She had been caring for him on the floor since and did not call 911 for assistance. Today she found him unresponsive, with blood in his mouth, and incontinent of urine.  CT brain was negative. A CODE STROKE was called, and a CTA head/neck were performed, which was negative for large vessel occlusion but concerning for a density anterior to the ascending aorta possibly right atrial thrombus. Also revealed enlarged, multinodular thyroid. TPA criteria was not met, as patient had unknown onset of symptoms. Patient was transferred to [facility] ED for further care .He developed  multiple witnessed seizures in [place] ED, temporarily resolved with Ativan administration . CT/CTA brain NAD . Seen by neurologist and EEG ordered to r/o status epilepticus as etiology of ongoing encephalopathy, probably will require  MRI once stabilized from cardiac and respiratory standpoint. (28 Jul 2020 10:57)      REVIEW OF SYSTEMS:    CONSTITUTIONAL: No fever, weight loss, or fatigue  EYES: No eye pain, visual disturbances, or discharge  ENMT:  No difficulty hearing, tinnitus, vertigo; No sinus or throat pain  NECK: No pain or stiffness  BREASTS: No pain, masses, or nipple discharge  RESPIRATORY: No cough, wheezing, chills or hemoptysis; No shortness of breath  CARDIOVASCULAR: No chest pain, palpitations, dizziness, or leg swelling  GASTROINTESTINAL: No abdominal or epigastric pain. No nausea, vomiting, or hematemesis; No diarrhea or constipation. No melena or hematochezia.  GENITOURINARY: No dysuria, frequency, hematuria, or incontinence  NEUROLOGICAL: No headaches, memory loss, loss of strength, numbness, or tremors  SKIN: No itching, burning, rashes, or lesions   LYMPH NODES: No enlarged glands  ENDOCRINE: No heat or cold intolerance; No hair loss  MUSCULOSKELETAL: No joint pain or swelling; No muscle, back, or extremity pain  PSYCHIATRIC: No depression, anxiety, mood swings, or difficulty sleeping  HEME/LYMPH: No easy bruising, or bleeding gums  ALLERGY AND IMMUNOLOGIC: No hives or eczema    MEDICATIONS  (STANDING):  aspirin  chewable 81 milliGRAM(s) Oral daily  atorvastatin 10 milliGRAM(s) Oral at bedtime  carvedilol 6.25 milliGRAM(s) Oral every 12 hours  furosemide   Injectable 60 milliGRAM(s) IV Push daily  heparin   Injectable 5000 Unit(s) SubCutaneous every 8 hours  hydrALAZINE 50 milliGRAM(s) Oral every 8 hours  levETIRAcetam  IVPB 500 milliGRAM(s) IV Intermittent every 12 hours  pantoprazole  Injectable 40 milliGRAM(s) IV Push daily    MEDICATIONS  (PRN):      ALLERGIES: Allergy Status Unknown  No Known Allergies      FAMILY HISTORY:      Social history:  Alochol:   Smoking:   Drug Use:   Marital Status:     PHYSICAL EXAMINATION:  -----------------------------  T(C): 36.8 (08-02-20 @ 00:31), Max: 36.9 (08-01-20 @ 13:32)  HR: 66 (08-02-20 @ 07:00) (45 - 77)  BP: 151/69 (08-02-20 @ 07:00) (115/58 - 185/80)  RR: 40 (08-02-20 @ 07:00) (31 - 43)  SpO2: 96% (08-02-20 @ 07:00) (91% - 99%)  Wt(kg): --    08-01 @ 07:01  -  08-02 @ 07:00  --------------------------------------------------------  IN:    dexmedetomidine Infusion: 12.8 mL    Glucerna 1.5: 160 mL    Oral Fluid: 60 mL    Solution: 200 mL  Total IN: 432.8 mL    OUT:    Indwelling Catheter - Urethral: 5850 mL  Total OUT: 5850 mL    Total NET: -5417.2 mL            Constitutional: well developed, normal appearance, well groomed, well nourished, no deformities and no acute distress.   Eyes: the conjunctiva exhibited no abnormalities and the eyelids demonstrated no xanthelasmas.   HEENT: normal oral mucosa, no oral pallor and no oral cyanosis.   Neck: normal jugular venous A waves present, normal jugular venous V waves present and no jugular venous mancia A waves.   Pulmonary: no respiratory distress, normal respiratory rhythm and effort, no accessory muscle use and lungs were clear to auscultation bilaterally. Anteriorly  Cardiovascular: heart rate and rhythm were normal, normal S1 and S2 and no murmur, gallop, rub, heave or thrill are present.   Musculoskeletal: the gait could not be assessed.  Extremities: no clubbing of the fingernails, no localized cyanosis, no petechial hemorrhages and no ischemic changes.   Skin: normal skin color and pigmentation, no rash, no venous stasis, no skin lesions, no skin ulcer and no xanthoma was observed.     LABS:   --------  08-02    145  |  104  |  26<H>  ----------------------------<  82  3.7   |  37<H>  |  1.30    Ca    8.9      02 Aug 2020 05:28  Phos  3.5     08-02  Mg     1.8     08-02    TPro  6.3  /  Alb  2.5<L>  /  TBili  2.0<H>  /  DBili  x   /  AST  31  /  ALT  55  /  AlkPhos  63  08-02                         8.4    7.13  )-----------( 156      ( 02 Aug 2020 05:28 )             27.3             Culture Results:   Normal Respiratory Jumana present (07-31 @ 12:07)    07-31 @ 12:07    Organism --   Gram Stain Blood -- Gram Stain   Moderate polymorphonuclear leukocytes per low power field  No Squamous epithelial cells per low power field  Rare Yeast like cells per oil power field  Specimen Source .Sputum Sputum  Culture-Blood --        Radiology:

## 2020-08-02 NOTE — PROGRESS NOTE ADULT - ASSESSMENT
The patient is an 79 year old male with a history of HTN, CVA who presents with unresponsiveness.    8/2/20 Patient extubated  Lying flat, comfortably  No complaints  Monitor compatible with NSR    Plan:  - Continue carvedilol 6.25 mg bid; titrate up if needed  - Continue hydralazine 50 mg q8h  - May change Furosemide to PO  - Atorvastatin-10mg HS  - CTA head and neck with motion artifact, no obvious stenosis. There is a questionable filling defect noted, possibly right atrial thrombus.  - Echocardiogram with mild LV systolic dysfunction, no obvious right atrial thrombus  - Off ceftriaxone  - Being followed by Neurology, ID, Pulmonary  - MRI with old CVA  - Follow labs  - ICU care The patient is an 79 year old male with a history of HTN, CVA who presents with unresponsiveness.    8/2/20 Patient extubated  Lying flat, comfortably  Awake and alert  No complaints  H&H-8.4/27.3  Monitor compatible with NSR    Plan:  - Continue carvedilol 6.25 mg bid; titrate up if needed  - Continue hydralazine 50 mg q8h  - May change Furosemide to PO  - Atorvastatin-10mg HS  - CTA head and neck with motion artifact, no obvious stenosis. There is a questionable filling defect noted, possibly right atrial thrombus.  - Echocardiogram with mild LV systolic dysfunction, no obvious right atrial thrombus  - Off ceftriaxone  - Being followed by Neurology, ID, Pulmonary  - MRI with old CVA  - Follow labs  - ICU care

## 2020-08-03 LAB
ALBUMIN SERPL ELPH-MCNC: 2.5 G/DL — LOW (ref 3.3–5)
ALP SERPL-CCNC: 64 U/L — SIGNIFICANT CHANGE UP (ref 40–120)
ALT FLD-CCNC: 54 U/L — SIGNIFICANT CHANGE UP (ref 12–78)
ANION GAP SERPL CALC-SCNC: 4 MMOL/L — LOW (ref 5–17)
AST SERPL-CCNC: 27 U/L — SIGNIFICANT CHANGE UP (ref 15–37)
BASOPHILS # BLD AUTO: 0.02 K/UL — SIGNIFICANT CHANGE UP (ref 0–0.2)
BASOPHILS NFR BLD AUTO: 0.3 % — SIGNIFICANT CHANGE UP (ref 0–2)
BILIRUB SERPL-MCNC: 2 MG/DL — HIGH (ref 0.2–1.2)
BUN SERPL-MCNC: 26 MG/DL — HIGH (ref 7–23)
CALCIUM SERPL-MCNC: 8.8 MG/DL — SIGNIFICANT CHANGE UP (ref 8.5–10.1)
CHLORIDE SERPL-SCNC: 101 MMOL/L — SIGNIFICANT CHANGE UP (ref 96–108)
CO2 SERPL-SCNC: 38 MMOL/L — HIGH (ref 22–31)
CREAT SERPL-MCNC: 1.3 MG/DL — SIGNIFICANT CHANGE UP (ref 0.5–1.3)
EOSINOPHIL # BLD AUTO: 0.45 K/UL — SIGNIFICANT CHANGE UP (ref 0–0.5)
EOSINOPHIL NFR BLD AUTO: 5.8 % — SIGNIFICANT CHANGE UP (ref 0–6)
GLUCOSE SERPL-MCNC: 97 MG/DL — SIGNIFICANT CHANGE UP (ref 70–99)
HCT VFR BLD CALC: 28.6 % — LOW (ref 39–50)
HGB BLD-MCNC: 8.9 G/DL — LOW (ref 13–17)
IMM GRANULOCYTES NFR BLD AUTO: 0.5 % — SIGNIFICANT CHANGE UP (ref 0–1.5)
LYMPHOCYTES # BLD AUTO: 0.68 K/UL — LOW (ref 1–3.3)
LYMPHOCYTES # BLD AUTO: 8.8 % — LOW (ref 13–44)
MAGNESIUM SERPL-MCNC: 1.7 MG/DL — SIGNIFICANT CHANGE UP (ref 1.6–2.6)
MCHC RBC-ENTMCNC: 28.6 PG — SIGNIFICANT CHANGE UP (ref 27–34)
MCHC RBC-ENTMCNC: 31.1 GM/DL — LOW (ref 32–36)
MCV RBC AUTO: 92 FL — SIGNIFICANT CHANGE UP (ref 80–100)
MONOCYTES # BLD AUTO: 1.1 K/UL — HIGH (ref 0–0.9)
MONOCYTES NFR BLD AUTO: 14.2 % — HIGH (ref 2–14)
NEUTROPHILS # BLD AUTO: 5.46 K/UL — SIGNIFICANT CHANGE UP (ref 1.8–7.4)
NEUTROPHILS NFR BLD AUTO: 70.4 % — SIGNIFICANT CHANGE UP (ref 43–77)
NRBC # BLD: 0 /100 WBCS — SIGNIFICANT CHANGE UP (ref 0–0)
PHOSPHATE SERPL-MCNC: 3 MG/DL — SIGNIFICANT CHANGE UP (ref 2.5–4.5)
PLATELET # BLD AUTO: 185 K/UL — SIGNIFICANT CHANGE UP (ref 150–400)
POTASSIUM SERPL-MCNC: 3.6 MMOL/L — SIGNIFICANT CHANGE UP (ref 3.5–5.3)
POTASSIUM SERPL-SCNC: 3.6 MMOL/L — SIGNIFICANT CHANGE UP (ref 3.5–5.3)
PROT SERPL-MCNC: 6.6 G/DL — SIGNIFICANT CHANGE UP (ref 6–8.3)
RBC # BLD: 3.11 M/UL — LOW (ref 4.2–5.8)
RBC # FLD: 15.6 % — HIGH (ref 10.3–14.5)
SODIUM SERPL-SCNC: 143 MMOL/L — SIGNIFICANT CHANGE UP (ref 135–145)
WBC # BLD: 7.75 K/UL — SIGNIFICANT CHANGE UP (ref 3.8–10.5)
WBC # FLD AUTO: 7.75 K/UL — SIGNIFICANT CHANGE UP (ref 3.8–10.5)

## 2020-08-03 PROCEDURE — 99233 SBSQ HOSP IP/OBS HIGH 50: CPT | Mod: GC

## 2020-08-03 RX ORDER — LEVETIRACETAM 250 MG/1
500 TABLET, FILM COATED ORAL EVERY 12 HOURS
Refills: 0 | Status: DISCONTINUED | OUTPATIENT
Start: 2020-08-03 | End: 2020-08-03

## 2020-08-03 RX ORDER — LEVETIRACETAM 250 MG/1
500 TABLET, FILM COATED ORAL EVERY 12 HOURS
Refills: 0 | Status: DISCONTINUED | OUTPATIENT
Start: 2020-08-03 | End: 2020-08-06

## 2020-08-03 RX ORDER — MAGNESIUM SULFATE 500 MG/ML
2 VIAL (ML) INJECTION ONCE
Refills: 0 | Status: COMPLETED | OUTPATIENT
Start: 2020-08-03 | End: 2020-08-03

## 2020-08-03 RX ORDER — AMLODIPINE BESYLATE 2.5 MG/1
5 TABLET ORAL AT BEDTIME
Refills: 0 | Status: DISCONTINUED | OUTPATIENT
Start: 2020-08-03 | End: 2020-08-06

## 2020-08-03 RX ORDER — ACETAMINOPHEN 500 MG
650 TABLET ORAL EVERY 6 HOURS
Refills: 0 | Status: DISCONTINUED | OUTPATIENT
Start: 2020-08-03 | End: 2020-08-06

## 2020-08-03 RX ORDER — HYDRALAZINE HCL 50 MG
10 TABLET ORAL ONCE
Refills: 0 | Status: COMPLETED | OUTPATIENT
Start: 2020-08-03 | End: 2020-08-03

## 2020-08-03 RX ORDER — HYDRALAZINE HCL 50 MG
75 TABLET ORAL EVERY 8 HOURS
Refills: 0 | Status: DISCONTINUED | OUTPATIENT
Start: 2020-08-03 | End: 2020-08-06

## 2020-08-03 RX ADMIN — Medication 10 MILLIGRAM(S): at 04:14

## 2020-08-03 RX ADMIN — HEPARIN SODIUM 5000 UNIT(S): 5000 INJECTION INTRAVENOUS; SUBCUTANEOUS at 05:12

## 2020-08-03 RX ADMIN — Medication 40 MILLIGRAM(S): at 05:11

## 2020-08-03 RX ADMIN — AMLODIPINE BESYLATE 5 MILLIGRAM(S): 2.5 TABLET ORAL at 21:50

## 2020-08-03 RX ADMIN — LEVETIRACETAM 500 MILLIGRAM(S): 250 TABLET, FILM COATED ORAL at 11:50

## 2020-08-03 RX ADMIN — HEPARIN SODIUM 5000 UNIT(S): 5000 INJECTION INTRAVENOUS; SUBCUTANEOUS at 13:03

## 2020-08-03 RX ADMIN — Medication 50 MILLIGRAM(S): at 05:11

## 2020-08-03 RX ADMIN — LEVETIRACETAM 500 MILLIGRAM(S): 250 TABLET, FILM COATED ORAL at 21:50

## 2020-08-03 RX ADMIN — CARVEDILOL PHOSPHATE 12.5 MILLIGRAM(S): 80 CAPSULE, EXTENDED RELEASE ORAL at 17:38

## 2020-08-03 RX ADMIN — CARVEDILOL PHOSPHATE 12.5 MILLIGRAM(S): 80 CAPSULE, EXTENDED RELEASE ORAL at 05:12

## 2020-08-03 RX ADMIN — Medication 81 MILLIGRAM(S): at 11:50

## 2020-08-03 RX ADMIN — Medication 50 GRAM(S): at 12:59

## 2020-08-03 RX ADMIN — LEVETIRACETAM 420 MILLIGRAM(S): 250 TABLET, FILM COATED ORAL at 00:05

## 2020-08-03 RX ADMIN — PANTOPRAZOLE SODIUM 40 MILLIGRAM(S): 20 TABLET, DELAYED RELEASE ORAL at 05:13

## 2020-08-03 RX ADMIN — Medication 75 MILLIGRAM(S): at 21:50

## 2020-08-03 RX ADMIN — Medication 650 MILLIGRAM(S): at 13:00

## 2020-08-03 RX ADMIN — Medication 75 MILLIGRAM(S): at 14:38

## 2020-08-03 RX ADMIN — ATORVASTATIN CALCIUM 10 MILLIGRAM(S): 80 TABLET, FILM COATED ORAL at 21:50

## 2020-08-03 RX ADMIN — Medication 650 MILLIGRAM(S): at 13:30

## 2020-08-03 RX ADMIN — HEPARIN SODIUM 5000 UNIT(S): 5000 INJECTION INTRAVENOUS; SUBCUTANEOUS at 21:50

## 2020-08-03 NOTE — PROGRESS NOTE ADULT - ASSESSMENT
78 yo male, PMHx CVA 2012, HTN, who presented as a transfer from [facility] with altered mental status, acute hypercapnic respiratory failure, transient shock distributive vs cardiogenic, LASHELL, and transaminitis. Hypothermia, bradycardia to 30's and multiple witnessed seizures in [place] ED. 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    Neuro: continue Keppra 500 mg q12 and ativan prn for breakthrough seizures, EEG neg for seizure, MRI brain neg for acute pathology, responds and follows all commands, on Quetiapine 25mg prn for agitation; per son progressive difficulty with speech at home recently  Pulm: chest PT, maintain aspiration precautions  CV: continue aspirin, atorvastatin; Hydralazine changed to q8 and increased to 75mg, c/w Coreg 12.5mg daily and amlodipine 5mg QHS.   GI: on dysphagia 1 diet, transaminitis resolved  Renal: resolved LASHELL, continue Lasix 40 mg IVP qd, DC indwelling malagon  ID: urine and blood culture neg, no other signs of bacterial infection, monitor off abx, elevated COVID IgG titre suggestive of recent exposure, sputum culture w Moderate pmls, rare yeast like cells  Endo: TSH wnl, T3 low, cortisol wnl; CT Angio w/ multinodular thyroid; will require outpatient workup  MSK: Pt recs: pt with 1 person assist and ambulate with RW 100ft; MICHEL    Heme: DVT ppx Heparin, continue ASA  Dispo: dispo to tele

## 2020-08-03 NOTE — PHARMACOTHERAPY INTERVENTION NOTE - COMMENTS
Spoke to MD (Eliud) patient tolerating PO medication, recommended switching Keppra to PO, MD agreed.

## 2020-08-03 NOTE — SWALLOW BEDSIDE ASSESSMENT ADULT - SWALLOW EVAL: DIAGNOSIS
1. The patient demonstrated a mild oral dysphagia for puree and nectar thick liquids marked by adequate oral acceptance with subsequent delayed bolus collection, transfer, and posterior transport. 2. The patient demonstrated a mild-moderate oral dysphagia for thin liquids marked by delayed bolus collection and transfer with suspected posterior loss over the base of tongue. 3. The patient demonstrated a mild pharyngeal dysphagia for puree and nectar thick liquids marked by a delayed pharyngeal swallow trigger with reduced hyolaryngeal elevation upon digital palpation w/o evidence of airway penetration. 4. The patient demonstrated a moderate-severe pharyngeal dysphagia for thin liquids marked by a delayed pharyngeal swallow trigger with reduced hyolaryngeal elevation upon digital palpation resulting in multiple swallows suggestive of pharyngeal stasis and/or airway penetration and change in vocal quality to a 'wet' tone suggestive of airway penetration.

## 2020-08-03 NOTE — PROGRESS NOTE ADULT - SUBJECTIVE AND OBJECTIVE BOX
Chief Complaint: Unresponsiveness    Interval Events: Extubated over weekend.    Review of Systems:  Unable to obtain    Physical Exam:  Vital Signs Last 24 Hrs  T(C): 36.9 (03 Aug 2020 04:04), Max: 37.6 (02 Aug 2020 20:25)  T(F): 98.5 (03 Aug 2020 04:04), Max: 99.7 (02 Aug 2020 20:25)  HR: 75 (03 Aug 2020 06:00) (63 - 78)  BP: 160/68 (03 Aug 2020 06:00) (131/97 - 183/96)  BP(mean): 98 (03 Aug 2020 06:00) (95 - 132)  RR: 121 (03 Aug 2020 06:00) (28 - 121)  SpO2: 91% (03 Aug 2020 06:00) (82% - 96%)  General: NAD  HEENT: ET tube in place  Neck: No JVD, no carotid bruit  Lungs: CTAB  CV: RRR, nl S1/S2, no M/R/G  Abdomen: S/NT/ND, +BS  Extremities: 1+ LE edema, no cyanosis  Neuro: Non-focal  Skin: No rash    Labs:             08-02    145  |  104  |  26<H>  ----------------------------<  82  3.7   |  37<H>  |  1.30    Ca    8.9      02 Aug 2020 05:28  Phos  3.5     08-02  Mg     1.8     08-02    TPro  6.3  /  Alb  2.5<L>  /  TBili  2.0<H>  /  DBili  x   /  AST  31  /  ALT  55  /  AlkPhos  63  08-02                        8.4    7.13  )-----------( 156      ( 02 Aug 2020 05:28 )             27.3       Telemetry: Sinus rhythm

## 2020-08-03 NOTE — PROGRESS NOTE ADULT - SUBJECTIVE AND OBJECTIVE BOX
Neurology follow up note    CATHY [id number]      Interval History:    Patient feels ok no new complaints.    MEDICATIONS    amLODIPine   Tablet 5 milliGRAM(s) Oral at bedtime  aspirin  chewable 81 milliGRAM(s) Oral daily  atorvastatin 10 milliGRAM(s) Oral at bedtime  carvedilol 12.5 milliGRAM(s) Oral every 12 hours  furosemide    Tablet 40 milliGRAM(s) Oral daily  heparin   Injectable 5000 Unit(s) SubCutaneous every 8 hours  hydrALAZINE 50 milliGRAM(s) Oral every 6 hours  levETIRAcetam  IVPB 500 milliGRAM(s) IV Intermittent every 12 hours  pantoprazole    Tablet 40 milliGRAM(s) Oral before breakfast      Allergies    Allergy Status Unknown  No Known Allergies    Intolerances            Vital Signs Last 24 Hrs  T(C): 36.9 (03 Aug 2020 07:57), Max: 37.6 (02 Aug 2020 20:25)  T(F): 98.5 (03 Aug 2020 07:57), Max: 99.7 (02 Aug 2020 20:25)  HR: 74 (03 Aug 2020 09:00) (66 - 78)  BP: 169/78 (03 Aug 2020 09:00) (131/97 - 183/96)  BP(mean): 112 (03 Aug 2020 09:00) (94 - 132)  RR: 30 (03 Aug 2020 09:00) (28 - 129)  SpO2: 98% (03 Aug 2020 09:00) (82% - 98%)      REVIEW OF SYSTEMS:     Constitutional: No fever, chills, fatigue, weakness  Eyes: no eye pain, visual disturbances, or discharge  ENT:  No difficulty hearing, tinnitus, vertigo; No sinus or throat pain  Neck: No pain or stiffness  Respiratory: No cough, dyspnea, wheezing   Cardiovascular: No chest pain, palpitations,   Gastrointestinal: No abdominal or epigastric pain. No nausea, vomiting  No diarrhea or constipation.   Genitourinary: No dysuria, frequency, hematuria or incontinence  Neurological: No headaches, lightheadedness, vertigo, numbness or tremors  Psychiatric: No depression, anxiety, mood swings or difficulty sleeping  Musculoskeletal: No joint pain or swelling; No muscle, back or extremity pain  Skin: No itching, burning, rashes or lesions   Lymph Nodes: No enlarged glands  Endocrine: No heat or cold intolerance; No hair loss   Allergy and Immunologic: No hives or eczema    On Neurological Examination:    Mental Status - Patient is alert, awake  year 20  aug  [facility] [place]       Follow simple commands    Speech -   Fluent    Cranial Nerves - Pupils 3 mm equal and reactive to light,   extraocular eye movements intact.   smile symmetric  intact bilateral NLF    Motor Exam -   Right upper 4/5  Left upper 4/5  Right lower3/5  Left lower 2/5 has pain in knee     Muscle tone - is normal all over.  No asymmetry is seen.      Sensory    Bilateral intact to light touch    GENERAL Exam: Nontoxic , No Acute Distress   	  HEENT:  normocephalic, atraumatic  		  LUNGS:  Decreased bilaterally  	  HEART: Normal S1S2   No murmur RRR        	  GI/ ABDOMEN:  Soft  Non tender    EXTREMITIES:   No Edema  No Clubbing  No Cyanosis   	   SKIN: Normal  No Ecchymosis               LABS:  CBC Full  -  ( 03 Aug 2020 08:32 )  WBC Count : 7.75 K/uL  RBC Count : 3.11 M/uL  Hemoglobin : 8.9 g/dL  Hematocrit : 28.6 %  Platelet Count - Automated : 185 K/uL  Mean Cell Volume : 92.0 fl  Mean Cell Hemoglobin : 28.6 pg  Mean Cell Hemoglobin Concentration : 31.1 gm/dL  Auto Neutrophil # : 5.46 K/uL  Auto Lymphocyte # : 0.68 K/uL  Auto Monocyte # : 1.10 K/uL  Auto Eosinophil # : 0.45 K/uL  Auto Basophil # : 0.02 K/uL  Auto Neutrophil % : 70.4 %  Auto Lymphocyte % : 8.8 %  Auto Monocyte % : 14.2 %  Auto Eosinophil % : 5.8 %  Auto Basophil % : 0.3 %      08-03    143  |  101  |  26<H>  ----------------------------<  97  3.6   |  38<H>  |  1.30    Ca    8.8      03 Aug 2020 08:32  Phos  3.0     08-03  Mg     1.7     08-03    TPro  6.6  /  Alb  2.5<L>  /  TBili  2.0<H>  /  DBili  x   /  AST  27  /  ALT  54  /  AlkPhos  64  08-03    Hemoglobin A1C:     LIVER FUNCTIONS - ( 03 Aug 2020 08:32 )  Alb: 2.5 g/dL / Pro: 6.6 g/dL / ALK PHOS: 64 U/L / ALT: 54 U/L / AST: 27 U/L / GGT: x           Vitamin B12         RADIOLOGY    ANALYSIS AND PLAN:  This is an 79-year-old with an episode of altered mental status and seizure.  1.	For episode of altered mental status and seizure, suspect this could be possibly metabolic encephalopathy.  The patient was hypothermic upon initial presentation, questionable any type of septic type process, septic shock.  2.	off antibiotics   3.	For underlying seizure  events, status epilepticus,  Keppra 500 mg twice a day.  no new events    4.	Ativan 1 mg IV push, q. 6h. p.r.n. for any type of breakthrough seizures.  5.	Seizure precaution.  6.	 EEG no seizures discharges   7.	For history of high cholesterol, continue the patient on statin.  8.	For history of hypertension, monitor systolic blood pressure.  9.	Will monitor the patient's electrolytes.  10.	mri normal   11.	overall more interactive     Physical therapy evaluation if possible and as tolerated.  OOB to chair/ambulation with assistance only.    Greater than 45 minutes spent in direct patient care reviewing  the notes, lab data/ imaging , discussion with multidisciplinary team.

## 2020-08-03 NOTE — PROGRESS NOTE ADULT - ATTENDING COMMENTS
Patient seen and examined, agree with above     80 y/o male with hx CVA and HTN, admitted with status epilepticus and acute resp failure, found to be hypothermic and bradycardic which improved with passive warming. CT head neg for bleed, CTA suspicious for RA thrombus but ECHO neg for thrombus.     Continues to improve    Recs:   Neuro: continue Keppra 500 mg q12 - change to po, EEG neg for seizure, MRI brain neg for acute pathology, PT eval, OOB, on aspirin for hx CVAs  CV: continue aspirin, hypertensive - continue Coreg and hydralazine, amlodipine added overnight, continue daily Lasix (decreased dose today)   Resp: status stable, maintain aspiration precautions  GI: s/p swallow eval, has dysphagia - continue dysphagia diet    ID: monitoring off abx, elevated COVID IgG titre suggestive of recent exposure   Renal: LASHELL from rhabdomyolysis - improving, replete hypomagnesemia   PPx: sc heparin   MSK: PT eval, OOB     Stable for tele Patient seen and examined, agree with above     78 y/o male with hx CVA and HTN, admitted with status epilepticus and acute resp failure, found to be hypothermic and bradycardic which improved with passive warming. CT head neg for bleed, CTA suspicious for RA thrombus but ECHO neg for thrombus.     Continues to improve    Recs:   Neuro: continue Keppra 500 mg q12 - change to po, EEG neg for seizure, MRI brain neg for acute pathology, PT eval, OOB, on aspirin for hx CVAs  CV: continue aspirin, hypertensive - continue Coreg and hydralazine, amlodipine added overnight, continue daily Lasix (decreased dose today)   Resp: status stable, maintain aspiration precautions  GI: s/p swallow eval, has dysphagia - continue dysphagia diet    ID: monitoring off abx, elevated COVID IgG titre suggestive of recent exposure   Renal: LASHELL from rhabdomyolysis - improving, replete hypomagnesemia, d/c Arevalo   PPx: sc heparin   MSK: PT eval, OOB     Stable for tele Patient seen and examined, agree with above     78 y/o male with hx CVA and HTN, admitted with status epilepticus and acute resp failure, found to be hypothermic and bradycardic which improved with passive warming. CT head neg for bleed, CTA suspicious for RA thrombus but ECHO neg for thrombus.     Successfully extubated 8/1   Doing well     Recs:   Neuro: continue Keppra 500 mg q12 - change to po, EEG neg for seizure, MRI brain neg for acute pathology, PT eval, OOB, on aspirin for hx CVAs  CV: continue aspirin, hypertensive - continue Coreg, hydralazine, amlodipine added overnight, continue Lasix 40 mg daily    Resp: stable, maintain aspiration precautions  GI: dysphagia - continue dysphagia diet, seen by speech therapy    ID: monitoring off abx, elevated COVID IgG titre suggestive of recent exposure   Renal: LASHELL from rhabdomyolysis - improving, d/c Arevalo  PPx: sc heparin   MSK: PT eval, OOB     Stable for tele

## 2020-08-03 NOTE — PROGRESS NOTE ADULT - ASSESSMENT
78yo Male  transferred from [place] ED to [place] ED with s/p seizure activity  and bradycardia into the 30s.  Arrived to [place] hypothermic and hypotensive .Patient  required intubation ph 7.1 with CO2 85 due to acute hypoxic respiratory failure on arrival to ER ,placed on Levophed drip due to hypotension Admitted for septic workup and evaluation limited history but apparently down for prolonged period prior to coming to ER

## 2020-08-03 NOTE — PROGRESS NOTE ADULT - SUBJECTIVE AND OBJECTIVE BOX
CATHY SANCHEZ    [facility] ICU1 11    Patient is a 79y old  Male who presents with a chief complaint of UNRESPONSIVENESS (03 Aug 2020 07:16)       Allergies    Allergy Status Unknown  No Known Allergies    Intolerances        HPI:  78yo Male PMHx CVA 2012, HTN, HLD , hernia surgery with testicle resection 2015, umbilical hernia repair 12/2019,  transferred from [place] ED to [facility] ED with s/p seizure activity  and bradycardia into the 30s.  Arrived to [facility] hypothermic and hypotensive .Patient  required intubation ph 7.1 with CO2 85 due to acute hypoxic respiratory failure on arrival to ER ,placed on Levophed drip due to hypotension Admitted for septic workup and evaluation ,send blood and urine cx, serial lactate levels, monitor vitals closely hydration ,monitor urine output and renal profile,iv abx initiated -given ceftriaxone in ER and seen by ID consult .Pulmonology and cardiology consults are called .Patient admitted  to ICU with septic shock ,noted to have  tongue bite and AMS ,neurology evaluation called  Admitted  to intensive care  unit for monitoring , to rule out ami -send 3 sets of cardiac enzymes to rule out acute coronary event, obtain ECHO to evaluate LVEF, cardiology consult  ,continue current sepsis management, ventilator management /O2 supply, anticoagulation plan as per cardiology consult Palliative care consult requested ,to discuss advance directives and complete MOLST .Tried to reach family to obtain details of past medical hx - left a message for daughter .not able to reach son ( non valid number )[place]  ED team spoke with patient's daughter, who stated her father fell to the ground on the floor of his motel room about 4 days ago and was unable to get up. She had been caring for him on the floor since and did not call 911 for assistance. Today she found him unresponsive, with blood in his mouth, and incontinent of urine.  CT brain was negative. A CODE STROKE was called, and a CTA head/neck were performed, which was negative for large vessel occlusion but concerning for a density anterior to the ascending aorta possibly right atrial thrombus. Also revealed enlarged, multinodular thyroid. TPA criteria was not met, as patient had unknown onset of symptoms. Patient was transferred to [facility] for further care .He developed  multiple witnessed seizures in [facility] ED, temporarily resolved with Ativan administration . CT/CTA brain NAD . Seen by neurologist and EEG ordered to r/o status epilepticus as etiology of ongoing encephalopathy, probably will require  MRI once stabilized from cardiac and respiratory standpoint. (28 Jul 2020 10:57)      PAST MEDICAL & SURGICAL HISTORY:  Chronic GERD  HTN (hypertension)  HLD (hyperlipidemia)  CVA (cerebral vascular accident)  High cholesterol  HTN (hypertension)  History of surgical removal of testicle  H/O hernia repair  No significant past surgical history      FAMILY HISTORY:        MEDICATIONS   amLODIPine   Tablet 5 milliGRAM(s) Oral at bedtime  aspirin  chewable 81 milliGRAM(s) Oral daily  atorvastatin 10 milliGRAM(s) Oral at bedtime  carvedilol 12.5 milliGRAM(s) Oral every 12 hours  furosemide    Tablet 40 milliGRAM(s) Oral daily  heparin   Injectable 5000 Unit(s) SubCutaneous every 8 hours  hydrALAZINE 50 milliGRAM(s) Oral every 6 hours  levETIRAcetam  IVPB 500 milliGRAM(s) IV Intermittent every 12 hours  pantoprazole    Tablet 40 milliGRAM(s) Oral before breakfast      Vital Signs Last 24 Hrs  T(C): 36.9 (03 Aug 2020 07:57), Max: 37.6 (02 Aug 2020 20:25)  T(F): 98.5 (03 Aug 2020 07:57), Max: 99.7 (02 Aug 2020 20:25)  HR: 74 (03 Aug 2020 09:00) (63 - 78)  BP: 169/78 (03 Aug 2020 09:00) (131/97 - 183/96)  BP(mean): 112 (03 Aug 2020 09:00) (94 - 132)  RR: 30 (03 Aug 2020 09:00) (28 - 129)  SpO2: 98% (03 Aug 2020 09:00) (82% - 98%)      08-02-20 @ 07:01  -  08-03-20 @ 07:00  --------------------------------------------------------  IN: 800 mL / OUT: 3760 mL / NET: -2960 mL    08-03-20 @ 07:01  -  08-03-20 @ 09:37  --------------------------------------------------------  IN: 0 mL / OUT: 350 mL / NET: -350 mL            LABS:                        8.9    7.75  )-----------( 185      ( 03 Aug 2020 08:32 )             28.6     08-03    143  |  101  |  26<H>  ----------------------------<  97  3.6   |  38<H>  |  1.30    Ca    8.8      03 Aug 2020 08:32  Phos  3.0     08-03  Mg     1.7     08-03    TPro  6.6  /  Alb  2.5<L>  /  TBili  2.0<H>  /  DBili  x   /  AST  27  /  ALT  54  /  AlkPhos  64  08-03          ABG - ( 01 Aug 2020 10:40 )  pH, Arterial: 7.41  pH, Blood: x     /  pCO2: 56    /  pO2: 64    / HCO3: 32    / Base Excess: 9.4   /  SaO2: 91                  WBC:  WBC Count: 7.75 K/uL (08-03 @ 08:32)  WBC Count: 7.13 K/uL (08-02 @ 05:28)  WBC Count: 5.89 K/uL (08-01 @ 05:35)  WBC Count: 6.39 K/uL (07-31 @ 05:35)      MICROBIOLOGY:  RECENT CULTURES:  07-31 .Sputum Sputum XXXX   Moderate polymorphonuclear leukocytes per low power field  No Squamous epithelial cells per low power field  Rare Yeast like cells per oil power field   Normal Respiratory Jumana present    07-29 .Urine Clean Catch (Midstream) XXXX XXXX   No growth    07-28 .Blood Blood-Peripheral XXXX XXXX   No Growth Final                    Sodium:  Sodium, Serum: 143 mmol/L (08-03 @ 08:32)  Sodium, Serum: 145 mmol/L (08-02 @ 05:28)  Sodium, Serum: 145 mmol/L (08-01 @ 05:35)  Sodium, Serum: 145 mmol/L (07-31 @ 18:29)  Sodium, Serum: 146 mmol/L (07-31 @ 05:35)      1.30 mg/dL 08-03 @ 08:32  1.30 mg/dL 08-02 @ 05:28  1.40 mg/dL 08-01 @ 05:35  1.40 mg/dL 07-31 @ 18:29  1.40 mg/dL 07-31 @ 05:35      Hemoglobin:  Hemoglobin: 8.9 g/dL (08-03 @ 08:32)  Hemoglobin: 8.4 g/dL (08-02 @ 05:28)  Hemoglobin: 8.8 g/dL (08-01 @ 05:35)  Hemoglobin: 8.3 g/dL (07-31 @ 05:35)      Platelets: Platelet Count - Automated: 185 K/uL (08-03 @ 08:32)  Platelet Count - Automated: 156 K/uL (08-02 @ 05:28)  Platelet Count - Automated: 142 K/uL (08-01 @ 05:35)  Platelet Count - Automated: 141 K/uL (07-31 @ 05:35)      LIVER FUNCTIONS - ( 03 Aug 2020 08:32 )  Alb: 2.5 g/dL / Pro: 6.6 g/dL / ALK PHOS: 64 U/L / ALT: 54 U/L / AST: 27 U/L / GGT: x                 RADIOLOGY & ADDITIONAL STUDIES:

## 2020-08-03 NOTE — SWALLOW BEDSIDE ASSESSMENT ADULT - PHARYNGEAL PHASE
Delayed pharyngeal swallow/Decreased laryngeal elevation Delayed pharyngeal swallow/Decreased laryngeal elevation/Wet vocal quality post oral intake/Multiple swallows

## 2020-08-03 NOTE — SWALLOW BEDSIDE ASSESSMENT ADULT - COMMENTS
Consult received and chart reviewed. The patient was seen at bedside this AM for an initial assessment of swallow function, at which time he was sleeping, though arouseable. The patient required verbal encouragement to participate in today's evaluation. Per RN report, the patient had been refusing to eat breakfast this AM. He further denied any pain pre/post today's evaluation. It should be noted that the patient was intubated on 7/28 with subsequent extubation on 8/1.    Per charting, the patient is an "80yo Male PMHx CVA 2012, HTN, HLD , hernia surgery with testicle resection 2015, umbilical hernia repair 12/2019,  transferred from [place] ED to [place] ED with s/p seizure activity  and bradycardia into the 30s.  Arrived to [place] hypothermic and hypotensive .Patient  required intubation ph 7.1 with CO2 85 due to acute hypoxic respiratory failure on arrival to ER ,placed on Levophed drip due to hypotension Admitted for septic workup and evaluation ,send blood and urine cx, serial lactate levels, monitor vitals closely hydration ,monitor urine output and renal profile,iv abx initiated -given ceftriaxone in ER and seen by ID consult .Pulmonology and cardiology consults are called .Patient admitted  to ICU with septic shock ,noted to have  tongue bite and AMS ,neurology evaluation called."    MRI of the head revealed, "No acute intracranial findings. Chronic infarct in the right occipital lobe (right PCA territory). Cortically-based chronic infarcts in the right posterior frontal lobe and right perirolandic region (right distal MCA territory). Chronic lacunar infarcts in the right subinsular white matter and cerebellar hemispheres bilaterally." Most recent CXR revealed, "There is an NG tube seen with the tip in the stomach. Lung apices and left costophrenic angle is excluded from the study. Heart size appears enlarged. Degenerative changes of the spine."    Discussed results and recommendations from this evaluation with the patient, RN, and MD on unit.

## 2020-08-03 NOTE — SWALLOW BEDSIDE ASSESSMENT ADULT - ASR SWALLOW ASPIRATION MONITOR
oral hygiene/position upright (90Y)/fever/pneumonia/throat clearing/upper respiratory infection/change of breathing pattern/cough/gurgly voice

## 2020-08-03 NOTE — PROGRESS NOTE ADULT - SUBJECTIVE AND OBJECTIVE BOX
PROGRESS NOTE  Patient is a 79y old  Male who presents with a chief complaint of UNRESPONSIVENESS (03 Aug 2020 12:02)  Chart and available morning labs /imaging are reviewed electronically , urgent issues addressed . More information  is being added upon completion of rounds , when more information is collected and management discussed with consultants , medical staff and social service/case management on the floor   LATE ENTRY- patient was seen and examined earlier today . Plan of care was discussed with med staff and unit coordinator .   OVERNIGHT  No new issues reported by medical staff . All above noted Patient is resting in a bed comfortably .Confused ,poor mentation .No distress noted   Pt had Hydralazine 10mg IVP at 0400 due to elevated blood pressure.  further. Pt states that he has not had a BM in 2 days. Seen  y PT -MICHEL recommended   HPI:  78yo Male PMHx CVA 2012, HTN, HLD , hernia surgery with testicle resection 2015, umbilical hernia repair 12/2019,  transferred from [place] ED to [place] ED with s/p seizure activity  and bradycardia into the 30s.  Arrived to [place] hypothermic and hypotensive .Patient  required intubation ph 7.1 with CO2 85 due to acute hypoxic respiratory failure on arrival to ER ,placed on Levophed drip due to hypotension Admitted for septic workup and evaluation ,send blood and urine cx, serial lactate levels, monitor vitals closely hydration ,monitor urine output and renal profile,iv abx initiated -given ceftriaxone in ER and seen by ID consult .Pulmonology and cardiology consults are called .Patient admitted  to ICU with septic shock ,noted to have  tongue bite and AMS ,neurology evaluation called  Admitted  to intensive care  unit for monitoring , to rule out ami -send 3 sets of cardiac enzymes to rule out acute coronary event, obtain ECHO to evaluate LVEF, cardiology consult  ,continue current sepsis management, ventilator management /O2 supply, anticoagulation plan as per cardiology consult Palliative care consult requested ,to discuss advance directives and complete MOLST .Tried to reach family to obtain details of past medical hx - left a message for daughter .not able to reach son ( non valid number )[place]  ED team spoke with patient's daughter, who stated her father fell to the ground on the floor of his motel room about 4 days ago and was unable to get up. She had been caring for him on the floor since and did not call 911 for assistance. Today she found him unresponsive, with blood in his mouth, and incontinent of urine.  CT brain was negative. A CODE STROKE was called, and a CTA head/neck were performed, which was negative for large vessel occlusion but concerning for a density anterior to the ascending aorta possibly right atrial thrombus. Also revealed enlarged, multinodular thyroid. TPA criteria was not met, as patient had unknown onset of symptoms. Patient was transferred to hospitals ED for further care .He developed  multiple witnessed seizures in [place] ED, temporarily resolved with Ativan administration . CT/CTA brain NAD . Seen by neurologist and EEG ordered to r/o status epilepticus as etiology of ongoing encephalopathy, probably will require  MRI once stabilized from cardiac and respiratory standpoint. (28 Jul 2020 10:57)    PAST MEDICAL & SURGICAL HISTORY:  Chronic GERD  HTN (hypertension)  HLD (hyperlipidemia)  CVA (cerebral vascular accident)  High cholesterol  HTN (hypertension)  History of surgical removal of testicle  H/O hernia repair  No significant past surgical history      MEDICATIONS  (STANDING):  amLODIPine   Tablet 5 milliGRAM(s) Oral at bedtime  aspirin  chewable 81 milliGRAM(s) Oral daily  atorvastatin 10 milliGRAM(s) Oral at bedtime  carvedilol 12.5 milliGRAM(s) Oral every 12 hours  furosemide    Tablet 40 milliGRAM(s) Oral daily  heparin   Injectable 5000 Unit(s) SubCutaneous every 8 hours  hydrALAZINE 75 milliGRAM(s) Oral every 8 hours  levETIRAcetam  Solution 500 milliGRAM(s) Oral every 12 hours  pantoprazole    Tablet 40 milliGRAM(s) Oral before breakfast    MEDICATIONS  (PRN):  acetaminophen   Tablet .. 650 milliGRAM(s) Oral every 6 hours PRN Mild Pain (1 - 3)      OBJECTIVE    T(C): 37 (08-03-20 @ 19:23), Max: 37.6 (08-02-20 @ 20:25)  HR: 66 (08-03-20 @ 19:00) (66 - 80)  BP: 146/68 (08-03-20 @ 19:00) (136/84 - 183/96)  RR: 34 (08-03-20 @ 19:00) (28 - 129)  SpO2: 100% (08-03-20 @ 19:00) (83% - 100%)  Wt(kg): --  I&O's Summary    02 Aug 2020 07:01  -  03 Aug 2020 07:00  --------------------------------------------------------  IN: 800 mL / OUT: 3760 mL / NET: -2960 mL    03 Aug 2020 07:01  -  03 Aug 2020 19:56  --------------------------------------------------------  IN: 910 mL / OUT: 1550 mL / NET: -640 mL          REVIEW OF SYSTEMS:  REVIEW OF SYSTEMS:  ROS is unobtainable due to dementia and confusion PATIENT IS CONFUSED AND IS UNABLE TO GIVE ANY/RELIABLE HISTORY OR REVIEW OF SYSTEMS PLEASE ALSO SEE UNDER HPI AND ASSESSMENT FOR OBTAINED REVIEW OF SYSTEMS     PHYSICAL EXAM:  Appearance: NAD. VS past 24 hrs -as above   HEENT:   Moist oral mucosa. Conjunctiva clear b/l.   Neck : supple  Respiratory: Lungs diminished bs at bases   Gastrointestinal:  Soft, nontender. No rebound. No rigidity. BS present	  Cardiovascular: RRR ,S1S2 present  Neurologic: Non-focal. Moving all extremities.  Extremities: b/l  edema. No erythema. No calf tenderness.  Skin: No rashes, No ecchymoses, No cyanosis.	  wounds ,skin lesions-See skin assesment flow sheet   LABS:                        8.9    7.75  )-----------( 185      ( 03 Aug 2020 08:32 )             28.6     08-03    143  |  101  |  26<H>  ----------------------------<  97  3.6   |  38<H>  |  1.30    Ca    8.8      03 Aug 2020 08:32  Phos  3.0     08-03  Mg     1.7     08-03    TPro  6.6  /  Alb  2.5<L>  /  TBili  2.0<H>  /  DBili  x   /  AST  27  /  ALT  54  /  AlkPhos  64  08-03    CAPILLARY BLOOD GLUCOSE              Culture - Sputum (collected 31 Jul 2020 12:07)  Source: .Sputum Sputum  Gram Stain (31 Jul 2020 15:15):    Moderate polymorphonuclear leukocytes per low power field    No Squamous epithelial cells per low power field    Rare Yeast like cells per oil power field  Final Report (02 Aug 2020 09:43):    Normal Respiratory Jumana present    Culture - Urine (collected 29 Jul 2020 01:14)  Source: .Urine Clean Catch (Midstream)  Final Report (30 Jul 2020 04:38):    No growth    Culture - Blood (collected 28 Jul 2020 16:18)  Source: .Blood Blood-Peripheral  Final Report (02 Aug 2020 17:01):    No Growth Final    Culture - Blood (collected 28 Jul 2020 16:18)  Source: .Blood Blood-Peripheral  Final Report (02 Aug 2020 17:01):    No Growth Final      RADIOLOGY & ADDITIONAL TESTS:   reviewed elctronically  ASSESSMENT/PLAN:

## 2020-08-03 NOTE — SWALLOW BEDSIDE ASSESSMENT ADULT - SWALLOW EVAL: RECOMMENDED FEEDING/EATING TECHNIQUES
maintain upright posture during/after eating for 30 mins/no straws/oral hygiene/check mouth frequently for oral residue/pocketing/crush medication (when feasible)/allow for swallow between intakes/alternate food with liquid/position upright (90 degrees)/small sips/bites

## 2020-08-03 NOTE — PROGRESS NOTE ADULT - ASSESSMENT
80yo Male PMHx CVA 2012, HTN, HLD , hernia surgery with testicle resection 2015, umbilical hernia repair 12/2019,  transferred from [place] ED to [place] ED with s/p seizure activity  and bradycardia into the 30s.  Arrived to [place] hypothermic and hypotensive .Patient  required intubation ph 7.1 with CO2 85 due to acute hypoxic respiratory failure on arrival to ER ,placed on Levophed drip due to hypotension Admitted for septic workup and evaluation ,send blood and urine cx, serial lactate levels, monitor vitals closely hydration ,monitor urine output and renal profile,iv abx initiated -given ceftriaxone in ER and seen by ID consult .Pulmonology and cardiology consults are called .Patient admitted  to ICU with septic shock ,noted to have  tongue bite and AMS ,neurology evaluation called  Admitted  to intensive care  unit for monitoring , to rule out ami -send 3 sets of cardiac enzymes to rule out acute coronary event, obtain ECHO to evaluate LVEF, cardiology consult  ,continue current sepsis management, ventilator management /O2 supply, anticoagulation plan as per cardiology consult Palliative care consult requested ,to discuss advance directives and complete MOLST .Tried to reach family to obtain details of past medical hx - left a message for daughter .not able to reach son ( non valid number )[place]  ED team spoke with patient's daughter, who stated her father fell to the ground on the floor of his motel room about 4 days ago and was unable to get up. She had been caring for him on the floor since and did not call 911 for assistance. Today she found him unresponsive, with blood in his mouth, and incontinent of urine.  CT brain was negative. A CODE STROKE was called, and a CTA head/neck were performed, which was negative for large vessel occlusion but concerning for a density anterior to the ascending aorta possibly right atrial thrombus. Also revealed enlarged, multinodular thyroid. TPA criteria was not met, as patient had unknown onset of symptoms. Patient was transferred to [facility] ED for further care .He developed  multiple witnessed seizures in [place] ED, temporarily resolved with Ativan administration . CT/CTA brain NAD . Seen by neurologist and EEG ordered to r/o status epilepticus as etiology of ongoing encephalopathy, probably will require  MRI once stabilized from cardiac and respiratory standpoint.

## 2020-08-03 NOTE — PROGRESS NOTE ADULT - SUBJECTIVE AND OBJECTIVE BOX
Date/Time Patient Seen:  		  Referring MD:   Data Reviewed	       Patient is a 79y old  Male who presents with a chief complaint of UNRESPONSIVENESS (03 Aug 2020 07:13)      Subjective/HPI     PAST MEDICAL & SURGICAL HISTORY:  Chronic GERD  HTN (hypertension)  HLD (hyperlipidemia)  CVA (cerebral vascular accident)  High cholesterol  HTN (hypertension)  Unknown and unspecified causes of morbidity  History of surgical removal of testicle  H/O hernia repair  No significant past surgical history        Medication list         MEDICATIONS  (STANDING):  amLODIPine   Tablet 5 milliGRAM(s) Oral at bedtime  aspirin  chewable 81 milliGRAM(s) Oral daily  atorvastatin 10 milliGRAM(s) Oral at bedtime  carvedilol 12.5 milliGRAM(s) Oral every 12 hours  furosemide    Tablet 40 milliGRAM(s) Oral daily  heparin   Injectable 5000 Unit(s) SubCutaneous every 8 hours  hydrALAZINE 50 milliGRAM(s) Oral every 6 hours  levETIRAcetam  IVPB 500 milliGRAM(s) IV Intermittent every 12 hours  pantoprazole    Tablet 40 milliGRAM(s) Oral before breakfast    MEDICATIONS  (PRN):         Vitals log        ICU Vital Signs Last 24 Hrs  T(C): 36.9 (03 Aug 2020 04:04), Max: 37.6 (02 Aug 2020 20:25)  T(F): 98.5 (03 Aug 2020 04:04), Max: 99.7 (02 Aug 2020 20:25)  HR: 68 (03 Aug 2020 07:00) (63 - 78)  BP: 138/65 (03 Aug 2020 07:00) (131/97 - 183/96)  BP(mean): 94 (03 Aug 2020 07:00) (94 - 132)  ABP: --  ABP(mean): --  RR: 129 (03 Aug 2020 07:00) (28 - 129)  SpO2: 83% (03 Aug 2020 07:00) (82% - 96%)           Input and Output:  I&O's Detail    02 Aug 2020 07:01  -  03 Aug 2020 07:00  --------------------------------------------------------  IN:    Oral Fluid: 600 mL    Solution: 200 mL  Total IN: 800 mL    OUT:    Indwelling Catheter - Urethral: 3760 mL  Total OUT: 3760 mL    Total NET: -2960 mL          Lab Data                        8.4    7.13  )-----------( 156      ( 02 Aug 2020 05:28 )             27.3     08-02    145  |  104  |  26<H>  ----------------------------<  82  3.7   |  37<H>  |  1.30    Ca    8.9      02 Aug 2020 05:28  Phos  3.5     08-02  Mg     1.8     08-02    TPro  6.3  /  Alb  2.5<L>  /  TBili  2.0<H>  /  DBili  x   /  AST  31  /  ALT  55  /  AlkPhos  63  08-02    ABG - ( 01 Aug 2020 10:40 )  pH, Arterial: 7.41  pH, Blood: x     /  pCO2: 56    /  pO2: 64    / HCO3: 32    / Base Excess: 9.4   /  SaO2: 91                      Review of Systems	      Objective     Physical Examination    heart s1s2  lung dec BS  abd soft      Pertinent Lab findings & Imaging      Mickey:  NO   Adequate UO     I&O's Detail    02 Aug 2020 07:01  -  03 Aug 2020 07:00  --------------------------------------------------------  IN:    Oral Fluid: 600 mL    Solution: 200 mL  Total IN: 800 mL    OUT:    Indwelling Catheter - Urethral: 3760 mL  Total OUT: 3760 mL    Total NET: -2960 mL               Discussed with:     Cultures:	        Radiology

## 2020-08-03 NOTE — PROGRESS NOTE ADULT - ASSESSMENT
cont rx cont rx      PATIENT          REVIEW OF SYMPTOMS      Able to give ROS  Yes     RELIABLE No   CONSTITUTIONAL Weakness Yes  Chills No Vision changes No  ENDOCRINE No unexplained hair loss No heat or cold intolerance    ALLERGY No hives  Sore throat No   RESP Coughing blood no  Shortness of breath YES   NEURO No Headache  Confusion Pain neck No   CARDIAC No Chest pain No Palpitations   GI No Pain abdomen NO   Vomiting NO     PHYSICAL EXAM    HEENT Unremarkable PERRLA atraumatic   RESP Fair air entry EXP prolonged    Harsh breath sound Resp distres mild   CARDIAC S1 S2 No S3     NO JVD    ABDOMEN SOFT BS PRESENT NOT DISTENDED No hepatosplenomegaly PEDAL EDEMA present No calf tenderness  NO rash   GENERAL Not TOXIC looking    HPI/PMH.       79 m lving in hotel (evicted recently) was found unresponsive and brought to [facility] er and transferred to [facility] P 2020 and Pulm consulted     Pt was noted unresponsive on hotel floor with possible tongue bite ? seizure incontinent   er vitals [facility] S 149/65 57         OXYGENATION      2020 ra 93%     VITALS/LABS     8/3/2020 80 150/70    PT DATA/BEST PRACTICE  ALLERGY    nka                 WT               2020 128 k                       BMI                2020 40             CrCl                                    ADVANCED DIRECTIVE     LINES TUBES POA.                 NONVIOLENT NONSELFDESTRUCTIVE LEVEL ONE 2020   HEAD OF BED ELEVATION Yes      INFECTION PPLX       PROCEDURE   Intubation )   Arevalo   Extubation   DVT PROPHYLAXIS         hposc (2020)   SCHULTZ PROPHYLAXIS          Protonix 40 ()   DYSPHAGIA EVAL     DIET.     jevity 1.5 1600 () (og)  --> dys 1 puree nectar ()                                                                        IV F       PATIENT DATA/ASSESSMENT/RECOMMENDATIONS     79 m found unresponsive  possibly from seiz with possible clot in r atrium on cta head resp failure pH 711 pco2 80 possible rhabdo possible mi chf in [place] No ac cva found on ct head   Pt intubated in er 2020 Extubated  started on po diet 2020    RESP stable off vent  COVID Prior infection   INFECTION ROCEPHIN rocephin (-)    No active infection   COPD on bd   S CHF ECHO  echo ef 45% dd biatrial enlargement  lasix 40 ()   HYDRALAZINE hydralaz 50.3 ()    CARVEDILOL carvedilol 6.25x2(2020)      LASHELL Inmproved  2020 cr 1.3      Sz Keppra                                   TIME SPENT Over 25 minutes aggregate care time spent on encounter; activities included combinations of  direct pt care, counseling and/or coordinating care reviewing notes, lab data/ imaging, discussion with multidisciplinary team/ pt /family. Risks, benefits, alternatives of planned interventions when applicable were discussed in detail and questions answered as best as possible    LAURA MORGAN [facility] P [phone]  1938 DOA 2020 DR SHAREE PUTNAM

## 2020-08-03 NOTE — PROGRESS NOTE ADULT - SUBJECTIVE AND OBJECTIVE BOX
Patient is a 79y old  Male who presents with a chief complaint of UNRESPONSIVENESS (03 Aug 2020 10:25)    Pt seen and examined in AM. Pt c/o LLE pain, described as a squeezing and burning pain. Pt had Hydralazine 10mg IVP at 0400 due to elevated blood pressure. Pt has been hypertensive throughout stay. Will adjust blood pressure medications further. Pt states that he has not had a BM in 2 days.      Review of Systems:  Constitutional: no fever, chills, fatigue  Neuro: no headache, numbness, weakness  Resp:  + cough, no wheezing, no shortness of breath  CV: no chest pain, palpitations, + leg swelling  GI: no abdominal pain, nausea, vomiting, diarrhea   : no dysuria, frequency, incontinence  Skin: no itching, burning, rashes, lesions   MSK: no joint pain or swelling  Psych: no depression, anxiety    T(F): 98.5 (08-03-20 @ 07:57), Max: 99.7 (08-02-20 @ 20:25)  HR: 73 (08-03-20 @ 11:43) (66 - 78)  BP: 163/83 (08-03-20 @ 11:00) (131/97 - 183/96)  RR: 34 (08-03-20 @ 11:43) (28 - 129)  SpO2: 97% (08-03-20 @ 11:43) (82% - 98%)  Wt(kg): --          CAPILLARY BLOOD GLUCOSE        I&O's Summary    02 Aug 2020 07:01  -  03 Aug 2020 07:00  --------------------------------------------------------  IN: 800 mL / OUT: 3760 mL / NET: -2960 mL    03 Aug 2020 07:01  -  03 Aug 2020 12:04  --------------------------------------------------------  IN: 300 mL / OUT: 1200 mL / NET: -900 mL        Physical Exam:   General: NAD  CNS: A&Ox2 (unaware of current year, although knows name of current president)  HEENT: pupils responsive to light, strabismus resolved, ET tube in place  Resp: course rhonchi b/l, no wheezing  CVS: RRR, nl S1/S2, no M/R/G  Abd: pitting abdominal edema likely 2/2 underlying fluid  Ext: 2+ LE edema, WWP, no cyanosis  Skin: No rash        Meds:  aspirin  chewable 81 milliGRAM(s) Oral daily  heparin   Injectable 5000 Unit(s) SubCutaneous every 8 hours      amLODIPine   Tablet 5 milliGRAM(s) Oral at bedtime  carvedilol 12.5 milliGRAM(s) Oral every 12 hours  furosemide    Tablet 40 milliGRAM(s) Oral daily  hydrALAZINE 75 milliGRAM(s) Oral every 8 hours    atorvastatin 10 milliGRAM(s) Oral at bedtime      acetaminophen   Tablet .. 650 milliGRAM(s) Oral every 6 hours PRN  levETIRAcetam  Solution 500 milliGRAM(s) Oral every 12 hours    pantoprazole    Tablet 40 milliGRAM(s) Oral before breakfast                    Labs:                        8.9    7.75  )-----------( 185      ( 03 Aug 2020 08:32 )             28.6       08-03    143  |  101  |  26<H>  ----------------------------<  97  3.6   |  38<H>  |  1.30    Ca    8.8      03 Aug 2020 08:32  Phos  3.0     08-03  Mg     1.7     08-03    TPro  6.6  /  Alb  2.5<L>  /  TBili  2.0<H>  /  DBili  x   /  AST  27  /  ALT  54  /  AlkPhos  64  08-03              .Sputum Sputum   Normal Respiratory Jumana present   Moderate polymorphonuclear leukocytes per low power field  No Squamous epithelial cells per low power field  Rare Yeast like cells per oil power field 07-31 @ 12:07          Radiology: ***    Bedside Lung U/S: ***    Bedside Cardiac U/S: ***    CENTRAL LINE: Y/N   DATE INSERTED:   REMOVE: Y/N    CANTOR: Y/N      DATE INSERTED:        REMOVE: Y/N    A-LINE: Y/N     DATE INSERTED:              REMOVE: Y/N    GLOBAL ISSUE/BEST PRACTICE:  Analgesia:   Sedation:   CAM-ICU:   HOB elevation: Y  Stress ulcer prophylaxis:   VTE prophylaxis:   Glycemic control:   Nutrition:     CODE STATUS: *** Patient is a 79y old  Male who presents with a chief complaint of UNRESPONSIVENESS (03 Aug 2020 10:25)    Pt seen and examined in AM. Pt c/o LLE pain, described as a squeezing and burning pain. Pt had Hydralazine 10mg IVP at 0400 due to elevated blood pressure. Pt has been hypertensive throughout stay. Will adjust blood pressure medications further. Pt states that he has not had a BM in 2 days.      Review of Systems:  Constitutional: no fever, chills, fatigue  Neuro: no headache, numbness, weakness  Resp:  + cough, no wheezing, no shortness of breath  CV: no chest pain, palpitations, + leg swelling  GI: no abdominal pain, nausea, vomiting, diarrhea   : no dysuria, frequency, incontinence  Skin: no itching, burning, rashes, lesions   MSK: no joint pain or swelling  Psych: no depression, anxiety    T(F): 98.5 (08-03-20 @ 07:57), Max: 99.7 (08-02-20 @ 20:25)  HR: 73 (08-03-20 @ 11:43) (66 - 78)  BP: 163/83 (08-03-20 @ 11:00) (131/97 - 183/96)  RR: 34 (08-03-20 @ 11:43) (28 - 129)  SpO2: 97% (08-03-20 @ 11:43) (82% - 98%)  Wt(kg): --          CAPILLARY BLOOD GLUCOSE        I&O's Summary    02 Aug 2020 07:01  -  03 Aug 2020 07:00  --------------------------------------------------------  IN: 800 mL / OUT: 3760 mL / NET: -2960 mL    03 Aug 2020 07:01  -  03 Aug 2020 12:04  --------------------------------------------------------  IN: 300 mL / OUT: 1200 mL / NET: -900 mL        Physical Exam:   General: NAD  CNS: A&Ox2 (unaware of current year, although knows name of current president)  HEENT: pupils responsive to light, strabismus resolved, ET tube in place  Resp: course rhonchi b/l, no wheezing  CVS: RRR, nl S1/S2, no M/R/G  Abd: pitting abdominal edema likely 2/2 underlying fluid  Ext: 2+ LE edema, WWP, no cyanosis  Skin: No rash        Meds:  aspirin  chewable 81 milliGRAM(s) Oral daily  heparin   Injectable 5000 Unit(s) SubCutaneous every 8 hours      amLODIPine   Tablet 5 milliGRAM(s) Oral at bedtime  carvedilol 12.5 milliGRAM(s) Oral every 12 hours  furosemide    Tablet 40 milliGRAM(s) Oral daily  hydrALAZINE 75 milliGRAM(s) Oral every 8 hours    atorvastatin 10 milliGRAM(s) Oral at bedtime      acetaminophen   Tablet .. 650 milliGRAM(s) Oral every 6 hours PRN  levETIRAcetam  Solution 500 milliGRAM(s) Oral every 12 hours    pantoprazole    Tablet 40 milliGRAM(s) Oral before breakfast                    Labs:                        8.9    7.75  )-----------( 185      ( 03 Aug 2020 08:32 )             28.6       08-03    143  |  101  |  26<H>  ----------------------------<  97  3.6   |  38<H>  |  1.30    Ca    8.8      03 Aug 2020 08:32  Phos  3.0     08-03  Mg     1.7     08-03    TPro  6.6  /  Alb  2.5<L>  /  TBili  2.0<H>  /  DBili  x   /  AST  27  /  ALT  54  /  AlkPhos  64  08-03              .Sputum Sputum   Normal Respiratory Jumana present   Moderate polymorphonuclear leukocytes per low power field  No Squamous epithelial cells per low power field  Rare Yeast like cells per oil power field 07-31 @ 12:07          Radiology:  CT Brain neg  CT Head/Neck neg. for large vessel occlusion. density anterior to ascending aorta, possible right atrial thrombus   MRI Brain - no acute changes    CENTRAL LINE: N     CANTOR: Indwelling catheter        A-LINE: N         GLOBAL ISSUE/BEST PRACTICE  Analgesia: yes  Sedation: no  HOB elevation: yes  Stress ulcer prophylaxis: yes  VTE prophylaxis: scd  Glycemic control: ss  Nutrition: tube feeds    CODE STATUS: Full  GOC discussion: Y Patient is a 79y old  Male who presents with a chief complaint of UNRESPONSIVENESS (03 Aug 2020 10:25)    Pt seen and examined in AM. Pt c/o LLE pain, described as a squeezing and burning pain. Pt had Hydralazine 10mg IVP at 0400 due to elevated blood pressure. Pt has been hypertensive throughout stay. Will adjust blood pressure medications further. Pt states that he has not had a BM in 2 days.      Review of Systems:  Constitutional: no fever, chills, fatigue  Neuro: no headache, numbness, weakness  Resp:  + cough, no wheezing, no shortness of breath  CV: no chest pain, palpitations, + leg swelling  GI: no abdominal pain, nausea, vomiting, diarrhea   : no dysuria, frequency, incontinence  Skin: no itching, burning, rashes, lesions   MSK: no joint pain or swelling  Psych: no depression, anxiety    T(F): 98.5 (08-03-20 @ 07:57), Max: 99.7 (08-02-20 @ 20:25)  HR: 73 (08-03-20 @ 11:43) (66 - 78)  BP: 163/83 (08-03-20 @ 11:00) (131/97 - 183/96)  RR: 34 (08-03-20 @ 11:43) (28 - 129)  SpO2: 97% (08-03-20 @ 11:43) (82% - 98%)  Wt(kg): --          CAPILLARY BLOOD GLUCOSE        I&O's Summary    02 Aug 2020 07:01  -  03 Aug 2020 07:00  --------------------------------------------------------  IN: 800 mL / OUT: 3760 mL / NET: -2960 mL    03 Aug 2020 07:01  -  03 Aug 2020 12:04  --------------------------------------------------------  IN: 300 mL / OUT: 1200 mL / NET: -900 mL        Physical Exam:   General: NAD  CNS: A&Ox2 (unaware of current year, although knows name of current president)  HEENT: pupils responsive to light  Resp: course rhonchi b/l, no wheezing  CVS: RRR, nl S1/S2, no M/R/G  Abd: pitting abdominal edema likely 2/2 underlying fluid  Ext: 2+ LE edema, WWP, no cyanosis  Skin: No rash        Meds:  aspirin  chewable 81 milliGRAM(s) Oral daily  heparin   Injectable 5000 Unit(s) SubCutaneous every 8 hours      amLODIPine   Tablet 5 milliGRAM(s) Oral at bedtime  carvedilol 12.5 milliGRAM(s) Oral every 12 hours  furosemide    Tablet 40 milliGRAM(s) Oral daily  hydrALAZINE 75 milliGRAM(s) Oral every 8 hours    atorvastatin 10 milliGRAM(s) Oral at bedtime      acetaminophen   Tablet .. 650 milliGRAM(s) Oral every 6 hours PRN  levETIRAcetam  Solution 500 milliGRAM(s) Oral every 12 hours    pantoprazole    Tablet 40 milliGRAM(s) Oral before breakfast                    Labs:                        8.9    7.75  )-----------( 185      ( 03 Aug 2020 08:32 )             28.6       08-03    143  |  101  |  26<H>  ----------------------------<  97  3.6   |  38<H>  |  1.30    Ca    8.8      03 Aug 2020 08:32  Phos  3.0     08-03  Mg     1.7     08-03    TPro  6.6  /  Alb  2.5<L>  /  TBili  2.0<H>  /  DBili  x   /  AST  27  /  ALT  54  /  AlkPhos  64  08-03              .Sputum Sputum   Normal Respiratory Jumana present   Moderate polymorphonuclear leukocytes per low power field  No Squamous epithelial cells per low power field  Rare Yeast like cells per oil power field 07-31 @ 12:07          Radiology:  CT Brain neg  CT Head/Neck neg. for large vessel occlusion. density anterior to ascending aorta, possible right atrial thrombus   MRI Brain - no acute changes    CENTRAL LINE: N     CANTOR: Indwelling catheter        A-LINE: N         GLOBAL ISSUE/BEST PRACTICE  Analgesia: yes  Sedation: no  HOB elevation: yes  Stress ulcer prophylaxis: yes  VTE prophylaxis: scd  Glycemic control: ss  Nutrition: tube feeds    CODE STATUS: Full  GOC discussion: Y

## 2020-08-03 NOTE — PROGRESS NOTE ADULT - ASSESSMENT
The patient is an 79 year old male with a history of HTN, CVA who presents with unresponsiveness.    Plan:  - CTA head and neck with motion artifact, no obvious stenosis. There is a questionable filling defect noted, possibly right atrial thrombus.  - Echocardiogram with mild LV systolic dysfunction, no obvious right atrial thrombus  - MRI with old CVA  - Continue carvedilol 12.5 mg bid; titrate up if needed  - Continue hydralazine 50 mg q6h - consider lowering frequency and titrating up dose  - Continue amlodipine 5 mg daily  - Neurology follow-up The patient is an 79 year old male with a history of HTN, CVA who presents with unresponsiveness.    Plan:  - CTA head and neck with motion artifact, no obvious stenosis. There is a questionable filling defect noted, possibly right atrial thrombus.  - Echocardiogram with mild LV systolic dysfunction, no obvious right atrial thrombus  - MRI with old CVA  - Continue carvedilol 12.5 mg bid; titrate up if needed  - Continue hydralazine 50 mg q6h - consider lowering frequency and titrating up dose  - Not on ACEI/ARB due to LASHELL  - Continue amlodipine 5 mg daily  - Neurology follow-up

## 2020-08-03 NOTE — PROGRESS NOTE ADULT - SUBJECTIVE AND OBJECTIVE BOX
infectious diseases progress note:    CATHY SANCHEZ is a 79y y. o. Male patient    No concerning overnight events    Allergies    Allergy Status Unknown  No Known Allergies    Intolerances        ANTIBIOTICS/RELEVANT:  antimicrobials    immunologic:    OTHER:  amLODIPine   Tablet 5 milliGRAM(s) Oral at bedtime  aspirin  chewable 81 milliGRAM(s) Oral daily  atorvastatin 10 milliGRAM(s) Oral at bedtime  carvedilol 12.5 milliGRAM(s) Oral every 12 hours  furosemide    Tablet 40 milliGRAM(s) Oral daily  heparin   Injectable 5000 Unit(s) SubCutaneous every 8 hours  hydrALAZINE 50 milliGRAM(s) Oral every 6 hours  levETIRAcetam  IVPB 500 milliGRAM(s) IV Intermittent every 12 hours  pantoprazole    Tablet 40 milliGRAM(s) Oral before breakfast      Objective:  Vital Signs Last 24 Hrs  T(C): 36.9 (03 Aug 2020 07:57), Max: 37.6 (02 Aug 2020 20:25)  T(F): 98.5 (03 Aug 2020 07:57), Max: 99.7 (02 Aug 2020 20:25)  HR: 74 (03 Aug 2020 09:00) (66 - 78)  BP: 169/78 (03 Aug 2020 09:00) (131/97 - 183/96)  BP(mean): 112 (03 Aug 2020 09:00) (94 - 132)  RR: 30 (03 Aug 2020 09:00) (28 - 129)  SpO2: 98% (03 Aug 2020 09:00) (82% - 98%)    T(C): 36.9 (08-03-20 @ 07:57), Max: 37.6 (08-02-20 @ 20:25)  T(C): 36.9 (08-03-20 @ 07:57), Max: 37.6 (08-02-20 @ 20:25)  T(C): 36.9 (08-03-20 @ 07:57), Max: 37.6 (08-02-20 @ 20:25)    PHYSICAL EXAM: extubated on NC  HEENT: NC atraumatic  Neck: supple  Respiratory: no accessory muscle use, breathing comfortably, bilaterally CTA  Cardiovascular: distant  Gastrointestinal: normal appearing, nondistended  Extremities: no clubbing, no cyanosis,  Neuro: decreased      LABS:                          8.9    7.75  )-----------( 185      ( 03 Aug 2020 08:32 )             28.6       7.75 08-03 @ 08:32  7.13 08-02 @ 05:28  5.89 08-01 @ 05:35  6.39 07-31 @ 05:35  6.41 07-30 @ 05:35  6.51 07-29 @ 17:37  6.35 07-29 @ 05:58  5.66 07-28 @ 23:45  3.87 07-28 @ 13:01  4.57 07-28 @ 06:01      08-03    143  |  101  |  26<H>  ----------------------------<  97  3.6   |  38<H>  |  1.30    Ca    8.8      03 Aug 2020 08:32  Phos  3.0     08-03  Mg     1.7     08-03    TPro  6.6  /  Alb  2.5<L>  /  TBili  2.0<H>  /  DBili  x   /  AST  27  /  ALT  54  /  AlkPhos  64  08-03      Creatinine, Serum: 1.30 mg/dL (08-03-20 @ 08:32)  Creatinine, Serum: 1.30 mg/dL (08-02-20 @ 05:28)  Creatinine, Serum: 1.40 mg/dL (08-01-20 @ 05:35)  Creatinine, Serum: 1.40 mg/dL (07-31-20 @ 18:29)  Creatinine, Serum: 1.40 mg/dL (07-31-20 @ 05:35)  Creatinine, Serum: 1.60 mg/dL (07-30-20 @ 05:35)  Creatinine, Serum: 1.70 mg/dL (07-29-20 @ 17:53)  Creatinine, Serum: 1.70 mg/dL (07-29-20 @ 05:58)  Creatinine, Serum: 1.30 mg/dL (07-28-20 @ 13:01)  Creatinine, Serum: 1.57 mg/dL (07-28-20 @ 06:01)                INFLAMMATORY MARKERS  Auto Neutrophil #: 5.46 K/uL (08-03-20 @ 08:32)  Auto Lymphocyte #: 0.68 K/uL (08-03-20 @ 08:32)  Auto Neutrophil #: 4.97 K/uL (08-02-20 @ 05:28)  Auto Lymphocyte #: 0.74 K/uL (08-02-20 @ 05:28)  Auto Neutrophil #: 4.02 K/uL (08-01-20 @ 05:35)  Auto Lymphocyte #: 0.76 K/uL (08-01-20 @ 05:35)  Auto Neutrophil #: 4.69 K/uL (07-31-20 @ 05:35)  Auto Lymphocyte #: 0.65 K/uL (07-31-20 @ 05:35)  Auto Neutrophil #: 4.57 K/uL (07-30-20 @ 05:35)  Auto Lymphocyte #: 0.72 K/uL (07-30-20 @ 05:35)  Auto Neutrophil #: 4.80 K/uL (07-29-20 @ 05:58)  Auto Lymphocyte #: 0.56 K/uL (07-29-20 @ 05:58)  Auto Neutrophil #: 3.32 K/uL (07-28-20 @ 13:01)  Auto Lymphocyte #: 0.23 K/uL (07-28-20 @ 13:01)  Auto Lymphocyte #: 0.35 K/uL (07-28-20 @ 06:01)  Auto Neutrophil #: 3.54 K/uL (07-28-20 @ 06:01)    Lactate, Blood: 0.8 mmol/L (07-28-20 @ 10:00)    Auto Eosinophil #: 0.45 K/uL (08-03-20 @ 08:32)  Auto Eosinophil #: 0.42 K/uL (08-02-20 @ 05:28)  Auto Eosinophil #: 0.20 K/uL (08-01-20 @ 05:35)  Auto Eosinophil #: 0.17 K/uL (07-31-20 @ 05:35)  Auto Eosinophil #: 0.17 K/uL (07-30-20 @ 05:35)  Auto Eosinophil #: 0.11 K/uL (07-29-20 @ 05:58)  Auto Eosinophil #: 0.08 K/uL (07-28-20 @ 13:01)  Auto Eosinophil #: 0.21 K/uL (07-28-20 @ 06:01)        Procalcitonin, Serum: 0.08 ng/mL (07-28-20 @ 13:01)    Troponin I, Serum: .026 ng/mL (07-28-20 @ 13:01)  Troponin I, Serum: .026 ng/mL (07-28-20 @ 06:01)    Creatine Kinase, Serum: 204 U/L (08-01-20 @ 05:35)  Creatine Kinase, Serum: 335 U/L (07-31-20 @ 05:35)  Creatine Kinase, Serum: 608 U/L (07-28-20 @ 13:01)  Creatine Kinase, Serum: 769 U/L (07-28-20 @ 06:06)              Activated Partial Thromboplastin Time: 93.0 sec (07-29-20 @ 07:56)  Activated Partial Thromboplastin Time: >200.0 sec (07-28-20 @ 23:45)  Activated Partial Thromboplastin Time: 35.7 sec (07-28-20 @ 13:01)  INR: 1.21 ratio (07-28-20 @ 13:01)  Activated Partial Thromboplastin Time: 38.3 sec (07-28-20 @ 06:01)  INR: 1.14 ratio (07-28-20 @ 06:01)          MICROBIOLOGY:              RADIOLOGY & ADDITIONAL STUDIES:

## 2020-08-03 NOTE — PROGRESS NOTE ADULT - ATTENDING COMMENTS
80yo Male PMHx CVA 2012, HTN, HLD , hernia surgery with testicle resection 2015, umbilical hernia repair 12/2019,  transferred from [place] ED to [place] ED with s/p seizure activity  and bradycardia into the 30s.  Arrived to [place] hypothermic and hypotensive .Patient  required intubation ph 7.1 with CO2 85 due to acute hypoxic respiratory failure on arrival to ER ,placed on Levophed drip due to hypotension Admitted for septic workup and evaluation ,send blood and urine cx, serial lactate levels, monitor vitals closely hydration ,monitor urine output and renal profile,iv abx initiated -given ceftriaxone in ER and seen by ID consult .Pulmonology and cardiology consults are called .Patient admitted  to ICU with septic shock ,noted to have  tongue bite and AMS ,neurology evaluation called  Admitted  to intensive care  unit for monitoring , to rule out ami -send 3 sets of cardiac enzymes to rule out acute coronary event, obtain ECHO to evaluate LVEF, cardiology consult  ,continue current sepsis management, ventilator management /O2 supply, anticoagulation plan as per cardiology consult Palliative care consult requested ,to discuss advance directives and complete MOLST .Tried to reach family to obtain details of past medical hx - left a message for daughter .not able to reach son ( non valid number )[place]  ED team spoke with patient's daughter, who stated her father fell to the ground on the floor of his motel room about 4 days ago and was unable to get up. She had been caring for him on the floor since and did not call 911 for assistance. Today she found him unresponsive, with blood in his mouth, and incontinent of urine.  CT brain was negative. A CODE STROKE was called, and a CTA head/neck were performed, which was negative for large vessel occlusion but concerning for a density anterior to the ascending aorta possibly right atrial thrombus. Also revealed enlarged, multinodular thyroid. TPA criteria was not met, as patient had unknown onset of symptoms. Patient was transferred to [facility] ED for further care .He developed  multiple witnessed seizures in [place] ED, temporarily resolved with Ativan administration . CT/CTA brain NAD . Seen by neurologist and EEG ordered to r/o status epilepticus as etiology of ongoing encephalopathy, probably will require  MRI once stabilized from cardiac and respiratory standpoint.

## 2020-08-04 LAB
ALBUMIN SERPL ELPH-MCNC: 2.2 G/DL — LOW (ref 3.3–5)
ALP SERPL-CCNC: 55 U/L — SIGNIFICANT CHANGE UP (ref 40–120)
ALT FLD-CCNC: 45 U/L — SIGNIFICANT CHANGE UP (ref 12–78)
ANION GAP SERPL CALC-SCNC: 1 MMOL/L — LOW (ref 5–17)
AST SERPL-CCNC: 21 U/L — SIGNIFICANT CHANGE UP (ref 15–37)
BASE EXCESS BLDA CALC-SCNC: 13 MMOL/L — HIGH (ref -2–2)
BASOPHILS # BLD AUTO: 0.02 K/UL — SIGNIFICANT CHANGE UP (ref 0–0.2)
BASOPHILS NFR BLD AUTO: 0.3 % — SIGNIFICANT CHANGE UP (ref 0–2)
BILIRUB SERPL-MCNC: 1.4 MG/DL — HIGH (ref 0.2–1.2)
BLOOD GAS COMMENTS ARTERIAL: SIGNIFICANT CHANGE UP
BUN SERPL-MCNC: 27 MG/DL — HIGH (ref 7–23)
CALCIUM SERPL-MCNC: 8.5 MG/DL — SIGNIFICANT CHANGE UP (ref 8.5–10.1)
CHLORIDE SERPL-SCNC: 102 MMOL/L — SIGNIFICANT CHANGE UP (ref 96–108)
CO2 SERPL-SCNC: 40 MMOL/L — HIGH (ref 22–31)
CREAT SERPL-MCNC: 1.3 MG/DL — SIGNIFICANT CHANGE UP (ref 0.5–1.3)
EOSINOPHIL # BLD AUTO: 0.37 K/UL — SIGNIFICANT CHANGE UP (ref 0–0.5)
EOSINOPHIL NFR BLD AUTO: 5.5 % — SIGNIFICANT CHANGE UP (ref 0–6)
GLUCOSE SERPL-MCNC: 98 MG/DL — SIGNIFICANT CHANGE UP (ref 70–99)
HCO3 BLDA-SCNC: 36 MMOL/L — HIGH (ref 23–27)
HCT VFR BLD CALC: 26.3 % — LOW (ref 39–50)
HGB BLD-MCNC: 7.9 G/DL — LOW (ref 13–17)
HOROWITZ INDEX BLDA+IHG-RTO: 32 — SIGNIFICANT CHANGE UP
IMM GRANULOCYTES NFR BLD AUTO: 0.4 % — SIGNIFICANT CHANGE UP (ref 0–1.5)
LYMPHOCYTES # BLD AUTO: 0.62 K/UL — LOW (ref 1–3.3)
LYMPHOCYTES # BLD AUTO: 9.2 % — LOW (ref 13–44)
MAGNESIUM SERPL-MCNC: 2.1 MG/DL — SIGNIFICANT CHANGE UP (ref 1.6–2.6)
MCHC RBC-ENTMCNC: 27.8 PG — SIGNIFICANT CHANGE UP (ref 27–34)
MCHC RBC-ENTMCNC: 30 GM/DL — LOW (ref 32–36)
MCV RBC AUTO: 92.6 FL — SIGNIFICANT CHANGE UP (ref 80–100)
MONOCYTES # BLD AUTO: 0.86 K/UL — SIGNIFICANT CHANGE UP (ref 0–0.9)
MONOCYTES NFR BLD AUTO: 12.7 % — SIGNIFICANT CHANGE UP (ref 2–14)
NEUTROPHILS # BLD AUTO: 4.87 K/UL — SIGNIFICANT CHANGE UP (ref 1.8–7.4)
NEUTROPHILS NFR BLD AUTO: 71.9 % — SIGNIFICANT CHANGE UP (ref 43–77)
NRBC # BLD: 0 /100 WBCS — SIGNIFICANT CHANGE UP (ref 0–0)
PCO2 BLDA: 60 MMHG — HIGH (ref 32–46)
PH BLDA: 7.42 — SIGNIFICANT CHANGE UP (ref 7.35–7.45)
PHOSPHATE SERPL-MCNC: 3.3 MG/DL — SIGNIFICANT CHANGE UP (ref 2.5–4.5)
PLATELET # BLD AUTO: 177 K/UL — SIGNIFICANT CHANGE UP (ref 150–400)
PO2 BLDA: 96 MMHG — SIGNIFICANT CHANGE UP (ref 74–108)
POTASSIUM SERPL-MCNC: 3.6 MMOL/L — SIGNIFICANT CHANGE UP (ref 3.5–5.3)
POTASSIUM SERPL-SCNC: 3.6 MMOL/L — SIGNIFICANT CHANGE UP (ref 3.5–5.3)
PROT SERPL-MCNC: 5.9 G/DL — LOW (ref 6–8.3)
RBC # BLD: 2.84 M/UL — LOW (ref 4.2–5.8)
RBC # FLD: 15.9 % — HIGH (ref 10.3–14.5)
SAO2 % BLDA: 97 % — HIGH (ref 92–96)
SODIUM SERPL-SCNC: 143 MMOL/L — SIGNIFICANT CHANGE UP (ref 135–145)
WBC # BLD: 6.77 K/UL — SIGNIFICANT CHANGE UP (ref 3.8–10.5)
WBC # FLD AUTO: 6.77 K/UL — SIGNIFICANT CHANGE UP (ref 3.8–10.5)

## 2020-08-04 RX ADMIN — HEPARIN SODIUM 5000 UNIT(S): 5000 INJECTION INTRAVENOUS; SUBCUTANEOUS at 13:40

## 2020-08-04 RX ADMIN — HEPARIN SODIUM 5000 UNIT(S): 5000 INJECTION INTRAVENOUS; SUBCUTANEOUS at 21:39

## 2020-08-04 RX ADMIN — PANTOPRAZOLE SODIUM 40 MILLIGRAM(S): 20 TABLET, DELAYED RELEASE ORAL at 05:10

## 2020-08-04 RX ADMIN — ATORVASTATIN CALCIUM 10 MILLIGRAM(S): 80 TABLET, FILM COATED ORAL at 21:38

## 2020-08-04 RX ADMIN — Medication 81 MILLIGRAM(S): at 12:00

## 2020-08-04 RX ADMIN — Medication 40 MILLIGRAM(S): at 05:10

## 2020-08-04 RX ADMIN — LEVETIRACETAM 500 MILLIGRAM(S): 250 TABLET, FILM COATED ORAL at 17:34

## 2020-08-04 RX ADMIN — Medication 75 MILLIGRAM(S): at 13:39

## 2020-08-04 RX ADMIN — CARVEDILOL PHOSPHATE 12.5 MILLIGRAM(S): 80 CAPSULE, EXTENDED RELEASE ORAL at 17:34

## 2020-08-04 RX ADMIN — CARVEDILOL PHOSPHATE 12.5 MILLIGRAM(S): 80 CAPSULE, EXTENDED RELEASE ORAL at 05:10

## 2020-08-04 RX ADMIN — Medication 75 MILLIGRAM(S): at 05:10

## 2020-08-04 RX ADMIN — LEVETIRACETAM 500 MILLIGRAM(S): 250 TABLET, FILM COATED ORAL at 05:10

## 2020-08-04 RX ADMIN — AMLODIPINE BESYLATE 5 MILLIGRAM(S): 2.5 TABLET ORAL at 21:38

## 2020-08-04 RX ADMIN — HEPARIN SODIUM 5000 UNIT(S): 5000 INJECTION INTRAVENOUS; SUBCUTANEOUS at 05:10

## 2020-08-04 RX ADMIN — Medication 75 MILLIGRAM(S): at 21:38

## 2020-08-04 NOTE — PROGRESS NOTE ADULT - SUBJECTIVE AND OBJECTIVE BOX
Chief Complaint: Unresponsiveness    Interval Events: No significant changes.    Review of Systems:  Unable to obtain    Physical Exam:  Vital Signs Last 24 Hrs  T(C): 36.7 (04 Aug 2020 05:26), Max: 37 (03 Aug 2020 19:23)  T(F): 98 (04 Aug 2020 05:26), Max: 98.6 (03 Aug 2020 19:23)  HR: 68 (04 Aug 2020 06:00) (56 - 80)  BP: 151/65 (04 Aug 2020 06:00) (96/50 - 183/81)  BP(mean): 93 (04 Aug 2020 06:00) (71 - 123)  RR: 31 (04 Aug 2020 06:00) (22 - 129)  SpO2: 96% (04 Aug 2020 06:00) (83% - 100%)  General: NAD  HEENT: ET tube in place  Neck: No JVD, no carotid bruit  Lungs: CTAB  CV: RRR, nl S1/S2, no M/R/G  Abdomen: S/NT/ND, +BS  Extremities: 1+ LE edema, no cyanosis  Neuro: Non-focal  Skin: No rash    Labs:             08-04    143  |  102  |  27<H>  ----------------------------<  98  3.6   |  40<H>  |  1.30    Ca    8.5      04 Aug 2020 06:00  Phos  3.3     08-04  Mg     2.1     08-04    TPro  5.9<L>  /  Alb  2.2<L>  /  TBili  1.4<H>  /  DBili  x   /  AST  21  /  ALT  45  /  AlkPhos  55  08-04                        7.9    6.77  )-----------( 177      ( 04 Aug 2020 06:00 )             26.3       Telemetry: Sinus rhythm

## 2020-08-04 NOTE — PROGRESS NOTE ADULT - SUBJECTIVE AND OBJECTIVE BOX
Date/Time Patient Seen:  		  Referring MD:   Data Reviewed	       Patient is a 79y old  Male who presents with a chief complaint of UNRESPONSIVENESS (04 Aug 2020 06:52)      Subjective/HPI     PAST MEDICAL & SURGICAL HISTORY:  Chronic GERD  HTN (hypertension)  HLD (hyperlipidemia)  CVA (cerebral vascular accident)  High cholesterol  HTN (hypertension)  Unknown and unspecified causes of morbidity  History of surgical removal of testicle  H/O hernia repair  No significant past surgical history        Medication list         MEDICATIONS  (STANDING):  amLODIPine   Tablet 5 milliGRAM(s) Oral at bedtime  aspirin  chewable 81 milliGRAM(s) Oral daily  atorvastatin 10 milliGRAM(s) Oral at bedtime  carvedilol 12.5 milliGRAM(s) Oral every 12 hours  furosemide    Tablet 40 milliGRAM(s) Oral daily  heparin   Injectable 5000 Unit(s) SubCutaneous every 8 hours  hydrALAZINE 75 milliGRAM(s) Oral every 8 hours  levETIRAcetam  Solution 500 milliGRAM(s) Oral every 12 hours  pantoprazole    Tablet 40 milliGRAM(s) Oral before breakfast    MEDICATIONS  (PRN):  acetaminophen   Tablet .. 650 milliGRAM(s) Oral every 6 hours PRN Mild Pain (1 - 3)         Vitals log        ICU Vital Signs Last 24 Hrs  T(C): 36.7 (04 Aug 2020 05:26), Max: 37 (03 Aug 2020 19:23)  T(F): 98 (04 Aug 2020 05:26), Max: 98.6 (03 Aug 2020 19:23)  HR: 59 (04 Aug 2020 07:00) (56 - 80)  BP: 131/61 (04 Aug 2020 07:00) (96/50 - 183/81)  BP(mean): 88 (04 Aug 2020 07:00) (71 - 123)  ABP: --  ABP(mean): --  RR: 29 (04 Aug 2020 07:00) (22 - 40)  SpO2: 94% (04 Aug 2020 07:00) (91% - 100%)           Input and Output:  I&O's Detail    03 Aug 2020 07:01  -  04 Aug 2020 07:00  --------------------------------------------------------  IN:    Oral Fluid: 860 mL    Solution: 50 mL  Total IN: 910 mL    OUT:    Indwelling Catheter - Urethral: 1550 mL  Total OUT: 1550 mL    Total NET: -640 mL          Lab Data                        7.9    6.77  )-----------( 177      ( 04 Aug 2020 06:00 )             26.3     08-04    143  |  102  |  27<H>  ----------------------------<  98  3.6   |  40<H>  |  1.30    Ca    8.5      04 Aug 2020 06:00  Phos  3.3     08-04  Mg     2.1     08-04    TPro  5.9<L>  /  Alb  2.2<L>  /  TBili  1.4<H>  /  DBili  x   /  AST  21  /  ALT  45  /  AlkPhos  55  08-04            Review of Systems	      Objective     Physical Examination    heart s1s2  lung dec BS  abd soft      Pertinent Lab findings & Imaging      Mickey:  NO   Adequate UO     I&O's Detail    03 Aug 2020 07:01  -  04 Aug 2020 07:00  --------------------------------------------------------  IN:    Oral Fluid: 860 mL    Solution: 50 mL  Total IN: 910 mL    OUT:    Indwelling Catheter - Urethral: 1550 mL  Total OUT: 1550 mL    Total NET: -640 mL               Discussed with:     Cultures:	        Radiology

## 2020-08-04 NOTE — PROGRESS NOTE ADULT - SUBJECTIVE AND OBJECTIVE BOX
Patient is a 79y old  Male who presents with a chief complaint of UNRESPONSIVENESS (04 Aug 2020 07:32)      Interval Events: No overnight events. Pt was seen and examined in AM. Pt reports continued LLE pain but unable to rate. Pt appears less verbal today as compared to yesterday. Pt is A&Ox3, although pt takes some time to respond to each question. Pt responding via hand gestures and head nods. Pt reports he has not had a bowel movement in 2-3 days.      Review of Systems:  Constitutional: no fever, chills, fatigue  Neuro: no headache, numbness, weakness  Resp: + cough, wheezing, shortness of breath  CV: no chest pain, palpitations, + leg swelling  GI: no abdominal pain, nausea, vomiting, diarrhea   : no dysuria, frequency, incontinence  Skin: no itching, burning, lesions   MSK: + Lt knee pain, +LLE pain, + LLE swelling  Psych: no depression, anxiety    T(F): 98 (08-04-20 @ 05:26), Max: 98.6 (08-03-20 @ 19:23)  HR: 59 (08-04-20 @ 07:00) (56 - 80)  BP: 131/61 (08-04-20 @ 07:00) (96/50 - 183/81)  RR: 29 (08-04-20 @ 07:00) (22 - 40)  SpO2: 94% (08-04-20 @ 07:00) (91% - 100%)  Wt(kg): --          CAPILLARY BLOOD GLUCOSE        I&O's Summary    03 Aug 2020 07:01  -  04 Aug 2020 07:00  --------------------------------------------------------  IN: 910 mL / OUT: 1550 mL / NET: -640 mL        Physical Exam:   General: NAD; pt is less verbal this AM, and responses are slowed  CNS: A&Ox3  HEENT: pupils responsive to light  Resp:  rhonchi b/l, no wheezing  CVS: RRR, nl S1/S2, no M/R/G  Abd: soft, nontender, +abdominal edema likely 2/2 underlying fluid  Ext: 2+ LE edema, ttp LLE, no cyanosis  Skin: No rash          Meds:  aspirin  chewable 81 milliGRAM(s) Oral daily  heparin   Injectable 5000 Unit(s) SubCutaneous every 8 hours      amLODIPine   Tablet 5 milliGRAM(s) Oral at bedtime  carvedilol 12.5 milliGRAM(s) Oral every 12 hours  furosemide    Tablet 40 milliGRAM(s) Oral daily  hydrALAZINE 75 milliGRAM(s) Oral every 8 hours    atorvastatin 10 milliGRAM(s) Oral at bedtime      acetaminophen   Tablet .. 650 milliGRAM(s) Oral every 6 hours PRN  levETIRAcetam  Solution 500 milliGRAM(s) Oral every 12 hours    pantoprazole    Tablet 40 milliGRAM(s) Oral before breakfast                    Labs:                        7.9    6.77  )-----------( 177      ( 04 Aug 2020 06:00 )             26.3       08-04    143  |  102  |  27<H>  ----------------------------<  98  3.6   |  40<H>  |  1.30    Ca    8.5      04 Aug 2020 06:00  Phos  3.3     08-04  Mg     2.1     08-04    TPro  5.9<L>  /  Alb  2.2<L>  /  TBili  1.4<H>  /  DBili  x   /  AST  21  /  ALT  45  /  AlkPhos  55  08-04              .Sputum Sputum   Normal Respiratory Jumana present   Moderate polymorphonuclear leukocytes per low power field  No Squamous epithelial cells per low power field  Rare Yeast like cells per oil power field 07-31 @ 12:07          CENTRAL LINE: N       SAKSHI: N      REMOVE: Y (8/3)    A-LINE: N         GLOBAL ISSUE/BEST PRACTICE:  Analgesia: yes  Sedation: no  HOB elevation: yes  Stress ulcer prophylaxis: yes  VTE prophylaxis: scd  Glycemic control: ss  Nutrition: tube feeds    CODE STATUS: Full  GOC discussion: Y Patient is a 79y old  Male who presents with a chief complaint of UNRESPONSIVENESS (04 Aug 2020 07:32)      Interval Events: No overnight events. Pt was seen and examined in AM. Pt reports continued LLE pain but unable to rate. Pt is A&Ox3, although pt takes some time to respond to each question.  Pt reports he has not had a bowel movement in 2-3 days. No other active complaints at this time.     Review of Systems:  Constitutional: no fever, chills, fatigue  Neuro: no headache, numbness, weakness  Resp: + cough, wheezing, shortness of breath  CV: no chest pain, palpitations, + leg swelling  GI: no abdominal pain, nausea, vomiting, diarrhea   : no dysuria, frequency, incontinence  Skin: no itching, burning, lesions   MSK: + Lt knee pain, +LLE pain, + LLE swelling  Psych: no depression, anxiety    T(F): 98 (08-04-20 @ 05:26), Max: 98.6 (08-03-20 @ 19:23)  HR: 59 (08-04-20 @ 07:00) (56 - 80)  BP: 131/61 (08-04-20 @ 07:00) (96/50 - 183/81)  RR: 29 (08-04-20 @ 07:00) (22 - 40)  SpO2: 94% (08-04-20 @ 07:00) (91% - 100%)  Wt(kg): --          CAPILLARY BLOOD GLUCOSE        I&O's Summary    03 Aug 2020 07:01  -  04 Aug 2020 07:00  --------------------------------------------------------  IN: 910 mL / OUT: 1550 mL / NET: -640 mL        Physical Exam:   General: NAD; pt is less verbal this AM, and responses are slowed  CNS: A&Ox3  HEENT: pupils responsive to light  Resp:  rhonchi b/l, no wheezing  CVS: RRR, nl S1/S2, no M/R/G  Abd: soft, nontender, +abdominal edema likely 2/2 underlying fluid  Ext: 2+ LE edema, ttp LLE, no cyanosis  Skin: No rash          Meds:  aspirin  chewable 81 milliGRAM(s) Oral daily  heparin   Injectable 5000 Unit(s) SubCutaneous every 8 hours      amLODIPine   Tablet 5 milliGRAM(s) Oral at bedtime  carvedilol 12.5 milliGRAM(s) Oral every 12 hours  furosemide    Tablet 40 milliGRAM(s) Oral daily  hydrALAZINE 75 milliGRAM(s) Oral every 8 hours    atorvastatin 10 milliGRAM(s) Oral at bedtime      acetaminophen   Tablet .. 650 milliGRAM(s) Oral every 6 hours PRN  levETIRAcetam  Solution 500 milliGRAM(s) Oral every 12 hours    pantoprazole    Tablet 40 milliGRAM(s) Oral before breakfast                    Labs:                        7.9    6.77  )-----------( 177      ( 04 Aug 2020 06:00 )             26.3       08-04    143  |  102  |  27<H>  ----------------------------<  98  3.6   |  40<H>  |  1.30    Ca    8.5      04 Aug 2020 06:00  Phos  3.3     08-04  Mg     2.1     08-04    TPro  5.9<L>  /  Alb  2.2<L>  /  TBili  1.4<H>  /  DBili  x   /  AST  21  /  ALT  45  /  AlkPhos  55  08-04              .Sputum Sputum   Normal Respiratory Jumana present   Moderate polymorphonuclear leukocytes per low power field  No Squamous epithelial cells per low power field  Rare Yeast like cells per oil power field 07-31 @ 12:07          CENTRAL LINE: N       CANTOR: N      REMOVE: Y (8/3)    A-LINE: N         GLOBAL ISSUE/BEST PRACTICE:  Analgesia: yes  Sedation: no  HOB elevation: yes  Stress ulcer prophylaxis: yes  VTE prophylaxis: scd  Glycemic control: ss  Nutrition: tube feeds    CODE STATUS: Full  GOC discussion: Y

## 2020-08-04 NOTE — PROGRESS NOTE ADULT - ASSESSMENT
78 yo male, PMHx CVA 2012, HTN, who presented as a transfer from [facility] with altered mental status, acute hypercapnic respiratory failure, transient shock distributive vs cardiogenic, LASHELL, and transaminitis. Hypothermia, bradycardia to 30's and multiple witnessed seizures in [place] ED. 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    Neuro: continue Keppra 500 mg q12 PO and ativan prn for breakthrough seizures, EEG neg for seizure, MRI brain neg for acute pathology, responds and follows all commands; per son progressive difficulty with speech at home recently.  Pulm: chest PT, maintain aspiration precautions  CV: continue aspirin, atorvastatin; c/w Hydralazine 75 q8, Coreg 12.5mg QD, and amlodipine 5mg QHS.   GI: on dysphagia 1 diet, transaminitis resolved  Renal: resolved LASHELL, continue Lasix 40 mg IVP qd  ID: urine and blood culture neg, no other signs of bacterial infection, monitor off abx, elevated COVID IgG titre suggestive of recent exposure, sputum culture w Moderate pmls, rare yeast like cells  Endo: TSH wnl, T3 low, cortisol wnl; CT Angio w/ multinodular thyroid; will require outpatient workup  MSK: Pt recs: pt with 1 person assist and ambulate with RW 100ft; MICHEL    Heme: DVT ppx Heparin, continue ASA  Dispo: dispo to tele

## 2020-08-04 NOTE — CHART NOTE - NSCHARTNOTEFT_GEN_A_CORE
Assessment:   Pt seen as ICU follow-up. Per SLP evaluation (8/3), PO diet initiated of puree with nectar-thickened liquids, + Ensure Enlive three times per day (supplement requested and added).     Pt resting and unarousable upon visit. Plate waste observation revealed pt consumed ~80% of breakfast. Supplements noted at bedside. Per chart, +BM 8/1, was previously on bowel regimen.    Factors impacting intake: [ ] none [ ] nausea  [ ] vomiting [ ] diarrhea [ ] constipation  [ x ]chewing problems [ x ] swallowing issues  [ ] other:     Diet Presciption: Diet, Dysphagia 1 Pureed-Nectar Consistency Fluid:   Supplement Feeding Modality:  Oral  Ensure Enlive Servings Per Day:  1       Frequency:  Three Times a day (08-03-20 @ 09:22)    Intake: Good (per direct observation)    Current Weight: (8/3) 246.9 lb    Pertinent Medications: MEDICATIONS  (STANDING):  amLODIPine   Tablet 5 milliGRAM(s) Oral at bedtime  aspirin  chewable 81 milliGRAM(s) Oral daily  atorvastatin 10 milliGRAM(s) Oral at bedtime  carvedilol 12.5 milliGRAM(s) Oral every 12 hours  furosemide    Tablet 40 milliGRAM(s) Oral daily  heparin   Injectable 5000 Unit(s) SubCutaneous every 8 hours  hydrALAZINE 75 milliGRAM(s) Oral every 8 hours  levETIRAcetam  Solution 500 milliGRAM(s) Oral every 12 hours  pantoprazole    Tablet 40 milliGRAM(s) Oral before breakfast    MEDICATIONS  (PRN):  acetaminophen   Tablet .. 650 milliGRAM(s) Oral every 6 hours PRN Mild Pain (1 - 3)    Pertinent Labs: 08-04 Na143 mmol/L Glu 98 mg/dL K+ 3.6 mmol/L Cr  1.30 mg/dL BUN 27 mg/dL<H> 08-04 Phos 3.3 mg/dL 08-04 Alb 2.2 g/dL<L>     CAPILLARY BLOOD GLUCOSE        Skin: 3+ scrotum, 1+ generalized. Stage I, sacrum/bilateral heels.    Estimated Needs:   [ x ] no change since previous assessment  [ ] recalculated:     Previous Nutrition Diagnosis:   [ x ] N/A    Nutrition Diagnosis is [ ] ongoing  [ ] resolved [ x ] not applicable     New Nutrition Diagnosis: [ x ] Chewing/swallowing difficulty related to AMS as evidenced by need for modified texture/consistency diet per SLP evaluation.      Interventions:   Recommend  [ ] Change Diet To:  [ x ] Nutrition Supplement: Continue Ensure Enlive three times per day.  [ ] Nutrition Support  [ x ] Other: Continue puree diet with nectar-thickened liquids as ordered. Consider re-initiation of bowel regimen if constipation persists. Encourage continued adequate intake of meals/ONS as tolerated.    Monitoring and Evaluation:   [ x ] PO intake [ x ] Tolerance to diet prescription [ x ] weights [ x ] labs[ x ] follow up per protocol  [ ] other:

## 2020-08-04 NOTE — PROGRESS NOTE ADULT - SUBJECTIVE AND OBJECTIVE BOX
CATHY SANCHEZ    [facility] ICU1 11    Patient is a 79y old  Male who presents with a chief complaint of UNRESPONSIVENESS (04 Aug 2020 09:29)       Allergies    Allergy Status Unknown  No Known Allergies    Intolerances        HPI:  80yo Male PMHx CVA 2012, HTN, HLD , hernia surgery with testicle resection 2015, umbilical hernia repair 12/2019,  transferred from [place] ED to [place] ED with s/p seizure activity  and bradycardia into the 30s.  Arrived to [place] hypothermic and hypotensive .Patient  required intubation ph 7.1 with CO2 85 due to acute hypoxic respiratory failure on arrival to ER ,placed on Levophed drip due to hypotension Admitted for septic workup and evaluation ,send blood and urine cx, serial lactate levels, monitor vitals closely hydration ,monitor urine output and renal profile,iv abx initiated -given ceftriaxone in ER and seen by ID consult .Pulmonology and cardiology consults are called .Patient admitted  to ICU with septic shock ,noted to have  tongue bite and AMS ,neurology evaluation called  Admitted  to intensive care  unit for monitoring , to rule out ami -send 3 sets of cardiac enzymes to rule out acute coronary event, obtain ECHO to evaluate LVEF, cardiology consult  ,continue current sepsis management, ventilator management /O2 supply, anticoagulation plan as per cardiology consult Palliative care consult requested ,to discuss advance directives and complete MOLST .Tried to reach family to obtain details of past medical hx - left a message for daughter .not able to reach son ( non valid number )[place]  ED team spoke with patient's daughter, who stated her father fell to the ground on the floor of his motel room about 4 days ago and was unable to get up. She had been caring for him on the floor since and did not call 911 for assistance. Today she found him unresponsive, with blood in his mouth, and incontinent of urine.  CT brain was negative. A CODE STROKE was called, and a CTA head/neck were performed, which was negative for large vessel occlusion but concerning for a density anterior to the ascending aorta possibly right atrial thrombus. Also revealed enlarged, multinodular thyroid. TPA criteria was not met, as patient had unknown onset of symptoms. Patient was transferred to [facility] ED for further care .He developed  multiple witnessed seizures in [place] ED, temporarily resolved with Ativan administration . CT/CTA brain NAD . Seen by neurologist and EEG ordered to r/o status epilepticus as etiology of ongoing encephalopathy, probably will require  MRI once stabilized from cardiac and respiratory standpoint. (28 Jul 2020 10:57)      PAST MEDICAL & SURGICAL HISTORY:  Chronic GERD  HTN (hypertension)  HLD (hyperlipidemia)  CVA (cerebral vascular accident)  High cholesterol  HTN (hypertension)  History of surgical removal of testicle  H/O hernia repair  No significant past surgical history      FAMILY HISTORY:        MEDICATIONS   acetaminophen   Tablet .. 650 milliGRAM(s) Oral every 6 hours PRN  amLODIPine   Tablet 5 milliGRAM(s) Oral at bedtime  aspirin  chewable 81 milliGRAM(s) Oral daily  atorvastatin 10 milliGRAM(s) Oral at bedtime  carvedilol 12.5 milliGRAM(s) Oral every 12 hours  furosemide    Tablet 40 milliGRAM(s) Oral daily  heparin   Injectable 5000 Unit(s) SubCutaneous every 8 hours  hydrALAZINE 75 milliGRAM(s) Oral every 8 hours  levETIRAcetam  Solution 500 milliGRAM(s) Oral every 12 hours  pantoprazole    Tablet 40 milliGRAM(s) Oral before breakfast      Vital Signs Last 24 Hrs  T(C): 36.6 (04 Aug 2020 07:22), Max: 37 (03 Aug 2020 19:23)  T(F): 97.8 (04 Aug 2020 07:22), Max: 98.6 (03 Aug 2020 19:23)  HR: 63 (04 Aug 2020 09:48) (56 - 80)  BP: 165/73 (04 Aug 2020 09:16) (96/50 - 183/81)  BP(mean): 105 (04 Aug 2020 09:16) (71 - 123)  RR: 30 (04 Aug 2020 09:48) (22 - 42)  SpO2: 92% (04 Aug 2020 09:48) (91% - 100%)      08-03-20 @ 07:01  -  08-04-20 @ 07:00  --------------------------------------------------------  IN: 910 mL / OUT: 1550 mL / NET: -640 mL            LABS:                        7.9    6.77  )-----------( 177      ( 04 Aug 2020 06:00 )             26.3     08-04    143  |  102  |  27<H>  ----------------------------<  98  3.6   |  40<H>  |  1.30    Ca    8.5      04 Aug 2020 06:00  Phos  3.3     08-04  Mg     2.1     08-04    TPro  5.9<L>  /  Alb  2.2<L>  /  TBili  1.4<H>  /  DBili  x   /  AST  21  /  ALT  45  /  AlkPhos  55  08-04              WBC:  WBC Count: 6.77 K/uL (08-04 @ 06:00)  WBC Count: 7.75 K/uL (08-03 @ 08:32)  WBC Count: 7.13 K/uL (08-02 @ 05:28)  WBC Count: 5.89 K/uL (08-01 @ 05:35)      MICROBIOLOGY:  RECENT CULTURES:  07-31 .Sputum Sputum XXXX   Moderate polymorphonuclear leukocytes per low power field  No Squamous epithelial cells per low power field  Rare Yeast like cells per oil power field   Normal Respiratory Jumana present    07-29 .Urine Clean Catch (Midstream) XXXX XXXX   No growth    07-28 .Blood Blood-Peripheral XXXX XXXX   No Growth Final                    Sodium:  Sodium, Serum: 143 mmol/L (08-04 @ 06:00)  Sodium, Serum: 143 mmol/L (08-03 @ 08:32)  Sodium, Serum: 145 mmol/L (08-02 @ 05:28)  Sodium, Serum: 145 mmol/L (08-01 @ 05:35)  Sodium, Serum: 145 mmol/L (07-31 @ 18:29)      1.30 mg/dL 08-04 @ 06:00  1.30 mg/dL 08-03 @ 08:32  1.30 mg/dL 08-02 @ 05:28  1.40 mg/dL 08-01 @ 05:35  1.40 mg/dL 07-31 @ 18:29      Hemoglobin:  Hemoglobin: 7.9 g/dL (08-04 @ 06:00)  Hemoglobin: 8.9 g/dL (08-03 @ 08:32)  Hemoglobin: 8.4 g/dL (08-02 @ 05:28)  Hemoglobin: 8.8 g/dL (08-01 @ 05:35)      Platelets: Platelet Count - Automated: 177 K/uL (08-04 @ 06:00)  Platelet Count - Automated: 185 K/uL (08-03 @ 08:32)  Platelet Count - Automated: 156 K/uL (08-02 @ 05:28)  Platelet Count - Automated: 142 K/uL (08-01 @ 05:35)      LIVER FUNCTIONS - ( 04 Aug 2020 06:00 )  Alb: 2.2 g/dL / Pro: 5.9 g/dL / ALK PHOS: 55 U/L / ALT: 45 U/L / AST: 21 U/L / GGT: x                 RADIOLOGY & ADDITIONAL STUDIES:

## 2020-08-04 NOTE — PROGRESS NOTE ADULT - SUBJECTIVE AND OBJECTIVE BOX
Neurology follow up note    CATHY [id number]      Interval History:    Patient feels ok no new complaints.    MEDICATIONS    acetaminophen   Tablet .. 650 milliGRAM(s) Oral every 6 hours PRN  amLODIPine   Tablet 5 milliGRAM(s) Oral at bedtime  aspirin  chewable 81 milliGRAM(s) Oral daily  atorvastatin 10 milliGRAM(s) Oral at bedtime  carvedilol 12.5 milliGRAM(s) Oral every 12 hours  furosemide    Tablet 40 milliGRAM(s) Oral daily  heparin   Injectable 5000 Unit(s) SubCutaneous every 8 hours  hydrALAZINE 75 milliGRAM(s) Oral every 8 hours  levETIRAcetam  Solution 500 milliGRAM(s) Oral every 12 hours  pantoprazole    Tablet 40 milliGRAM(s) Oral before breakfast      Allergies    Allergy Status Unknown  No Known Allergies    Intolerances            Vital Signs Last 24 Hrs  T(C): 36.6 (04 Aug 2020 07:22), Max: 37 (03 Aug 2020 19:23)  T(F): 97.8 (04 Aug 2020 07:22), Max: 98.6 (03 Aug 2020 19:23)  HR: 66 (04 Aug 2020 08:53) (56 - 80)  BP: 131/93 (04 Aug 2020 08:53) (96/50 - 183/81)  BP(mean): 109 (04 Aug 2020 08:53) (71 - 123)  RR: 33 (04 Aug 2020 08:53) (22 - 42)  SpO2: 95% (04 Aug 2020 08:53) (91% - 100%)        REVIEW OF SYSTEMS:     Constitutional: No fever, chills, fatigue, weakness  Eyes: no eye pain, visual disturbances, or discharge  ENT:  No difficulty hearing, tinnitus, vertigo; No sinus or throat pain  Neck: No pain or stiffness  Respiratory: No cough, dyspnea, wheezing   Cardiovascular: No chest pain, palpitations,   Gastrointestinal: No abdominal or epigastric pain. No nausea, vomiting  No diarrhea or constipation.   Genitourinary: No dysuria, frequency, hematuria or incontinence  Neurological: No headaches, lightheadedness, vertigo, numbness or tremors  Psychiatric: No depression, anxiety, mood swings or difficulty sleeping  Musculoskeletal: No joint pain or swelling; No muscle, back or extremity pain  Skin: No itching, burning, rashes or lesions   Lymph Nodes: No enlarged glands  Endocrine: No heat or cold intolerance; No hair loss   Allergy and Immunologic: No hives or eczema    On Neurological Examination:    Mental Status - Patient is alert, awake  year 20  aug  [place]       Follow simple commands    Speech -   Fluent    Cranial Nerves - Pupils 3 mm equal and reactive to light,   extraocular eye movements intact.   smile symmetric  intact bilateral NLF    Motor Exam -   Right upper 4/5  Left upper 4/5  Right lower3/5  Left lower 2/5 has pain in knee     Muscle tone - is normal all over.  No asymmetry is seen.      Sensory    Bilateral intact to light touch    GENERAL Exam: Nontoxic , No Acute Distress   	  HEENT:  normocephalic, atraumatic  		  LUNGS:  Decreased bilaterally  	  HEART: Normal S1S2   No murmur RRR        	  GI/ ABDOMEN:  Soft  Non tender    EXTREMITIES:   No Edema  No Clubbing  No Cyanosis   	   SKIN: Normal  No Ecchymosis             LABS:  CBC Full  -  ( 04 Aug 2020 06:00 )  WBC Count : 6.77 K/uL  RBC Count : 2.84 M/uL  Hemoglobin : 7.9 g/dL  Hematocrit : 26.3 %  Platelet Count - Automated : 177 K/uL  Mean Cell Volume : 92.6 fl  Mean Cell Hemoglobin : 27.8 pg  Mean Cell Hemoglobin Concentration : 30.0 gm/dL  Auto Neutrophil # : 4.87 K/uL  Auto Lymphocyte # : 0.62 K/uL  Auto Monocyte # : 0.86 K/uL  Auto Eosinophil # : 0.37 K/uL  Auto Basophil # : 0.02 K/uL  Auto Neutrophil % : 71.9 %  Auto Lymphocyte % : 9.2 %  Auto Monocyte % : 12.7 %  Auto Eosinophil % : 5.5 %  Auto Basophil % : 0.3 %      08-04    143  |  102  |  27<H>  ----------------------------<  98  3.6   |  40<H>  |  1.30    Ca    8.5      04 Aug 2020 06:00  Phos  3.3     08-04  Mg     2.1     08-04    TPro  5.9<L>  /  Alb  2.2<L>  /  TBili  1.4<H>  /  DBili  x   /  AST  21  /  ALT  45  /  AlkPhos  55  08-04    Hemoglobin A1C:     LIVER FUNCTIONS - ( 04 Aug 2020 06:00 )  Alb: 2.2 g/dL / Pro: 5.9 g/dL / ALK PHOS: 55 U/L / ALT: 45 U/L / AST: 21 U/L / GGT: x           Vitamin B12         RADIOLOGY      ANALYSIS AND PLAN:  This is an 79-year-old with an episode of altered mental status and seizure.  1.	For episode of altered mental status and seizure, suspect this could be possibly metabolic encephalopathy.  The patient was hypothermic upon initial presentation, questionable any type of septic type process, septic shock.  2.	off antibiotics   3.	For underlying seizure  events, status epilepticus,  Keppra 500 mg twice a day.  no new events    4.	Ativan 1 mg IV push, q. 6h. p.r.n. for any type of breakthrough seizures.  5.	Seizure precaution.  6.	monitor respiratory status as needed   7.	 EEG no seizures discharges   8.	For history of high cholesterol, continue the patient on statin.  9.	For history of hypertension, monitor systolic blood pressure.  10.	Will monitor the patient's electrolytes.  11.	mri normal   12.	fall precautions   13.	overall more interactive     Physical therapy evaluation if possible and as tolerated.  OOB to chair/ambulation with assistance only.    Greater than 45 minutes spent in direct patient care reviewing  the notes, lab data/ imaging , discussion with multidisciplinary team.

## 2020-08-04 NOTE — PROGRESS NOTE ADULT - ASSESSMENT
The patient is an 79 year old male with a history of HTN, CVA who presents with unresponsiveness.    Plan:  - CTA head and neck with motion artifact, no obvious stenosis. There is a questionable filling defect noted, possibly right atrial thrombus.  - Echocardiogram with mild LV systolic dysfunction, no obvious right atrial thrombus  - MRI with old CVA  - Continue carvedilol 12.5 mg bid; titrate up if needed  - Continue hydralazine 50 mg q6h - consider lowering frequency and titrating up dose  - Continue amlodipine 5 mg daily  - Continue furosemide 40 mg PO daily  - Neurology follow-up The patient is an 79 year old male with a history of HTN, CVA who presents with unresponsiveness.    Plan:  - CTA head and neck with motion artifact, no obvious stenosis. There is a questionable filling defect noted, possibly right atrial thrombus.  - Echocardiogram with mild LV systolic dysfunction, no obvious right atrial thrombus  - MRI with old CVA  - Continue carvedilol 12.5 mg bid; titrate up if needed  - Continue hydralazine 75 mg q8h  - Continue amlodipine 5 mg daily  - Continue furosemide 40 mg PO daily  - Neurology follow-up

## 2020-08-04 NOTE — PROGRESS NOTE ADULT - ASSESSMENT
cont rx cont rx      PATIENT          REVIEW OF SYMPTOMS      Able to give ROS  Yes     RELIABLE No   CONSTITUTIONAL Weakness Yes  Chills No Vision changes No  ENDOCRINE No unexplained hair loss No heat or cold intolerance    ALLERGY No hives  Sore throat No   RESP Coughing blood no  Shortness of breath YES   NEURO No Headache  Confusion Pain neck No   CARDIAC No Chest pain No Palpitations   GI No Pain abdomen NO   Vomiting NO     PHYSICAL EXAM    HEENT Unremarkable PERRLA atraumatic   RESP Fair air entry EXP prolonged    Harsh breath sound Resp distres mild   CARDIAC S1 S2 No S3     NO JVD    ABDOMEN SOFT BS PRESENT NOT DISTENDED No hepatosplenomegaly PEDAL EDEMA present No calf tenderness  NO rash   GENERAL Not TOXIC looking    HPI/PMH.       79 m lving in hotel (evicted recently) was found unresponsive and brought to o er and transferred to [facility] 2020 and Pulm consulted     Pt was noted unresponsive on hotel floor with possible tongue bite ? seizure incontinent   er vitals [facility] S 149/65 57         OXYGENATION      2020 ra 93%     VITALS/LABS     2020 60 130/60     EVENTS.     2020 Pt being tr out of icu    RELEVANT INFORMATION       ABG 2020 10a cpap  741/56/64     ALBUTEROL albuterol ()     COVID igg  covid igg pos+betzy   COVID pcr  Neg-betzy     W 2020 w 6.3   BLOD C  bc Neg-betzy   URINE c  uc Neg-betzy   ROCEPHIN rocephin (-)     CK 2020 ck 335   ECHO  echo ef 45% dd biatrial enlargement  ASA  asa 81 ()  LASIX lasix 60 () --> lasix 40 ()   HYDRALAZINE hydralaz 50.3 ()    CARVEDILOL carvedilol 6.25x2(2020)     Hb --2020 Hb 9.2 - 8.6-7.9    Plt -2020 plt 154 - 134   Na 2020 Na 146   Xr ----2020 Cr 1.7 - 1.6 -1.4 - 1.3-1.3    MR  mr chr infarct r occipital lobe chr infarcts r post frontal and r perirolandic region  Chr lacunar infarcts r subinsular   KEPPRA keppra 500.2 ()       PT DATA/BEST PRACTICE  ALLERGY    nka                 WT               2020 128 k                       BMI                2020 40             CrCl                                    ADVANCED DIRECTIVE     LINES TUBES POA.                 NONVIOLENT NONSELFDESTRUCTIVE LEVEL ONE 2020   HEAD OF BED ELEVATION Yes      INFECTION PPLX       PROCEDURE   Intubation )   Arevalo   Extubation   DVT PROPHYLAXIS         hposc (2020)   SCHULTZ PROPHYLAXIS          Protonix 40 ()   DYSPHAGIA EVAL     8/3 dys 1 nectar recommended  DIET.     jevity 1.5 1600 () (og)  --> dys 1 puree nectar ()                                                                        IV F       PATIENT DATA/ASSESSMENT/RECOMMENDATIONS     79 m found unresponsive  possibly from seiz with possible clot in r atrium on cta head resp failure pH 711 pco2 80 possible rhabdo possible mi chf in [place] No ac cva found on ct head   Pt intubated in er 2020 Extubated  started on po diet 2020    RESP stable off vent Had hypercapnic resp failure Will check abg (2020)   COVID Prior infection   INFECTION ROCEPHIN rocephin (-)    No active infection   COPD on bd Under contrl  S CHF ECHO  echo ef 45% dd biatrial enlargement  lasix 40 ()   HYDRALAZINE hydralaz 50.3 ()    CARVEDILOL carvedilol 6.25x2(2020)      Chck cxr   LASHELL Inmproved  2020 cr 1.3      Sz MR  mr chr infarct r occipital lobe chr infarcts r post frontal and r perirolandic region  Chr lacunar infarcts r subinsular   Keppra                                 DC planning         TIME SPENT Over 25 minutes aggregate care time spent on encounter; activities included combinations of  direct pt care, counseling and/or coordinating care reviewing notes, lab data/ imaging, discussion with multidisciplinary team/ pt /family. Risks, benefits, alternatives of planned interventions when applicable were discussed in detail and questions answered as best as possible    LAURA MORGAN [facility] [phone]  1938 DOA 2020 DR SHAREE PUTNAM

## 2020-08-05 ENCOUNTER — TRANSCRIPTION ENCOUNTER (OUTPATIENT)
Age: 80
End: 2020-08-05

## 2020-08-05 LAB
ALBUMIN SERPL ELPH-MCNC: 2.4 G/DL — LOW (ref 3.3–5)
ALP SERPL-CCNC: 57 U/L — SIGNIFICANT CHANGE UP (ref 40–120)
ALT FLD-CCNC: 45 U/L — SIGNIFICANT CHANGE UP (ref 12–78)
ANION GAP SERPL CALC-SCNC: 3 MMOL/L — LOW (ref 5–17)
AST SERPL-CCNC: 21 U/L — SIGNIFICANT CHANGE UP (ref 15–37)
BILIRUB SERPL-MCNC: 1.3 MG/DL — HIGH (ref 0.2–1.2)
BUN SERPL-MCNC: 30 MG/DL — HIGH (ref 7–23)
CALCIUM SERPL-MCNC: 8.8 MG/DL — SIGNIFICANT CHANGE UP (ref 8.5–10.1)
CHLORIDE SERPL-SCNC: 100 MMOL/L — SIGNIFICANT CHANGE UP (ref 96–108)
CO2 SERPL-SCNC: 40 MMOL/L — HIGH (ref 22–31)
CREAT SERPL-MCNC: 1.2 MG/DL — SIGNIFICANT CHANGE UP (ref 0.5–1.3)
GLUCOSE SERPL-MCNC: 93 MG/DL — SIGNIFICANT CHANGE UP (ref 70–99)
INR BLD: 1.19 RATIO — HIGH (ref 0.88–1.16)
MAGNESIUM SERPL-MCNC: 2 MG/DL — SIGNIFICANT CHANGE UP (ref 1.6–2.6)
NT-PROBNP SERPL-SCNC: 5446 PG/ML — HIGH (ref 0–450)
PHOSPHATE SERPL-MCNC: 2.8 MG/DL — SIGNIFICANT CHANGE UP (ref 2.5–4.5)
POTASSIUM SERPL-MCNC: 3.7 MMOL/L — SIGNIFICANT CHANGE UP (ref 3.5–5.3)
POTASSIUM SERPL-SCNC: 3.7 MMOL/L — SIGNIFICANT CHANGE UP (ref 3.5–5.3)
PROCALCITONIN SERPL-MCNC: 0.15 NG/ML — HIGH (ref 0–0.04)
PROT SERPL-MCNC: 6.5 G/DL — SIGNIFICANT CHANGE UP (ref 6–8.3)
PROTHROM AB SERPL-ACNC: 13.8 SEC — HIGH (ref 10.6–13.6)
SODIUM SERPL-SCNC: 143 MMOL/L — SIGNIFICANT CHANGE UP (ref 135–145)

## 2020-08-05 PROCEDURE — 71045 X-RAY EXAM CHEST 1 VIEW: CPT | Mod: 26

## 2020-08-05 RX ADMIN — Medication 75 MILLIGRAM(S): at 05:50

## 2020-08-05 RX ADMIN — HEPARIN SODIUM 5000 UNIT(S): 5000 INJECTION INTRAVENOUS; SUBCUTANEOUS at 14:32

## 2020-08-05 RX ADMIN — Medication 650 MILLIGRAM(S): at 23:05

## 2020-08-05 RX ADMIN — Medication 650 MILLIGRAM(S): at 21:45

## 2020-08-05 RX ADMIN — Medication 75 MILLIGRAM(S): at 14:32

## 2020-08-05 RX ADMIN — LEVETIRACETAM 500 MILLIGRAM(S): 250 TABLET, FILM COATED ORAL at 05:50

## 2020-08-05 RX ADMIN — ATORVASTATIN CALCIUM 10 MILLIGRAM(S): 80 TABLET, FILM COATED ORAL at 21:45

## 2020-08-05 RX ADMIN — CARVEDILOL PHOSPHATE 12.5 MILLIGRAM(S): 80 CAPSULE, EXTENDED RELEASE ORAL at 17:32

## 2020-08-05 RX ADMIN — Medication 81 MILLIGRAM(S): at 11:45

## 2020-08-05 RX ADMIN — CARVEDILOL PHOSPHATE 12.5 MILLIGRAM(S): 80 CAPSULE, EXTENDED RELEASE ORAL at 05:50

## 2020-08-05 RX ADMIN — PANTOPRAZOLE SODIUM 40 MILLIGRAM(S): 20 TABLET, DELAYED RELEASE ORAL at 05:50

## 2020-08-05 RX ADMIN — Medication 40 MILLIGRAM(S): at 05:50

## 2020-08-05 RX ADMIN — HEPARIN SODIUM 5000 UNIT(S): 5000 INJECTION INTRAVENOUS; SUBCUTANEOUS at 21:45

## 2020-08-05 RX ADMIN — HEPARIN SODIUM 5000 UNIT(S): 5000 INJECTION INTRAVENOUS; SUBCUTANEOUS at 05:50

## 2020-08-05 RX ADMIN — AMLODIPINE BESYLATE 5 MILLIGRAM(S): 2.5 TABLET ORAL at 21:45

## 2020-08-05 RX ADMIN — Medication 75 MILLIGRAM(S): at 21:45

## 2020-08-05 RX ADMIN — LEVETIRACETAM 500 MILLIGRAM(S): 250 TABLET, FILM COATED ORAL at 17:32

## 2020-08-05 NOTE — PROGRESS NOTE ADULT - SUBJECTIVE AND OBJECTIVE BOX
Neurology follow up note    CATHY [id number]      Interval History:    Patient feels ok no new complaints.    MEDICATIONS    acetaminophen   Tablet .. 650 milliGRAM(s) Oral every 6 hours PRN  amLODIPine   Tablet 5 milliGRAM(s) Oral at bedtime  aspirin  chewable 81 milliGRAM(s) Oral daily  atorvastatin 10 milliGRAM(s) Oral at bedtime  carvedilol 12.5 milliGRAM(s) Oral every 12 hours  furosemide    Tablet 40 milliGRAM(s) Oral daily  heparin   Injectable 5000 Unit(s) SubCutaneous every 8 hours  hydrALAZINE 75 milliGRAM(s) Oral every 8 hours  levETIRAcetam  Solution 500 milliGRAM(s) Oral every 12 hours  pantoprazole    Tablet 40 milliGRAM(s) Oral before breakfast      Allergies    Allergy Status Unknown  No Known Allergies    Intolerances            Vital Signs Last 24 Hrs  T(C): 37.1 (05 Aug 2020 07:15), Max: 37.6 (04 Aug 2020 23:44)  T(F): 98.7 (05 Aug 2020 07:15), Max: 99.6 (04 Aug 2020 23:44)  HR: 68 (05 Aug 2020 07:15) (61 - 72)  BP: 122/57 (05 Aug 2020 07:15) (122/57 - 151/71)  BP(mean): 90 (04 Aug 2020 11:00) (90 - 90)  RR: 20 (05 Aug 2020 07:15) (18 - 31)  SpO2: 93% (05 Aug 2020 07:15) (93% - 96%)      REVIEW OF SYSTEMS:     Constitutional: No fever, chills, fatigue, weakness  Eyes: no eye pain, visual disturbances, or discharge  ENT:  No difficulty hearing, tinnitus, vertigo; No sinus or throat pain  Neck: No pain or stiffness  Respiratory: No cough, dyspnea, wheezing   Cardiovascular: No chest pain, palpitations,   Gastrointestinal: No abdominal or epigastric pain. No nausea, vomiting  No diarrhea or constipation.   Genitourinary: No dysuria, frequency, hematuria or incontinence  Neurological: No headaches, lightheadedness, vertigo, numbness or tremors  Psychiatric: No depression, anxiety, mood swings or difficulty sleeping  Musculoskeletal: No joint pain or swelling; No muscle, back or extremity pain  Skin: No itching, burning, rashes or lesions   Lymph Nodes: No enlarged glands  Endocrine: No heat or cold intolerance; No hair loss   Allergy and Immunologic: No hives or eczema    On Neurological Examination:    Mental Status - Patient is alert, awake  year 20  aug  [facility] [place]       Follow simple commands    Speech -   Fluent    Cranial Nerves - Pupils 3 mm equal and reactive to light,   extraocular eye movements intact.   smile symmetric  intact bilateral NLF    Motor Exam -   Right upper 4/5  Left upper 4/5  Right lower3/5  Left lower 2/5 has pain in knee     Muscle tone - is normal all over.  No asymmetry is seen.      Sensory    Bilateral intact to light touch    GENERAL Exam: Nontoxic , No Acute Distress   	  HEENT:  normocephalic, atraumatic  		  LUNGS:  Decreased bilaterally  	  HEART: Normal S1S2   No murmur RRR        	  GI/ ABDOMEN:  Soft  Non tender    EXTREMITIES:   No Edema  No Clubbing  No Cyanosis   	   SKIN: Normal  No Ecchymosis                LABS:  CBC Full  -  ( 05 Aug 2020 07:11 )  WBC Count : 7.22 K/uL  RBC Count : 2.81 M/uL  Hemoglobin : 7.8 g/dL  Hematocrit : 26.1 %  Platelet Count - Automated : 211 K/uL  Mean Cell Volume : 92.9 fl  Mean Cell Hemoglobin : 27.8 pg  Mean Cell Hemoglobin Concentration : 29.9 gm/dL  Auto Neutrophil # : 5.12 K/uL  Auto Lymphocyte # : 0.87 K/uL  Auto Monocyte # : 0.76 K/uL  Auto Eosinophil # : 0.40 K/uL  Auto Basophil # : 0.03 K/uL  Auto Neutrophil % : 71.0 %  Auto Lymphocyte % : 12.0 %  Auto Monocyte % : 10.5 %  Auto Eosinophil % : 5.5 %  Auto Basophil % : 0.4 %      08-05    143  |  100  |  30<H>  ----------------------------<  93  3.7   |  40<H>  |  1.20    Ca    8.8      05 Aug 2020 07:11  Phos  2.8     08-05  Mg     2.0     08-05    TPro  6.5  /  Alb  2.4<L>  /  TBili  1.3<H>  /  DBili  x   /  AST  21  /  ALT  45  /  AlkPhos  57  08-05    Hemoglobin A1C:     LIVER FUNCTIONS - ( 05 Aug 2020 07:11 )  Alb: 2.4 g/dL / Pro: 6.5 g/dL / ALK PHOS: 57 U/L / ALT: 45 U/L / AST: 21 U/L / GGT: x           Vitamin B12   PT/INR - ( 05 Aug 2020 07:11 )   PT: 13.8 sec;   INR: 1.19 ratio               RADIOLOGY    ANALYSIS AND PLAN:  This is an 79-year-old with an episode of altered mental status and seizure.  1.	For episode of altered mental status and seizure, suspect this could be possibly metabolic encephalopathy.  The patient was hypothermic upon initial presentation, questionable any type of septic type process, septic shock.  2.	off antibiotics   3.	For underlying seizure  events, status epilepticus,  Keppra 500 mg twice a day.  no new events    4.	Ativan 1 mg IV push, q. 6h. p.r.n. for any type of breakthrough seizures.  5.	Seizure precaution.  6.	monitor respiratory status as needed   7.	 EEG no seizures discharges   8.	For history of high cholesterol, continue the patient on statin.  9.	For history of hypertension, monitor systolic blood pressure.  10.	Will monitor the patient's electrolytes.  11.	mri normal   12.	fall precautions   13.	overall more interactive     Physical therapy evaluation if possible and as tolerated.  OOB to chair/ambulation with assistance only.    Greater than 40 minutes spent in direct patient care reviewing  the notes, lab data/ imaging , discussion with multidisciplinary team.

## 2020-08-05 NOTE — DISCHARGE NOTE PROVIDER - CARE PROVIDERS DIRECT ADDRESSES
,DirectAddress_Unknown,[email or web address],DirectAddress_Unknown

## 2020-08-05 NOTE — DISCHARGE NOTE PROVIDER - CARE PROVIDER_API CALL
Nikunj Lima  CARDIOVASCULAR DISEASE  [address]  [place] [address]  Phone: [phone]  Fax: [phone]  Follow Up Time: 2 weeks    Sami Johnson  CRITICAL CARE MEDICINE  [facility] [place] [address]  Phone: [phone]  Fax: [phone]  Follow Up Time: 1 month    Pahlavan, Mohsen  NEPHROLOGY  [address]  [place] [address]  Phone: [phone]  Fax: [phone]  Follow Up Time: 1 month    Annemarie Harvey  NEUROLOGY  [address] [place] [address]  Phone: [phone]  Fax: [phone]  Follow Up Time: 1 month

## 2020-08-05 NOTE — PROGRESS NOTE ADULT - SUBJECTIVE AND OBJECTIVE BOX
CATHY SANCHEZ    [facility] TELN [address] D1    Patient is a 79y old  Male who presents with a chief complaint of UNRESPONSIVENESS (05 Aug 2020 08:36)       Allergies    Allergy Status Unknown  No Known Allergies    Intolerances        HPI:  78yo Male PMHx CVA 2012, HTN, HLD , hernia surgery with testicle resection 2015, umbilical hernia repair 12/2019,  transferred from [place] ED to [place] ED with s/p seizure activity  and bradycardia into the 30s.  Arrived to [place] hypothermic and hypotensive .Patient  required intubation ph 7.1 with CO2 85 due to acute hypoxic respiratory failure on arrival to ER ,placed on Levophed drip due to hypotension Admitted for septic workup and evaluation ,send blood and urine cx, serial lactate levels, monitor vitals closely hydration ,monitor urine output and renal profile,iv abx initiated -given ceftriaxone in ER and seen by ID consult .Pulmonology and cardiology consults are called .Patient admitted  to ICU with septic shock ,noted to have  tongue bite and AMS ,neurology evaluation called  Admitted  to intensive care  unit for monitoring , to rule out ami -send 3 sets of cardiac enzymes to rule out acute coronary event, obtain ECHO to evaluate LVEF, cardiology consult  ,continue current sepsis management, ventilator management /O2 supply, anticoagulation plan as per cardiology consult Palliative care consult requested ,to discuss advance directives and complete MOLST .Tried to reach family to obtain details of past medical hx - left a message for daughter .not able to reach son ( non valid number )[place]  ED team spoke with patient's daughter, who stated her father fell to the ground on the floor of his motel room about 4 days ago and was unable to get up. She had been caring for him on the floor since and did not call 911 for assistance. Today she found him unresponsive, with blood in his mouth, and incontinent of urine.  CT brain was negative. A CODE STROKE was called, and a CTA head/neck were performed, which was negative for large vessel occlusion but concerning for a density anterior to the ascending aorta possibly right atrial thrombus. Also revealed enlarged, multinodular thyroid. TPA criteria was not met, as patient had unknown onset of symptoms. Patient was transferred to [facility] ED for further care .He developed  multiple witnessed seizures in [place] ED, temporarily resolved with Ativan administration . CT/CTA brain NAD . Seen by neurologist and EEG ordered to r/o status epilepticus as etiology of ongoing encephalopathy, probably will require  MRI once stabilized from cardiac and respiratory standpoint. (28 Jul 2020 10:57)      PAST MEDICAL & SURGICAL HISTORY:  Chronic GERD  HTN (hypertension)  HLD (hyperlipidemia)  CVA (cerebral vascular accident)  High cholesterol  HTN (hypertension)  History of surgical removal of testicle  H/O hernia repair  No significant past surgical history      FAMILY HISTORY:        MEDICATIONS   acetaminophen   Tablet .. 650 milliGRAM(s) Oral every 6 hours PRN  amLODIPine   Tablet 5 milliGRAM(s) Oral at bedtime  aspirin  chewable 81 milliGRAM(s) Oral daily  atorvastatin 10 milliGRAM(s) Oral at bedtime  carvedilol 12.5 milliGRAM(s) Oral every 12 hours  furosemide    Tablet 40 milliGRAM(s) Oral daily  heparin   Injectable 5000 Unit(s) SubCutaneous every 8 hours  hydrALAZINE 75 milliGRAM(s) Oral every 8 hours  levETIRAcetam  Solution 500 milliGRAM(s) Oral every 12 hours  pantoprazole    Tablet 40 milliGRAM(s) Oral before breakfast      Vital Signs Last 24 Hrs  T(C): 36.9 (05 Aug 2020 04:24), Max: 37.6 (04 Aug 2020 23:44)  T(F): 98.4 (05 Aug 2020 04:24), Max: 99.6 (04 Aug 2020 23:44)  HR: 72 (05 Aug 2020 04:24) (61 - 72)  BP: 142/62 (05 Aug 2020 04:24) (130/56 - 151/71)  BP(mean): 90 (04 Aug 2020 11:00) (90 - 97)  RR: 20 (05 Aug 2020 04:24) (18 - 31)  SpO2: 93% (05 Aug 2020 04:24) (92% - 96%)      08-04-20 @ 07:01  -  08-05-20 @ 07:00  --------------------------------------------------------  IN: 340 mL / OUT: 350 mL / NET: -10 mL            LABS:                        7.8    7.22  )-----------( 211      ( 05 Aug 2020 07:11 )             26.1     08-05    143  |  100  |  30<H>  ----------------------------<  93  3.7   |  40<H>  |  1.20    Ca    8.8      05 Aug 2020 07:11  Phos  2.8     08-05  Mg     2.0     08-05    TPro  6.5  /  Alb  2.4<L>  /  TBili  1.3<H>  /  DBili  x   /  AST  21  /  ALT  45  /  AlkPhos  57  08-05    PT/INR - ( 05 Aug 2020 07:11 )   PT: 13.8 sec;   INR: 1.19 ratio               ABG - ( 04 Aug 2020 14:32 )  pH, Arterial: 7.42  pH, Blood: x     /  pCO2: 60    /  pO2: 96    / HCO3: 36    / Base Excess: 13.0  /  SaO2: 97                  WBC:  WBC Count: 7.22 K/uL (08-05 @ 07:11)  WBC Count: 6.77 K/uL (08-04 @ 06:00)  WBC Count: 7.75 K/uL (08-03 @ 08:32)  WBC Count: 7.13 K/uL (08-02 @ 05:28)      MICROBIOLOGY:  RECENT CULTURES:  07-31 .Sputum Sputum XXXX   Moderate polymorphonuclear leukocytes per low power field  No Squamous epithelial cells per low power field  Rare Yeast like cells per oil power field   Normal Respiratory Jumana present                PT/INR - ( 05 Aug 2020 07:11 )   PT: 13.8 sec;   INR: 1.19 ratio             Sodium:  Sodium, Serum: 143 mmol/L (08-05 @ 07:11)  Sodium, Serum: 143 mmol/L (08-04 @ 06:00)  Sodium, Serum: 143 mmol/L (08-03 @ 08:32)  Sodium, Serum: 145 mmol/L (08-02 @ 05:28)      1.20 mg/dL 08-05 @ 07:11  1.30 mg/dL 08-04 @ 06:00  1.30 mg/dL 08-03 @ 08:32  1.30 mg/dL 08-02 @ 05:28      Hemoglobin:  Hemoglobin: 7.8 g/dL (08-05 @ 07:11)  Hemoglobin: 7.9 g/dL (08-04 @ 06:00)  Hemoglobin: 8.9 g/dL (08-03 @ 08:32)  Hemoglobin: 8.4 g/dL (08-02 @ 05:28)      Platelets: Platelet Count - Automated: 211 K/uL (08-05 @ 07:11)  Platelet Count - Automated: 177 K/uL (08-04 @ 06:00)  Platelet Count - Automated: 185 K/uL (08-03 @ 08:32)  Platelet Count - Automated: 156 K/uL (08-02 @ 05:28)      LIVER FUNCTIONS - ( 05 Aug 2020 07:11 )  Alb: 2.4 g/dL / Pro: 6.5 g/dL / ALK PHOS: 57 U/L / ALT: 45 U/L / AST: 21 U/L / GGT: x                 RADIOLOGY & ADDITIONAL STUDIES:

## 2020-08-05 NOTE — PROGRESS NOTE ADULT - ASSESSMENT
cont rx cont rx      PAPITOS CATHY [facility] P [id number]  1940 DOA 2020 DR SHAREE PUTNAM     REVIEW OF SYMPTOMS      Able to give ROS  NO     PHYSICAL EXAM    HEENT Unremarkable PERRLA atraumatic   RESP Fair air entry EXP prolonged    Harsh breath sound Resp distres mild   CARDIAC S1 S2 No S3     NO JVD    ABDOMEN SOFT BS PRESENT NOT DISTENDED No hepatosplenomegaly PEDAL EDEMA present No calf tenderness  NO rash   GENERAL Not TOXIC looking    HPI/PMH.       79 m lving in hotel (evicted recently) was found unresponsive and brought to [place] er and transferred to [facility] P 2020 and Pulm consulted     Pt was noted unresponsive on hotel floor with possible tongue bite ? seizure incontinent   er vitals [facility] S 149/65 57         OXYGENATION      2020 ra 93%     VITALS/LABS     2020 99 70 110/50     PROCEDURE   Intubation )   Arevalo   Extubation   DVT PROPHYLAXIS         hposc (2020)   SCHULTZ PROPHYLAXIS          Protonix 40 ()   DYSPHAGIA EVAL     8/3 dys 1 nectar recommended  DIET.     jevity 1.5 1600 () (og)  --> dys 1 puree nectar ()                                                                        IV F       PATIENT DATA/ASSESSMENT/RECOMMENDATIONS     79 m found unresponsive  possibly from seiz with possible clot in r atrium on cta head resp failure pH 711 pco2 80 possible rhabdo possible mi chf in [place] No ac cva found on ct head   Pt intubated in er 2020 Extubated  started on po diet 2020    RESP stable off vent Had hypercapnic resp failure Will check abg (2020)   COVID Prior infection   INFECTION ROCEPHIN rocephin (-)    No active infection   COPD on bd Under contrl  S CHF ECHO  echo ef 45% dd biatrial enlargement  BNP 2020 bnp 5446  ECHO  echo ef 45% dd biatrial enlargement  ASA  asa 81 ()  LASIX lasix 60 () --> lasix 40 ()   HYDRALAZINE hydralaz 50.3 ()  --> hydralazine 75.3 (8/3 ) (Dr Kline   CARVEDILOL carvedilol 6.25x2(2020)   cont rx  LASHELL Inmproved  2020 cr 1.3      Sz MR  mr chr infarct r occipital lobe chr infarcts r post frontal and r perirolandic region  Chr lacunar infarcts r subinsular   Keppra                                 DC planning         TIME SPENT Over 25 minutes aggregate care time spent on encounter; activities included combinations of  direct pt care, counseling and/or coordinating care reviewing notes, lab data/ imaging, discussion with multidisciplinary team/ pt /family. Risks, benefits, alternatives of planned interventions when applicable were discussed in detail and questions answered as best as possible    LAURA MORGAN [facility] P [phone]  1940 DOA 2020 DR SHAREE PUTNAM

## 2020-08-05 NOTE — PROGRESS NOTE ADULT - ASSESSMENT
78yo Male PMHx CVA 2012, HTN, HLD , hernia surgery with testicle resection 2015, umbilical hernia repair 12/2019,  transferred from [place] ED to [place] ED with s/p seizure activity  and bradycardia into the 30s.  Arrived to [place] hypothermic and hypotensive .Patient  required intubation ph 7.1 with CO2 85 due to acute hypoxic respiratory failure on arrival to ER ,placed on Levophed drip due to hypotension Admitted for septic workup and evaluation ,send blood and urine cx, serial lactate levels, monitor vitals closely hydration ,monitor urine output and renal profile,iv abx initiated -given ceftriaxone in ER and seen by ID consult .Pulmonology and cardiology consults are called .Patient admitted  to ICU with septic shock ,noted to have  tongue bite and AMS ,neurology evaluation called  Admitted  to intensive care  unit for monitoring , to rule out ami -send 3 sets of cardiac enzymes to rule out acute coronary event, obtain ECHO to evaluate LVEF, cardiology consult  ,continue current sepsis management, ventilator management /O2 supply, anticoagulation plan as per cardiology consult Palliative care consult requested ,to discuss advance directives and complete MOLST .Tried to reach family to obtain details of past medical hx - left a message for daughter .not able to reach son ( non valid number )[place]  ED team spoke with patient's daughter, who stated her father fell to the ground on the floor of his motel room about 4 days ago and was unable to get up. She had been caring for him on the floor since and did not call 911 for assistance. Today she found him unresponsive, with blood in his mouth, and incontinent of urine.  CT brain was negative. A CODE STROKE was called, and a CTA head/neck were performed, which was negative for large vessel occlusion but concerning for a density anterior to the ascending aorta possibly right atrial thrombus. Also revealed enlarged, multinodular thyroid. TPA criteria was not met, as patient had unknown onset of symptoms. Patient was transferred to [facility] ED for further care .He developed  multiple witnessed seizures in [place] ED, temporarily resolved with Ativan administration . CT/CTA brain NAD . Seen by neurologist and EEG ordered to r/o status epilepticus as etiology of ongoing encephalopathy, probably will require  MRI once stabilized from cardiac and respiratory standpoint.

## 2020-08-05 NOTE — DISCHARGE NOTE PROVIDER - PROVIDER TOKENS
PROVIDER:[TOKEN:[[id number]],FOLLOWUP:[2 weeks]],PROVIDER:[TOKEN:[[id number]],FOLLOWUP:[1 month]],PROVIDER:[TOKEN:[[id number]],FOLLOWUP:[1 month]],PROVIDER:[TOKEN:[[id number]],FOLLOWUP:[1 month]]

## 2020-08-05 NOTE — PROGRESS NOTE ADULT - ATTENDING COMMENTS
78yo Male PMHx CVA 2012, HTN, HLD , hernia surgery with testicle resection 2015, umbilical hernia repair 12/2019,  transferred from [place] ED to [place] ED with s/p seizure activity  and bradycardia into the 30s.  Arrived to [place] hypothermic and hypotensive .Patient  required intubation ph 7.1 with CO2 85 due to acute hypoxic respiratory failure on arrival to ER ,placed on Levophed drip due to hypotension Admitted for septic workup and evaluation ,send blood and urine cx, serial lactate levels, monitor vitals closely hydration ,monitor urine output and renal profile,iv abx initiated -given ceftriaxone in ER and seen by ID consult .Pulmonology and cardiology consults are called .Patient admitted  to ICU with septic shock ,noted to have  tongue bite and AMS ,neurology evaluation called  Admitted  to intensive care  unit for monitoring , to rule out ami -send 3 sets of cardiac enzymes to rule out acute coronary event, obtain ECHO to evaluate LVEF, cardiology consult  ,continue current sepsis management, ventilator management /O2 supply, anticoagulation plan as per cardiology consult Palliative care consult requested ,to discuss advance directives and complete MOLST .Tried to reach family to obtain details of past medical hx - left a message for daughter .not able to reach son ( non valid number )[place]  ED team spoke with patient's daughter, who stated her father fell to the ground on the floor of his motel room about 4 days ago and was unable to get up. She had been caring for him on the floor since and did not call 911 for assistance. Today she found him unresponsive, with blood in his mouth, and incontinent of urine.  CT brain was negative. A CODE STROKE was called, and a CTA head/neck were performed, which was negative for large vessel occlusion but concerning for a density anterior to the ascending aorta possibly right atrial thrombus. Also revealed enlarged, multinodular thyroid. TPA criteria was not met, as patient had unknown onset of symptoms. Patient was transferred to [facility] ED for further care .He developed  multiple witnessed seizures in [place] ED, temporarily resolved with Ativan administration . CT/CTA brain NAD . Seen by neurologist and EEG ordered to r/o status epilepticus as etiology of ongoing encephalopathy, probably will require  MRI once stabilized from cardiac and respiratory standpoint.

## 2020-08-05 NOTE — PROGRESS NOTE ADULT - SUBJECTIVE AND OBJECTIVE BOX
Date/Time Patient Seen:  		  Referring MD:   Data Reviewed	       Patient is a 79y old  Male who presents with a chief complaint of UNRESPONSIVENESS (04 Aug 2020 12:12)      Subjective/HPI     PAST MEDICAL & SURGICAL HISTORY:  Chronic GERD  HTN (hypertension)  HLD (hyperlipidemia)  CVA (cerebral vascular accident)  High cholesterol  HTN (hypertension)  Unknown and unspecified causes of morbidity  History of surgical removal of testicle  H/O hernia repair  No significant past surgical history        Medication list         MEDICATIONS  (STANDING):  amLODIPine   Tablet 5 milliGRAM(s) Oral at bedtime  aspirin  chewable 81 milliGRAM(s) Oral daily  atorvastatin 10 milliGRAM(s) Oral at bedtime  carvedilol 12.5 milliGRAM(s) Oral every 12 hours  furosemide    Tablet 40 milliGRAM(s) Oral daily  heparin   Injectable 5000 Unit(s) SubCutaneous every 8 hours  hydrALAZINE 75 milliGRAM(s) Oral every 8 hours  levETIRAcetam  Solution 500 milliGRAM(s) Oral every 12 hours  pantoprazole    Tablet 40 milliGRAM(s) Oral before breakfast    MEDICATIONS  (PRN):  acetaminophen   Tablet .. 650 milliGRAM(s) Oral every 6 hours PRN Mild Pain (1 - 3)         Vitals log        ICU Vital Signs Last 24 Hrs  T(C): 36.9 (05 Aug 2020 04:24), Max: 37.6 (04 Aug 2020 23:44)  T(F): 98.4 (05 Aug 2020 04:24), Max: 99.6 (04 Aug 2020 23:44)  HR: 72 (05 Aug 2020 04:24) (61 - 72)  BP: 142/62 (05 Aug 2020 04:24) (130/56 - 165/73)  BP(mean): 90 (04 Aug 2020 11:00) (90 - 109)  ABP: --  ABP(mean): --  RR: 20 (05 Aug 2020 04:24) (18 - 33)  SpO2: 93% (05 Aug 2020 04:24) (92% - 96%)           Input and Output:  I&O's Detail    04 Aug 2020 07:01  -  05 Aug 2020 07:00  --------------------------------------------------------  IN:    Oral Fluid: 340 mL  Total IN: 340 mL    OUT:    Voided: 350 mL  Total OUT: 350 mL    Total NET: -10 mL          Lab Data                        7.8    7.22  )-----------( 211      ( 05 Aug 2020 07:11 )             26.1     08-05    143  |  100  |  30<H>  ----------------------------<  93  3.7   |  40<H>  |  1.20    Ca    8.8      05 Aug 2020 07:11  Phos  2.8     08-05  Mg     2.0     08-05    TPro  6.5  /  Alb  2.4<L>  /  TBili  1.3<H>  /  DBili  x   /  AST  21  /  ALT  45  /  AlkPhos  57  08-05    ABG - ( 04 Aug 2020 14:32 )  pH, Arterial: 7.42  pH, Blood: x     /  pCO2: 60    /  pO2: 96    / HCO3: 36    / Base Excess: 13.0  /  SaO2: 97                      Review of Systems	      Objective     Physical Examination    heart s1s2  lung dec BS  abd soft      Pertinent Lab findings & Imaging      Mickey:  NO   Adequate UO     I&O's Detail    04 Aug 2020 07:01  -  05 Aug 2020 07:00  --------------------------------------------------------  IN:    Oral Fluid: 340 mL  Total IN: 340 mL    OUT:    Voided: 350 mL  Total OUT: 350 mL    Total NET: -10 mL               Discussed with:     Cultures:	        Radiology

## 2020-08-05 NOTE — PROGRESS NOTE ADULT - ASSESSMENT
The patient is an 79 year old male with a history of HTN, CVA who presents with unresponsiveness.    Plan:  - CTA head and neck with motion artifact, no obvious stenosis. There is a questionable filling defect noted, possibly right atrial thrombus.  - Echocardiogram with mild LV systolic dysfunction, no obvious right atrial thrombus  - MRI with old CVA  - Continue carvedilol 12.5 mg bid  - Continue hydralazine 75 mg q8h  - Continue amlodipine 5 mg daily  - Continue furosemide 40 mg PO daily  - Neurology follow-up  - Discharge planning

## 2020-08-05 NOTE — DISCHARGE NOTE PROVIDER - NSDCCPCAREPLAN_GEN_ALL_CORE_FT
PRINCIPAL DISCHARGE DIAGNOSIS  Diagnosis: Septic shock  Assessment and Plan of Treatment:       SECONDARY DISCHARGE DIAGNOSES  Diagnosis: CHF (congestive heart failure)  Assessment and Plan of Treatment: CHF (congestive heart failure)    Diagnosis: LASHELL (acute kidney injury)  Assessment and Plan of Treatment: LASHELL (acute kidney injury)    Diagnosis: Seizure  Assessment and Plan of Treatment: Seizure    Diagnosis: COVID-19 virus IgG antibody detected  Assessment and Plan of Treatment: COVID-19 virus IgG antibody detected    Diagnosis: Bradycardia  Assessment and Plan of Treatment:

## 2020-08-05 NOTE — PROGRESS NOTE ADULT - SUBJECTIVE AND OBJECTIVE BOX
Chief Complaint: Unresponsiveness    Interval Events: No significant changes.    Review of Systems:  Unable to obtain    Physical Exam:  Vital Signs Last 24 Hrs  T(C): 36.9 (05 Aug 2020 04:24), Max: 37.6 (04 Aug 2020 23:44)  T(F): 98.4 (05 Aug 2020 04:24), Max: 99.6 (04 Aug 2020 23:44)  HR: 72 (05 Aug 2020 04:24) (61 - 72)  BP: 142/62 (05 Aug 2020 04:24) (130/56 - 165/73)  BP(mean): 90 (04 Aug 2020 11:00) (90 - 109)  RR: 20 (05 Aug 2020 04:24) (18 - 42)  SpO2: 93% (05 Aug 2020 04:24) (91% - 96%)  General: NAD  HEENT: ET tube in place  Neck: No JVD, no carotid bruit  Lungs: CTAB  CV: RRR, nl S1/S2, no M/R/G  Abdomen: S/NT/ND, +BS  Extremities: 1+ LE edema, no cyanosis  Neuro: Non-focal  Skin: No rash    Labs:             08-05    143  |  100  |  30<H>  ----------------------------<  93  3.7   |  40<H>  |  1.20    Ca    8.8      05 Aug 2020 07:11  Phos  2.8     08-05  Mg     2.0     08-05    TPro  6.5  /  Alb  2.4<L>  /  TBili  1.3<H>  /  DBili  x   /  AST  21  /  ALT  45  /  AlkPhos  57  08-05                        7.8    7.22  )-----------( 211      ( 05 Aug 2020 07:11 )             26.1     Telemetry: Sinus rhythm

## 2020-08-05 NOTE — DISCHARGE NOTE PROVIDER - HOSPITAL COURSE
80yo Male PMHx CVA 2012, HTN, HLD , hernia surgery with testicle resection 2015, umbilical hernia repair 12/2019,  transferred from [place] ED to [place] ED with s/p seizure activity  and bradycardia into the 30s.  Arrived to [place] hypothermic and hypotensive .Patient  required intubation ph 7.1 with CO2 85 due to acute hypoxic respiratory failure on arrival to ER ,placed on Levophed drip due to hypotension Admitted for septic workup and evaluation ,send blood and urine cx, serial lactate levels, monitor vitals closely hydration ,monitor urine output and renal profile,iv abx initiated -given ceftriaxone in ER and seen by ID consult .Pulmonology and cardiology consults are called .Patient admitted  to ICU with septic shock ,noted to have  tongue bite and AMS ,neurology evaluation called  Admitted  to intensive care  unit for monitoring , to rule out ami -send 3 sets of cardiac enzymes to rule out acute coronary event, obtain ECHO to evaluate LVEF, cardiology consult  ,continue current sepsis management, ventilator management /O2 supply, anticoagulation plan as per cardiology consult Palliative care consult requested ,to discuss advance directives and complete MOLST .Tried to reach family to obtain details of past medical hx - left a message for daughter .not able to reach son ( non valid number )[place]  ED team spoke with patient's daughter, who stated her father fell to the ground on the floor of his motel room about 4 days ago and was unable to get up. She had been caring for him on the floor since and did not call 911 for assistance. Today she found him unresponsive, with blood in his mouth, and incontinent of urine.  CT brain was negative. A CODE STROKE was called, and a CTA head/neck were performed, which was negative for large vessel occlusion but concerning for a density anterior to the ascending aorta possibly right atrial thrombus. Also revealed enlarged, multinodular thyroid. TPA criteria was not met, as patient had unknown onset of symptoms. Patient was transferred to [facility] ED for further care .He developed  multiple witnessed seizures in [place] ED, temporarily resolved with Ativan administration . CT/CTA brain NAD . Seen by neurologist and EEG ordered to r/o status epilepticus as etiology of ongoing encephalopathy, probably will require  MRI once stabilized from cardiac and respiratory standpoint.         Problem/Plan - 1:    ·  Problem: Acute respiratory failure.  Plan: MANAGEMENT AS PER ICU TEAM , SERIAL ABGS AND CHEST XRAYS ,PATIENT IS S/P  EXTUBATION.         Problem/Plan - 2:    ·  Problem: Unresponsiveness.  Plan: LIKELY MULTIFACTORIAL 2/2 TO ACUTE METABOLIC ENCEPHALOPATHY SECONDARY TO SEPSIS AND ACUTE RESPIRATORY FAILURE ,POA AND POSTICTAL STATE .COVID RELATED ENCEPHALOPATHY ALSO MAY BE CONTRIBUTING FACTOR .MRI BRAIN NOTED -NO ACUTE EVENTS ,CHRONIC CVA ,PATIENT HAS KNOWN HX OF CVA IN 2012 WITH RESIDUAL DYSARTHRIA.         Problem/Plan - 3:    ·  Problem: CHF (congestive heart failure).  Plan: ACUTE ON CHRONIC COMBINED ,POA - Receiving furosemide and albumin Cardiology f/up     - CTA head and neck with motion artifact, no obvious stenosis. There is a questionable filling defect noted, possibly right atrial thrombus.    - Echocardiogram with mild LV systolic dysfunction, no obvious right atrial thrombus.         Problem/Plan - 4:    ·  Problem: Seizure.  Plan: OF UNCLEAR ETIOLOGY ,CONTINUE SZ PRECAUTIONS ,ON KEPPRA ,SEEN BY NEUROLOGIST ,EEG REVIEWED AS ABOVE  ,ICU MONITORING .MRI BRAIN -OLD CVA ,NO ACUTE DISEASE.         Problem/Plan - 5:    ·  Problem: Sepsis.  Plan: RULED OUT ,NO EVIDENCE OF INFECTION , D/C CEFTRIAXONE ,MAY OBSERVE OFF ABX AS PER ID CONSULT RECOMMENDATION.         Problem/Plan - 6:    Problem: LASHELL (acute kidney injury). Plan: LIKELY 2/2 TO RHABDOMYOLYSIS 2/2 TO SZ , CONTINUE SERIAL BMP ,INS/OUTS.        Problem/Plan - 7:    ·  Problem: COVID-19 virus IgG antibody detected.  Plan: Patient was at rehab x 6 mnts in 2020  , as per family he did not have any COVID  symptoms except cough in May and some patients in rehab were positive at that time , patient  has been very lethargic with speech abnormalities and falls (ambulates with walker), no obvious seizure like activity reported at home after discharge from rehab.         Problem/Plan - 8:    ·  Problem: Atrial thrombus.  Plan: CTA HEAD/NECK -There is a questionable filling defect noted, possibly right atrial thrombus .OFF HEPARIN DRIP   ,ECHO REVIEWED -SEE ABOVE ,NO DEFINITE THROMBUS  ,SEEN BY CARDIOLOGIST .         Problem/Plan - 9:    ·  Problem: Leg edema.  Plan: S/P FUROSEMIDE  , LEG ELEVATION ,SCROTAL ELEVATION ,FOLLOWED BY CARDIOLOGY ,ECHO REVIEWED -DIASTOLIC DYSFUNCTION AND MILDLY REDUCED SYSTOLIC FUNCTION EF 45 %.         Problem/Plan - 10:    Problem: Prophylactic measure. Plan; Gastrointestinal stress ulcer prophylaxis and DVT prophylaxis administered.

## 2020-08-05 NOTE — DISCHARGE NOTE PROVIDER - NSDCMRMEDTOKEN_GEN_ALL_CORE_FT
atorvastatin 10 mg oral tablet: 1 tab(s) orally once a day  carvedilol 25 mg oral tablet: 1 tab(s) orally 2 times a day with meals   hydrALAZINE 50 mg oral tablet: 1 tab(s) orally 2 times a day  pantoprazole 20 mg oral delayed release tablet: 1 tab(s) orally once a day  spironolactone 25 mg oral tablet: 1 tab(s) orally once a day acetaminophen 325 mg oral tablet: 2 tab(s) orally every 6 hours, As needed, Mild Pain (1 - 3)  amLODIPine 5 mg oral tablet: 1 tab(s) orally once a day (at bedtime)  aspirin 81 mg oral tablet, chewable: 1 tab(s) orally once a day  atorvastatin 10 mg oral tablet: 1 tab(s) orally once a day (at bedtime)  carvedilol 12.5 mg oral tablet: 1 tab(s) orally every 12 hours ,hold for sbp below 100 or hr below 60  furosemide 40 mg oral tablet: 1 tab(s) orally once a day ,hold for sbp below 100  heparin: 5000 unit(s) subcutaneous 2 times a day  hydrALAZINE 25 mg oral tablet: 3 tab(s) orally every 8 hours ,hold for sbp below 120  levETIRAcetam 100 mg/mL oral solution: 5 milliliter(s) orally every 12 hours  pantoprazole 40 mg oral delayed release tablet: 1 tab(s) orally once a day (before a meal)

## 2020-08-05 NOTE — PROGRESS NOTE ADULT - SUBJECTIVE AND OBJECTIVE BOX
PROGRESS NOTE  Patient is a 79y old  Male who presents with a chief complaint of UNRESPONSIVENESS (05 Aug 2020 09:22)  Chart and available morning labs /imaging are reviewed electronically , urgent issues addressed . More information  is being added upon completion of rounds , when more information is collected and management discussed with consultants , medical staff and social service/case management on the floor     OVERNIGHT  No new issues reported by medical staff . All above noted Patient is resting in a bed comfortably .Confused ,poor mentation .No distress noted     HPI:  80yo Male PMHx CVA 2012, HTN, HLD , hernia surgery with testicle resection 2015, umbilical hernia repair 12/2019,  transferred from [place] ED to [place] ED with s/p seizure activity  and bradycardia into the 30s.  Arrived to [place] hypothermic and hypotensive .Patient  required intubation ph 7.1 with CO2 85 due to acute hypoxic respiratory failure on arrival to ER ,placed on Levophed drip due to hypotension Admitted for septic workup and evaluation ,send blood and urine cx, serial lactate levels, monitor vitals closely hydration ,monitor urine output and renal profile,iv abx initiated -given ceftriaxone in ER and seen by ID consult .Pulmonology and cardiology consults are called .Patient admitted  to ICU with septic shock ,noted to have  tongue bite and AMS ,neurology evaluation called  Admitted  to intensive care  unit for monitoring , to rule out ami -send 3 sets of cardiac enzymes to rule out acute coronary event, obtain ECHO to evaluate LVEF, cardiology consult  ,continue current sepsis management, ventilator management /O2 supply, anticoagulation plan as per cardiology consult Palliative care consult requested ,to discuss advance directives and complete MOLST .Tried to reach family to obtain details of past medical hx - left a message for daughter .not able to reach son ( non valid number )[place]  ED team spoke with patient's daughter, who stated her father fell to the ground on the floor of his motel room about 4 days ago and was unable to get up. She had been caring for him on the floor since and did not call 911 for assistance. Today she found him unresponsive, with blood in his mouth, and incontinent of urine.  CT brain was negative. A CODE STROKE was called, and a CTA head/neck were performed, which was negative for large vessel occlusion but concerning for a density anterior to the ascending aorta possibly right atrial thrombus. Also revealed enlarged, multinodular thyroid. TPA criteria was not met, as patient had unknown onset of symptoms. Patient was transferred to [facility] ED for further care .He developed  multiple witnessed seizures in [place] ED, temporarily resolved with Ativan administration . CT/CTA brain NAD . Seen by neurologist and EEG ordered to r/o status epilepticus as etiology of ongoing encephalopathy, probably will require  MRI once stabilized from cardiac and respiratory standpoint. (28 Jul 2020 10:57)    PAST MEDICAL & SURGICAL HISTORY:  Chronic GERD  HTN (hypertension)  HLD (hyperlipidemia)  CVA (cerebral vascular accident)  High cholesterol  HTN (hypertension)  History of surgical removal of testicle  H/O hernia repair  No significant past surgical history      MEDICATIONS  (STANDING):  amLODIPine   Tablet 5 milliGRAM(s) Oral at bedtime  aspirin  chewable 81 milliGRAM(s) Oral daily  atorvastatin 10 milliGRAM(s) Oral at bedtime  carvedilol 12.5 milliGRAM(s) Oral every 12 hours  furosemide    Tablet 40 milliGRAM(s) Oral daily  heparin   Injectable 5000 Unit(s) SubCutaneous every 8 hours  hydrALAZINE 75 milliGRAM(s) Oral every 8 hours  levETIRAcetam  Solution 500 milliGRAM(s) Oral every 12 hours  pantoprazole    Tablet 40 milliGRAM(s) Oral before breakfast    MEDICATIONS  (PRN):  acetaminophen   Tablet .. 650 milliGRAM(s) Oral every 6 hours PRN Mild Pain (1 - 3)      OBJECTIVE    T(C): 36.9 (08-05-20 @ 04:24), Max: 37.6 (08-04-20 @ 23:44)  HR: 72 (08-05-20 @ 04:24) (61 - 72)  BP: 142/62 (08-05-20 @ 04:24) (130/56 - 151/71)  RR: 20 (08-05-20 @ 04:24) (18 - 31)  SpO2: 93% (08-05-20 @ 04:24) (93% - 96%)  Wt(kg): --  I&O's Summary    04 Aug 2020 07:01  -  05 Aug 2020 07:00  --------------------------------------------------------  IN: 340 mL / OUT: 350 mL / NET: -10 mL          REVIEW OF SYSTEMS:  CONSTITUTIONAL: No fever, weight loss, or fatigue  EYES: No eye pain, visual disturbances, or discharge  ENMT:   No sinus or throat pain  NECK: No pain or stiffness  RESPIRATORY: No cough, wheezing, chills or hemoptysis; No shortness of breath  CARDIOVASCULAR: No chest pain, palpitations, dizziness, or leg swelling  GASTROINTESTINAL: No abdominal pain. No nausea, vomiting; No diarrhea or constipation. No melena or hematochezia.  GENITOURINARY: No dysuria, frequency, hematuria, or incontinence  NEUROLOGICAL: No headaches, memory loss, loss of strength, numbness, or tremors  SKIN: No itching, burning, rashes, or lesions   MUSCULOSKELETAL: No joint pain or swelling; No muscle, back, or extremity pain    PHYSICAL EXAM:  Appearance: NAD. VS past 24 hrs -as above   HEENT:   Moist oral mucosa. Conjunctiva clear b/l.   Neck : supple  Respiratory: Lungs CTAB.  Gastrointestinal:  Soft, nontender. No rebound. No rigidity. BS present	  Cardiovascular: RRR ,S1S2 present  Neurologic: Non-focal. Moving all extremities.  Extremities: No edema. No erythema. No calf tenderness.  Skin: No rashes, No ecchymoses, No cyanosis.	  wounds ,skin lesions-See skin assesment flow sheet   LABS:                        7.8    7.22  )-----------( 211      ( 05 Aug 2020 07:11 )             26.1     08-05    143  |  100  |  30<H>  ----------------------------<  93  3.7   |  40<H>  |  1.20    Ca    8.8      05 Aug 2020 07:11  Phos  2.8     08-05  Mg     2.0     08-05    TPro  6.5  /  Alb  2.4<L>  /  TBili  1.3<H>  /  DBili  x   /  AST  21  /  ALT  45  /  AlkPhos  57  08-05    CAPILLARY BLOOD GLUCOSE        PT/INR - ( 05 Aug 2020 07:11 )   PT: 13.8 sec;   INR: 1.19 ratio               Culture - Sputum (collected 31 Jul 2020 12:07)  Source: .Sputum Sputum  Gram Stain (31 Jul 2020 15:15):    Moderate polymorphonuclear leukocytes per low power field    No Squamous epithelial cells per low power field    Rare Yeast like cells per oil power field  Final Report (02 Aug 2020 09:43):    Normal Respiratory Jumana present    Culture - Urine (collected 29 Jul 2020 01:14)  Source: .Urine Clean Catch (Midstream)  Final Report (30 Jul 2020 04:38):    No growth    Culture - Blood (collected 28 Jul 2020 16:18)  Source: .Blood Blood-Peripheral  Final Report (02 Aug 2020 17:01):    No Growth Final    Culture - Blood (collected 28 Jul 2020 16:18)  Source: .Blood Blood-Peripheral  Final Report (02 Aug 2020 17:01):    No Growth Final      RADIOLOGY & ADDITIONAL TESTS:< from: MR Head No Cont (07.30.20 @ 18:06) >  EXAM:  MR BRAIN                            PROCEDURE DATE:  07/30/2020          INTERPRETATION:  CLINICAL INDICATION: Weakness, lethargy, speech abnormality. Evaluate for CVA.    TECHNIQUE: Multi-planar multi-sequential MR imaging of the brain was performed without intravenous contrast.    COMPARISON: CT head, CT angiography head and neck 7/28/2020.    FINDINGS:    MRI BRAIN:    There is gliosis and encephalomalacia in the right occipital lobe with associated ex-vacuo dilatation of the occipital horn of right lateral ventricle, findings consistent with chronic infarct in the right PCA vascular territory. There is also cortically-based encephalomalacia/gliosis with associated hemosiderin deposition and chronic blood products in the right posterior frontal lobe and right perirolandic region, findings consistent with small chronic infarct in the right distal MCA vascular territory. There are chronic lacunar infarcts in the right subinsular white matter and in the cerebellar hemispheres bilaterally. There are scattered and patchy T2/FLAIR hyperintense changes in the periventricular and subcortical white matter and also in the central bonnie, nonspecific finding, but most likely representing sequelae of moderately severe chronic microvascular ischemic disease.    There is diffuse cortical sulcal prominence related to underlying brain parenchymal volume.    No acute infarction, intracranial hemorrhage or mass. There is no evidence of obstructive hydrocephalus. There are no extra-axial fluid collections.    The visualized skull base flow voids appear patent.    There is an enlarged, partially empty sella turcica.    The visualized intraorbital contents are normal. There are scattered mild mucosal inflammatory changes of the ethmoid air cells and maxillary sinuses. There are tiny polyps versus mucus retention cysts in the bilateral maxillary sinuses. The mastoid air cells are grossly clear. The visualized soft tissues and osseous structures appear unremarkable.    IMPRESSION:    No acute intracranial findings.    Chronic infarct in the right occipital lobe (right PCA territory). Cortically-based chronic infarcts in the right posterior frontal lobe and right perirolandic region (right distal MCA territory). Chronic lacunar infarcts in the right subinsular white matter and cerebellar hemispheres bilaterally.      < end of copied text >     reviewed elctronically  ASSESSMENT/PLAN:

## 2020-08-06 ENCOUNTER — TRANSCRIPTION ENCOUNTER (OUTPATIENT)
Age: 80
End: 2020-08-06

## 2020-08-06 VITALS
SYSTOLIC BLOOD PRESSURE: 156 MMHG | HEART RATE: 65 BPM | DIASTOLIC BLOOD PRESSURE: 83 MMHG | TEMPERATURE: 98 F | OXYGEN SATURATION: 97 % | RESPIRATION RATE: 18 BRPM

## 2020-08-06 LAB
ANION GAP SERPL CALC-SCNC: 1 MMOL/L — LOW (ref 5–17)
BUN SERPL-MCNC: 28 MG/DL — HIGH (ref 7–23)
CALCIUM SERPL-MCNC: 9.2 MG/DL — SIGNIFICANT CHANGE UP (ref 8.5–10.1)
CHLORIDE SERPL-SCNC: 101 MMOL/L — SIGNIFICANT CHANGE UP (ref 96–108)
CO2 SERPL-SCNC: 41 MMOL/L — HIGH (ref 22–31)
CREAT SERPL-MCNC: 1.1 MG/DL — SIGNIFICANT CHANGE UP (ref 0.5–1.3)
GLUCOSE SERPL-MCNC: 93 MG/DL — SIGNIFICANT CHANGE UP (ref 70–99)
HCT VFR BLD CALC: 28.5 % — LOW (ref 39–50)
HGB BLD-MCNC: 8.6 G/DL — LOW (ref 13–17)
MCHC RBC-ENTMCNC: 28.3 PG — SIGNIFICANT CHANGE UP (ref 27–34)
MCHC RBC-ENTMCNC: 30.2 GM/DL — LOW (ref 32–36)
MCV RBC AUTO: 93.8 FL — SIGNIFICANT CHANGE UP (ref 80–100)
NRBC # BLD: 0 /100 WBCS — SIGNIFICANT CHANGE UP (ref 0–0)
PLATELET # BLD AUTO: 259 K/UL — SIGNIFICANT CHANGE UP (ref 150–400)
POTASSIUM SERPL-MCNC: 3.5 MMOL/L — SIGNIFICANT CHANGE UP (ref 3.5–5.3)
POTASSIUM SERPL-SCNC: 3.5 MMOL/L — SIGNIFICANT CHANGE UP (ref 3.5–5.3)
RBC # BLD: 3.04 M/UL — LOW (ref 4.2–5.8)
RBC # FLD: 16.2 % — HIGH (ref 10.3–14.5)
SARS-COV-2 RNA SPEC QL NAA+PROBE: SIGNIFICANT CHANGE UP
SODIUM SERPL-SCNC: 143 MMOL/L — SIGNIFICANT CHANGE UP (ref 135–145)
WBC # BLD: 7.39 K/UL — SIGNIFICANT CHANGE UP (ref 3.8–10.5)
WBC # FLD AUTO: 7.39 K/UL — SIGNIFICANT CHANGE UP (ref 3.8–10.5)

## 2020-08-06 PROCEDURE — 96374 THER/PROPH/DIAG INJ IV PUSH: CPT

## 2020-08-06 PROCEDURE — 82533 TOTAL CORTISOL: CPT

## 2020-08-06 PROCEDURE — 36600 WITHDRAWAL OF ARTERIAL BLOOD: CPT

## 2020-08-06 PROCEDURE — 71045 X-RAY EXAM CHEST 1 VIEW: CPT

## 2020-08-06 PROCEDURE — 84484 ASSAY OF TROPONIN QUANT: CPT

## 2020-08-06 PROCEDURE — 87070 CULTURE OTHR SPECIMN AEROBIC: CPT

## 2020-08-06 PROCEDURE — 84443 ASSAY THYROID STIM HORMONE: CPT

## 2020-08-06 PROCEDURE — 95816 EEG AWAKE AND DROWSY: CPT

## 2020-08-06 PROCEDURE — 84100 ASSAY OF PHOSPHORUS: CPT

## 2020-08-06 PROCEDURE — P9047: CPT

## 2020-08-06 PROCEDURE — 83880 ASSAY OF NATRIURETIC PEPTIDE: CPT

## 2020-08-06 PROCEDURE — 83735 ASSAY OF MAGNESIUM: CPT

## 2020-08-06 PROCEDURE — 93005 ELECTROCARDIOGRAM TRACING: CPT

## 2020-08-06 PROCEDURE — 84481 FREE ASSAY (FT-3): CPT

## 2020-08-06 PROCEDURE — 96375 TX/PRO/DX INJ NEW DRUG ADDON: CPT

## 2020-08-06 PROCEDURE — 94799 UNLISTED PULMONARY SVC/PX: CPT

## 2020-08-06 PROCEDURE — 94640 AIRWAY INHALATION TREATMENT: CPT

## 2020-08-06 PROCEDURE — 97110 THERAPEUTIC EXERCISES: CPT

## 2020-08-06 PROCEDURE — 83605 ASSAY OF LACTIC ACID: CPT

## 2020-08-06 PROCEDURE — 85027 COMPLETE CBC AUTOMATED: CPT

## 2020-08-06 PROCEDURE — 87635 SARS-COV-2 COVID-19 AMP PRB: CPT

## 2020-08-06 PROCEDURE — 85730 THROMBOPLASTIN TIME PARTIAL: CPT

## 2020-08-06 PROCEDURE — U0003: CPT

## 2020-08-06 PROCEDURE — 97116 GAIT TRAINING THERAPY: CPT

## 2020-08-06 PROCEDURE — 80048 BASIC METABOLIC PNL TOTAL CA: CPT

## 2020-08-06 PROCEDURE — 97530 THERAPEUTIC ACTIVITIES: CPT

## 2020-08-06 PROCEDURE — 94002 VENT MGMT INPAT INIT DAY: CPT

## 2020-08-06 PROCEDURE — 94003 VENT MGMT INPAT SUBQ DAY: CPT

## 2020-08-06 PROCEDURE — 93306 TTE W/DOPPLER COMPLETE: CPT

## 2020-08-06 PROCEDURE — 80053 COMPREHEN METABOLIC PANEL: CPT

## 2020-08-06 PROCEDURE — 85610 PROTHROMBIN TIME: CPT

## 2020-08-06 PROCEDURE — 82550 ASSAY OF CK (CPK): CPT

## 2020-08-06 PROCEDURE — 87040 BLOOD CULTURE FOR BACTERIA: CPT

## 2020-08-06 PROCEDURE — 84439 ASSAY OF FREE THYROXINE: CPT

## 2020-08-06 PROCEDURE — 97163 PT EVAL HIGH COMPLEX 45 MIN: CPT

## 2020-08-06 PROCEDURE — 86618 LYME DISEASE ANTIBODY: CPT

## 2020-08-06 PROCEDURE — 82803 BLOOD GASES ANY COMBINATION: CPT

## 2020-08-06 PROCEDURE — 80307 DRUG TEST PRSMV CHEM ANLYZR: CPT

## 2020-08-06 PROCEDURE — 99291 CRITICAL CARE FIRST HOUR: CPT | Mod: 25

## 2020-08-06 PROCEDURE — 99221 1ST HOSP IP/OBS SF/LOW 40: CPT

## 2020-08-06 PROCEDURE — 92526 ORAL FUNCTION THERAPY: CPT

## 2020-08-06 PROCEDURE — C8929: CPT

## 2020-08-06 PROCEDURE — 36415 COLL VENOUS BLD VENIPUNCTURE: CPT

## 2020-08-06 PROCEDURE — 87086 URINE CULTURE/COLONY COUNT: CPT

## 2020-08-06 PROCEDURE — 99231 SBSQ HOSP IP/OBS SF/LOW 25: CPT

## 2020-08-06 PROCEDURE — 94760 N-INVAS EAR/PLS OXIMETRY 1: CPT

## 2020-08-06 PROCEDURE — 70551 MRI BRAIN STEM W/O DYE: CPT

## 2020-08-06 PROCEDURE — 84145 PROCALCITONIN (PCT): CPT

## 2020-08-06 PROCEDURE — 81001 URINALYSIS AUTO W/SCOPE: CPT

## 2020-08-06 PROCEDURE — 92610 EVALUATE SWALLOWING FUNCTION: CPT

## 2020-08-06 PROCEDURE — 86769 SARS-COV-2 COVID-19 ANTIBODY: CPT

## 2020-08-06 RX ORDER — HYDRALAZINE HCL 50 MG
3 TABLET ORAL
Qty: 0 | Refills: 0 | DISCHARGE
Start: 2020-08-06

## 2020-08-06 RX ORDER — ATORVASTATIN CALCIUM 80 MG/1
1 TABLET, FILM COATED ORAL
Qty: 0 | Refills: 0 | DISCHARGE
Start: 2020-08-06

## 2020-08-06 RX ORDER — CARVEDILOL PHOSPHATE 80 MG/1
1 CAPSULE, EXTENDED RELEASE ORAL
Qty: 0 | Refills: 0 | DISCHARGE
Start: 2020-08-06

## 2020-08-06 RX ORDER — ATORVASTATIN CALCIUM 80 MG/1
1 TABLET, FILM COATED ORAL
Qty: 0 | Refills: 0 | DISCHARGE

## 2020-08-06 RX ORDER — ASPIRIN/CALCIUM CARB/MAGNESIUM 324 MG
1 TABLET ORAL
Qty: 0 | Refills: 0 | DISCHARGE
Start: 2020-08-06

## 2020-08-06 RX ORDER — AMLODIPINE BESYLATE 2.5 MG/1
1 TABLET ORAL
Qty: 0 | Refills: 0 | DISCHARGE
Start: 2020-08-06

## 2020-08-06 RX ORDER — CARVEDILOL PHOSPHATE 80 MG/1
1 CAPSULE, EXTENDED RELEASE ORAL
Qty: 0 | Refills: 0 | DISCHARGE

## 2020-08-06 RX ORDER — PANTOPRAZOLE SODIUM 20 MG/1
1 TABLET, DELAYED RELEASE ORAL
Qty: 0 | Refills: 0 | DISCHARGE
Start: 2020-08-06

## 2020-08-06 RX ORDER — SPIRONOLACTONE 25 MG/1
1 TABLET, FILM COATED ORAL
Qty: 0 | Refills: 0 | DISCHARGE

## 2020-08-06 RX ORDER — FUROSEMIDE 40 MG
1 TABLET ORAL
Qty: 0 | Refills: 0 | DISCHARGE
Start: 2020-08-06

## 2020-08-06 RX ORDER — HEPARIN SODIUM 5000 [USP'U]/ML
5000 INJECTION INTRAVENOUS; SUBCUTANEOUS
Qty: 0 | Refills: 0 | DISCHARGE
Start: 2020-08-06

## 2020-08-06 RX ORDER — HYDRALAZINE HCL 50 MG
1 TABLET ORAL
Qty: 0 | Refills: 0 | DISCHARGE

## 2020-08-06 RX ORDER — ACETAMINOPHEN 500 MG
2 TABLET ORAL
Qty: 0 | Refills: 0 | DISCHARGE
Start: 2020-08-06

## 2020-08-06 RX ORDER — PANTOPRAZOLE SODIUM 20 MG/1
1 TABLET, DELAYED RELEASE ORAL
Qty: 0 | Refills: 0 | DISCHARGE

## 2020-08-06 RX ORDER — LEVETIRACETAM 250 MG/1
5 TABLET, FILM COATED ORAL
Qty: 0 | Refills: 0 | DISCHARGE
Start: 2020-08-06

## 2020-08-06 RX ADMIN — Medication 81 MILLIGRAM(S): at 11:59

## 2020-08-06 RX ADMIN — HEPARIN SODIUM 5000 UNIT(S): 5000 INJECTION INTRAVENOUS; SUBCUTANEOUS at 05:14

## 2020-08-06 RX ADMIN — Medication 75 MILLIGRAM(S): at 12:02

## 2020-08-06 RX ADMIN — LEVETIRACETAM 500 MILLIGRAM(S): 250 TABLET, FILM COATED ORAL at 05:13

## 2020-08-06 RX ADMIN — Medication 75 MILLIGRAM(S): at 05:14

## 2020-08-06 RX ADMIN — Medication 40 MILLIGRAM(S): at 05:14

## 2020-08-06 RX ADMIN — PANTOPRAZOLE SODIUM 40 MILLIGRAM(S): 20 TABLET, DELAYED RELEASE ORAL at 05:14

## 2020-08-06 RX ADMIN — HEPARIN SODIUM 5000 UNIT(S): 5000 INJECTION INTRAVENOUS; SUBCUTANEOUS at 14:22

## 2020-08-06 RX ADMIN — CARVEDILOL PHOSPHATE 12.5 MILLIGRAM(S): 80 CAPSULE, EXTENDED RELEASE ORAL at 05:14

## 2020-08-06 NOTE — PROGRESS NOTE ADULT - NSHPATTENDINGPLANDISCUSS_GEN_ALL_CORE
patient
nitin Coleman at [phone]
nitin Coleman at [phone]
nitin Coleman at [phone]
nitin Coleman at [phone]
MED STAFF , ,  ,  ,ANTICIPATE D/C TO [facility] IN 24-48 HRS
MED STAFF , ,  ,  ,ANTICIPATE D/C TO [facility] In am spoke to the son on a phone
MED STAFF , ,  ,  ,FAMILY . D/C TO [facility] WHEN ARRANGED BY KIMI
MED STAFF , , , , 
MED STAFF , , , ,  ,
RN IN ICU , , , ,  ,
MED STAFF , ,  , 
MED STAFF , , , ,  ,

## 2020-08-06 NOTE — PROGRESS NOTE ADULT - PROBLEM SELECTOR PROBLEM 7
Bradycardia
Bradycardia
COVID-19 virus IgG antibody detected

## 2020-08-06 NOTE — DISCHARGE NOTE NURSING/CASE MANAGEMENT/SOCIAL WORK - NSDCPEWEB_GEN_ALL_CORE
[facility] for Tobacco Control website --- [email or web address] --- [email or web address]

## 2020-08-06 NOTE — PROGRESS NOTE ADULT - SUBJECTIVE AND OBJECTIVE BOX
Chief Complaint: Unresponsiveness    Interval Events: No events overnight.    Review of Systems:  Unable to obtain    Physical Exam:  Vital Signs Last 24 Hrs  T(C): 36.6 (06 Aug 2020 04:20), Max: 37.3 (05 Aug 2020 20:04)  T(F): 97.8 (06 Aug 2020 04:20), Max: 99.2 (05 Aug 2020 20:04)  HR: 64 (06 Aug 2020 04:20) (64 - 85)  BP: 147/68 (06 Aug 2020 04:20) (110/52 - 155/68)  BP(mean): --  RR: 19 (06 Aug 2020 04:20) (18 - 19)  SpO2: 97% (06 Aug 2020 04:20) (91% - 97%)  General: NAD  HEENT: MMM  Neck: No JVD, no carotid bruit  Lungs: CTAB  CV: RRR, nl S1/S2, no M/R/G  Abdomen: S/NT/ND, +BS  Extremities: Trace LE edema, no cyanosis  Neuro: Non-focal  Skin: No rash    Labs:             08-06    143  |  101  |  28<H>  ----------------------------<  93  3.5   |  41<H>  |  1.10    Ca    9.2      06 Aug 2020 06:46  Phos  2.8     08-05  Mg     2.0     08-05    TPro  6.5  /  Alb  2.4<L>  /  TBili  1.3<H>  /  DBili  x   /  AST  21  /  ALT  45  /  AlkPhos  57  08-05                        8.6    7.39  )-----------( 259      ( 06 Aug 2020 06:46 )             28.5       Telemetry: Sinus rhythm

## 2020-08-06 NOTE — PROGRESS NOTE ADULT - PROBLEM SELECTOR PROBLEM 8
Atrial thrombus

## 2020-08-06 NOTE — PROGRESS NOTE ADULT - SUBJECTIVE AND OBJECTIVE BOX
Date/Time Patient Seen:  		  Referring MD:   Data Reviewed	       Patient is a 79y old  Male who presents with a chief complaint of UNRESPONSIVENESS (05 Aug 2020 10:23)      Subjective/HPI     PAST MEDICAL & SURGICAL HISTORY:  Chronic GERD  HTN (hypertension)  HLD (hyperlipidemia)  CVA (cerebral vascular accident)  High cholesterol  HTN (hypertension)  Unknown and unspecified causes of morbidity  History of surgical removal of testicle  H/O hernia repair  No significant past surgical history        Medication list         MEDICATIONS  (STANDING):  amLODIPine   Tablet 5 milliGRAM(s) Oral at bedtime  aspirin  chewable 81 milliGRAM(s) Oral daily  atorvastatin 10 milliGRAM(s) Oral at bedtime  carvedilol 12.5 milliGRAM(s) Oral every 12 hours  furosemide    Tablet 40 milliGRAM(s) Oral daily  heparin   Injectable 5000 Unit(s) SubCutaneous every 8 hours  hydrALAZINE 75 milliGRAM(s) Oral every 8 hours  levETIRAcetam  Solution 500 milliGRAM(s) Oral every 12 hours  pantoprazole    Tablet 40 milliGRAM(s) Oral before breakfast    MEDICATIONS  (PRN):  acetaminophen   Tablet .. 650 milliGRAM(s) Oral every 6 hours PRN Mild Pain (1 - 3)         Vitals log        ICU Vital Signs Last 24 Hrs  T(C): 36.5 (06 Aug 2020 07:15), Max: 37.3 (05 Aug 2020 20:04)  T(F): 97.7 (06 Aug 2020 07:15), Max: 99.2 (05 Aug 2020 20:04)  HR: 61 (06 Aug 2020 07:15) (61 - 85)  BP: 138/66 (06 Aug 2020 07:15) (110/52 - 155/68)  BP(mean): --  ABP: --  ABP(mean): --  RR: 20 (06 Aug 2020 07:15) (18 - 20)  SpO2: 96% (06 Aug 2020 07:15) (91% - 97%)           Input and Output:  I&O's Detail      Lab Data                        8.6    7.39  )-----------( 259      ( 06 Aug 2020 06:46 )             28.5     08-06    143  |  101  |  28<H>  ----------------------------<  93  3.5   |  41<H>  |  1.10    Ca    9.2      06 Aug 2020 06:46  Phos  2.8     08-05  Mg     2.0     08-05    TPro  6.5  /  Alb  2.4<L>  /  TBili  1.3<H>  /  DBili  x   /  AST  21  /  ALT  45  /  AlkPhos  57  08-05    ABG - ( 04 Aug 2020 14:32 )  pH, Arterial: 7.42  pH, Blood: x     /  pCO2: 60    /  pO2: 96    / HCO3: 36    / Base Excess: 13.0  /  SaO2: 97                      Review of Systems	      Objective     Physical Examination    heart s1s2  lung dec BS  abd soft  on o2 support      Pertinent Lab findings & Imaging      Arevalo:  NO   Adequate UO     I&O's Detail           Discussed with:     Cultures:	        Radiology

## 2020-08-06 NOTE — PROGRESS NOTE ADULT - SUBJECTIVE AND OBJECTIVE BOX
CATHY SANCHEZ    [facility] [address]    Patient is a 79y old  Male who presents with a chief complaint of UNRESPONSIVENESS (06 Aug 2020 10:01)       Allergies    Allergy Status Unknown  No Known Allergies    Intolerances        HPI:  80yo Male PMHx CVA 2012, HTN, HLD , hernia surgery with testicle resection 2015, umbilical hernia repair 12/2019,  transferred from [place] ED to [place] ED with s/p seizure activity  and bradycardia into the 30s.  Arrived to [place] hypothermic and hypotensive .Patient  required intubation ph 7.1 with CO2 85 due to acute hypoxic respiratory failure on arrival to ER ,placed on Levophed drip due to hypotension Admitted for septic workup and evaluation ,send blood and urine cx, serial lactate levels, monitor vitals closely hydration ,monitor urine output and renal profile,iv abx initiated -given ceftriaxone in ER and seen by ID consult .Pulmonology and cardiology consults are called .Patient admitted  to ICU with septic shock ,noted to have  tongue bite and AMS ,neurology evaluation called  Admitted  to intensive care  unit for monitoring , to rule out ami -send 3 sets of cardiac enzymes to rule out acute coronary event, obtain ECHO to evaluate LVEF, cardiology consult  ,continue current sepsis management, ventilator management /O2 supply, anticoagulation plan as per cardiology consult Palliative care consult requested ,to discuss advance directives and complete MOLST .Tried to reach family to obtain details of past medical hx - left a message for daughter .not able to reach son ( non valid number )[place]  ED team spoke with patient's daughter, who stated her father fell to the ground on the floor of his motel room about 4 days ago and was unable to get up. She had been caring for him on the floor since and did not call 911 for assistance. Today she found him unresponsive, with blood in his mouth, and incontinent of urine.  CT brain was negative. A CODE STROKE was called, and a CTA head/neck were performed, which was negative for large vessel occlusion but concerning for a density anterior to the ascending aorta possibly right atrial thrombus. Also revealed enlarged, multinodular thyroid. TPA criteria was not met, as patient had unknown onset of symptoms. Patient was transferred to [facility] ED for further care .He developed  multiple witnessed seizures in [place] ED, temporarily resolved with Ativan administration . CT/CTA brain NAD . Seen by neurologist and EEG ordered to r/o status epilepticus as etiology of ongoing encephalopathy, probably will require  MRI once stabilized from cardiac and respiratory standpoint. (28 Jul 2020 10:57)      PAST MEDICAL & SURGICAL HISTORY:  Chronic GERD  HTN (hypertension)  HLD (hyperlipidemia)  CVA (cerebral vascular accident)  High cholesterol  HTN (hypertension)  History of surgical removal of testicle  H/O hernia repair  No significant past surgical history      FAMILY HISTORY:        MEDICATIONS   acetaminophen   Tablet .. 650 milliGRAM(s) Oral every 6 hours PRN  amLODIPine   Tablet 5 milliGRAM(s) Oral at bedtime  aspirin  chewable 81 milliGRAM(s) Oral daily  atorvastatin 10 milliGRAM(s) Oral at bedtime  carvedilol 12.5 milliGRAM(s) Oral every 12 hours  furosemide    Tablet 40 milliGRAM(s) Oral daily  heparin   Injectable 5000 Unit(s) SubCutaneous every 8 hours  hydrALAZINE 75 milliGRAM(s) Oral every 8 hours  levETIRAcetam  Solution 500 milliGRAM(s) Oral every 12 hours  pantoprazole    Tablet 40 milliGRAM(s) Oral before breakfast      Vital Signs Last 24 Hrs  T(C): 36.5 (06 Aug 2020 07:15), Max: 37.3 (05 Aug 2020 20:04)  T(F): 97.7 (06 Aug 2020 07:15), Max: 99.2 (05 Aug 2020 20:04)  HR: 61 (06 Aug 2020 07:15) (61 - 85)  BP: 138/66 (06 Aug 2020 07:15) (110/52 - 155/68)  BP(mean): --  RR: 20 (06 Aug 2020 07:15) (18 - 20)  SpO2: 96% (06 Aug 2020 07:15) (91% - 97%)            LABS:                        8.6    7.39  )-----------( 259      ( 06 Aug 2020 06:46 )             28.5     08-06    143  |  101  |  28<H>  ----------------------------<  93  3.5   |  41<H>  |  1.10    Ca    9.2      06 Aug 2020 06:46  Phos  2.8     08-05  Mg     2.0     08-05    TPro  6.5  /  Alb  2.4<L>  /  TBili  1.3<H>  /  DBili  x   /  AST  21  /  ALT  45  /  AlkPhos  57  08-05    PT/INR - ( 05 Aug 2020 07:11 )   PT: 13.8 sec;   INR: 1.19 ratio               ABG - ( 04 Aug 2020 14:32 )  pH, Arterial: 7.42  pH, Blood: x     /  pCO2: 60    /  pO2: 96    / HCO3: 36    / Base Excess: 13.0  /  SaO2: 97                  WBC:  WBC Count: 7.39 K/uL (08-06 @ 06:46)  WBC Count: 7.22 K/uL (08-05 @ 07:11)  WBC Count: 6.77 K/uL (08-04 @ 06:00)  WBC Count: 7.75 K/uL (08-03 @ 08:32)      MICROBIOLOGY:  RECENT CULTURES:  07-31 .Sputum Sputum XXXX   Moderate polymorphonuclear leukocytes per low power field  No Squamous epithelial cells per low power field  Rare Yeast like cells per oil power field   Normal Respiratory Jumana present                PT/INR - ( 05 Aug 2020 07:11 )   PT: 13.8 sec;   INR: 1.19 ratio             Sodium:  Sodium, Serum: 143 mmol/L (08-06 @ 06:46)  Sodium, Serum: 143 mmol/L (08-05 @ 07:11)  Sodium, Serum: 143 mmol/L (08-04 @ 06:00)  Sodium, Serum: 143 mmol/L (08-03 @ 08:32)      1.10 mg/dL 08-06 @ 06:46  1.20 mg/dL 08-05 @ 07:11  1.30 mg/dL 08-04 @ 06:00  1.30 mg/dL 08-03 @ 08:32      Hemoglobin:  Hemoglobin: 8.6 g/dL (08-06 @ 06:46)  Hemoglobin: 7.8 g/dL (08-05 @ 07:11)  Hemoglobin: 7.9 g/dL (08-04 @ 06:00)  Hemoglobin: 8.9 g/dL (08-03 @ 08:32)      Platelets: Platelet Count - Automated: 259 K/uL (08-06 @ 06:46)  Platelet Count - Automated: 211 K/uL (08-05 @ 07:11)  Platelet Count - Automated: 177 K/uL (08-04 @ 06:00)  Platelet Count - Automated: 185 K/uL (08-03 @ 08:32)      LIVER FUNCTIONS - ( 05 Aug 2020 07:11 )  Alb: 2.4 g/dL / Pro: 6.5 g/dL / ALK PHOS: 57 U/L / ALT: 45 U/L / AST: 21 U/L / GGT: x                 RADIOLOGY & ADDITIONAL STUDIES:

## 2020-08-06 NOTE — PROGRESS NOTE ADULT - PROBLEM SELECTOR PROBLEM 1
Palliative care encounter
Acute respiratory failure

## 2020-08-06 NOTE — PROGRESS NOTE ADULT - PROBLEM SELECTOR PLAN 4
Admitted for septic workup and evaluation,send blood and urine cx,serial lactate levels,monitor vitals closley,ivfs hydration,monitor urine output and renal profile,iv abx initiated ,seen by ID CONSULT
Admitted for septic workup and evaluation,send blood and urine cx,serial lactate levels,monitor vitals closley,ivfs hydration,monitor urine output and renal profile,iv abx initiated ,seen by ID CONSULT
OF UNCLEAR ETIOLOGY ,CONTINUE SZ PRECAUTIONS ,ON KEPPRA ,SEEN BY NEUROLOGIST ,EEG REVIEWED AS ABOVE  ,ICU MONITORING .MRI BRAIN -OLD CVA ,NO ACUTE DISEASE

## 2020-08-06 NOTE — PROGRESS NOTE ADULT - ATTENDING COMMENTS
80yo Male PMHx CVA 2012, HTN, HLD , hernia surgery with testicle resection 2015, umbilical hernia repair 12/2019,  transferred from [place] ED to [place] ED with s/p seizure activity  and bradycardia into the 30s.  Arrived to [place] hypothermic and hypotensive .Patient  required intubation ph 7.1 with CO2 85 due to acute hypoxic respiratory failure on arrival to ER ,placed on Levophed drip due to hypotension Admitted for septic workup and evaluation ,send blood and urine cx, serial lactate levels, monitor vitals closely hydration ,monitor urine output and renal profile,iv abx initiated -given ceftriaxone in ER and seen by ID consult .Pulmonology and cardiology consults are called .Patient admitted  to ICU with septic shock ,noted to have  tongue bite and AMS ,neurology evaluation called  Admitted  to intensive care  unit for monitoring , to rule out ami -send 3 sets of cardiac enzymes to rule out acute coronary event, obtain ECHO to evaluate LVEF, cardiology consult  ,continue current sepsis management, ventilator management /O2 supply, anticoagulation plan as per cardiology consult Palliative care consult requested ,to discuss advance directives and complete MOLST .Tried to reach family to obtain details of past medical hx - left a message for daughter .not able to reach son ( non valid number )[place]  ED team spoke with patient's daughter, who stated her father fell to the ground on the floor of his motel room about 4 days ago and was unable to get up. She had been caring for him on the floor since and did not call 911 for assistance. Today she found him unresponsive, with blood in his mouth, and incontinent of urine.  CT brain was negative. A CODE STROKE was called, and a CTA head/neck were performed, which was negative for large vessel occlusion but concerning for a density anterior to the ascending aorta possibly right atrial thrombus. Also revealed enlarged, multinodular thyroid. TPA criteria was not met, as patient had unknown onset of symptoms. Patient was transferred to [facility] ED for further care .He developed  multiple witnessed seizures in [place] ED, temporarily resolved with Ativan administration . CT/CTA brain NAD . Seen by neurologist and EEG ordered to r/o status epilepticus as etiology of ongoing encephalopathy, probably will require  MRI once stabilized from cardiac and respiratory standpoint.

## 2020-08-06 NOTE — PROGRESS NOTE ADULT - PROBLEM SELECTOR PROBLEM 3
CHF (congestive heart failure)
Seizure
Seizure
CHF (congestive heart failure)
CHF (congestive heart failure)

## 2020-08-06 NOTE — PROGRESS NOTE ADULT - PROBLEM SELECTOR PLAN 1
78 yo male, PMHx CVA 2012, HTN, who presented as a transfer from [facility] with altered mental status, possible post-ictal state following new onset seizure, with acute hypercapnic respiratory failure, transient shock distributive vs cardiogenic, LASHELL, and transaminitis.  remains on IVF  remains on ABX IV  Neuro eval in progress  labs and imaging reviewed  left a message for the son - Jared Lezama at [phone]  pt remains NPO  planned for EEG - as per Neuro recs
CM notes reviewed - pt accepted at [facility] when medically ready  SLP follow up noted - Dysphagia I diet recs.  - extubated on Aug 1  Monitored off ABX  on LASIX - BP - HTN tx optimization in progress -   79 year old male with a history of HTN, CVA who presents with unresponsiveness.  CVA 2012, HTN, who presented as a transfer from [facility] with altered mental status, acute hypercapnic respiratory failure, transient shock distributive vs cardiogenic, LASHELL, and transaminitis.  remains in critical state  full code  I and O  monitor VS and HD and Sat  labs and imaging reviewed  Son updated on condition and status.
MRI brain noted, chronic Infarcts, EEG noted -   ventilated -   on emp ABX rx regimen   on IV LASIX   79 year old male with a history of HTN, CVA who presents with unresponsiveness.  CVA 2012, HTN, who presented as a transfer from [facility] with altered mental status, acute hypercapnic respiratory failure, transient shock distributive vs cardiogenic, LASHELL, and transaminitis.  remains in critical state  full code  I and O  monitor VS and HD and Sat  labs and imaging reviewed  Son updated on condition and status
MRI brain noted, chronic Infarcts, EEG noted -   ventilated - SBT trials - weaning as tolerated  Monitored off ABX  on IV LASIX   79 year old male with a history of HTN, CVA who presents with unresponsiveness.  CVA 2012, HTN, who presented as a transfer from [facility] with altered mental status, acute hypercapnic respiratory failure, transient shock distributive vs cardiogenic, LASHELL, and transaminitis.  remains in critical state  full code  I and O  monitor VS and HD and Sat  labs and imaging reviewed  Son updated on condition and status.
SLP follow up noted - Dysphagia I diet recs.   extubated on Aug 1  Monitored off ABX  on LASIX - BP - HTN tx optimization in progress -   79 year old male with a history of HTN, CVA who presents with unresponsiveness.  CVA 2012, HTN, who presented as a transfer from [facility] with altered mental status, acute hypercapnic respiratory failure, transient shock distributive vs cardiogenic, LASHELL, and transaminitis.  remains in critical state  full code  I and O  monitor VS and HD and Sat  labs and imaging reviewed  Son updated on condition and status.
SLP follow up noted - Dysphagia I diet recs.   extubated on Aug 1  Monitored off ABX  on LASIX - BP - HTN tx optimization in progress -   79 year old male with a history of HTN, CVA who presents with unresponsiveness.  CVA 2012, HTN, who presented as a transfer from [facility] with altered mental status, acute hypercapnic respiratory failure, transient shock distributive vs cardiogenic, LASHELL, and transaminitis.  remains in critical state  full code  I and O  monitor VS and HD and Sat  labs and imaging reviewed  Son updated on condition and status.
extubated on Aug 1  Monitored off ABX  on LASIX - BP - HTN tx optimization in progress -   79 year old male with a history of HTN, CVA who presents with unresponsiveness.  CVA 2012, HTN, who presented as a transfer from [facility] with altered mental status, acute hypercapnic respiratory failure, transient shock distributive vs cardiogenic, LASHELL, and transaminitis.  remains in critical state  full code  I and O  monitor VS and HD and Sat  labs and imaging reviewed  Son updated on condition and status.
hx CVA and HTN, admitted with status epilepticus and acute resp failure, found to be hypothermic and bradycardic which improved with passive warming. CT head neg for bleed, CTA suspicious for RA thrombus but ECHO neg for thrombus  HF - TME - Seizure disorder - Acute infection eval - LASHELL   ID eval and follow up reviewed - Cx noted - labs reviewed - on emp ABX rx regimen  Neuro follow up noted - AED - TME entertained  serial labs - CK - renal indices - I and O  TF  HOB elev  asp prec  assist with ADL  left a message for family -   GOC discussion under way
seen and examined - VS noted - on o2 support -   extubated on Aug 1  Monitored off ABX  on IV LASIX   79 year old male with a history of HTN, CVA who presents with unresponsiveness.  CVA 2012, HTN, who presented as a transfer from [facility] with altered mental status, acute hypercapnic respiratory failure, transient shock distributive vs cardiogenic, LASHELL, and transaminitis.  remains in critical state  full code  I and O  monitor VS and HD and Sat  labs and imaging reviewed  Son updated on condition and status.
MANAGEMENT AS PER ICU TEAM , SERIAL ABGS AND CHEST XRAYS ,PATIENT IS S/P  EXTUBATION
VENTILATOR MANAGEMENT AS PER ICU TEAM , SERIAL ABGS AND CHEST XRAYS
VENTILATOR MANAGEMENT AS PER ICU TEAM , SERIAL ABGS AND CHEST XRAYS
VENTILATOR MANAGEMENT AS PER ICU TEAM , SERIAL ABGS AND CHEST XRAYS ,PLAN FOR EXTUBATION
very limited history so very broad differential but at this point no sig suspicion that this is an acute bacterial infection that would benefit from abx  recommend observation off abx
very limited history so very broad differential but at this point no sig suspicion that this is an acute bacterial infection that would benefit from abx  recommend observation off abx
very limited history so very broad differential but at this point no sig suspicion that this is an acute bacterial infection that would benefit from abx so recommend dc of abx
very limited history so very broad differential but would include bacterial sepsis in the differential and recommend broad spectrum abx pending clarification of clinical picture and results of micro investigations. Noted lymphopenia but no clearing of eosinophils
MANAGEMENT AS PER ICU TEAM , SERIAL ABGS AND CHEST XRAYS ,PATIENT IS S/P  EXTUBATION
VENTILATOR MANAGEMENT AS PER ICU TEAM , SERIAL ABGS AND CHEST XRAYS

## 2020-08-06 NOTE — DISCHARGE NOTE NURSING/CASE MANAGEMENT/SOCIAL WORK - NSDCPEPTSTRK_GEN_ALL_CORE
Stroke support groups for patients, families, and friends/Stroke education booklet/Need for follow up after discharge/Prescribed medications/Risk factors for stroke/Call 911 for stroke/Stroke warning signs and symptoms/Signs and symptoms of stroke

## 2020-08-06 NOTE — PROGRESS NOTE ADULT - PROBLEM SELECTOR PLAN 3
ACUTE ON CHRONIC COMBINED ,POA - Receiving furosemide and albumin Cardiology f/up   - CTA head and neck with motion artifact, no obvious stenosis. There is a questionable filling defect noted, possibly right atrial thrombus.  - Echocardiogram with mild LV systolic dysfunction, no obvious right atrial thrombus
LAURE PRECAUTIONS ,ON KEPPRA ,SEEN BY NEUROLOGIST ,EEG ORDERED ,ICU ADMISSION - CTA head and neck with motion artifact, no obvious stenosis
LAURE VAZQUEZ ,ON KEPPRA ,SEEN BY NEUROLOGIST ,EEG REVIEWED AS ABOVE  ,ICU ADMISSION
ACUTE ON CHRONIC COMBINED ,POA - Receiving furosemide and albumin Cardiology f/up   - CTA head and neck with motion artifact, no obvious stenosis. There is a questionable filling defect noted, possibly right atrial thrombus.  - Echocardiogram with mild LV systolic dysfunction, no obvious right atrial thrombus
ACUTE ON CHRONIC COMBINED ,POA - Receiving furosemide and albumin Cardiology f/up   - CTA head and neck with motion artifact, no obvious stenosis. There is a questionable filling defect noted, possibly right atrial thrombus.  - Echocardiogram with mild LV systolic dysfunction, no obvious right atrial thrombus

## 2020-08-06 NOTE — PROGRESS NOTE ADULT - PROBLEM SELECTOR PROBLEM 2
Sepsis
Unresponsiveness

## 2020-08-06 NOTE — PROGRESS NOTE ADULT - PROBLEM SELECTOR PLAN 9
S/P FUROSEMIDE  , LEG ELEVATION ,SCROTAL ELEVATION ,FOLLOWED BY CARDIOLOGY ,ECHO REVIEWED -DIASTOLIC DYSFUNCTION AND MILDLY REDUCED SYSTOLIC FUNCTION EF 45 %
S/P FUROSEMIDE 60 MG X 1 , LEG ELEVATION ,SCROTAL ELEVATION ,FOLLOWED BY CARDIOLOGY ,ECHO REVIEWED -DIASTOLIC DYSFUNCTION AND MILDLY REDUCED SYSTOLIC FUNCTION EF 45 %
S/P FUROSEMIDE  , LEG ELEVATION ,SCROTAL ELEVATION ,FOLLOWED BY CARDIOLOGY ,ECHO REVIEWED -DIASTOLIC DYSFUNCTION AND MILDLY REDUCED SYSTOLIC FUNCTION EF 45 %
S/P FUROSEMIDE  , LEG ELEVATION ,SCROTAL ELEVATION ,FOLLOWED BY CARDIOLOGY ,ECHO REVIEWED -DIASTOLIC DYSFUNCTION AND MILDLY REDUCED SYSTOLIC FUNCTION EF 45 %

## 2020-08-06 NOTE — PROGRESS NOTE ADULT - SUBJECTIVE AND OBJECTIVE BOX
PROGRESS NOTE  Patient is a 79y old  Male who presents with a chief complaint of UNRESPONSIVENESS (06 Aug 2020 10:38)  Chart and available morning labs /imaging are reviewed electronically , urgent issues addressed . More information  is being added upon completion of rounds , when more information is collected and management discussed with consultants , medical staff and social service/case management on the floor     OVERNIGHT  No new issues reported by medical staff . All above noted Patient is resting in a bed comfortably .Confused ,poor mentation .No distress noted     HPI:  80yo Male PMHx CVA 2012, HTN, HLD , hernia surgery with testicle resection 2015, umbilical hernia repair 12/2019,  transferred from [place] ED to [place] ED with s/p seizure activity  and bradycardia into the 30s.  Arrived to [place] hypothermic and hypotensive .Patient  required intubation ph 7.1 with CO2 85 due to acute hypoxic respiratory failure on arrival to ER ,placed on Levophed drip due to hypotension Admitted for septic workup and evaluation ,send blood and urine cx, serial lactate levels, monitor vitals closely hydration ,monitor urine output and renal profile,iv abx initiated -given ceftriaxone in ER and seen by ID consult .Pulmonology and cardiology consults are called .Patient admitted  to ICU with septic shock ,noted to have  tongue bite and AMS ,neurology evaluation called  Admitted  to intensive care  unit for monitoring , to rule out ami -send 3 sets of cardiac enzymes to rule out acute coronary event, obtain ECHO to evaluate LVEF, cardiology consult  ,continue current sepsis management, ventilator management /O2 supply, anticoagulation plan as per cardiology consult Palliative care consult requested ,to discuss advance directives and complete MOLST .Tried to reach family to obtain details of past medical hx - left a message for daughter .not able to reach son ( non valid number )[place]  ED team spoke with patient's daughter, who stated her father fell to the ground on the floor of his motel room about 4 days ago and was unable to get up. She had been caring for him on the floor since and did not call 911 for assistance. Today she found him unresponsive, with blood in his mouth, and incontinent of urine.  CT brain was negative. A CODE STROKE was called, and a CTA head/neck were performed, which was negative for large vessel occlusion but concerning for a density anterior to the ascending aorta possibly right atrial thrombus. Also revealed enlarged, multinodular thyroid. TPA criteria was not met, as patient had unknown onset of symptoms. Patient was transferred to [facility] ED for further care .He developed  multiple witnessed seizures in [place] ED, temporarily resolved with Ativan administration . CT/CTA brain NAD . Seen by neurologist and EEG ordered to r/o status epilepticus as etiology of ongoing encephalopathy, probably will require  MRI once stabilized from cardiac and respiratory standpoint. (28 Jul 2020 10:57)    PAST MEDICAL & SURGICAL HISTORY:  Chronic GERD  HTN (hypertension)  HLD (hyperlipidemia)  CVA (cerebral vascular accident)  High cholesterol  HTN (hypertension)  History of surgical removal of testicle  H/O hernia repair  No significant past surgical history      MEDICATIONS  (STANDING):  amLODIPine   Tablet 5 milliGRAM(s) Oral at bedtime  aspirin  chewable 81 milliGRAM(s) Oral daily  atorvastatin 10 milliGRAM(s) Oral at bedtime  carvedilol 12.5 milliGRAM(s) Oral every 12 hours  furosemide    Tablet 40 milliGRAM(s) Oral daily  heparin   Injectable 5000 Unit(s) SubCutaneous every 8 hours  hydrALAZINE 75 milliGRAM(s) Oral every 8 hours  levETIRAcetam  Solution 500 milliGRAM(s) Oral every 12 hours  pantoprazole    Tablet 40 milliGRAM(s) Oral before breakfast    MEDICATIONS  (PRN):  acetaminophen   Tablet .. 650 milliGRAM(s) Oral every 6 hours PRN Mild Pain (1 - 3)      OBJECTIVE    T(C): 36.8 (08-06-20 @ 12:00), Max: 37.3 (08-05-20 @ 20:04)  HR: 63 (08-06-20 @ 12:00) (61 - 85)  BP: 156/67 (08-06-20 @ 12:00) (110/52 - 156/67)  RR: 20 (08-06-20 @ 12:00) (18 - 20)  SpO2: 93% (08-06-20 @ 12:00) (91% - 97%)  Wt(kg): --  I&O's Summary        REVIEW OF SYSTEMS:  CONSTITUTIONAL: No fever, weight loss, or fatigue  EYES: No eye pain, visual disturbances, or discharge  ENMT:   No sinus or throat pain  NECK: No pain or stiffness  RESPIRATORY: No cough, wheezing, chills or hemoptysis; No shortness of breath  CARDIOVASCULAR: No chest pain, palpitations, dizziness, or leg swelling  GASTROINTESTINAL: No abdominal pain. No nausea, vomiting; No diarrhea or constipation. No melena or hematochezia.  GENITOURINARY: No dysuria, frequency, hematuria, or incontinence  NEUROLOGICAL: No headaches, memory loss, loss of strength, numbness, or tremors  SKIN: No itching, burning, rashes, or lesions   MUSCULOSKELETAL: No joint pain or swelling; No muscle, back, or extremity pain    PHYSICAL EXAM:  Appearance: NAD. VS past 24 hrs -as above   HEENT:   Moist oral mucosa. Conjunctiva clear b/l.   Neck : supple  Respiratory: Lungs CTAB.  Gastrointestinal:  Soft, nontender. No rebound. No rigidity. BS present	  Cardiovascular: RRR ,S1S2 present  Neurologic: Non-focal. Moving all extremities.  Extremities: No edema. No erythema. No calf tenderness.  Skin: No rashes, No ecchymoses, No cyanosis.	  wounds ,skin lesions-See skin assesment flow sheet   LABS:                        8.6    7.39  )-----------( 259      ( 06 Aug 2020 06:46 )             28.5     08-06    143  |  101  |  28<H>  ----------------------------<  93  3.5   |  41<H>  |  1.10    Ca    9.2      06 Aug 2020 06:46  Phos  2.8     08-05  Mg     2.0     08-05    TPro  6.5  /  Alb  2.4<L>  /  TBili  1.3<H>  /  DBili  x   /  AST  21  /  ALT  45  /  AlkPhos  57  08-05    CAPILLARY BLOOD GLUCOSE        PT/INR - ( 05 Aug 2020 07:11 )   PT: 13.8 sec;   INR: 1.19 ratio               Culture - Sputum (collected 31 Jul 2020 12:07)  Source: .Sputum Sputum  Gram Stain (31 Jul 2020 15:15):    Moderate polymorphonuclear leukocytes per low power field    No Squamous epithelial cells per low power field    Rare Yeast like cells per oil power field  Final Report (02 Aug 2020 09:43):    Normal Respiratory Jumana present    Culture - Urine (collected 29 Jul 2020 01:14)  Source: .Urine Clean Catch (Midstream)  Final Report (30 Jul 2020 04:38):    No growth    Culture - Blood (collected 28 Jul 2020 16:18)  Source: .Blood Blood-Peripheral  Final Report (02 Aug 2020 17:01):    No Growth Final    Culture - Blood (collected 28 Jul 2020 16:18)  Source: .Blood Blood-Peripheral  Final Report (02 Aug 2020 17:01):    No Growth Final      RADIOLOGY & ADDITIONAL TESTS:   reviewed elctronically  ASSESSMENT/PLAN: 	    Patient was seen and examined on a day of discharge . Plan of care , discharge medications and recommendations discussed with consultants and clearance for discharge obtained .Social service , case management  and medical staff are aware of plan. Family is notified. Discharge summary  is  prepared electronically 75minutes spent on this visit, 50% visit time spent in care co-ordination with other attendings and counselling patient  I have discussed care plan with patient and HCP,expressed understanding of problems treatment and their effect and side effects, alternatives in detail,I have asked if they have any questions and concerns and appropriately addressed them to best of my ability

## 2020-08-06 NOTE — PROGRESS NOTE ADULT - PROBLEM SELECTOR PLAN 6
LIKELY 2/2 TO RHABDOMYOLYSIS 2/2 TO SZ , CONTINUE SERIAL BMP ,INS/OUTS
SEPTIC WORKUP  IN PROGRESS ,FOLLOWED BY ID CONSULT
SEPTIC WORKUP ,WARMING BLANKET ,ICU ADMISSION
LIKELY 2/2 TO RHABDOMYOLYSIS 2/2 TO SZ , CONTINUE SERIAL BMP ,INS/OUTS
LIKELY 2/2 TO RHABDOMYOLYSIS 2/2 TO SZ , CONTINUE SERIAL BMP ,INS/OUTS

## 2020-08-06 NOTE — PROGRESS NOTE ADULT - PROBLEM SELECTOR PLAN 10
Gastrointestinal stress ulcer prophylaxis and DVT prophylaxis administered

## 2020-08-06 NOTE — PROGRESS NOTE ADULT - PROBLEM SELECTOR PLAN 8
CTA HEAD/NECK -There is a questionable filling defect noted, possibly right atrial thrombus .OFF HEPARIN DRIP   ,ECHO REVIEWED -SEE ABOVE ,NO DEFINITE THROMBUS  ,SEEN BY CARDIOLOGIST 
CTA HEAD/NECK -There is a questionable filling defect noted, possibly right atrial thrombus .OFF HEPARIN DRIP DUE TO HEMATURIA  ,ECHO REVIEWED -SEE ABOVE ,NO DEFINITE THROMBUS  ,SEEN BY CARDIOLOGIST 
CTA HEAD/NECK -There is a questionable filling defect noted, possibly right atrial thrombus .OFF HEPARIN DRIP   ,ECHO REVIEWED -SEE ABOVE ,NO DEFINITE THROMBUS  ,SEEN BY CARDIOLOGIST 
CTA HEAD/NECK -There is a questionable filling defect noted, possibly right atrial thrombus .OFF HEPARIN DRIP   ,ECHO REVIEWED -SEE ABOVE ,NO DEFINITE THROMBUS  ,SEEN BY CARDIOLOGIST

## 2020-08-06 NOTE — PROGRESS NOTE ADULT - PROVIDER SPECIALTY LIST ADULT
Cardiology
Critical Care
Hospitalist
Infectious Disease
Neurology
Palliative Care
Pulmonology
Neurology
Pulmonology
Pulmonology

## 2020-08-06 NOTE — PROGRESS NOTE ADULT - PROBLEM SELECTOR PLAN 5
ON LEVAPHED DRIP Admitted for septic workup and evaluation,send blood and urine cx,serial lactate levels,monitor vitals closley,ivfs hydration,monitor urine output and renal profile,iv abx initiated
RESOLVED - Hold Carvedilol for bradycardia, will revaluate need on 7/30  - Start Hydralazine 25 mg TID.
RULED OUT ,NO EVIDENCE OF INFECTION , D/C CEFTRIAXONE ,MAY OBSERVE OFF ABX AS PER ID CONSULT RECOMMENDATION

## 2020-08-06 NOTE — PROGRESS NOTE ADULT - SUBJECTIVE AND OBJECTIVE BOX
Neurology follow up note    CATHY [id number]      Interval History:    Patient sleeping in bed     MEDICATIONS    acetaminophen   Tablet .. 650 milliGRAM(s) Oral every 6 hours PRN  amLODIPine   Tablet 5 milliGRAM(s) Oral at bedtime  aspirin  chewable 81 milliGRAM(s) Oral daily  atorvastatin 10 milliGRAM(s) Oral at bedtime  carvedilol 12.5 milliGRAM(s) Oral every 12 hours  furosemide    Tablet 40 milliGRAM(s) Oral daily  heparin   Injectable 5000 Unit(s) SubCutaneous every 8 hours  hydrALAZINE 75 milliGRAM(s) Oral every 8 hours  levETIRAcetam  Solution 500 milliGRAM(s) Oral every 12 hours  pantoprazole    Tablet 40 milliGRAM(s) Oral before breakfast      Allergies    Allergy Status Unknown  No Known Allergies    Intolerances            Vital Signs Last 24 Hrs  T(C): 36.5 (06 Aug 2020 07:15), Max: 37.3 (05 Aug 2020 20:04)  T(F): 97.7 (06 Aug 2020 07:15), Max: 99.2 (05 Aug 2020 20:04)  HR: 61 (06 Aug 2020 07:15) (61 - 85)  BP: 138/66 (06 Aug 2020 07:15) (110/52 - 155/68)  BP(mean): --  RR: 20 (06 Aug 2020 07:15) (18 - 20)  SpO2: 96% (06 Aug 2020 07:15) (91% - 97%)    REVIEW OF SYSTEMS:     Constitutional: No fever, chills, fatigue, weakness  Eyes: no eye pain, visual disturbances, or discharge  ENT:  No difficulty hearing, tinnitus, vertigo; No sinus or throat pain  Neck: No pain or stiffness  Respiratory: No cough, dyspnea, wheezing   Cardiovascular: No chest pain, palpitations,   Gastrointestinal: No abdominal or epigastric pain. No nausea, vomiting  No diarrhea or constipation.   Genitourinary: No dysuria, frequency, hematuria or incontinence  Neurological: No headaches, lightheadedness, vertigo, numbness or tremors  Psychiatric: No depression, anxiety, mood swings or difficulty sleeping  Musculoskeletal: No joint pain or swelling; No muscle, back or extremity pain  Skin: No itching, burning, rashes or lesions   Lymph Nodes: No enlarged glands  Endocrine: No heat or cold intolerance; No hair loss   Allergy and Immunologic: No hives or eczema    On Neurological Examination:    Mental Status - Patient is sleepy today     Follow simple commands    Speech -   say a few words    Cranial Nerves - Pupils 3 mm equal and reactive to light,   extraocular eye movements intact.   smile symmetric  intact bilateral NLF    Motor Exam -   moves all 4 ext with sitmuli     Muscle tone - is normal all over.  No asymmetry is seen.      Sensory    Bilateral intact to light touch    GENERAL Exam: Nontoxic , No Acute Distress   	  HEENT:  normocephalic, atraumatic  		  LUNGS:  Decreased bilaterally  	  HEART: Normal S1S2   No murmur RRR        	  GI/ ABDOMEN:  Soft  Non tender    EXTREMITIES:   No Edema  No Clubbing  No Cyanosis   	   SKIN: Normal  No Ecchymosis                     LABS:  CBC Full  -  ( 06 Aug 2020 06:46 )  WBC Count : 7.39 K/uL  RBC Count : 3.04 M/uL  Hemoglobin : 8.6 g/dL  Hematocrit : 28.5 %  Platelet Count - Automated : 259 K/uL  Mean Cell Volume : 93.8 fl  Mean Cell Hemoglobin : 28.3 pg  Mean Cell Hemoglobin Concentration : 30.2 gm/dL  Auto Neutrophil # : x  Auto Lymphocyte # : x  Auto Monocyte # : x  Auto Eosinophil # : x  Auto Basophil # : x  Auto Neutrophil % : x  Auto Lymphocyte % : x  Auto Monocyte % : x  Auto Eosinophil % : x  Auto Basophil % : x      08-06    143  |  101  |  28<H>  ----------------------------<  93  3.5   |  41<H>  |  1.10    Ca    9.2      06 Aug 2020 06:46  Phos  2.8     08-05  Mg     2.0     08-05    TPro  6.5  /  Alb  2.4<L>  /  TBili  1.3<H>  /  DBili  x   /  AST  21  /  ALT  45  /  AlkPhos  57  08-05    Hemoglobin A1C:     LIVER FUNCTIONS - ( 05 Aug 2020 07:11 )  Alb: 2.4 g/dL / Pro: 6.5 g/dL / ALK PHOS: 57 U/L / ALT: 45 U/L / AST: 21 U/L / GGT: x           Vitamin B12   PT/INR - ( 05 Aug 2020 07:11 )   PT: 13.8 sec;   INR: 1.19 ratio               RADIOLOGY    ANALYSIS AND PLAN:  This is an 79-year-old with an episode of altered mental status and seizure.  1.	For episode of altered mental status and seizure, suspect this could be possibly metabolic encephalopathy.  The patient was hypothermic upon initial presentation, questionable any type of septic type process, septic shock.  2.	off antibiotics   3.	For underlying seizure  events, status epilepticus,  Keppra 500 mg twice a day.  no new events    4.	Ativan 1 mg IV push, q. 6h. p.r.n. for any type of breakthrough seizures.  5.	Seizure precaution.  6.	monitor respiratory status as needed   7.	 EEG no seizures discharges   8.	For history of high cholesterol, continue the patient on statin.  9.	For history of hypertension, monitor systolic blood pressure.  10.	Will monitor the patient's electrolytes.  11.	mri normal   12.	fall precautions   13.	appears sleepy today     Physical therapy evaluation if possible and as tolerated.  OOB to chair/ambulation with assistance only.    Greater than 40 minutes spent in direct patient care reviewing  the notes, lab data/ imaging , discussion with multidisciplinary team.

## 2020-08-06 NOTE — PROGRESS NOTE ADULT - ASSESSMENT
78yo Male PMHx CVA 2012, HTN, HLD , hernia surgery with testicle resection 2015, umbilical hernia repair 12/2019,  transferred from [place] ED to [place] ED with s/p seizure activity  and bradycardia into the 30s.  Arrived to [place] hypothermic and hypotensive .Patient  required intubation ph 7.1 with CO2 85 due to acute hypoxic respiratory failure on arrival to ER ,placed on Levophed drip due to hypotension Admitted for septic workup and evaluation ,send blood and urine cx, serial lactate levels, monitor vitals closely hydration ,monitor urine output and renal profile,iv abx initiated -given ceftriaxone in ER and seen by ID consult .Pulmonology and cardiology consults are called .Patient admitted  to ICU with septic shock ,noted to have  tongue bite and AMS ,neurology evaluation called  Admitted  to intensive care  unit for monitoring , to rule out ami -send 3 sets of cardiac enzymes to rule out acute coronary event, obtain ECHO to evaluate LVEF, cardiology consult  ,continue current sepsis management, ventilator management /O2 supply, anticoagulation plan as per cardiology consult Palliative care consult requested ,to discuss advance directives and complete MOLST .Tried to reach family to obtain details of past medical hx - left a message for daughter .not able to reach son ( non valid number )[place]  ED team spoke with patient's daughter, who stated her father fell to the ground on the floor of his motel room about 4 days ago and was unable to get up. She had been caring for him on the floor since and did not call 911 for assistance. Today she found him unresponsive, with blood in his mouth, and incontinent of urine.  CT brain was negative. A CODE STROKE was called, and a CTA head/neck were performed, which was negative for large vessel occlusion but concerning for a density anterior to the ascending aorta possibly right atrial thrombus. Also revealed enlarged, multinodular thyroid. TPA criteria was not met, as patient had unknown onset of symptoms. Patient was transferred to [facility] ED for further care .He developed  multiple witnessed seizures in [place] ED, temporarily resolved with Ativan administration . CT/CTA brain NAD . Seen by neurologist and EEG ordered to r/o status epilepticus as etiology of ongoing encephalopathy, probably will require  MRI once stabilized from cardiac and respiratory standpoint.

## 2020-08-06 NOTE — PROGRESS NOTE ADULT - PROBLEM SELECTOR PROBLEM 6
Hypothermia
Hypothermia
LASHELL (acute kidney injury)

## 2020-08-06 NOTE — PROGRESS NOTE ADULT - PROBLEM SELECTOR PLAN 2
LIKELY MULTIFACTORIAL 2/2 TO ACUTE METABOLIC ENCEPHALOPATHY SECONDARY TO SEPSIS AND ACUTE RESPIRATORY FAILURE ,POA AND POSTICTAL STATE
LIKELY MULTIFACTORIAL 2/2 TO ACUTE METABOLIC ENCEPHALOPATHY SECONDARY TO SEPSIS AND ACUTE RESPIRATORY FAILURE ,POA AND POSTICTAL STATE
LIKELY MULTIFACTORIAL 2/2 TO ACUTE METABOLIC ENCEPHALOPATHY SECONDARY TO SEPSIS AND ACUTE RESPIRATORY FAILURE ,POA AND POSTICTAL STATE .COVID RELATED ENCEPHALOPATHY ALSO MAY BE CONTRIBUTING FACTOR .MRI BRAIN NOTED -NO ACUTE EVENTS ,CHRONIC CVA ,PATIENT HAS KNOWN HX OF CVA IN 2012 WITH RESIDUAL DYSARTHRIA
as above
as above  Thank you for consulting us and involving us in the management of this most interesting and challenging case.     Please Call with any further questions
LIKELY MULTIFACTORIAL 2/2 TO ACUTE METABOLIC ENCEPHALOPATHY SECONDARY TO SEPSIS AND ACUTE RESPIRATORY FAILURE ,POA AND POSTICTAL STATE .COVID RELATED ENCEPHALOPATHY ALSO MAY BE CONTRIBUTING FACTOR .MRI BRAIN NOTED -NO ACUTE EVENTS ,CHRONIC CVA ,PATIENT HAS KNOWN HX OF CVA IN 2012 WITH RESIDUAL DYSARTHRIA
LIKELY MULTIFACTORIAL 2/2 TO ACUTE METABOLIC ENCEPHALOPATHY SECONDARY TO SEPSIS AND ACUTE RESPIRATORY FAILURE ,POA AND POSTICTAL STATE .COVID RELATED ENCEPHALOPATHY ALSO MAY BE CONTRIBUTING FACTOR .MRI BRAIN NOTED -NO ACUTE EVENTS ,CHRONIC CVA ,PATIENT HAS KNOWN HX OF CVA IN 2012 WITH RESIDUAL DYSARTHRIA

## 2020-08-06 NOTE — PROGRESS NOTE ADULT - REASON FOR ADMISSION
UNRESPONSIVENESS
rolling walker
UNRESPONSIVENESS

## 2020-08-06 NOTE — DISCHARGE NOTE NURSING/CASE MANAGEMENT/SOCIAL WORK - NSDCPEEMAIL_GEN_ALL_CORE
[facility] for Tobacco Control email [email or web address]

## 2020-08-06 NOTE — DISCHARGE NOTE NURSING/CASE MANAGEMENT/SOCIAL WORK - PATIENT PORTAL LINK FT
You can access the FollowMyHealth Patient Portal offered by [facility] by registering at the following website: [email or web address]. By joining [employer]’s FollowMyHealth portal, you will also be able to view your health information using other applications (apps) compatible with our system.

## 2020-08-06 NOTE — PROGRESS NOTE ADULT - PROBLEM SELECTOR PLAN 7
Admitted  to telemetry unit for monitoring , send 3 sets of cardiac enzymes to rule out acute coronary event, obtain ECHO to evaluate LVEF, cardiology consult  ,continue current management, O2 supply, anticoagulation plan as per cardiology consult ,Echocardiogram with mild LV systolic dysfunction, no obvious right atrial thrombus ,Hold carvedilol; eventually can resume when HR picks up furtherseen by Dr Lima  in ER
Admitted  to telemetry unit for monitoring , send 3 sets of cardiac enzymes to rule out acute coronary event, obtain ECHO to evaluate LVEF, cardiology consult  ,continue current management, O2 supply, anticoagulation plan as per cardiology consult ,Echocardiogram with mild LV systolic dysfunction, no obvious right atrial thrombus ,Hold carvedilol; eventually can resume when HR picks up furtherseen by Dr Lima  in ER
Patient was at rehab x 6 mnts in 2020  , as per family he did not have any COVID  symptoms except cough in May and some patients in rehab were positive at that time , patient  has been very lethargic with speech abnormalities and falls (ambulates with walker), no obvious seizure like activity reported at home after discharge from rehab

## 2020-08-06 NOTE — PROGRESS NOTE ADULT - ASSESSMENT
cont rx cont rx        OXYGENATION      2020 ra 93%     VITALS/LABS     2020 afeb 60 150/80     PT DATA/BEST PRACTICE  ALLERGY    nka                 WT               2020 128 k                       BMI                2020 40             CrCl                                    ADVANCED DIRECTIVE     LINES TUBES POA.                 NONVIOLENT NONSELFDESTRUCTIVE LEVEL ONE 2020   HEAD OF BED ELEVATION Yes        PROCEDURE   Intubation )   Arevalo   Extubation   DVT PROPHYLAXIS         hposc (2020)   SCHULTZ PROPHYLAXIS          Protonix 40 ()   DYSPHAGIA EVAL     8/3 dys 1 nectar recommended  DIET.     jevity 1.5 1600 () (og)  --> dys 1 puree nectar ()                                                                        IV F       PATIENT DATA/ASSESSMENT/RECOMMENDATIONS     79 m found unresponsive  possibly from seiz with possible clot in r atrium on cta head resp failure pH 711 pco2 80 possible rhabdo possible mi chf in [place] No ac cva found on ct head   Pt intubated in er 2020 Extubated  started on po diet 2020    RESP stable off vent Had hypercapnic resp failure Will check abg (2020)   COVID Prior infection   INFECTION ROCEPHIN rocephin (-)    No active infection   COPD on bd Under contrl  S CHF ECHO  echo ef 45% dd biatrial enlargement  BNP 2020 bnp 5446  ECHO  echo ef 45% dd biatrial enlargement  ASA  asa 81 ()  LASIX lasix 60 () --> lasix 40 ()   HYDRALAZINE hydralaz 50.3 ()  --> hydralazine 75.3 (8/3 ) (Dr Kline   CARVEDILOL carvedilol 6.25x2(2020)   cont rx  LASHELL Inmproved  2020 cr 1.3      Sz MR  mr chr infarct r occipital lobe chr infarcts r post frontal and r perirolandic region  Chr lacunar infarcts r subinsular   Keppra                                 DC planning           TIME SPENT Over 25 minutes aggregate care time spent on encounter; activities included combinations of  direct pt care, counseling and/or coordinating care reviewing notes, lab data/ imaging, discussion with multidisciplinary team/ pt /family. Risks, benefits, alternatives of planned interventions when applicable were discussed in detail and questions answered as best as possible    LAURA MORGAN [facility] P [phone]  1938 DOA 2020 DR SHAREE PUTNAM

## 2020-09-03 NOTE — ED POST DISCHARGE NOTE - ADDITIONAL DOCUMENTATION
Verbal order to nurse for Ativan 1 mg IV given. Medication was pulled for administration from [employer], but ultimately not needed.

## 2020-10-18 ENCOUNTER — EMERGENCY (EMERGENCY)
Facility: HOSPITAL | Age: 80
LOS: 0 days | Discharge: HOME | End: 2020-10-18
Attending: EMERGENCY MEDICINE
Payer: SELF-PAY

## 2020-10-18 VITALS
TEMPERATURE: 98 F | HEART RATE: 67 BPM | OXYGEN SATURATION: 95 % | DIASTOLIC BLOOD PRESSURE: 59 MMHG | HEIGHT: 70 IN | SYSTOLIC BLOOD PRESSURE: 136 MMHG | WEIGHT: 179.9 LBS | RESPIRATION RATE: 18 BRPM

## 2020-10-18 VITALS
HEART RATE: 66 BPM | SYSTOLIC BLOOD PRESSURE: 145 MMHG | DIASTOLIC BLOOD PRESSURE: 62 MMHG | OXYGEN SATURATION: 96 % | RESPIRATION RATE: 17 BRPM | TEMPERATURE: 98 F

## 2020-10-18 DIAGNOSIS — Z86.73 PERSONAL HISTORY OF TRANSIENT ISCHEMIC ATTACK (TIA), AND CEREBRAL INFARCTION WITHOUT RESIDUAL DEFICITS: ICD-10-CM

## 2020-10-18 DIAGNOSIS — I10 ESSENTIAL (PRIMARY) HYPERTENSION: ICD-10-CM

## 2020-10-18 DIAGNOSIS — I49.3 VENTRICULAR PREMATURE DEPOLARIZATION: ICD-10-CM

## 2020-10-18 DIAGNOSIS — R55 SYNCOPE AND COLLAPSE: ICD-10-CM

## 2020-10-18 DIAGNOSIS — Z98.890 OTHER SPECIFIED POSTPROCEDURAL STATES: Chronic | ICD-10-CM

## 2020-10-18 DIAGNOSIS — Z79.82 LONG TERM (CURRENT) USE OF ASPIRIN: ICD-10-CM

## 2020-10-18 DIAGNOSIS — G45.9 TRANSIENT CEREBRAL ISCHEMIC ATTACK, UNSPECIFIED: ICD-10-CM

## 2020-10-18 DIAGNOSIS — K21.9 GASTRO-ESOPHAGEAL REFLUX DISEASE WITHOUT ESOPHAGITIS: ICD-10-CM

## 2020-10-18 DIAGNOSIS — E78.5 HYPERLIPIDEMIA, UNSPECIFIED: ICD-10-CM

## 2020-10-18 PROBLEM — I63.9 CEREBRAL INFARCTION, UNSPECIFIED: Chronic | Status: ACTIVE | Noted: 2020-07-28

## 2020-10-18 LAB
ALBUMIN SERPL ELPH-MCNC: 2.8 G/DL — LOW (ref 3.3–5)
ALP SERPL-CCNC: 141 U/L — HIGH (ref 40–120)
ALT FLD-CCNC: 134 U/L — HIGH (ref 12–78)
ANION GAP SERPL CALC-SCNC: 6 MMOL/L — SIGNIFICANT CHANGE UP (ref 5–17)
AST SERPL-CCNC: 25 U/L — SIGNIFICANT CHANGE UP (ref 15–37)
BASOPHILS # BLD AUTO: 0.02 K/UL — SIGNIFICANT CHANGE UP (ref 0–0.2)
BASOPHILS NFR BLD AUTO: 0.2 % — SIGNIFICANT CHANGE UP (ref 0–2)
BILIRUB SERPL-MCNC: 1.4 MG/DL — HIGH (ref 0.2–1.2)
BUN SERPL-MCNC: 29 MG/DL — HIGH (ref 7–23)
CALCIUM SERPL-MCNC: 8.9 MG/DL — SIGNIFICANT CHANGE UP (ref 8.5–10.1)
CHLORIDE SERPL-SCNC: 107 MMOL/L — SIGNIFICANT CHANGE UP (ref 96–108)
CO2 SERPL-SCNC: 28 MMOL/L — SIGNIFICANT CHANGE UP (ref 22–31)
CREAT SERPL-MCNC: 1.33 MG/DL — HIGH (ref 0.5–1.3)
EOSINOPHIL # BLD AUTO: 0.15 K/UL — SIGNIFICANT CHANGE UP (ref 0–0.5)
EOSINOPHIL NFR BLD AUTO: 1.6 % — SIGNIFICANT CHANGE UP (ref 0–6)
GLUCOSE SERPL-MCNC: 128 MG/DL — HIGH (ref 70–99)
HCT VFR BLD CALC: 34.8 % — LOW (ref 39–50)
HGB BLD-MCNC: 10.9 G/DL — LOW (ref 13–17)
IMM GRANULOCYTES NFR BLD AUTO: 0.5 % — SIGNIFICANT CHANGE UP (ref 0–1.5)
LYMPHOCYTES # BLD AUTO: 0.54 K/UL — LOW (ref 1–3.3)
LYMPHOCYTES # BLD AUTO: 5.7 % — LOW (ref 13–44)
MAGNESIUM SERPL-MCNC: 1.6 MG/DL — SIGNIFICANT CHANGE UP (ref 1.6–2.6)
MCHC RBC-ENTMCNC: 29.5 PG — SIGNIFICANT CHANGE UP (ref 27–34)
MCHC RBC-ENTMCNC: 31.3 GM/DL — LOW (ref 32–36)
MCV RBC AUTO: 94.3 FL — SIGNIFICANT CHANGE UP (ref 80–100)
MONOCYTES # BLD AUTO: 0.68 K/UL — SIGNIFICANT CHANGE UP (ref 0–0.9)
MONOCYTES NFR BLD AUTO: 7.2 % — SIGNIFICANT CHANGE UP (ref 2–14)
NEUTROPHILS # BLD AUTO: 8.06 K/UL — HIGH (ref 1.8–7.4)
NEUTROPHILS NFR BLD AUTO: 84.8 % — HIGH (ref 43–77)
PHOSPHATE SERPL-MCNC: 2.4 MG/DL — LOW (ref 2.5–4.5)
PLATELET # BLD AUTO: 181 K/UL — SIGNIFICANT CHANGE UP (ref 150–400)
POTASSIUM SERPL-MCNC: 3.9 MMOL/L — SIGNIFICANT CHANGE UP (ref 3.5–5.3)
POTASSIUM SERPL-SCNC: 3.9 MMOL/L — SIGNIFICANT CHANGE UP (ref 3.5–5.3)
PROT SERPL-MCNC: 7.7 GM/DL — SIGNIFICANT CHANGE UP (ref 6–8.3)
RBC # BLD: 3.69 M/UL — LOW (ref 4.2–5.8)
RBC # FLD: 13.6 % — SIGNIFICANT CHANGE UP (ref 10.3–14.5)
SARS-COV-2 RNA SPEC QL NAA+PROBE: SIGNIFICANT CHANGE UP
SODIUM SERPL-SCNC: 141 MMOL/L — SIGNIFICANT CHANGE UP (ref 135–145)
WBC # BLD: 9.5 K/UL — SIGNIFICANT CHANGE UP (ref 3.8–10.5)
WBC # FLD AUTO: 9.5 K/UL — SIGNIFICANT CHANGE UP (ref 3.8–10.5)

## 2020-10-18 PROCEDURE — 99285 EMERGENCY DEPT VISIT HI MDM: CPT | Mod: 25

## 2020-10-18 PROCEDURE — 70450 CT HEAD/BRAIN W/O DYE: CPT

## 2020-10-18 PROCEDURE — 85025 COMPLETE CBC W/AUTO DIFF WBC: CPT

## 2020-10-18 PROCEDURE — U0003: CPT

## 2020-10-18 PROCEDURE — 84100 ASSAY OF PHOSPHORUS: CPT

## 2020-10-18 PROCEDURE — 99284 EMERGENCY DEPT VISIT MOD MDM: CPT

## 2020-10-18 PROCEDURE — 84484 ASSAY OF TROPONIN QUANT: CPT

## 2020-10-18 PROCEDURE — 83735 ASSAY OF MAGNESIUM: CPT

## 2020-10-18 PROCEDURE — 71045 X-RAY EXAM CHEST 1 VIEW: CPT

## 2020-10-18 PROCEDURE — 93005 ELECTROCARDIOGRAM TRACING: CPT

## 2020-10-18 PROCEDURE — 80053 COMPREHEN METABOLIC PANEL: CPT

## 2020-10-18 PROCEDURE — 36415 COLL VENOUS BLD VENIPUNCTURE: CPT

## 2020-10-18 PROCEDURE — 71045 X-RAY EXAM CHEST 1 VIEW: CPT | Mod: 26

## 2020-10-18 PROCEDURE — 93010 ELECTROCARDIOGRAM REPORT: CPT

## 2020-10-18 PROCEDURE — 70450 CT HEAD/BRAIN W/O DYE: CPT | Mod: 26

## 2020-10-18 PROCEDURE — 82962 GLUCOSE BLOOD TEST: CPT

## 2020-10-18 PROCEDURE — 99053 MED SERV 10PM-8AM 24 HR FAC: CPT

## 2020-10-18 NOTE — ED ADULT NURSE NOTE - CHIEF COMPLAINT QUOTE
patient BIBEMS from [place] c/o AMS. as per EMS, patient was taking his nighttime medications when he dropped a pill and "my daughter got mad and called 911". + slurred speech and intermittent confusion. patient lethargic. patient axox2/3 in triage.  at triage. no code stroke as per dr. michel.

## 2020-10-18 NOTE — ED ADULT NURSE NOTE - NSIMPLEMENTINTERV_GEN_ALL_ED
Implemented All Fall Risk Interventions:  [place] to call system. Call bell, personal items and telephone within reach. Instruct patient to call for assistance. Room bathroom lighting operational. Non-slip footwear when patient is off stretcher. Physically safe environment: no spills, clutter or unnecessary equipment. Stretcher in lowest position, wheels locked, appropriate side rails in place. Provide visual cue, wrist band, yellow gown, etc. Monitor gait and stability. Monitor for mental status changes and reorient to person, place, and time. Review medications for side effects contributing to fall risk. Reinforce activity limits and safety measures with patient and family.

## 2020-10-18 NOTE — ED ADULT NURSE REASSESSMENT NOTE - NS ED NURSE REASSESS COMMENT FT1
Pt AA&Ox3. Pt VS stable. Pt denies any pain or discomfort a this time. Pt to be discharge and  by daughter in am. Monitoring continues.

## 2020-10-18 NOTE — ED PROVIDER NOTE - PSH
H/O hernia repair    History of surgical removal of testicle    No significant past surgical history

## 2020-10-18 NOTE — ED PROVIDER NOTE - PATIENT PORTAL LINK FT
You can access the FollowMyHealth Patient Portal offered by [facility] by registering at the following website: [email or web address]. By joining [employer]’s FollowMyHealth portal, you will also be able to view your health information using other applications (apps) compatible with our system.

## 2020-10-18 NOTE — ED ADULT TRIAGE NOTE - CHIEF COMPLAINT QUOTE
patient BIBEMS from [place] c/o AMS. as per EMS, patient was taking his nighttime medications when he dropped a pill and "my daughter got mad and called 911". + slurred speech and intermittent confusion. patient lethargic. patient axox2/3 in triage.  at triage. no code stroke as per dr. michel.

## 2020-10-18 NOTE — ED PROVIDER NOTE - NSFOLLOWUPINSTRUCTIONS_ED_ALL_ED_FT
These transient symptoms may have been due to the increase in Coreg and resulting decreased BP and HR.  Pt had a normal neurological exam in the ER      Transient Ischemic Attack    A transient ischemic attack (TIA) is a "warning stroke" that causes stroke-like symptoms that go away quickly. A TIA does not cause lasting damage to the brain. But having a TIA is a sign that you may be at risk for a stroke. Lifestyle changes and medical treatments can help prevent a stroke.     It is important to know the symptoms of a TIA and what to do. Get help right away, even if your symptoms go away. The symptoms of a TIA are the same as those of a stroke. They can happen fast, and they usually go away within minutes or hours. They can include:    Weakness or loss of feeling in your face, arm, or leg. This often happens on one side of your body.  Trouble walking.  Trouble moving your arms or legs.  Trouble talking or understanding what people are saying.  Trouble seeing.  Seeing two of one object (double vision).  Feeling dizzy.  Feeling confused.  Loss of balance or coordination.  Feeling sick to your stomach (nauseous) and throwing up (vomiting).  A very bad headache for no reason.    What increases the risk?    Certain things may make you more likely to have a TIA. Some of these are things that you can change, such as:    Being very overweight (obese).  Using products that contain nicotine or tobacco, such as cigarettes and e-cigarettes.  Taking birth control pills.  Not being active.  Drinking too much alcohol.  Using drugs.    Other risk factors include:    Having an irregular heartbeat (atrial fibrillation).  Being  or .  Having had blood clots, stroke, TIA, or heart attack in the past.  Being a woman with a history of high blood pressure in pregnancy (preeclampsia).  Being over the age of 60.  Being male.  Having family history of stroke.  Having the following diseases or conditions:  High blood pressure.  High cholesterol.  Diabetes.  Heart disease.  Sickle cell disease.  Sleep apnea.  Migraine headache.  Long-term (chronic) diseases that cause soreness and swelling (inflammation).  Disorders that affect how your blood clots.    Follow these instructions at home:  Medicines    Take over-the-counter and prescription medicines only as told by your doctor.  If you were told to take aspirin or another medicine to thin your blood, take it exactly as told by your doctor.  Taking too much of the medicine can cause bleeding.  Taking too little of the medicine may not work to treat the problem.    Eating and drinking    Eat 5 or more servings of fruits and vegetables each day.  Follow instructions from your doctor about your diet. You may need to follow a certain diet to help lower your risk of having a stroke. You may need to:  Eat a diet that is low in fat and salt.  Eat foods that contain a lot of fiber.  Limit the amount of carbohydrates and sugar in your diet.  Limit alcohol intake to 1 drink a day for nonpregnant women and 2 drinks a day for men. One drink equals 12 oz of beer, 5 oz of wine, or 1½ oz of hard liquor.    General instructions    Keep a healthy weight.  Stay active. Try to get at least 30 minutes of activity on all or most days.  Find out if you have a condition called sleep apnea. Get treatment if needed.  Do not use any products that contain nicotine or tobacco, such as cigarettes and e-cigarettes. If you need help quitting, ask your doctor.  Do not abuse drugs.  Keep all follow-up visits as told by your doctor. This is important.    Get help right away if:  You have any signs of stroke. “BE FAST” is an easy way to remember the main warning signs:  B - Balance. Signs are dizziness, sudden trouble walking, or loss of balance.  E - Eyes. Signs are trouble seeing or a sudden change in how you see.  F - Face. Signs are sudden weakness or loss of feeling of the face, or the face or eyelid drooping on one side.  A - Arms. Signs are weakness or loss of feeling in an arm. This happens suddenly and usually on one side of the body.  S - Speech. Signs are sudden trouble speaking, slurred speech, or trouble understanding what people say.  T - Time. Time to call emergency services. Write down what time symptoms started.  You have other signs of stroke, such as:   A sudden, very bad headache with no known cause.   Feeling sick to your stomach (nausea).   Throwing up (vomiting).   Jerky movements that you cannot control (seizure).    These symptoms may be an emergency. Do not wait to see if the symptoms will go away. Get medical help right away. Call your local emergency services (911 in the [place] Do not drive yourself to the hospital.    Summary  A transient ischemic attack (TIA) is a "warning stroke" that causes stroke-like symptoms that go away quickly.  A TIA is a medical emergency. Get help right away, even if your symptoms go away.  A TIA does not cause lasting damage to the brain.  Having a TIA is a sign that you may be at risk for a stroke. Lifestyle changes and medical treatments can help prevent a stroke.    ADDITIONAL NOTES AND INSTRUCTIONS    Please follow up with your Primary MD in 24-48 hr.  Seek immediate medical care for any new/worsening signs or symptoms.

## 2020-10-18 NOTE — ED PROVIDER NOTE - CLINICAL SUMMARY MEDICAL DECISION MAKING FREE TEXT BOX
79 y/o M BIBEMS for reported brief episode of decreased LOC with slurred speech resolved prior to arrival of EMS with NIHSS of 0 in ED.  Pt's daughter notes increased dose of Coreg and symptoms beginning shortly after taking it.  Plan: CT head, EKG, labs, repeat neuro exam, follow up with neurology vs admission

## 2020-10-18 NOTE — ED ADULT NURSE NOTE - OBJECTIVE STATEMENT
Pt BIBA from [place] c/o AMS. Per EMS, pt daughter witnessed  slurred speech and confusion after trying to wake up pt to take his medications.  Pt was alert and oriented per EMS. Pt  reports chronic R leg pain and weakness and weakness. No facial droop noted, no slurred speech upon arrival to the ED.  Pt denies CP, SOB, dizziness. Pt denies headache, blurred vision. No other complains at this time.

## 2020-10-18 NOTE — ED ADULT NURSE NOTE - NS ED NURSE IV DC DT
61 f   post op  5/31  C1L, AVR, MV repair   + E faecalis,  nl EF    H     CKD  cervical fusion,  smoker, PAD  lt fem endarectorectomy    post op products    ID for abx     will need long course  abx  and PICC after BC      6/3  trf too floor  NSR   +pw   abx  and diuretics     6/4: Preliminary bld cx negative. Pt will need PICC line when bld cx b/l negative    6/5 VSS; WBC 12- continue ampicillin and ceftriaxone as per ID; place he catheter as per renal- Dr. Berger; d/w IR - he catheter to be placed 6/6 as per renal- npo after midnight     pw d/c this am; abx til 7/12 as per ID    6/6/19- VSS he placed @ IR - slight bleeding at site    Discharge planning- home friday after  IV ABX setup 61 f   post op  5/31  C1L, AVR, MV repair   + E faecalis,  nl EF    H     CKD  cervical fusion,  smoker, PAD  lt fem endarectorectomy    post op products    ID for abx     will need long course  abx  and PICC after BC      6/3  trf too floor  NSR   +pw   abx  and diuretics     6/4: Preliminary bld cx negative. Pt will need PICC line when bld cx b/l negative    6/5 VSS; WBC 12- continue ampicillin and ceftriaxone as per ID; place he catheter as per renal- Dr. Berger; d/w IR - he catheter to be placed 6/6 as per renal- npo after midnight     pw d/c this am; abx til 7/12 as per ID    6/6/19- VSS he placed @ IR - slight bleeding at site- surgicell applied; bleeding resolved    6/7 Discharge patient home with iv abx til 7/12 as per ID and dr. Cee 18-Oct-2020 06:51

## 2020-10-18 NOTE — ED PROVIDER NOTE - PROGRESS NOTE DETAILS
Had lengthy discussing with daughter about outpatient follow up vs close follow up with neurology and she is opting for close follow up.  Hussein Cardoso, DO

## 2020-10-18 NOTE — ED PROVIDER NOTE - PMH
Chronic GERD    CVA (cerebral vascular accident)    High cholesterol    HLD (hyperlipidemia)    HTN (hypertension)    HTN (hypertension)

## 2020-10-18 NOTE — ED PROVIDER NOTE - OBJECTIVE STATEMENT
79 y/o Male PMHx CVA 2012, HTN, HLD , hernia surgery with testicle resection 2015, umbilical hernia repair 12/2019 BIBEMS called by his daughter for brief episode of decreased responsiveness when she woke him about 1-2 hours ago to give him his medication.  Pt was alert and oriented per EMS and on arrival to ED in triage. Pt denies any other complaints at this time other than chronic left knee pain. 81 y/o Male PMHx CVA 2012, HTN, HLD , hernia surgery with testicle resection 2015, umbilical hernia repair 12/2019 BIBEMS called by his daughter for brief episode of decreased responsiveness when she woke him about 1-2 hours ago to give him his medication.  Pt was alert and oriented per EMS and on arrival to ED in triage. Pt denies any other complaints at this time other than chronic left knee pain.  Pt's daughter states that he was started on an increased dose of Carvedilol from 12.5 mg to 25 mg and it was given to him at 2230 last night.  He became drowsy appearing and slow to respond shortly after receiving the medication.

## 2020-10-18 NOTE — ED ADULT NURSE NOTE - CHPI ED NUR SYMPTOMS NEG
no fever/no loss of consciousness/no vomiting/no dizziness/no numbness/no nausea/no blurred vision/no change in level of consciousness

## 2020-12-14 ENCOUNTER — INPATIENT (INPATIENT)
Facility: HOSPITAL | Age: 80
LOS: 8 days | Discharge: HOME HEALTH | DRG: 640 | End: 2020-12-23
Attending: HOSPITALIST | Admitting: HOSPITALIST
Payer: MEDICARE

## 2020-12-14 VITALS
TEMPERATURE: 98 F | HEIGHT: 70 IN | SYSTOLIC BLOOD PRESSURE: 141 MMHG | OXYGEN SATURATION: 99 % | DIASTOLIC BLOOD PRESSURE: 84 MMHG | RESPIRATION RATE: 18 BRPM | HEART RATE: 88 BPM | WEIGHT: 199.96 LBS

## 2020-12-14 DIAGNOSIS — Z98.890 OTHER SPECIFIED POSTPROCEDURAL STATES: Chronic | ICD-10-CM

## 2020-12-14 DIAGNOSIS — R55 SYNCOPE AND COLLAPSE: ICD-10-CM

## 2020-12-14 LAB
ALBUMIN SERPL ELPH-MCNC: 3.2 G/DL — LOW (ref 3.3–5)
ALP SERPL-CCNC: 108 U/L — SIGNIFICANT CHANGE UP (ref 40–120)
ALT FLD-CCNC: 146 U/L — HIGH (ref 12–78)
ANION GAP SERPL CALC-SCNC: 3 MMOL/L — LOW (ref 5–17)
AST SERPL-CCNC: 48 U/L — HIGH (ref 15–37)
BASOPHILS # BLD AUTO: 0.06 K/UL — SIGNIFICANT CHANGE UP (ref 0–0.2)
BASOPHILS NFR BLD AUTO: 1 % — SIGNIFICANT CHANGE UP (ref 0–2)
BILIRUB SERPL-MCNC: 2.1 MG/DL — HIGH (ref 0.2–1.2)
BUN SERPL-MCNC: 17 MG/DL — SIGNIFICANT CHANGE UP (ref 7–23)
CALCIUM SERPL-MCNC: 9 MG/DL — SIGNIFICANT CHANGE UP (ref 8.5–10.1)
CHLORIDE SERPL-SCNC: 115 MMOL/L — HIGH (ref 96–108)
CO2 SERPL-SCNC: 29 MMOL/L — SIGNIFICANT CHANGE UP (ref 22–31)
CREAT SERPL-MCNC: 1.51 MG/DL — HIGH (ref 0.5–1.3)
EOSINOPHIL # BLD AUTO: 0.2 K/UL — SIGNIFICANT CHANGE UP (ref 0–0.5)
EOSINOPHIL NFR BLD AUTO: 3.4 % — SIGNIFICANT CHANGE UP (ref 0–6)
GLUCOSE SERPL-MCNC: 116 MG/DL — HIGH (ref 70–99)
HCT VFR BLD CALC: 37.2 % — LOW (ref 39–50)
HGB BLD-MCNC: 11 G/DL — LOW (ref 13–17)
IMM GRANULOCYTES NFR BLD AUTO: 0.3 % — SIGNIFICANT CHANGE UP (ref 0–1.5)
LYMPHOCYTES # BLD AUTO: 1.14 K/UL — SIGNIFICANT CHANGE UP (ref 1–3.3)
LYMPHOCYTES # BLD AUTO: 19.2 % — SIGNIFICANT CHANGE UP (ref 13–44)
MCHC RBC-ENTMCNC: 29.3 PG — SIGNIFICANT CHANGE UP (ref 27–34)
MCHC RBC-ENTMCNC: 29.6 GM/DL — LOW (ref 32–36)
MCV RBC AUTO: 98.9 FL — SIGNIFICANT CHANGE UP (ref 80–100)
MONOCYTES # BLD AUTO: 0.38 K/UL — SIGNIFICANT CHANGE UP (ref 0–0.9)
MONOCYTES NFR BLD AUTO: 6.4 % — SIGNIFICANT CHANGE UP (ref 2–14)
NEUTROPHILS # BLD AUTO: 4.13 K/UL — SIGNIFICANT CHANGE UP (ref 1.8–7.4)
NEUTROPHILS NFR BLD AUTO: 69.7 % — SIGNIFICANT CHANGE UP (ref 43–77)
PLATELET # BLD AUTO: 229 K/UL — SIGNIFICANT CHANGE UP (ref 150–400)
POTASSIUM SERPL-MCNC: 3.8 MMOL/L — SIGNIFICANT CHANGE UP (ref 3.5–5.3)
POTASSIUM SERPL-SCNC: 3.8 MMOL/L — SIGNIFICANT CHANGE UP (ref 3.5–5.3)
PROT SERPL-MCNC: 7.4 GM/DL — SIGNIFICANT CHANGE UP (ref 6–8.3)
RBC # BLD: 3.76 M/UL — LOW (ref 4.2–5.8)
RBC # FLD: 15.6 % — HIGH (ref 10.3–14.5)
SODIUM SERPL-SCNC: 147 MMOL/L — HIGH (ref 135–145)
TROPONIN I SERPL-MCNC: 0.05 NG/ML — HIGH (ref 0.01–0.04)
WBC # BLD: 5.93 K/UL — SIGNIFICANT CHANGE UP (ref 3.8–10.5)
WBC # FLD AUTO: 5.93 K/UL — SIGNIFICANT CHANGE UP (ref 3.8–10.5)

## 2020-12-14 PROCEDURE — 82746 ASSAY OF FOLIC ACID SERUM: CPT

## 2020-12-14 PROCEDURE — 92610 EVALUATE SWALLOWING FUNCTION: CPT | Mod: GN

## 2020-12-14 PROCEDURE — 84425 ASSAY OF VITAMIN B-1: CPT

## 2020-12-14 PROCEDURE — 78452 HT MUSCLE IMAGE SPECT MULT: CPT

## 2020-12-14 PROCEDURE — 93010 ELECTROCARDIOGRAM REPORT: CPT

## 2020-12-14 PROCEDURE — 83735 ASSAY OF MAGNESIUM: CPT

## 2020-12-14 PROCEDURE — 92523 SPEECH SOUND LANG COMPREHEN: CPT | Mod: GN

## 2020-12-14 PROCEDURE — 88342 IMHCHEM/IMCYTCHM 1ST ANTB: CPT

## 2020-12-14 PROCEDURE — A9500: CPT

## 2020-12-14 PROCEDURE — U0003: CPT

## 2020-12-14 PROCEDURE — 97530 THERAPEUTIC ACTIVITIES: CPT | Mod: GP

## 2020-12-14 PROCEDURE — 88305 TISSUE EXAM BY PATHOLOGIST: CPT

## 2020-12-14 PROCEDURE — 97162 PT EVAL MOD COMPLEX 30 MIN: CPT | Mod: GP

## 2020-12-14 PROCEDURE — 93005 ELECTROCARDIOGRAM TRACING: CPT

## 2020-12-14 PROCEDURE — 84443 ASSAY THYROID STIM HORMONE: CPT

## 2020-12-14 PROCEDURE — 85730 THROMBOPLASTIN TIME PARTIAL: CPT

## 2020-12-14 PROCEDURE — 85610 PROTHROMBIN TIME: CPT

## 2020-12-14 PROCEDURE — 84484 ASSAY OF TROPONIN QUANT: CPT

## 2020-12-14 PROCEDURE — 94640 AIRWAY INHALATION TREATMENT: CPT

## 2020-12-14 PROCEDURE — 95816 EEG AWAKE AND DROWSY: CPT

## 2020-12-14 PROCEDURE — 80053 COMPREHEN METABOLIC PANEL: CPT

## 2020-12-14 PROCEDURE — 97116 GAIT TRAINING THERAPY: CPT | Mod: GP

## 2020-12-14 PROCEDURE — 83880 ASSAY OF NATRIURETIC PEPTIDE: CPT

## 2020-12-14 PROCEDURE — 80048 BASIC METABOLIC PNL TOTAL CA: CPT

## 2020-12-14 PROCEDURE — 83550 IRON BINDING TEST: CPT

## 2020-12-14 PROCEDURE — 93306 TTE W/DOPPLER COMPLETE: CPT

## 2020-12-14 PROCEDURE — 86658 ENTEROVIRUS ANTIBODY: CPT

## 2020-12-14 PROCEDURE — 86769 SARS-COV-2 COVID-19 ANTIBODY: CPT

## 2020-12-14 PROCEDURE — 80307 DRUG TEST PRSMV CHEM ANLYZR: CPT

## 2020-12-14 PROCEDURE — 85027 COMPLETE CBC AUTOMATED: CPT

## 2020-12-14 PROCEDURE — 36415 COLL VENOUS BLD VENIPUNCTURE: CPT

## 2020-12-14 PROCEDURE — 85025 COMPLETE CBC W/AUTO DIFF WBC: CPT

## 2020-12-14 PROCEDURE — 84156 ASSAY OF PROTEIN URINE: CPT

## 2020-12-14 PROCEDURE — 84166 PROTEIN E-PHORESIS/URINE/CSF: CPT

## 2020-12-14 PROCEDURE — 70450 CT HEAD/BRAIN W/O DYE: CPT | Mod: 26

## 2020-12-14 PROCEDURE — 83036 HEMOGLOBIN GLYCOSYLATED A1C: CPT

## 2020-12-14 PROCEDURE — 86334 IMMUNOFIX E-PHORESIS SERUM: CPT

## 2020-12-14 PROCEDURE — 82607 VITAMIN B-12: CPT

## 2020-12-14 PROCEDURE — 84165 PROTEIN E-PHORESIS SERUM: CPT

## 2020-12-14 PROCEDURE — 70551 MRI BRAIN STEM W/O DYE: CPT | Mod: 52

## 2020-12-14 PROCEDURE — 71046 X-RAY EXAM CHEST 2 VIEWS: CPT | Mod: 26

## 2020-12-14 PROCEDURE — 84207 ASSAY OF VITAMIN B-6: CPT

## 2020-12-14 PROCEDURE — 82728 ASSAY OF FERRITIN: CPT

## 2020-12-14 PROCEDURE — 84100 ASSAY OF PHOSPHORUS: CPT

## 2020-12-14 PROCEDURE — 83883 ASSAY NEPHELOMETRY NOT SPEC: CPT

## 2020-12-14 PROCEDURE — 93017 CV STRESS TEST TRACING ONLY: CPT

## 2020-12-14 PROCEDURE — 70547 MR ANGIOGRAPHY NECK W/O DYE: CPT | Mod: 52

## 2020-12-14 PROCEDURE — 80061 LIPID PANEL: CPT

## 2020-12-14 PROCEDURE — 83540 ASSAY OF IRON: CPT

## 2020-12-14 PROCEDURE — 76770 US EXAM ABDO BACK WALL COMP: CPT

## 2020-12-14 PROCEDURE — 88313 SPECIAL STAINS GROUP 2: CPT

## 2020-12-14 PROCEDURE — 84155 ASSAY OF PROTEIN SERUM: CPT

## 2020-12-14 PROCEDURE — 70544 MR ANGIOGRAPHY HEAD W/O DYE: CPT | Mod: 52

## 2020-12-14 PROCEDURE — 80074 ACUTE HEPATITIS PANEL: CPT

## 2020-12-14 NOTE — ED PROVIDER NOTE - CRITICAL CARE PROVIDED
documentation/telephone consultation with the patient's family/additional history taking/interpretation of diagnostic studies/consultation with other physicians

## 2020-12-14 NOTE — ED ADULT NURSE NOTE - CHIEF COMPLAINT QUOTE
pt presents to the ED after syncopal episode and an episode of confusion at [facility], pt AOx4, at triage, pt with equal bilateral  strength, MD Batista called to bed side for stroke eval

## 2020-12-14 NOTE — ED ADULT TRIAGE NOTE - CHIEF COMPLAINT QUOTE
pt presents to the ED after syncopal episode and an episode of confusion at [facility], pt AOx4, at triage, pt with equal bilateral  strength, MD Batista called to bed side for stroke eval

## 2020-12-14 NOTE — ED ADULT NURSE NOTE - OBJECTIVE STATEMENT
Pt BIBA from [facility] with AMS starting around 8PM tonight. As per EMS daughter visited pt at assisted living (6pm), pt was fine but later daughter states pt's "speech was weird" and possible syncopal episode. 911 called fo abnormal speech. Pt denies pain, no chest pain, no SOB. Pt doesn't know why he is here/what happened. Pt is oriented x4 but believes he lives at home with his daughter. Pt denies LOC, no headaches, no blurred vision. Pt able to move all extremities.

## 2020-12-14 NOTE — ED PROVIDER NOTE - OBJECTIVE STATEMENT
81 y/o male with a PMHx of CVA, HLD, HTN, GERD, presents to the ED BIBA from [facility] after a period of unresponsiveness. As per EMS, daughter was unresponsive and slumped over, EMS reports pt with high BP of 190/110. [facility] staff witnessed pt with slurred speech and confusion.  Pt with similar episode on 10/18/20 and seen in [facility], sx resolved and dx with TIA.   daughter went to visit pt for dinner around 6pm and ate dinner fin after he didn't feel good and speech weird knocked something called staff called 911. epi speech groggy 8pmawaker alert originally no comli unable to refill carvedilol 3 weeks no loc. no ha, cp, sob, abd pain. stopped blood thinner for blood clots 3 weeks ago. on 81mg ASA. 81 y/o male with a PMHx of CVA, HLD, HTN, GERD, presents to the ED BIBA from [facility] c/o altered mental status starting at 8pm today. Daughter went to visit pt for dinner around 6pm and pt ate dinner fine but afterwards he stated he didn't feel well and daughter noticed his "speech was  weird" and pt knocked something over. Daughter called staff over and then called 911 for episode of abnormal speech and grogginess States pt was awake and alert originally with no complaints before dinner. Daughter was unable to refill Carvedilol for 3 weeks. No LOC, ha, cp, sob, abd pain. EMS reports pt with high BP of 190/110. Pt with similar episode on 10/18/20 and seen in [facility], sx resolved and dx with TIA.   stopped blood thinner for blood clots 3 weeks ago. on 81mg ASA. 81 y/o male with a PMHx of CVA, HLD, HTN, GERD, presents to the ED BIBA from [facility] c/o altered mental status starting at 8pm today. Daughter went to visit pt for dinner around 6pm and pt ate dinner and was fine but afterwards he stated he didn't feel well and daughter noticed his "speech was abnormal" and pt knocked something over on the table. Daughter called staff over and then called 911 for episode of abnormal speech and grogginess. States pt was awake and alert originally with no complaints before dinner. Daughter was unable to refill Carvedilol for 3 weeks. No LOC. Pt denies headache, cp, sob, abd pain and weakness. Pt with similar episode on 10/18/20 and seen in [facility], sx resolved and dx with TIA.   stopped blood thinner for blood clots 3 weeks ago. on 81mg ASA. 81 y/o male with a PMHx of CVA, HLD, HTN, GERD, presents to the ED BIBA from [facility] c/o altered mental status starting at 8pm today. Daughter went to visit pt for dinner around 6pm and pt ate dinner and was fine but afterwards he stated he didn't feel well and daughter noticed his "speech was abnormal" and pt knocked something over on the table. Daughter called staff over and then called 911 for episode of abnormal speech and grogginess. Daughter states pt did not lose consciousness. States pt was awake and alert originally with no complaints before dinner. Daughter was unable to refill Carvedilol for 3 weeks. Pt denies headache, cp, sob, abd pain and weakness. Pt with similar episode on 10/18/20 and seen in [facility], sx resolved and dx with TIA.   stopped blood thinner for blood clots 3 weeks ago. on 81mg ASA.

## 2020-12-14 NOTE — ED ADULT NURSE REASSESSMENT NOTE - NS ED NURSE REASSESS COMMENT FT1
Attempted to straight cath pt for urine, pt states "oh shit, no way iesha can I have some water first." Pt given water, will attempt to get urine after.

## 2020-12-14 NOTE — ED PROVIDER NOTE - CLINICAL SUMMARY MEDICAL DECISION MAKING FREE TEXT BOX
pt with acute episode of AMS per daughter, pt awake alert in the ED, stroke scale of 0, plan CT head, cardiac labs, urine, observation and re eval.

## 2020-12-15 ENCOUNTER — TRANSCRIPTION ENCOUNTER (OUTPATIENT)
Age: 80
End: 2020-12-15

## 2020-12-15 LAB
A1C WITH ESTIMATED AVERAGE GLUCOSE RESULT: 4.8 % — SIGNIFICANT CHANGE UP (ref 4–5.6)
ALBUMIN SERPL ELPH-MCNC: 3 G/DL — LOW (ref 3.3–5)
ALP SERPL-CCNC: 109 U/L — SIGNIFICANT CHANGE UP (ref 40–120)
ALT FLD-CCNC: 140 U/L — HIGH (ref 12–78)
ANION GAP SERPL CALC-SCNC: 5 MMOL/L — SIGNIFICANT CHANGE UP (ref 5–17)
APPEARANCE UR: CLEAR — SIGNIFICANT CHANGE UP
APTT BLD: 30.8 SEC — SIGNIFICANT CHANGE UP (ref 27.5–35.5)
APTT BLD: 32.5 SEC — SIGNIFICANT CHANGE UP (ref 27.5–35.5)
AST SERPL-CCNC: 44 U/L — HIGH (ref 15–37)
BASOPHILS # BLD AUTO: 0.05 K/UL — SIGNIFICANT CHANGE UP (ref 0–0.2)
BASOPHILS NFR BLD AUTO: 0.9 % — SIGNIFICANT CHANGE UP (ref 0–2)
BILIRUB SERPL-MCNC: 1.7 MG/DL — HIGH (ref 0.2–1.2)
BILIRUB UR-MCNC: NEGATIVE — SIGNIFICANT CHANGE UP
BUN SERPL-MCNC: 15 MG/DL — SIGNIFICANT CHANGE UP (ref 7–23)
CALCIUM SERPL-MCNC: 9.1 MG/DL — SIGNIFICANT CHANGE UP (ref 8.5–10.1)
CHLORIDE SERPL-SCNC: 115 MMOL/L — HIGH (ref 96–108)
CHOLEST SERPL-MCNC: 91 MG/DL — SIGNIFICANT CHANGE UP
CO2 SERPL-SCNC: 26 MMOL/L — SIGNIFICANT CHANGE UP (ref 22–31)
COLOR SPEC: ABNORMAL
CREAT SERPL-MCNC: 1.44 MG/DL — HIGH (ref 0.5–1.3)
DIFF PNL FLD: ABNORMAL
EOSINOPHIL # BLD AUTO: 0.23 K/UL — SIGNIFICANT CHANGE UP (ref 0–0.5)
EOSINOPHIL NFR BLD AUTO: 4.3 % — SIGNIFICANT CHANGE UP (ref 0–6)
ESTIMATED AVERAGE GLUCOSE: 91 MG/DL — SIGNIFICANT CHANGE UP (ref 68–114)
GLUCOSE SERPL-MCNC: 99 MG/DL — SIGNIFICANT CHANGE UP (ref 70–99)
GLUCOSE UR QL: NEGATIVE MG/DL — SIGNIFICANT CHANGE UP
HCT VFR BLD CALC: 37.2 % — LOW (ref 39–50)
HDLC SERPL-MCNC: 27 MG/DL — LOW
HGB BLD-MCNC: 10.9 G/DL — LOW (ref 13–17)
IMM GRANULOCYTES NFR BLD AUTO: 0.4 % — SIGNIFICANT CHANGE UP (ref 0–1.5)
INR BLD: 1.28 RATIO — HIGH (ref 0.88–1.16)
INR BLD: 1.3 RATIO — HIGH (ref 0.88–1.16)
KETONES UR-MCNC: ABNORMAL
LEUKOCYTE ESTERASE UR-ACNC: ABNORMAL
LIPID PNL WITH DIRECT LDL SERPL: 53 MG/DL — SIGNIFICANT CHANGE UP
LYMPHOCYTES # BLD AUTO: 1.13 K/UL — SIGNIFICANT CHANGE UP (ref 1–3.3)
LYMPHOCYTES # BLD AUTO: 21.3 % — SIGNIFICANT CHANGE UP (ref 13–44)
MAGNESIUM SERPL-MCNC: 1.9 MG/DL — SIGNIFICANT CHANGE UP (ref 1.6–2.6)
MCHC RBC-ENTMCNC: 29.1 PG — SIGNIFICANT CHANGE UP (ref 27–34)
MCHC RBC-ENTMCNC: 29.3 GM/DL — LOW (ref 32–36)
MCV RBC AUTO: 99.5 FL — SIGNIFICANT CHANGE UP (ref 80–100)
MONOCYTES # BLD AUTO: 0.36 K/UL — SIGNIFICANT CHANGE UP (ref 0–0.9)
MONOCYTES NFR BLD AUTO: 6.8 % — SIGNIFICANT CHANGE UP (ref 2–14)
NEUTROPHILS # BLD AUTO: 3.51 K/UL — SIGNIFICANT CHANGE UP (ref 1.8–7.4)
NEUTROPHILS NFR BLD AUTO: 66.3 % — SIGNIFICANT CHANGE UP (ref 43–77)
NITRITE UR-MCNC: POSITIVE
NON HDL CHOLESTEROL: 64 MG/DL — SIGNIFICANT CHANGE UP
PH UR: 5 — SIGNIFICANT CHANGE UP (ref 5–8)
PHOSPHATE SERPL-MCNC: 3.1 MG/DL — SIGNIFICANT CHANGE UP (ref 2.5–4.5)
PLATELET # BLD AUTO: 223 K/UL — SIGNIFICANT CHANGE UP (ref 150–400)
POTASSIUM SERPL-MCNC: 3.9 MMOL/L — SIGNIFICANT CHANGE UP (ref 3.5–5.3)
POTASSIUM SERPL-SCNC: 3.9 MMOL/L — SIGNIFICANT CHANGE UP (ref 3.5–5.3)
PROT SERPL-MCNC: 7.4 GM/DL — SIGNIFICANT CHANGE UP (ref 6–8.3)
PROT UR-MCNC: 100 MG/DL
PROTHROM AB SERPL-ACNC: 14.8 SEC — HIGH (ref 10.6–13.6)
PROTHROM AB SERPL-ACNC: 14.9 SEC — HIGH (ref 10.6–13.6)
RBC # BLD: 3.74 M/UL — LOW (ref 4.2–5.8)
RBC # FLD: 15.7 % — HIGH (ref 10.3–14.5)
SARS-COV-2 RNA SPEC QL NAA+PROBE: SIGNIFICANT CHANGE UP
SODIUM SERPL-SCNC: 146 MMOL/L — HIGH (ref 135–145)
SP GR SPEC: 1.02 — SIGNIFICANT CHANGE UP (ref 1.01–1.02)
TRIGL SERPL-MCNC: 57 MG/DL — SIGNIFICANT CHANGE UP
TROPONIN I SERPL-MCNC: 0.04 NG/ML — SIGNIFICANT CHANGE UP (ref 0.01–0.04)
UROBILINOGEN FLD QL: 4 MG/DL
WBC # BLD: 5.3 K/UL — SIGNIFICANT CHANGE UP (ref 3.8–10.5)
WBC # FLD AUTO: 5.3 K/UL — SIGNIFICANT CHANGE UP (ref 3.8–10.5)

## 2020-12-15 PROCEDURE — 95816 EEG AWAKE AND DROWSY: CPT | Mod: 26

## 2020-12-15 PROCEDURE — 93306 TTE W/DOPPLER COMPLETE: CPT | Mod: 26

## 2020-12-15 PROCEDURE — 99497 ADVNCD CARE PLAN 30 MIN: CPT | Mod: 25

## 2020-12-15 PROCEDURE — 99223 1ST HOSP IP/OBS HIGH 75: CPT

## 2020-12-15 PROCEDURE — 70547 MR ANGIOGRAPHY NECK W/O DYE: CPT | Mod: 26,52

## 2020-12-15 PROCEDURE — 93010 ELECTROCARDIOGRAM REPORT: CPT

## 2020-12-15 PROCEDURE — 70544 MR ANGIOGRAPHY HEAD W/O DYE: CPT | Mod: 26,52,59

## 2020-12-15 PROCEDURE — 70551 MRI BRAIN STEM W/O DYE: CPT | Mod: 26,52

## 2020-12-15 RX ORDER — CEFTRIAXONE 500 MG/1
1000 INJECTION, POWDER, FOR SOLUTION INTRAMUSCULAR; INTRAVENOUS EVERY 24 HOURS
Refills: 0 | Status: COMPLETED | OUTPATIENT
Start: 2020-12-15 | End: 2020-12-21

## 2020-12-15 RX ORDER — SODIUM CHLORIDE 9 MG/ML
1000 INJECTION, SOLUTION INTRAVENOUS
Refills: 0 | Status: DISCONTINUED | OUTPATIENT
Start: 2020-12-15 | End: 2020-12-16

## 2020-12-15 RX ORDER — SODIUM CHLORIDE 9 MG/ML
1000 INJECTION, SOLUTION INTRAVENOUS
Refills: 0 | Status: DISCONTINUED | OUTPATIENT
Start: 2020-12-15 | End: 2020-12-15

## 2020-12-15 RX ORDER — CARVEDILOL PHOSPHATE 80 MG/1
25 CAPSULE, EXTENDED RELEASE ORAL
Refills: 0 | Status: DISCONTINUED | OUTPATIENT
Start: 2020-12-15 | End: 2020-12-17

## 2020-12-15 RX ORDER — HEPARIN SODIUM 5000 [USP'U]/ML
5000 INJECTION INTRAVENOUS; SUBCUTANEOUS EVERY 12 HOURS
Refills: 0 | Status: DISCONTINUED | OUTPATIENT
Start: 2020-12-15 | End: 2020-12-21

## 2020-12-15 RX ORDER — ATORVASTATIN CALCIUM 80 MG/1
40 TABLET, FILM COATED ORAL AT BEDTIME
Refills: 0 | Status: DISCONTINUED | OUTPATIENT
Start: 2020-12-15 | End: 2020-12-23

## 2020-12-15 RX ORDER — ASPIRIN/CALCIUM CARB/MAGNESIUM 324 MG
325 TABLET ORAL DAILY
Refills: 0 | Status: DISCONTINUED | OUTPATIENT
Start: 2020-12-15 | End: 2020-12-21

## 2020-12-15 RX ADMIN — HEPARIN SODIUM 5000 UNIT(S): 5000 INJECTION INTRAVENOUS; SUBCUTANEOUS at 22:07

## 2020-12-15 RX ADMIN — HEPARIN SODIUM 5000 UNIT(S): 5000 INJECTION INTRAVENOUS; SUBCUTANEOUS at 11:36

## 2020-12-15 RX ADMIN — Medication 325 MILLIGRAM(S): at 11:35

## 2020-12-15 RX ADMIN — CEFTRIAXONE 1000 MILLIGRAM(S): 500 INJECTION, POWDER, FOR SOLUTION INTRAMUSCULAR; INTRAVENOUS at 01:43

## 2020-12-15 RX ADMIN — CARVEDILOL PHOSPHATE 25 MILLIGRAM(S): 80 CAPSULE, EXTENDED RELEASE ORAL at 11:35

## 2020-12-15 RX ADMIN — Medication 325 MILLIGRAM(S): at 01:43

## 2020-12-15 RX ADMIN — ATORVASTATIN CALCIUM 40 MILLIGRAM(S): 80 TABLET, FILM COATED ORAL at 22:07

## 2020-12-15 RX ADMIN — SODIUM CHLORIDE 75 MILLILITER(S): 9 INJECTION, SOLUTION INTRAVENOUS at 01:43

## 2020-12-15 NOTE — DISCHARGE NOTE PROVIDER - HOSPITAL COURSE
1) AMS - R/o TIA/CVA  - tele monitoring. tele review as above.  - CTH - Multiple chronic stable infarcts. f/u MRI / MRA  - Cont. ASA / Statin   - check echo   - f/u Lipid panel / A1c  - Speech, PT, Neuro consult     2) UTI - likely GNR  - Cont. Ceftriaxone  - F/u urine cx   - Cont. IVF    3) Mildly elevated troponins  - mildly elevated and downtrended. No CP  - Cont. ASA  - f/u echo  - Cardio consult     4) Suspect LASHELL  - Cont. IVF, D5 1/2 NS x 10hrs.   - Avoid nephrotoxic agents  - UA +    5) Hypernatremia   - Recheck in AM. Cont. IVF, D5 1/2 NS x 10hrs.     6) Anemia  -Trend H/H and if stable -> F/u outpatient for further work up     7) Transaminitis   - monitor f/u hep panel and lipid panel.    DVT ppx  - Heparin SubQ    Advanced Directives: Full Code.  Dispo: f/u MRI. repeat labs in AM. PT edin.     ~~ LM for daughter over phone w/ call back number. 12/15. CHIEF COMPLAINT/DIAGNOSIS: AMS    HPI: 79 y/o M w/ PMH of CVA, TIA, dyslipidemia, HTN, GERD, h/o seizure disorder p/w AMS. Patient states he doesn't recall the episode at all, and states he is completely fine. Patient has trouble stating where is currently, but otherwise knows it's 2020, and is conversational. As per ED note, patient's daughter had noticed AMS where patient's speech was abnormal and was groggy. However, daughter had stated patient did not have LOC. Presently patient denies any weakness in arms / legs / slurred speech / facial droop / vision deficits / sensory deficits / CP / SOB / cough / runny nose / sore throat / nausea / vomiting / abdominal pain / diarrhea.  ** As per son patient has been non-compliant with medications for 1month unable to receive from ?? **    12/23: awake, alert. conversating. no complaints. limited ros.     ASSESSMENT AND PLAN:    79 y/o M w/ PMH of CVA, TIA, dyslipidemia, HTN, GERD, p/w AMS.     1) Toxic/Metabolic encephalopathy due to UTI and possible Wernicke's   - tele monitoring. tele review as above.  - CTH - Multiple chronic stable infarcts. f/u MRI / MRA > as above, no acute infarcts.  - s/p IV thiamine x3 days  - F/u b12, b1, b6, folate lvls.  - Seroquel daily (limit use - prolongs QTC) QTC stable < 500  - ABX for UTI complete  - Speech, PT, Neuro, Cardio consult appreciated     2) Acute on Chronic Systolic HF/ Cardiomyopathy - not in acute exacerbation  - Per cardio last known EF 45%, now 10-20% EF  - Cont: GDMT: not candidate for ACEi.   - Cont. BB (dose reduced to 3.125 BID w/ parameters), hydralazine (increase to 25mg TID), Imdur   - Cont. IV Lasix QD  - s/p stress test (12/21) - no reversible defects.   - f/u viral studies - coxsackie neg  - s/p fat pad bx on 12/19  - EP eval - AICD after 3 months of GDMT, life vest contraindicated at this time (adjust Seroquel to control agitation)  12/21 - trial low dose ACEi    3) PAF   - PAF noted on tele 12/16, 12/18.   - CHADsVasc = 6.  - cont. BB w/ parameters.  - Cont. Eliquis 5mg BID    4) Mildly elevated troponins  - Mildly elevated and downtrended. No CP  - Cont. ASA, statin  - Cardio consult appreciated    5) UTI - likely GNR  - s/p course of Ceftriaxone  - s/p gentle hydration. encourage PO hydration.     6) LASHELL on CKD Stage 3, urinary retention  - s/p IVF  - Avoid nephrotoxic agents, no ACE/ARB  - Arevalo for retention.  Cont. Flomax - TOV prior to d/c  - Urology f/u appreciated   12/22: repeat labs after starting ACEi, pending    7) Transaminitis   - hep panel, lipids wnl  - monitor on statin therapy.    8) DVT ppx  - Heparin SubQ    d/c and f/u cardio outpatient.     Advanced Directives: Full Code. Consult palliative. Patient with multiple chronic progressive conditions    Dispo: discharge to [facility] in stable condition    Final diagnosis, treatment plan, and follow-up recommendations were discussed and explained to the patient. The patient was given an opportunity to ask questions concerning the diagnosis and treatment plan. The patient acknowledged understanding of the diagnosis, treatment, and follow-up recommendations. The patient was advised to seek urgent care upon discharge if worsening symptoms develop prior to scheduled follow-up. Time spent on discharge included time with the patient, and also coordinating discharge care as outlined below.    Total time spent: 50 min

## 2020-12-15 NOTE — DISCHARGE NOTE PROVIDER - PROVIDER TOKENS
PROVIDER:[TOKEN:[[id number]]],PROVIDER:[TOKEN:[[id number]]] PROVIDER:[TOKEN:[[id number]]],PROVIDER:[TOKEN:[[id number]]],PROVIDER:[TOKEN:[[id number]]]

## 2020-12-15 NOTE — DISCHARGE NOTE PROVIDER - CARE PROVIDER_API CALL
Shikha Rios ()  Cardiovascular Disease; Internal Medicine  [address], [address]  [place] [address]  Phone: [phone]  Fax: [phone]  Follow Up Time:     Ladan Spence)  Urology Surgery  [address] [place] [address]  Phone: [phone]  Fax: [phone]  Follow Up Time:    Shikha Rios ()  Cardiovascular Disease; Internal Medicine  [address], [address]  [place] [address]  Phone: [phone]  Fax: [phone]  Follow Up Time:     Ladan Spence)  Urology Surgery  [address] [place] [address]  Phone: [phone]  Fax: [phone]  Follow Up Time:     Mack Koch  INTERNAL MEDICINE  [address], [address]  [place] [address]  Phone: [phone]  Fax: [phone]  Follow Up Time:

## 2020-12-15 NOTE — SWALLOW BEDSIDE ASSESSMENT ADULT - SWALLOW EVAL: CRITERIA FOR SKILLED INTERVENTION MET
DO NOT FEEL THAT ACUTE SPEECH PATHOLOGY FOLLOW UP WOULD CHANGE CLINICAL MANAGEMENT/OUTCOME WHILE IN ACUTE HOSPITAL SETTING. PT'S OROPHARYNGEAL SWALLOWING ABILITIES WERE FELT TO BE FUNCTIONAL FOR AGE WHEN IN AN ALERT STATE. FURTHER, HIS MOTOR SPEECH ABILITIES WERE FELT TO BE GROSSLY FUNCTIONAL AND HIS VERBALIZATIONS WERE LINGUISTICALLY INTACT WHEN IN AN ALERT STATE. AS SUCH, I DO NOT FEEL THAT ACUTE SPEECH/SWALLOWING THERAPY, WOULD BE OF CLINICAL BENEFIT WHILE IN HOSPITAL. GIVEN ABOVE, WILL NOT ACTIVELY FOLLOW. RECONSULT PRN SHOULD STATUS CHANGE AND CONDITION WARRANT.

## 2020-12-15 NOTE — SWALLOW BEDSIDE ASSESSMENT ADULT - SWALLOW EVAL: SECRETION MANAGEMENT
No drooling noted and strength of volitional cough was grossly functional when pt was in an alert state.

## 2020-12-15 NOTE — PHYSICAL THERAPY INITIAL EVALUATION ADULT - GENERAL OBSERVATIONS, REHAB EVAL
Pt rec'd supine in bed, pleasant and cooperative with PT, no acute complaints, asking to use bathroom.

## 2020-12-15 NOTE — PROGRESS NOTE ADULT - SUBJECTIVE AND OBJECTIVE BOX
CHIEF COMPLAINT/DIAGNOSIS:    HPI: 79 y/o M w/ PMH of CVA, TIA, dyslipidemia, HTN, GERD, h/o seizure disorder p/w AMS. Patient states he doesn't recall the episode at all, and states he is completely fine. Patient has trouble stating where is currently, but otherwise knows it's 2020, and is conversational. As per ED note, patient's daughter had noticed AMS where patient's speech was abnormal and was groggy. However, daughter had stated patient did not have LOC. Presently patient denies any weakness in arms / legs / slurred speech / facial droop / vision deficits / sensory deficits / CP / SOB / cough / runny nose / sore throat / nausea / vomiting / abdominal pain / diarrhea     12/15:  REVIEW OF SYSTEMS:  All other review of systems is negative unless indicated above    PHYSICAL EXAM:  Constitutional: NAD, awake and alert, well-developed  HEENT: PERR, EOMI, Normal Hearing, MMM  Neck: Soft and supple, No LAD, No JVD  Respiratory: Breath sounds are clear bilaterally, No wheezing, rales or rhonchi  Cardiovascular: S1 and S2, regular rate and rhythm, no Murmurs, gallops or rubs  Gastrointestinal: Bowel Sounds present, soft, nontender, nondistended, no guarding, no rebound  Extremities: No peripheral edema  Vascular: 2+ peripheral pulses  Neurological: A/O x 3, no focal deficits  Musculoskeletal: 5/5 strength b/l upper and lower extremities  Skin: No rashes    Vital Signs Last 24 Hrs  T(C): 36.4 (15 Dec 2020 03:29), Max: 36.9 (15 Dec 2020 01:48)  T(F): 97.5 (15 Dec 2020 03:29), Max: 98.4 (15 Dec 2020 01:48)  HR: 83 (15 Dec 2020 03:29) (83 - 88)  BP: 140/93 (15 Dec 2020 03:29) (132/81 - 161/95)  BP(mean): 119 (15 Dec 2020 02:40) (96 - 119)  RR: 20 (15 Dec 2020 03:29) (18 - 21)  SpO2: 99% (15 Dec 2020 03:29) (96% - 99%)    LABS: All Labs Reviewed:                        11.0   5.93  )-----------( 229      ( 14 Dec 2020 21:34 )             37.2     12-14    147<H>  |  115<H>  |  17  ----------------------------<  116<H>  3.8   |  29  |  1.51<H>    Ca    9.0      14 Dec 2020 21:34    TPro  7.4  /  Alb  3.2<L>  /  TBili  2.1<H>  /  DBili  x   /  AST  48<H>  /  ALT  146<H>  /  AlkPhos  108  12-14    PT/INR - ( 14 Dec 2020 23:32 )   PT: 14.8 sec;   INR: 1.28 ratio      PTT - ( 14 Dec 2020 23:32 )  PTT:32.5 sec    CARDIAC MARKERS ( 15 Dec 2020 00:40 )  0.045 ng/mL / x     / x     / x     / x      CARDIAC MARKERS ( 14 Dec 2020 23:32 )  0.048 ng/mL / x     / x     / x     / x      CARDIAC MARKERS ( 14 Dec 2020 21:34 )  0.047 ng/mL / x     / x     / x     / x        RADIOLOGY:  < from: CT Head No Cont (12.14.20 @ 22:01) >  IMPRESSION: No intracranial hemorrhage or mass effect. Stable chronic infarcts.  < end of copied text >    ECHOCARDIOGRAM: Pending     MEDICATIONS  (STANDING):  aspirin 325 milliGRAM(s) Oral daily  atorvastatin 40 milliGRAM(s) Oral at bedtime  carvedilol Oral Tab/Cap - Peds 25 milliGRAM(s) Oral two times a day  cefTRIAXone Injectable. 1000 milliGRAM(s) IV Push every 24 hours  heparin   Injectable 5000 Unit(s) SubCutaneous every 12 hours  sodium chloride 0.45%. 1000 milliLiter(s) (75 mL/Hr) IV Continuous <Continuous>    MEDICATIONS  (PRN):    TELEMETRY REVIEW:  12/15: frequent bigeminy, triplets, PVCs 90-100s    ASSESSMENT AND PLAN:    79 y/o M w/ PMH of CVA, TIA, dyslipidemia, HTN, GERD, p/w AMS.     AMS - R/o TIA/CVA  - tele monitoring. tele review as above.  - CTH - Multiple chronic stable infarcts. f/u MRI / MRA  - Cont. ASA / Statin   - check echo   - f/u Lipid panel / A1c  - Speech, PT, Neuro consult     UTI - likely GNR  - Cont. Ceftriaxone  - F/u urine cx   - Cont. IVF    Mildly elevated troponins  - mildly elevated and downtrended. No CP  - Cont. ASA  - f/u echo  - Cardio consult     Suspect LASHELL  - Cont. IVF   - Avoid nephrotoxic agents  - UA +    Hypernatremia   - Recheck in AM s/p IVF    Anemia  -Trend H/H and if stable -> F/u outpatient for further work up     Transaminitis   -Trend LFRTs during hospitalization-> if stable, f/u outpatient w/ GI for further work up     DVT ppx  - Heparin SubQ    Advanced Directives: Full Code.    Dispo: CHIEF COMPLAINT/DIAGNOSIS: AMS    HPI: 81 y/o M w/ PMH of CVA, TIA, dyslipidemia, HTN, GERD, h/o seizure disorder p/w AMS. Patient states he doesn't recall the episode at all, and states he is completely fine. Patient has trouble stating where is currently, but otherwise knows it's 2020, and is conversational. As per ED note, patient's daughter had noticed AMS where patient's speech was abnormal and was groggy. However, daughter had stated patient did not have LOC. Presently patient denies any weakness in arms / legs / slurred speech / facial droop / vision deficits / sensory deficits / CP / SOB / cough / runny nose / sore throat / nausea / vomiting / abdominal pain / diarrhea     12/15: no complaints. limited ros due to cognitive impairment.     PHYSICAL EXAM:  Constitutional: Awake and alert, obese  HEENT:  Normal Hearing, MMM  Neck: Soft and supple  Respiratory: Breath sounds are clear/ diminished b/l   Cardiovascular: S1 and S2, regular rate and rhythm, no Murmurs, gallops or rubs  Gastrointestinal: Bowel Sounds present, soft, nontender, nondistended, no guarding, no rebound  Extremities: trace, pitting +1 b/l peripheral edema  Vascular: 2+ peripheral pulses  Neurological: A/O x 2, forgetful. follows commands. no visible facial droop. able to lift arm over head. blinks to threat. speech fluent.   Musculoskeletal: 5/5 strength b/l upper and lower extremities  Skin: No rashes    Vital Signs Last 24 Hrs  T(C): 36.4 (15 Dec 2020 03:29), Max: 36.9 (15 Dec 2020 01:48)  T(F): 97.5 (15 Dec 2020 03:29), Max: 98.4 (15 Dec 2020 01:48)  HR: 83 (15 Dec 2020 03:29) (83 - 88)  BP: 140/93 (15 Dec 2020 03:29) (132/81 - 161/95)  BP(mean): 119 (15 Dec 2020 02:40) (96 - 119)  RR: 20 (15 Dec 2020 03:29) (18 - 21)  SpO2: 99% (15 Dec 2020 03:29) (96% - 99%) - room air     LABS: All Labs Reviewed:                        11.0   5.93  )-----------( 229      ( 14 Dec 2020 21:34 )             37.2     12-14    147<H>  |  115<H>  |  17  ----------------------------<  116<H>  3.8   |  29  |  1.51<H>    Ca    9.0      14 Dec 2020 21:34    TPro  7.4  /  Alb  3.2<L>  /  TBili  2.1<H>  /  DBili  x   /  AST  48<H>  /  ALT  146<H>  /  AlkPhos  108  12-14    PT/INR - ( 14 Dec 2020 23:32 )   PT: 14.8 sec;   INR: 1.28 ratio      PTT - ( 14 Dec 2020 23:32 )  PTT:32.5 sec    CARDIAC MARKERS ( 15 Dec 2020 00:40 )  0.045 ng/mL / x     / x     / x     / x      CARDIAC MARKERS ( 14 Dec 2020 23:32 )  0.048 ng/mL / x     / x     / x     / x      CARDIAC MARKERS ( 14 Dec 2020 21:34 )  0.047 ng/mL / x     / x     / x     / x        RADIOLOGY:  < from: CT Head No Cont (12.14.20 @ 22:01) >  IMPRESSION: No intracranial hemorrhage or mass effect. Stable chronic infarcts.  < end of copied text >    ECHOCARDIOGRAM: Pending     MEDICATIONS  (STANDING):  aspirin 325 milliGRAM(s) Oral daily  atorvastatin 40 milliGRAM(s) Oral at bedtime  carvedilol Oral Tab/Cap - Peds 25 milliGRAM(s) Oral two times a day  cefTRIAXone Injectable. 1000 milliGRAM(s) IV Push every 24 hours  heparin   Injectable 5000 Unit(s) SubCutaneous every 12 hours  sodium chloride 0.45%. 1000 milliLiter(s) (75 mL/Hr) IV Continuous <Continuous>    MEDICATIONS  (PRN):    TELEMETRY REVIEW:  12/15: frequent bigeminy, triplets, PVCs 90-100s    ASSESSMENT AND PLAN:    81 y/o M w/ PMH of CVA, TIA, dyslipidemia, HTN, GERD, p/w AMS.     1) AMS - R/o TIA/CVA  - tele monitoring. tele review as above.  - CTH - Multiple chronic stable infarcts. f/u MRI / MRA  - Cont. ASA / Statin   - check echo   - f/u Lipid panel / A1c  - Speech, PT, Neuro consult     2) UTI - likely GNR  - Cont. Ceftriaxone  - F/u urine cx   - Cont. IVF    3) Mildly elevated troponins  - mildly elevated and downtrended. No CP  - Cont. ASA  - f/u echo  - Cardio consult     4) Suspect LASHELL  - Cont. IVF, D5 1/2 NS x 10hrs.   - Avoid nephrotoxic agents  - UA +    5) Hypernatremia   - Recheck in AM. Cont. IVF, D5 1/2 NS x 10hrs.     6) Anemia  -Trend H/H and if stable -> F/u outpatient for further work up     7) Transaminitis   - monitor f/u hep panel and lipid panel.    DVT ppx  - Heparin SubQ    Advanced Directives: Full Code.  Dispo: f/u MRI. repeat labs in AM. PT eval.     ~~ LM for daughter over phone w/ call back number. 12/15. CHIEF COMPLAINT/DIAGNOSIS: AMS    HPI: 81 y/o M w/ PMH of CVA, TIA, dyslipidemia, HTN, GERD, h/o seizure disorder p/w AMS. Patient states he doesn't recall the episode at all, and states he is completely fine. Patient has trouble stating where is currently, but otherwise knows it's 2020, and is conversational. As per ED note, patient's daughter had noticed AMS where patient's speech was abnormal and was groggy. However, daughter had stated patient did not have LOC. Presently patient denies any weakness in arms / legs / slurred speech / facial droop / vision deficits / sensory deficits / CP / SOB / cough / runny nose / sore throat / nausea / vomiting / abdominal pain / diarrhea.  ** As per son patient has been non-compliant with medications for 1month unable to receive from ?? **    12/15: no complaints. limited ros due to cognitive impairment.     PHYSICAL EXAM:  Constitutional: Awake and alert, obese  HEENT:  Normal Hearing, MMM  Neck: Soft and supple  Respiratory: Breath sounds are clear/ diminished b/l   Cardiovascular: S1 and S2, regular rate and rhythm, no Murmurs, gallops or rubs  Gastrointestinal: Bowel Sounds present, soft, nontender, nondistended, no guarding, no rebound  Extremities: trace, pitting +1 b/l peripheral edema  Vascular: 2+ peripheral pulses  Neurological: A/O x 2, forgetful. follows commands. no visible facial droop. able to lift arm over head. blinks to threat. speech fluent.   Musculoskeletal: 5/5 strength b/l upper and lower extremities  Skin: No rashes    Vital Signs Last 24 Hrs  T(C): 36.4 (15 Dec 2020 03:29), Max: 36.9 (15 Dec 2020 01:48)  T(F): 97.5 (15 Dec 2020 03:29), Max: 98.4 (15 Dec 2020 01:48)  HR: 83 (15 Dec 2020 03:29) (83 - 88)  BP: 140/93 (15 Dec 2020 03:29) (132/81 - 161/95)  BP(mean): 119 (15 Dec 2020 02:40) (96 - 119)  RR: 20 (15 Dec 2020 03:29) (18 - 21)  SpO2: 99% (15 Dec 2020 03:29) (96% - 99%) - room air     LABS: All Labs Reviewed:                        11.0   5.93  )-----------( 229      ( 14 Dec 2020 21:34 )             37.2     12-14    147<H>  |  115<H>  |  17  ----------------------------<  116<H>  3.8   |  29  |  1.51<H>    Ca    9.0      14 Dec 2020 21:34    TPro  7.4  /  Alb  3.2<L>  /  TBili  2.1<H>  /  DBili  x   /  AST  48<H>  /  ALT  146<H>  /  AlkPhos  108  12-14    PT/INR - ( 14 Dec 2020 23:32 )   PT: 14.8 sec;   INR: 1.28 ratio      PTT - ( 14 Dec 2020 23:32 )  PTT:32.5 sec    CARDIAC MARKERS ( 15 Dec 2020 00:40 )  0.045 ng/mL / x     / x     / x     / x      CARDIAC MARKERS ( 14 Dec 2020 23:32 )  0.048 ng/mL / x     / x     / x     / x      CARDIAC MARKERS ( 14 Dec 2020 21:34 )  0.047 ng/mL / x     / x     / x     / x        RADIOLOGY:  < from: CT Head No Cont (12.14.20 @ 22:01) >  IMPRESSION: No intracranial hemorrhage or mass effect. Stable chronic infarcts.  < end of copied text >    ECHOCARDIOGRAM: Pending     MEDICATIONS  (STANDING):  aspirin 325 milliGRAM(s) Oral daily  atorvastatin 40 milliGRAM(s) Oral at bedtime  carvedilol Oral Tab/Cap - Peds 25 milliGRAM(s) Oral two times a day  cefTRIAXone Injectable. 1000 milliGRAM(s) IV Push every 24 hours  heparin   Injectable 5000 Unit(s) SubCutaneous every 12 hours  sodium chloride 0.45%. 1000 milliLiter(s) (75 mL/Hr) IV Continuous <Continuous>    MEDICATIONS  (PRN):    TELEMETRY REVIEW:  12/15: frequent bigeminy, triplets, PVCs 90-100s    ASSESSMENT AND PLAN:    81 y/o M w/ PMH of CVA, TIA, dyslipidemia, HTN, GERD, p/w AMS.     1) metabolic encephalopathy - R/o TIA/ CVA, UTI  - tele monitoring. tele review as above.  - CTH - Multiple chronic stable infarcts. f/u MRI / MRA  - Cont. ASA / Statin   - check echo -- EF 20%?? Unclear if baseline - no echo in history. d/w Dr. Garg. will keep NPO for now.  - f/u Lipid panel / A1c  - Speech, PT, Neuro consult   - Cardio consult     2) UTI - likely GNR  - Cont. Ceftriaxone  - F/u urine cx   - Cont. IVF    3) Mildly elevated troponins  - mildly elevated and downtrended. No CP  - Cont. ASA  - f/u echo  - Cardio consult     4) Suspect LASHELL  - Cont. IVF, D5 1/2 NS x 10hrs.   - Avoid nephrotoxic agents  - UA +    5) Hypernatremia   - Recheck in AM. Cont. IVF, D5 1/2 NS x 10hrs.     6) Anemia  -Trend H/H and if stable -> F/u outpatient for further work up     7) Transaminitis   - monitor f/u hep panel and lipid panel.    8) DVT ppx  - Heparin SubQ    Advanced Directives: Full Code.    Dispo: f/u MRI. repeat labs in AM. PT eval.     ~~ LM for daughter over phone w/ call back number. 12/15. Spoke to son Jared with updates 12/15.

## 2020-12-15 NOTE — H&P ADULT - HISTORY OF PRESENT ILLNESS
79 y/o M w/ PMH of CVA, TIA, dyslipidemia, HTN, GERD, h/o seizure disorder p/w AMS. Patient states he doesn't recall the episode at all, and states he is completely fine. Patient has trouble stating where is currently, but otherwise knows it's 2020, and is conversational. As per ED note, patient's daughter had noticed AMS where patient's pseech was abnormal and was groggy. However, daughter had stated patient did not have LOC    Presently patient denies any weakness in arms / legs / slurred speech / facial droop / vision deficits / sensory deficits / CP / SOB / cough / runny nose / sore throat / nausea / vomiting / abdominal pain / diarrhea     PSH: Hernia repair, surgical removal of testicle    Social Hx: Denies x 3     Family Hx: Denies x 3

## 2020-12-15 NOTE — CONSULT NOTE ADULT - SUBJECTIVE AND OBJECTIVE BOX
Neurology Consult requested by:   Patient is a 80y old  Man who presents with a chief complaint of AMS (15 Dec 2020 08:04)     HPI:  79 y/o M w/ PMH of CVA, TIA, dyslipidemia, HTN, GERD, h/o seizure disorder p/w change in MS. As per ED note, patient's daughter had noticed change in MS where patient's pseech was abnormal and was groggy. However, daughter had stated patient did not have LOC.   Patient in bed, unresponsive.     PSH: Hernia repair, surgical removal of testicle    Social Hx: Denies x 3     Family Hx: Denies x 3 (15 Dec 2020 00:08)      PAST MEDICAL & SURGICAL HISTORY:  Chronic GERD    HTN (hypertension)    HLD (hyperlipidemia)    CVA (cerebral vascular accident)    High cholesterol    HTN (hypertension)    History of surgical removal of testicle    H/O hernia repair    No significant past surgical history      FAMILY HISTORY:    Social Hx:  Nonsmoker, no drug or alcohol use  Medications and Allergies ReviewedMEDICATIONS  (STANDING):  aspirin 325 milliGRAM(s) Oral daily  atorvastatin 40 milliGRAM(s) Oral at bedtime  carvedilol Oral Tab/Cap - Peds 25 milliGRAM(s) Oral two times a day  cefTRIAXone Injectable. 1000 milliGRAM(s) IV Push every 24 hours  dextrose 5% + sodium chloride 0.45%. 1000 milliLiter(s) (50 mL/Hr) IV Continuous <Continuous>  heparin   Injectable 5000 Unit(s) SubCutaneous every 12 hours     ROS: Pertinent positives in HPI, all other ROS were reviewed and are negative.      Examination:   Vital Signs Last 24 Hrs  T(C): 36.3 (15 Dec 2020 09:55), Max: 36.9 (15 Dec 2020 01:48)  T(F): 97.3 (15 Dec 2020 09:55), Max: 98.4 (15 Dec 2020 01:48)  HR: 81 (15 Dec 2020 09:55) (81 - 88)  BP: 137/76 (15 Dec 2020 09:55) (132/81 - 161/95)  BP(mean): 119 (15 Dec 2020 02:40) (96 - 119)  RR: 19 (15 Dec 2020 09:55) (18 - 21)  SpO2: 93% (15 Dec 2020 09:55) (93% - 99%)  General: not cooperative, NAD   NECK: supple, no masses  Vascular : no carotid bruits,   Lungs: CTAB  Chest: RRR, no murmurs  Extremities: nontender, no edema  Musculoskeletal: no adventitious movements, no joint stiffness  Skin: no rash    Neurological Examination:  NIHSS:  MS: unresponsive moans, doesn't follow commands   CN: PERLL, EOMI, grimaces bilat symmetric,  tongue midline, SCM equal bilaterally  Motor: unable to test, moves all extremities   Sens: withdraws to pain bilat     Reflexes: 0-1/4 all over, downgoing toes b/l  Coord:  Cannot test    Labs: Reviewed  Comprehensive Metabolic Panel (12.15.20 @ 07:39)   Sodium, Serum: 146 mmol/L   Potassium, Serum: 3.9 mmol/L   Chloride, Serum: 115 mmol/L   Carbon Dioxide, Serum: 26 mmol/L   Anion Gap, Serum: 5 mmol/L   Blood Urea Nitrogen, Serum: 15 mg/dL   Creatinine, Serum: 1.44 mg/dL   Glucose, Serum: 99 mg/dL   Calcium, Total Serum: 9.1 mg/dL   Protein Total, Serum: 7.4 gm/dL   Albumin, Serum: 3.0 g/dL   Bilirubin Total, Serum: 1.7 mg/dL   Alkaline Phosphatase, Serum: 109 U/L   Aspartate Aminotransferase (AST/SGOT): 44 U/L   Alanine Aminotransferase (ALT/SGPT): 140 U/L   eGFR if Non : 45        Imaging:   t< from: CT Head No Cont (12.14.20 @ 22:01) >  PROCEDURE DATE:  12/14/2020          INTERPRETATION:  CLINICAL INFORMATION: Altered mental status    TECHNIQUE: Noncontrast axial CT images of the brain were acquired from the base of skull to vertex.    COMPARISON: CT head 10/18/2020.    FINDINGS: No intracranial hemorrhage is seen. Chronic, stable infarcts in the right frontal and parietal lobes. Chronic, stable bilateral cerebellar infarcts. Extensive periventricular and subcortical white matter hypoattenuation without mass effect is noted, non-specific, but likely related to chronic small vessel ischemic changes..    Cerebral volume loss is present with proportional prominence of the sulci and ventricles. No mass effect or midline shift is seen. Basal cisterns are not effaced.    The visualized paranasal sinuses and tympanomastoid cavities are clear.    No osseous abnormality seen.    IMPRESSION: No intracranial hemorrhage or mass effect. Stable chronic infarcts.    < end of copied text >      < from: EEG Awake or Drowsy (12.15.20 @ 10:00) >  PROCEDURE DATE:  12/15/2020        INTERPRETATION:  16-channel EEG recording performed on this 80-year-old man with sudden collapse, change in mental status. Rule out seizures.  This EEG performed with the patient unresponsive.    The predominant rhythms consist of a mixture of low amplitude 5-6 Hz activity, frontal, symmetrical low to medium amplitude 3-4 Hz activity. Centrally dominant, symmetrical spindles, 12-15 Hz low amplitude activity.    When an attempt to wake up the patient is made, the background activity current consist of 6-7 Hz, low amplitude occipitally dominant activity. As well as an increased amount of bilateral diffuse 15-20 Hz activity.    No focal slowing or epileptiform activity noted.    Stepped photic stimulation did not demonstrate any further abnormalities.    Impression: Abnormal EEG performed with the patient asleep, because of poor reactivity, slowing of the background activity, consistent with a diffuse cerebral dysfunction, maybe on the basis of a  diffuse metabolic, toxic or structural abnormality. Clinical correlation recommended.

## 2020-12-15 NOTE — SWALLOW BEDSIDE ASSESSMENT ADULT - SWALLOW EVAL: DIAGNOSIS
1) Pt has a tendency to lean to the right at rest, a cervical kyphosis was noted & he was variably lethargic which hindered feeding integrity. With that being stated, he exhibited Oropharyngeal Swallowing abilities which subjectively appeared to be grossly within functional parameters for age when in an alert state. Moreover, no behavioral aspiration signs exhibited on exam. Note that I suspect that alertness for feeding will fluctuate in setting of encephalopathy related to acute illness(i.e UTI, LASHELL, transaminitis, anemia, elevated Troponin).  2) Pt tended to lean to the right at rest & a cervical kyphosis was noted. He was lethargic/groggy. Cues were needed to sustain prolonged eye opening/joint attention. Suspect that encephalopathy associated with acute illness is hindering communicative alertness. With that being stated, the pt was able to verbalize when in an alert state. At these times, his motor speech abilities were functional, & his verbalizations were linguistically intact.

## 2020-12-15 NOTE — DISCHARGE NOTE PROVIDER - NSDCMRMEDTOKEN_GEN_ALL_CORE_FT
carvedilol 25 mg oral tablet: 1 tab(s) orally 2 times a day  ***per daughter***   apixaban 5 mg oral tablet: 1 tab(s) orally 2 times a day  aspirin 81 mg oral delayed release tablet: 1 tab(s) orally once a day  atorvastatin 40 mg oral tablet: 1 tab(s) orally once a day (at bedtime)  carvedilol 3.125 mg oral tablet: 1 tab(s) orally every 12 hours  fluticasone 50 mcg/inh nasal spray: 1 spray(s) nasal 2 times a day, As needed, Congestion, Nasal  furosemide 20 mg oral tablet: 1 tab(s) orally once a day  hydrALAZINE 25 mg oral tablet: 1 tab(s) orally 3 times a day  isosorbide mononitrate 30 mg oral tablet, extended release: 1 tab(s) orally once a day  lisinopril 5 mg oral tablet: 1 tab(s) orally once a day  Multiple Vitamins oral tablet: 1 tab(s) orally once a day  QUEtiapine 25 mg oral tablet: 1 tab(s) orally once a day  tamsulosin 0.4 mg oral capsule: 1 cap(s) orally 2 times a day  thiamine 100 mg oral tablet: 1 tab(s) orally once a day

## 2020-12-15 NOTE — DISCHARGE NOTE PROVIDER - NSDCCPCAREPLAN_GEN_ALL_CORE_FT
PRINCIPAL DISCHARGE DIAGNOSIS  Diagnosis: AMS (altered mental status)  Assessment and Plan of Treatment: - You were admitted due to altered mental status likely from _____ .      SECONDARY DISCHARGE DIAGNOSES  Diagnosis: UTI (urinary tract infection)  Assessment and Plan of Treatment: A urinary tract infection (UTI) is caused by bacteria that get inside your urinary tract. Your urinary tract includes your kidneys, ureters, bladder, and urethra.   - Continue to take __ antibiotics for ___ more days (sent to pharmacy)  - Continue to stay hydrated.   - To prevent urinary tract infections – wear cotton underwear or loose clothing, women should wipe front to back after urinating or having a bowel movement, drink cranberry juice or use cranberry supplements may help prevent UTIs.   **Monitor for the following signs/symptoms: Fever > 101, chills, pain or burning with urination, foul smelling or cloudy urine, confusion, weakness or fatigue. If you experience these signs/symptoms please alert your primary care provider or if your signs/symptoms are severe please return to the ED**     PRINCIPAL DISCHARGE DIAGNOSIS  Diagnosis: AMS (altered mental status)  Assessment and Plan of Treatment: - You were admitted due to altered mental status likely from vitamin deficency and UTI  - You completed your course of antibiotics  - Continue vitamin supplements daily      SECONDARY DISCHARGE DIAGNOSES  Diagnosis: Urinary retention  Assessment and Plan of Treatment: - You were found to have urinary retention. Continue Flomax    Diagnosis: PAF (paroxysmal atrial fibrillation)  Assessment and Plan of Treatment: - Continue Eliquis 5mg twice a day for stroke prevention.    Diagnosis: HF (heart failure)  Assessment and Plan of Treatment: You were found to heart failure/cardiomyopathy. Heart failure is a condition in which the heart can't pump enough blood to meet the body's needs. The weakening of the heart's pumping ability causes blood and fluid to back up into the lungs resulting in the buildup of fluid in the feet, ankles and legs -- called edema, tiredness and shortness of breath.   - Continue to monitor your weights at home daily and keep a log  - Maintain a low sodium diet and a fluid restriction of 1500mL per day.  - Continue to take Lasix --  - Follow up with your cardiologist Dr. Rios after discharge for biopsy results and further management - Call to make an appointment.       Diagnosis: UTI (urinary tract infection)  Assessment and Plan of Treatment: A urinary tract infection (UTI) is caused by bacteria that get inside your urinary tract. Your urinary tract includes your kidneys, ureters, bladder, and urethra. You completed your course of antibiotics.  - To prevent urinary tract infections – wear cotton underwear or loose clothing, women should wipe front to back after urinating or having a bowel movement, drink cranberry juice or use cranberry supplements may help prevent UTIs.     PRINCIPAL DISCHARGE DIAGNOSIS  Diagnosis: AMS (altered mental status)  Assessment and Plan of Treatment: - You were admitted due to altered mental status likely from vitamin deficency and UTI  - You completed your course of antibiotics  - Continue vitamin supplements daily      SECONDARY DISCHARGE DIAGNOSES  Diagnosis: HF (heart failure)  Assessment and Plan of Treatment: You were found to heart failure/cardiomyopathy. Heart failure is a condition in which the heart can't pump enough blood to meet the body's needs. The weakening of the heart's pumping ability causes blood and fluid to back up into the lungs resulting in the buildup of fluid in the feet, ankles and legs -- called edema, tiredness and shortness of breath.   - Continue to monitor your weights at home daily and keep a log  - Maintain a low sodium diet and a fluid restriction of 1500mL per day.  - Continue to take Lasix 20mg once a day (new medication), Imdur 30mg once a day (new medication), Coreg 3.125mg twice a day (new medication), Hydralazine 25mg three times a day (new medication), Lisinopril 5mg once a day (new medication)  - Follow up with your cardiologist Dr. Rios after discharge for biopsy results and further management - Call to make an appointment.       Diagnosis: UTI (urinary tract infection)  Assessment and Plan of Treatment: A urinary tract infection (UTI) is caused by bacteria that get inside your urinary tract. Your urinary tract includes your kidneys, ureters, bladder, and urethra. You completed your course of antibiotics.  - To prevent urinary tract infections – wear cotton underwear or loose clothing, women should wipe front to back after urinating or having a bowel movement, drink cranberry juice or use cranberry supplements may help prevent UTIs.    Diagnosis: Urinary retention  Assessment and Plan of Treatment: - You were found to have urinary retention. Continue Flomax 0.4mg twice a day (new medication).    Diagnosis: PAF (paroxysmal atrial fibrillation)  Assessment and Plan of Treatment: - Continue Eliquis 5mg twice a day for stroke prevention.

## 2020-12-15 NOTE — SWALLOW BEDSIDE ASSESSMENT ADULT - ORAL PREPARATORY PHASE
Pt's alertness for feeding was variably reduced. When pt was alert enough to be fed, labial grading on utensils was functional.

## 2020-12-15 NOTE — SWALLOW BEDSIDE ASSESSMENT ADULT - COMMENTS
The pt was admitted to  with lethargy and altered mentation. Hospital course is notable for lethargy, UTI, LASHELL, hypernatremia, elevated Troponin, transaminitis, and anemia. Head CT revealed old infarcts but no acute stroke. EEG revealed generalized slowing. This profile is superimposed upon a h/o HTN, HLD, GERD, seizures and past CVA/TIA. Additionally, pt is s/p hernia repair as well as surgical removal of a testicle. The patient was admitted to  with lethargy and altered mentation. Hospital course is notable for lethargy, UTI, LASHELL, hypernatremia, elevated Troponin, transaminitis, and anemia. Head CT revealed old infarcts but no acute stroke. EEG revealed generalized slowing. This profile is superimposed upon a h/o HTN, HLD, GERD, seizures and past CVA/TIA. Additionally, pt is s/p hernia repair as well as surgical removal of a testicle NOS. The patient was admitted to  with lethargy and altered mentation. Hospital course is notable for lethargy, UTI, LASHELL, hypernatremia, elevated Troponin, transaminitis, and anemia. Head CT revealed old infarcts but no acute stroke. EEG revealed generalized slowing. This profile is superimposed upon a h/o HTN, HLD, GERD, seizures and past CVA/TIA. Additionally, pt is s/p umbilical hernia repair as well as surgical removal of a testicle NOS. It should be noted that the pt does have a h/o transiently developing Dysphagia at a time when he was hospitalized with sepsis/respiratory failure warranting transient intubation. Swallow restoration was reported when he recovered.

## 2020-12-15 NOTE — SWALLOW BEDSIDE ASSESSMENT ADULT - PHARYNGEAL PHASE
Swallow triggered in an acceptable time frame for age when he was alert enough to eat. Laryngeal lift on palpation during swallowing trials was felt to be grossly functional for age. NO behavioral aspiration signs exhibited. Odynophagia was denied.

## 2020-12-15 NOTE — DISCHARGE NOTE PROVIDER - NSDCCAREPROVSEEN_GEN_ALL_CORE_FT
Blanca Shea (Hospitalist)  Elizabeth To (Nurse Practitioner)  Mack Koch (Neurologist) Blanca Shea (Hospitalist)  Elizabeth To (Nurse Practitioner)

## 2020-12-15 NOTE — CONSULT NOTE ADULT - ASSESSMENT
81 y/o M w/ PMH of CVA, TIA, dyslipidemia, HTN, GERD, p/w change in Ms. Possibly metabolic encephalopathy. on antibiotics for UTI.  suggest:  if no change consider MR head wo  supportive care as per medicine

## 2020-12-15 NOTE — PROVIDER CONTACT NOTE (OTHER) - BACKGROUND
Pt admitted r/o CVA. Admitted with AMS, syncope. PMH seizures, Pt admitted r/o CVA, AMS, syncope. PMH seizures, TIA, CVA, CHF, HTN.

## 2020-12-15 NOTE — H&P ADULT - CONVERSATION DETAILS
Patient states nitin Coleman is health care proxy. Patient wishes to have cardiac resuscitation and intubation / mechanical ventilation if necessary and is Full Code. Advance care planning time <30 minutes

## 2020-12-15 NOTE — DISCHARGE NOTE PROVIDER - CARE PROVIDERS DIRECT ADDRESSES
,DirectAddress_Unknown,DirectAddress_Unknown ,DirectAddress_Unknown,DirectAddress_Unknown,[email or web address]

## 2020-12-15 NOTE — SWALLOW BEDSIDE ASSESSMENT ADULT - SWALLOW EVAL: FEEDING ASSISTANCE
Pt was able to feed self when in an alert state but he was sometimes too lethargic/groggy to feed. breastfeeding exclusively

## 2020-12-15 NOTE — SWALLOW BEDSIDE ASSESSMENT ADULT - SWALLOW EVAL: RECOMMENDED DIET
SUGGEST A REGULAR TEXTURE DIET WITH THIN LIQUID CONSISTENCIES AS THE PATIENT TOLERATES THESE FOOD CONSISTENCIES FROM AN OROPHARYNGEAL SWALLOWING STANCE WHEN IN AN ALERT STATE WHICH WAS NOT ALWAYS THE CASE. PT SHOULD BE FED ONLY WHEN IN AN ALERT STATE. ENCEPHALOPATHY RELATED TO ACUTE ILLNESS MAY NEGATIVELY IMPACT HIS LEVELS OF ALERTNESS AT TIMES.

## 2020-12-15 NOTE — H&P ADULT - ASSESSMENT
81 y/o M w/ PMH of CVA, TIA, dyslipidemia, HTN, GERD, p/w AMS.     *AMS - R/o TIA  -CTH - Multiple chronic stable infarcts   -ASA / Statin   -MRI / MRA  -Neuro consult  -Neuro checks  -Echo  -Lipid panel / A1c  -Tele monitoring  -Speech and swallow consult  -PT consult   -Fall precautions     *Mildly elevated troponins  -Possibly 2/2 renal failure  -Continue to trend troponins  -ASA  -Cardio consult  -Tele monitoring  -Echo  -EKG -     *Suspect LASHELL  -IVF and trend creatinine in AM to check for improvement  -Avoid nephrotoxic agents  -UA  -I/Os     *UTI  -Ceftriaxone  -F/u urine cx     *Hypernatremia   -Recheck in AM s/p IVF    *Anemia  -Trend H/H and if stable -> F/u outpatient for further work up     *Transaminitis   -Trend LRTs during hospitalization-> if stable, f/u outpatient w/ GI for further work up     *H/o seizure disorder / HTN / GERD  -If stable -> F/u outpatient for futher management        *DVT ppx  -Heparin SubQ    IMPROVE VTE Individual Risk Assessment    RISK                                                                Points    [  ] Previous VTE                                                  3    [  ] Thrombophilia                                               2    [  ] Lower limb paralysis                                      2        (unable to hold up >15 seconds)      [  ] Current Cancer                                              2         (within 6 months)    [1  ] Immobilization > 24 hrs                                1    [  ] ICU/CCU stay > 24 hours                              1    [1  ] Age > 60                                                      1    IMPROVE VTE Score _2________    IMPROVE Score 0-1: Low Risk, No VTE prophylaxis required for most patients, encourage ambulation.   IMPROVE Score 2-3: At risk, pharmacologic VTE prophylaxis is indicated for most patients (in the absence of a contraindication)  IMPROVE Score > or = 4: High Risk, pharmacologic VTE prophylaxis is indicated for most patients (in the absence of a contraindication)   79 y/o M w/ PMH of CVA, TIA, dyslipidemia, HTN, GERD, p/w AMS.     *AMS - R/o TIA  -CTH - Multiple chronic stable infarcts   -ASA / Statin   -MRI / MRA  -Neuro consult  -Neuro checks  -Echo  -Lipid panel / A1c  -Tele monitoring  -Speech and swallow consult  -PT consult   -Fall precautions     *Mildly elevated troponins  -Possibly 2/2 renal failure  -Continue to trend troponins  -ASA  -Cardio consult  -Tele monitoring  -Echo  -EKG Reviewed    *Suspect LASHELL  -IVF and trend creatinine in AM to check for improvement  -Avoid nephrotoxic agents  -UA  -I/Os     *UTI  -Ceftriaxone  -F/u urine cx     *Hypernatremia   -Recheck in AM s/p IVF    *Anemia  -Trend H/H and if stable -> F/u outpatient for further work up     *Transaminitis   -Trend LRTs during hospitalization-> if stable, f/u outpatient w/ GI for further work up     *H/o seizure disorder / HTN / GERD  -If stable -> F/u outpatient for futher management      *DVT ppx  -Heparin SubQ    IMPROVE VTE Individual Risk Assessment    RISK                                                                Points    [  ] Previous VTE                                                  3    [  ] Thrombophilia                                               2    [  ] Lower limb paralysis                                      2        (unable to hold up >15 seconds)      [  ] Current Cancer                                              2         (within 6 months)    [1  ] Immobilization > 24 hrs                                1    [  ] ICU/CCU stay > 24 hours                              1    [1  ] Age > 60                                                      1    IMPROVE VTE Score _2________    IMPROVE Score 0-1: Low Risk, No VTE prophylaxis required for most patients, encourage ambulation.   IMPROVE Score 2-3: At risk, pharmacologic VTE prophylaxis is indicated for most patients (in the absence of a contraindication)  IMPROVE Score > or = 4: High Risk, pharmacologic VTE prophylaxis is indicated for most patients (in the absence of a contraindication)

## 2020-12-15 NOTE — PHYSICAL THERAPY INITIAL EVALUATION ADULT - PERTINENT HX OF CURRENT PROBLEM, REHAB EVAL
81 y/o M w/ PMH of CVA, TIA, dyslipidemia, HTN, GERD, h/o seizure disorder p/w AMS. Patient states he doesn't recall the episode at all, and states he is completely fine. Patient has trouble stating where is currently, but otherwise knows it's 2020, and is conversational. As per ED note, patient's daughter had noticed AMS where patient's speech was abnormal and was groggy.

## 2020-12-15 NOTE — SWALLOW BEDSIDE ASSESSMENT ADULT - ADDITIONAL RECOMMENDATIONS
1) Neuro f/u    2) Nutrition f/u. Note that oropharyngeal swallowing integrity appears relatively preserved when in an alert state.     3) Hospitalist follow up. Pt with multiple acute medical issues(i.e UTI, LASHELL, hypernatremia, transaminitis, anemia, elevated Troponin). 1) Neuro f/u    2) Nutrition f/u. Note that oropharyngeal swallowing integrity appears relatively preserved when patient in an alert state.     3) Hospitalist follow up. Pt with multiple acute medical issues(i.e UTI, LASHELL, hypernatremia, transaminitis, anemia, elevated Troponin).

## 2020-12-15 NOTE — SWALLOW BEDSIDE ASSESSMENT ADULT - ORAL PHASE
Bolus formation/transfer were grossly mechanically functional for age when in an alert state. Age acceptable piecemeal deglutition was evident and pt was able to clear oral debris on his own after age acceptable piecemeal deglutition.

## 2020-12-15 NOTE — DISCHARGE NOTE PROVIDER - INSTRUCTIONS
Low Sodium. Low Cholesterol  Choose foods that are low in salt - examples include: fresh meats, poultry, fish, eggs, milk and yogurt. Avoid packaged/processed foods and excessive table salt use.

## 2020-12-15 NOTE — SWALLOW BEDSIDE ASSESSMENT ADULT - SLP GENERAL OBSERVATIONS
Pt tended to lean to the right at rest & a cervical kyphosis was noted. He was lethargic/groggy. Cues were needed to sustain prolonged eye opening/joint attention. Suspect that encephalopathy associated with acute illness is hindering communicative alertness. With that being stated, the pt was able to verbalize when in an alert state. At these times, his motor speech abilities were functional, & his verbalizations were linguistically intact. Note that pt was able to verbalize needs when in an alert state.

## 2020-12-15 NOTE — ED ADULT NURSE REASSESSMENT NOTE - NS ED NURSE REASSESS COMMENT FT1
Repeat troponin elevated. Repeat EKG completed. MD Caba notified and will review EKG. No new interventions at this time. Pt is comfortable in bed, no complaints at this time. Pending COVID result and bed placement.

## 2020-12-16 DIAGNOSIS — R77.8 OTHER SPECIFIED ABNORMALITIES OF PLASMA PROTEINS: ICD-10-CM

## 2020-12-16 DIAGNOSIS — E78.5 HYPERLIPIDEMIA, UNSPECIFIED: ICD-10-CM

## 2020-12-16 DIAGNOSIS — R45.1 RESTLESSNESS AND AGITATION: ICD-10-CM

## 2020-12-16 DIAGNOSIS — I63.9 CEREBRAL INFARCTION, UNSPECIFIED: ICD-10-CM

## 2020-12-16 DIAGNOSIS — R41.82 ALTERED MENTAL STATUS, UNSPECIFIED: ICD-10-CM

## 2020-12-16 DIAGNOSIS — I42.9 CARDIOMYOPATHY, UNSPECIFIED: ICD-10-CM

## 2020-12-16 DIAGNOSIS — I10 ESSENTIAL (PRIMARY) HYPERTENSION: ICD-10-CM

## 2020-12-16 DIAGNOSIS — N30.00 ACUTE CYSTITIS WITHOUT HEMATURIA: ICD-10-CM

## 2020-12-16 LAB
-  AMIKACIN: SIGNIFICANT CHANGE UP
-  AMOXICILLIN/CLAVULANIC ACID: SIGNIFICANT CHANGE UP
-  AMPICILLIN/SULBACTAM: SIGNIFICANT CHANGE UP
-  AMPICILLIN: SIGNIFICANT CHANGE UP
-  AZTREONAM: SIGNIFICANT CHANGE UP
-  CEFAZOLIN: SIGNIFICANT CHANGE UP
-  CEFEPIME: SIGNIFICANT CHANGE UP
-  CEFOXITIN: SIGNIFICANT CHANGE UP
-  CEFTRIAXONE: SIGNIFICANT CHANGE UP
-  CIPROFLOXACIN: SIGNIFICANT CHANGE UP
-  ERTAPENEM: SIGNIFICANT CHANGE UP
-  GENTAMICIN: SIGNIFICANT CHANGE UP
-  IMIPENEM: SIGNIFICANT CHANGE UP
-  LEVOFLOXACIN: SIGNIFICANT CHANGE UP
-  MEROPENEM: SIGNIFICANT CHANGE UP
-  NITROFURANTOIN: SIGNIFICANT CHANGE UP
-  PIPERACILLIN/TAZOBACTAM: SIGNIFICANT CHANGE UP
-  TIGECYCLINE: SIGNIFICANT CHANGE UP
-  TOBRAMYCIN: SIGNIFICANT CHANGE UP
-  TRIMETHOPRIM/SULFAMETHOXAZOLE: SIGNIFICANT CHANGE UP
ADD ON TEST-SPECIMEN IN LAB: SIGNIFICANT CHANGE UP
ADD ON TEST-SPECIMEN IN LAB: SIGNIFICANT CHANGE UP
ANION GAP SERPL CALC-SCNC: 7 MMOL/L — SIGNIFICANT CHANGE UP (ref 5–17)
BUN SERPL-MCNC: 26 MG/DL — HIGH (ref 7–23)
CALCIUM SERPL-MCNC: 9.1 MG/DL — SIGNIFICANT CHANGE UP (ref 8.5–10.1)
CHLORIDE SERPL-SCNC: 113 MMOL/L — HIGH (ref 96–108)
CO2 SERPL-SCNC: 20 MMOL/L — LOW (ref 22–31)
CREAT SERPL-MCNC: 1.75 MG/DL — HIGH (ref 0.5–1.3)
CULTURE RESULTS: SIGNIFICANT CHANGE UP
GLUCOSE SERPL-MCNC: 90 MG/DL — SIGNIFICANT CHANGE UP (ref 70–99)
HAV IGM SER-ACNC: SIGNIFICANT CHANGE UP
HBV CORE IGM SER-ACNC: SIGNIFICANT CHANGE UP
HBV SURFACE AG SER-ACNC: SIGNIFICANT CHANGE UP
HCV AB S/CO SERPL IA: 0.14 S/CO — SIGNIFICANT CHANGE UP (ref 0–0.99)
HCV AB SERPL-IMP: SIGNIFICANT CHANGE UP
MAGNESIUM SERPL-MCNC: 2.2 MG/DL — SIGNIFICANT CHANGE UP (ref 1.6–2.6)
METHOD TYPE: SIGNIFICANT CHANGE UP
NT-PROBNP SERPL-SCNC: HIGH PG/ML (ref 0–450)
ORGANISM # SPEC MICROSCOPIC CNT: SIGNIFICANT CHANGE UP
ORGANISM # SPEC MICROSCOPIC CNT: SIGNIFICANT CHANGE UP
POTASSIUM SERPL-MCNC: 5 MMOL/L — SIGNIFICANT CHANGE UP (ref 3.5–5.3)
POTASSIUM SERPL-SCNC: 5 MMOL/L — SIGNIFICANT CHANGE UP (ref 3.5–5.3)
SARS-COV-2 IGG SERPL QL IA: NEGATIVE — SIGNIFICANT CHANGE UP
SARS-COV-2 IGM SERPL IA-ACNC: 1.11 INDEX — SIGNIFICANT CHANGE UP
SODIUM SERPL-SCNC: 140 MMOL/L — SIGNIFICANT CHANGE UP (ref 135–145)
SPECIMEN SOURCE: SIGNIFICANT CHANGE UP
VIT B12 SERPL-MCNC: 752 PG/ML — SIGNIFICANT CHANGE UP (ref 232–1245)

## 2020-12-16 PROCEDURE — 99223 1ST HOSP IP/OBS HIGH 75: CPT

## 2020-12-16 PROCEDURE — 99233 SBSQ HOSP IP/OBS HIGH 50: CPT

## 2020-12-16 RX ORDER — THIAMINE MONONITRATE (VIT B1) 100 MG
500 TABLET ORAL DAILY
Refills: 0 | Status: DISCONTINUED | OUTPATIENT
Start: 2020-12-16 | End: 2020-12-16

## 2020-12-16 RX ORDER — ISOSORBIDE MONONITRATE 60 MG/1
30 TABLET, EXTENDED RELEASE ORAL DAILY
Refills: 0 | Status: DISCONTINUED | OUTPATIENT
Start: 2020-12-16 | End: 2020-12-23

## 2020-12-16 RX ORDER — QUETIAPINE FUMARATE 200 MG/1
12.5 TABLET, FILM COATED ORAL EVERY 8 HOURS
Refills: 0 | Status: DISCONTINUED | OUTPATIENT
Start: 2020-12-16 | End: 2020-12-18

## 2020-12-16 RX ORDER — HALOPERIDOL DECANOATE 100 MG/ML
1 INJECTION INTRAMUSCULAR ONCE
Refills: 0 | Status: COMPLETED | OUTPATIENT
Start: 2020-12-16 | End: 2020-12-16

## 2020-12-16 RX ORDER — THIAMINE MONONITRATE (VIT B1) 100 MG
500 TABLET ORAL EVERY 8 HOURS
Refills: 0 | Status: COMPLETED | OUTPATIENT
Start: 2020-12-16 | End: 2020-12-19

## 2020-12-16 RX ORDER — HYDRALAZINE HCL 50 MG
10 TABLET ORAL THREE TIMES A DAY
Refills: 0 | Status: DISCONTINUED | OUTPATIENT
Start: 2020-12-16 | End: 2020-12-21

## 2020-12-16 RX ADMIN — Medication 325 MILLIGRAM(S): at 10:25

## 2020-12-16 RX ADMIN — Medication 0.5 MILLIGRAM(S): at 16:47

## 2020-12-16 RX ADMIN — QUETIAPINE FUMARATE 12.5 MILLIGRAM(S): 200 TABLET, FILM COATED ORAL at 17:44

## 2020-12-16 RX ADMIN — Medication 255 MILLIGRAM(S): at 16:57

## 2020-12-16 RX ADMIN — ISOSORBIDE MONONITRATE 30 MILLIGRAM(S): 60 TABLET, EXTENDED RELEASE ORAL at 10:25

## 2020-12-16 RX ADMIN — Medication 255 MILLIGRAM(S): at 10:23

## 2020-12-16 RX ADMIN — HALOPERIDOL DECANOATE 1 MILLIGRAM(S): 100 INJECTION INTRAMUSCULAR at 03:39

## 2020-12-16 RX ADMIN — HEPARIN SODIUM 5000 UNIT(S): 5000 INJECTION INTRAVENOUS; SUBCUTANEOUS at 10:25

## 2020-12-16 RX ADMIN — CARVEDILOL PHOSPHATE 25 MILLIGRAM(S): 80 CAPSULE, EXTENDED RELEASE ORAL at 10:25

## 2020-12-16 RX ADMIN — Medication 10 MILLIGRAM(S): at 16:58

## 2020-12-16 NOTE — DIETITIAN INITIAL EVALUATION ADULT. - OTHER INFO
81 y/o M w/ PMH of CVA, TIA, dyslipidemia, HTN, GERD, h/o seizure disorder p/w AMS. Patient states he doesn't recall the episode at all, and states he is completely fine. Patient has trouble stating where is currently, but otherwise knows it's 2020, and is conversational. As per ED note, patient's daughter had noticed AMS where patient's pseech was abnormal and was groggy. However, daughter had stated patient did not have LOC  recently patient denies any weakness in arms / legs / slurred speech / facial droop / vision deficits / sensory deficits / CP / SOB / cough / runny nose / sore throat / nausea / vomiting / abdominal pain / diarrhea     Pt seen for assessment. Reviewed pt's chart and pt being treated for possible Wernicke's. Pt started on IV Thiamine today (500mg TID for 3 days, than 250ml daily). Pt with no noted history of ETOH abuse, 81 y/o M w/ PMH of CVA, TIA, dyslipidemia, HTN, GERD, h/o seizure disorder p/w AMS. Patient states he doesn't recall the episode at all, and states he is completely fine. Patient has trouble stating where is currently, but otherwise knows it's 2020, and is conversational. As per ED note, patient's daughter had noticed AMS where patient's pseech was abnormal and was groggy. However, daughter had stated patient did not have LOC  recently patient denies any weakness in arms / legs / slurred speech / facial droop / vision deficits / sensory deficits / CP / SOB / cough / runny nose / sore throat / nausea / vomiting / abdominal pain / diarrhea     Pt seen for assessment. Reviewed pt's chart and pt being treated for possible Wernicke's. Pt started on IV Thiamine today (500mg TID for 3 days, than 250ml daily). Pt with no noted history of ETOH abuse, Reviewed medication and does not appear to have a Drug-Nutrient interaction (outpatient medication info minimal, also of note in history pt was not taking medication per family due to not being able to get from MD?. Discussed with RN and assistant pt has really good appetite and was eating well.  Pt has been seen in past. Pt's weight was 284lbs back in 07/29/2020. Pt's current admitting weight was 228lbs (total weight difference 56lbs an 19% of BW over 5 month; clinically sig). 81 y/o M w/ PMH of CVA, TIA, dyslipidemia, HTN, GERD, h/o seizure disorder p/w AMS. Patient states he doesn't recall the episode at all, and states he is completely fine. Patient has trouble stating where is currently, but otherwise knows it's 2020, and is conversational. As per ED note, patient's daughter had noticed AMS where patient's pseech was abnormal and was groggy. However, daughter had stated patient did not have LOC  recently patient denies any weakness in arms / legs / slurred speech / facial droop / vision deficits / sensory deficits / CP / SOB / cough / runny nose / sore throat / nausea / vomiting / abdominal pain / diarrhea     Pt seen for assessment. Reviewed pt's chart and pt being treated for possible Wernicke's. Pt started on IV Thiamine today (500mg TID for 3 days, than 250ml daily). Pt with no noted history of ETOH abuse, Reviewed medication and does not appear to have a Drug-Nutrient interaction (outpatient medication info minimal, also of note in history pt was not taking medication per family due to not being able to get from MD?. Discussed with RN and assistant pt has really good appetite and was eating well.  Pt has been seen in past. Pt's weight was 284lbs back in 07/29/2020. Pt's current admitting weight was 228lbs (total weight difference 56lbs an 19% of BW over 5 month; clinically sig). Called pt's daughter who states she thinks her father gained weight? Pt wasn't eating well at [facility], but she felt he had gained weight over the past few months. Unsure if weight on last hospital visit is incorrect or if weight loss is accurate (will montior pt's response to thiamine to assess). Overall pt without difficulty chewing or swallowing no issues with n/v/d/c. as noted. RD most concerned with possible large weight loss over the past few months. Recommend Gelatein BID to hep meet protein needs. Will continue to monitor pt's nutritional status. 79 y/o M w/ PMH of CVA, TIA, dyslipidemia, HTN, GERD, h/o seizure disorder p/w AMS. Patient states he doesn't recall the episode at all, and states he is completely fine. Patient has trouble stating where is currently, but otherwise knows it's 2020, and is conversational. As per ED note, patient's daughter had noticed AMS where patient's pseech was abnormal and was groggy. However, daughter had stated patient did not have LOC  recently patient denies any weakness in arms / legs / slurred speech / facial droop / vision deficits / sensory deficits / CP / SOB / cough / runny nose / sore throat / nausea / vomiting / abdominal pain / diarrhea     Pt seen for assessment. Reviewed pt's chart and pt being treated for possible Wernicke's. Pt started on IV Thiamine today (500mg TID for 3 days, than 250ml daily). Pt with no noted history of ETOH abuse, Reviewed medication and does not appear to have a Drug-Nutrient interaction (outpatient medication info minimal, also of note in history pt was not taking medication per family due to not being able to get from [facility]?. Discussed with RN and assistant pt has really good appetite and was eating well.  Pt has been seen in past. Pt's weight was 284lbs back in 07/29/2020. Pt's current admitting weight was 228lbs (total weight difference 56lbs an 19% of BW over 5 month; clinically sig). Called pt's daughter who states she thinks her father gained weight? Pt wasn't eating well at [facility], but she felt he had gained weight over the past few months. Unsure if weight on last hospital visit is incorrect or if weight loss is accurate (will montior pt's response to thiamine to assess). Overall pt without difficulty chewing or swallowing no issues with n/v/d/c. as noted. RD most concerned with possible large weight loss over the past few months. Recommend Gelatein BID to hep meet protein needs. Will continue to monitor pt's nutritional status.    Recommend:  C/w thiamine treatment  Review b vitamins and replete as needed   Add MVI daily  Monitor weight and PO intake  Add Gelatein BID  Will continue to evaluated for potential malnutrition

## 2020-12-16 NOTE — CONSULT NOTE ADULT - ASSESSMENT
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 Cardiology consulted for further evaluation and treatment.    -No concern for acute MI, Troponins only minimally elevated and flat, consistent with demand ischemia with underlying LASHELL on CKD.  -Patient's mentation is waxing/waning with concerns he may have Thiamine def/Wernicke's encephalopathy as per radiology. Consider further evaluation/treatment.  -Patient warrants ischemic work up once stable. Recent Code gray requiring haldol makes him high risk for a LHC until he stabilizes. His renal function also has been worsening. Can consider outpatient LHC vs nuclear stress test for viability, vs inpatient if stabilizes.  -Recommend optimizing medication. With LASHELL on CKD with worsening Cr, will hold off on ACE and monitor. If unable to add an ACE consider starting on hydralazine/isordil which has been shown both morbidity and mortality benefit.  -With his AMS and agitation would hold off on a LifeVest, will re-address once more stable.  -C/W ASA, Statin, BB

## 2020-12-16 NOTE — DIETITIAN INITIAL EVALUATION ADULT. - CALCULATED FROM (G/KG)
[facility] 7th Fl  Neurosurgery    SUBJECTIVE:     History of Present Illness:  Diana Trivedi is a 16 y.o. female with intractable generalized epilepsy who presents as a referral from Dr. Handy for VNS placement.    Meds:  lamictal 200 mg BID  keppra 1500mg BID     Failed meds- zonegran    2 Hour EEG 2/16/18- normal  EM[facility] at [facility] showed multiple generalized seizures with eyelid myoclonia consitent with Jeavons syndrome.    Review of patient's allergies indicates:  No Known Allergies    Past Medical History:   Diagnosis Date    Developmental delay     Seizures        History reviewed. No pertinent surgical history.     Family History   Problem Relation Age of Onset    Cancer Maternal Grandmother     Migraines Mother        Social History     Social History    Marital status: Single     Spouse name: N/A    Number of children: N/A    Years of education: N/A     Occupational History    Not on file.     Social History Main Topics    Smoking status: Never Smoker    Smokeless tobacco: Never Used    Alcohol use No    Drug use: No    Sexual activity: Not Currently     Other Topics Concern    Not on file     Social History Narrative    8th grade. Struggles in school       Review of Systems:  Constitutional: no fever, chills or night sweats. No changes in weight   Eyes: no visual changes   ENT: no nasal congestion or sore throat   Respiratory: no cough or shortness of breath   Cardiovascular: no chest pain or palpitations   Gastrointestinal: no nausea or vomiting   Genitourinary: no hematuria or dysuria   Integument/Breast: no rash or pruritis   Hematologic/Lymphatic: no easy bruising or lymphadenopathy   Musculoskeletal: no arthralgias or myalgias.   Neurological: no seizures or tremors   Behavioral/Psych: no auditory or visual hallucinations   Endocrine: no heat or cold intolerance     OBJECTIVE:     Vital Signs (Most Recent):  Vitals:    05/08/18 1541   BP: 115/65   Pulse: 76  "  Weight: 66 kg (145 lb 8 oz)   Height: 5' 5.5" (1.664 m)       Physical Exam:  General: well developed, well nourished, no distress.   Head: normocephalic, atraumatic  Neurologic: Alert and oriented. Thought content appropriate.  GCS: Motor: 6/Verbal: 5/Eyes: 4 GCS Total: 15  Mental Status: Awake, Alert, Oriented x 4  Language: No aphasia  Speech: No dysarthria  Cranial nerves: face symmetric, tongue midline, CN II-XII grossly intact.   Eyes: pupils equal, round, reactive to light with accomodation, EOMI.  Pulmonary: normal respirations, no signs of respiratory distress  Abdomen: soft, non-distended, not tender to palpation  Sensory: intact to light touch throughout    Motor Strength: Moves all extremities spontaneously with good tone.  Full strength upper and lower extremities. No abnormal movements seen.     Strength  Deltoids Triceps Biceps Wrist Extension Wrist Flexion Hand    Upper: R 5/5 5/5 5/5 5/5 5/5 5/5    L 5/5 5/5 5/5 5/5 5/5 5/5     Iliopsoas Quadriceps Knee  Flexion Tibialis  anterior Gastro- cnemius EHL   Lower: R 5/5 5/5 5/5 5/5 5/5 5/5    L 5/5 5/5 5/5 5/5 5/5 5/5     DTR's: 2 + and symmetric in UE and LE  Pronator Drift: no drift noted  Finger-to-nose: Intact bilaterally  Meng: absent  Clonus: absent  Babinski: absent  Pulses: 2+ and symmetric radial and dorsalis pedis.  Skin: Skin is warm, dry and intact.  Straight leg raise: negative  Gait: normal  Tandem Gait: No difficulty         Able to walk on heels & toes  Cervical ROM: full  Lumbar ROM: full   SI Joint tenderness: Negative   Pain on Hip ROM: Negative    Diagnostic Results:  No imaging for review.     EEG reviewed.    ASSESSMENT/PLAN:     Diana Trivedi is a 16 y.o. female with intractable generalized epilepsy who presents as a referral from Dr. Handy for VNS placement. Risks, benefits, indications, alternatives were discussed in depth, and the patient wished to proceed with surgery. Will proceed with scheduling. "       Teresa Carpenter PA-C  Neurosurgery     90.48

## 2020-12-16 NOTE — PROGRESS NOTE ADULT - SUBJECTIVE AND OBJECTIVE BOX
CHIEF COMPLAINT/DIAGNOSIS: AMS    HPI: 81 y/o M w/ PMH of CVA, TIA, dyslipidemia, HTN, GERD, h/o seizure disorder p/w AMS. Patient states he doesn't recall the episode at all, and states he is completely fine. Patient has trouble stating where is currently, but otherwise knows it's 2020, and is conversational. As per ED note, patient's daughter had noticed AMS where patient's speech was abnormal and was groggy. However, daughter had stated patient did not have LOC. Presently patient denies any weakness in arms / legs / slurred speech / facial droop / vision deficits / sensory deficits / CP / SOB / cough / runny nose / sore throat / nausea / vomiting / abdominal pain / diarrhea.  ** As per son patient has been non-compliant with medications for 1month unable to receive from ?? **    12/16: no complaints. limited ros due to cognitive impairment. overnight with agitation requiring haldol. denies sob, cp or palp. reports no prior hx of alcohol use.    PHYSICAL EXAM:  Constitutional: Awake and alert, obese  HEENT:  Normal Hearing, MMM  Neck: Soft and supple  Respiratory: Breath sounds are clear/ diminished b/l   Cardiovascular: S1 and S2, regular rate and rhythm, no Murmurs, gallops or rubs  Gastrointestinal: Bowel Sounds present, soft, nontender, nondistended, no guarding, no rebound  Extremities: trace, pitting +1 b/l peripheral edema  Vascular: 2+ peripheral pulses  Neurological: A/O x 2, forgetful. follows commands. no visible facial droop. able to lift arm over head. blinks to threat. speech fluent.   Musculoskeletal: 5/5 strength b/l upper and lower extremities  Skin: No rashes    Vital Signs Last 24 Hrs  T(C): 36.6 (16 Dec 2020 08:30), Max: 36.6 (16 Dec 2020 08:30)  T(F): 97.8 (16 Dec 2020 08:30), Max: 97.8 (16 Dec 2020 08:30)  HR: 57 (16 Dec 2020 08:30) (51 - 60)  BP: 146/89 (16 Dec 2020 08:30) (109/78 - 146/89)  BP(mean): 92 (15 Dec 2020 20:12) (92 - 92)  RR: 18 (16 Dec 2020 08:30) (16 - 18)  SpO2: 97% (16 Dec 2020 08:30) (97% - 99%) - room air     LABS: All Labs Reviewed:                        10.9   5.30  )-----------( 223      ( 15 Dec 2020 07:39 )             37.2     12-16    140  |  113<H>  |  26<H>  ----------------------------<  90  5.0   |  20<L>  |  1.75<H>    Ca    9.1      16 Dec 2020 07:11  Phos  3.1     12-15  Mg     2.2     12-16    TPro  7.4  /  Alb  3.0<L>  /  TBili  1.7<H>  /  DBili  x   /  AST  44<H>  /  ALT  140<H>  /  AlkPhos  109  12-15    PT/INR - ( 15 Dec 2020 07:39 )   PT: 14.9 sec;   INR: 1.30 ratio         PTT - ( 15 Dec 2020 07:39 )  PTT:30.8 sec  CARDIAC MARKERS ( 15 Dec 2020 00:40 )  0.045 ng/mL / x     / x     / x     / x      CARDIAC MARKERS ( 14 Dec 2020 23:32 )  0.048 ng/mL / x     / x     / x     / x      CARDIAC MARKERS ( 14 Dec 2020 21:34 )  0.047 ng/mL / x     / x     / x     / x        RADIOLOGY:  < from: MR Head No Cont Disc (12.15.20 @ 16:03) >  IMPRESSION:  Redemonstration volume loss remote infarcts right greater than left MCA distribution in bilateral frontal and parietal lobes with encephalomalacia, bilateral PCA, and bilateral cerebellar left greater than right territories with encephalomalacia, marked microvascular disease, remote lacunar infarcts, without new restricted diffusion, hemorrhage or shift.    Decreased size mamillary bodies correlate with thiamin vitamin [id number] severely motion limited MRA with atherosclerotic vascular calcification multifocal narrowing and stenosis of the cavernous to provide ICAs, with severe multifocal stenosis of the intracranial arterial middle and posterior cerebral arteriesand vertebral and basilar arteries as detailed above, MRA of the neck only obtained  views, suggest repeat imaging when patient can hold still.  < end of copied text >    < from: CT Head No Cont (12.14.20 @ 22:01) >  IMPRESSION: No intracranial hemorrhage or mass effect. Stable chronic infarcts.  < end of copied text >    ECHOCARDIOGRAM:   pending official read.    MEDICATIONS  (STANDING):  aspirin 325 milliGRAM(s) Oral daily  atorvastatin 40 milliGRAM(s) Oral at bedtime  carvedilol Oral Tab/Cap - Peds 25 milliGRAM(s) Oral two times a day  cefTRIAXone Injectable. 1000 milliGRAM(s) IV Push every 24 hours  heparin   Injectable 5000 Unit(s) SubCutaneous every 12 hours  sodium chloride 0.45%. 1000 milliLiter(s) (75 mL/Hr) IV Continuous <Continuous>    MEDICATIONS  (PRN):    TELEMETRY REVIEW:  12/16: frequent bigeminy, triplets, PVCs 90-100s    ASSESSMENT AND PLAN:    81 y/o M w/ PMH of CVA, TIA, dyslipidemia, HTN, GERD, p/w AMS.     1) Toxic/Metabolic encephalopathy due to UTI and suspected Wernicke's   - tele monitoring. tele review as above.  - CTH - Multiple chronic stable infarcts. f/u MRI / MRA > as above, no acute infarcts.  - Cont. ASA / Statin   - check echo -- EF 10-20%?? Unclear if baseline - no echo in history. d/w Dr. Garg. will keep NPO for now.  - f/u Lipid panel / A1c --  wnl  - start IV vitamin B1. f/u b12, b1, b6, folate lvls.  - Speech, PT, Neuro consult   - Cardio consult     2) Acute on Chronic Systolic HF - not in acute exacerbation  - Per cardio last known EF 45%, now 10-20% EF  - check bnp  - Cont: GDMT: not candidate for ACEi. Cont. BB, hydralazine, Imdur   - Defer LHC to r/o ischemic for now until mentation improves   - EP eval    3) Mildly elevated troponins  - mildly elevated and downtrended. No CP  - Cont. ASA, statin  - Cardio consult appreciated -- plan for eventual LHC to r/o ischemia in setting of severely reduced EF    4) UTI - likely GNR  - Cont. Ceftriaxone  - F/u urine cx   - s/p gentle hydration. encourage PO hydration.     5) LASHELL on CKD Stage 3  - s/p IVF  - Avoid nephrotoxic agents, no ACE/ARB  - bladder scan, r/o urinary retention.     6) Hypernatremia - resolved  - Recheck in AM. Cont. IVF, D5 1/2 NS x 10hrs.     7) Transaminitis   - monitor f/u hep panel and lipid panel.  - monitor on statin therapy.    8) DVT ppx  - Heparin SubQ    Advanced Directives: Full Code.    ~~ Spoke to nitin Coleman with updates 12/15, 12/16 CHIEF COMPLAINT/DIAGNOSIS: AMS    HPI: 79 y/o M w/ PMH of CVA, TIA, dyslipidemia, HTN, GERD, h/o seizure disorder p/w AMS. Patient states he doesn't recall the episode at all, and states he is completely fine. Patient has trouble stating where is currently, but otherwise knows it's 2020, and is conversational. As per ED note, patient's daughter had noticed AMS where patient's speech was abnormal and was groggy. However, daughter had stated patient did not have LOC. Presently patient denies any weakness in arms / legs / slurred speech / facial droop / vision deficits / sensory deficits / CP / SOB / cough / runny nose / sore throat / nausea / vomiting / abdominal pain / diarrhea.  ** As per son patient has been non-compliant with medications for 1month unable to receive from ?? **    12/16: no complaints. limited ros due to cognitive impairment. overnight with agitation requiring haldol. denies sob, cp or palp. reports no prior hx of alcohol use.    PHYSICAL EXAM:  Constitutional: Awake and alert, obese  HEENT:  Normal Hearing, MMM  Neck: Soft and supple  Respiratory: Breath sounds are clear/ diminished b/l   Cardiovascular: S1 and S2, regular rate and rhythm, no Murmurs, gallops or rubs  Gastrointestinal: Bowel Sounds present, soft, nontender, nondistended, no guarding, no rebound  Extremities: trace, pitting +1 b/l peripheral edema  Vascular: 2+ peripheral pulses  Neurological: A/O x 2, forgetful. follows commands. no visible facial droop. able to lift arm over head. blinks to threat. speech fluent.   Musculoskeletal: 5/5 strength b/l upper and lower extremities  Skin: No rashes    Vital Signs Last 24 Hrs  T(C): 36.6 (16 Dec 2020 08:30), Max: 36.6 (16 Dec 2020 08:30)  T(F): 97.8 (16 Dec 2020 08:30), Max: 97.8 (16 Dec 2020 08:30)  HR: 57 (16 Dec 2020 08:30) (51 - 60)  BP: 146/89 (16 Dec 2020 08:30) (109/78 - 146/89)  BP(mean): 92 (15 Dec 2020 20:12) (92 - 92)  RR: 18 (16 Dec 2020 08:30) (16 - 18)  SpO2: 97% (16 Dec 2020 08:30) (97% - 99%) - room air     LABS: All Labs Reviewed:                        10.9   5.30  )-----------( 223      ( 15 Dec 2020 07:39 )             37.2     12-16    140  |  113<H>  |  26<H>  ----------------------------<  90  5.0   |  20<L>  |  1.75<H>    Ca    9.1      16 Dec 2020 07:11  Phos  3.1     12-15  Mg     2.2     12-16    TPro  7.4  /  Alb  3.0<L>  /  TBili  1.7<H>  /  DBili  x   /  AST  44<H>  /  ALT  140<H>  /  AlkPhos  109  12-15    PT/INR - ( 15 Dec 2020 07:39 )   PT: 14.9 sec;   INR: 1.30 ratio         PTT - ( 15 Dec 2020 07:39 )  PTT:30.8 sec  CARDIAC MARKERS ( 15 Dec 2020 00:40 )  0.045 ng/mL / x     / x     / x     / x      CARDIAC MARKERS ( 14 Dec 2020 23:32 )  0.048 ng/mL / x     / x     / x     / x      CARDIAC MARKERS ( 14 Dec 2020 21:34 )  0.047 ng/mL / x     / x     / x     / x        RADIOLOGY:  < from: MR Head No Cont Disc (12.15.20 @ 16:03) >  IMPRESSION:  Redemonstration volume loss remote infarcts right greater than left MCA distribution in bilateral frontal and parietal lobes with encephalomalacia, bilateral PCA, and bilateral cerebellar left greater than right territories with encephalomalacia, marked microvascular disease, remote lacunar infarcts, without new restricted diffusion, hemorrhage or shift.    Decreased size mamillary bodies correlate with thiamin vitamin [id number] severely motion limited MRA with atherosclerotic vascular calcification multifocal narrowing and stenosis of the cavernous to provide ICAs, with severe multifocal stenosis of the intracranial arterial middle and posterior cerebral arteriesand vertebral and basilar arteries as detailed above, MRA of the neck only obtained  views, suggest repeat imaging when patient can hold still.  < end of copied text >    < from: CT Head No Cont (12.14.20 @ 22:01) >  IMPRESSION: No intracranial hemorrhage or mass effect. Stable chronic infarcts.  < end of copied text >    ECHOCARDIOGRAM:   pending official read.    MEDICATIONS  (STANDING):  aspirin 325 milliGRAM(s) Oral daily  atorvastatin 40 milliGRAM(s) Oral at bedtime  carvedilol Oral Tab/Cap - Peds 25 milliGRAM(s) Oral two times a day  cefTRIAXone Injectable. 1000 milliGRAM(s) IV Push every 24 hours  heparin   Injectable 5000 Unit(s) SubCutaneous every 12 hours  sodium chloride 0.45%. 1000 milliLiter(s) (75 mL/Hr) IV Continuous <Continuous>    MEDICATIONS  (PRN):    TELEMETRY REVIEW:  12/16: frequent bigeminy, triplets, PVCs 90-100s    ASSESSMENT AND PLAN:    79 y/o M w/ PMH of CVA, TIA, dyslipidemia, HTN, GERD, p/w AMS.     1) Toxic/Metabolic encephalopathy due to UTI and suspected Wernicke's   - tele monitoring. tele review as above.  - CTH - Multiple chronic stable infarcts. f/u MRI / MRA > as above, no acute infarcts.  - Cont. ASA / Statin   - start IV vitamin B1. f/u b12, b1, b6, folate lvls.  - start Seroquel prn for agitation/ sundowning   - Speech, PT, Neuro consult   - Cardio consult     2) Acute on Chronic Systolic HF - not in acute exacerbation  - Per cardio last known EF 45%, now 10-20% EF  - check bnp  - Cont: GDMT: not candidate for ACEi. Cont. BB, hydralazine, Imdur   - Defer LHC to r/o ischemic for now until mentation improves   - EP eval    3) Mildly elevated troponins  - mildly elevated and downtrended. No CP  - Cont. ASA, statin  - Cardio consult appreciated -- plan for eventual LHC to r/o ischemia in setting of severely reduced EF    4) UTI - likely GNR  - Cont. Ceftriaxone  - F/u urine cx   - s/p gentle hydration. encourage PO hydration.     5) LASHELL on CKD Stage 3  - s/p IVF  - Avoid nephrotoxic agents, no ACE/ARB  - bladder scan, r/o urinary retention.     6) Hypernatremia - resolved  - Recheck in AM. Cont. IVF, D5 1/2 NS x 10hrs.     7) Transaminitis   - monitor f/u hep panel   - lipids wnl  - monitor on statin therapy.    8) DVT ppx  - Heparin SubQ    Advanced Directives: Full Code.    ~~ Spoke to son Jared with updates 12/15, 12/16 CHIEF COMPLAINT/DIAGNOSIS: AMS    HPI: 79 y/o M w/ PMH of CVA, TIA, dyslipidemia, HTN, GERD, h/o seizure disorder p/w AMS. Patient states he doesn't recall the episode at all, and states he is completely fine. Patient has trouble stating where is currently, but otherwise knows it's 2020, and is conversational. As per ED note, patient's daughter had noticed AMS where patient's speech was abnormal and was groggy. However, daughter had stated patient did not have LOC. Presently patient denies any weakness in arms / legs / slurred speech / facial droop / vision deficits / sensory deficits / CP / SOB / cough / runny nose / sore throat / nausea / vomiting / abdominal pain / diarrhea.  ** As per son patient has been non-compliant with medications for 1month unable to receive from ?? **    12/16: no complaints. limited ros due to cognitive impairment. overnight with agitation requiring haldol. denies sob, cp or palp. reports no prior hx of alcohol use.    PHYSICAL EXAM:  Constitutional: Awake and alert, obese  HEENT:  Normal Hearing, MMM  Neck: Soft and supple  Respiratory: Breath sounds are clear/ diminished b/l   Cardiovascular: S1 and S2, regular rate and rhythm, no Murmurs, gallops or rubs  Gastrointestinal: Bowel Sounds present, soft, nontender, nondistended, no guarding, no rebound  Extremities: trace, pitting +1 b/l peripheral edema  Vascular: 2+ peripheral pulses  Neurological: A/O x 2, forgetful. follows commands. no visible facial droop. able to lift arm over head. blinks to threat. speech fluent.   Musculoskeletal: 5/5 strength b/l upper and lower extremities  Skin: No rashes    Vital Signs Last 24 Hrs  T(C): 36.6 (16 Dec 2020 08:30), Max: 36.6 (16 Dec 2020 08:30)  T(F): 97.8 (16 Dec 2020 08:30), Max: 97.8 (16 Dec 2020 08:30)  HR: 57 (16 Dec 2020 08:30) (51 - 60)  BP: 146/89 (16 Dec 2020 08:30) (109/78 - 146/89)  BP(mean): 92 (15 Dec 2020 20:12) (92 - 92)  RR: 18 (16 Dec 2020 08:30) (16 - 18)  SpO2: 97% (16 Dec 2020 08:30) (97% - 99%) - room air     LABS: All Labs Reviewed:                        10.9   5.30  )-----------( 223      ( 15 Dec 2020 07:39 )             37.2     12-16    140  |  113<H>  |  26<H>  ----------------------------<  90  5.0   |  20<L>  |  1.75<H>    Ca    9.1      16 Dec 2020 07:11  Phos  3.1     12-15  Mg     2.2     12-16    TPro  7.4  /  Alb  3.0<L>  /  TBili  1.7<H>  /  DBili  x   /  AST  44<H>  /  ALT  140<H>  /  AlkPhos  109  12-15    PT/INR - ( 15 Dec 2020 07:39 )   PT: 14.9 sec;   INR: 1.30 ratio         PTT - ( 15 Dec 2020 07:39 )  PTT:30.8 sec  CARDIAC MARKERS ( 15 Dec 2020 00:40 )  0.045 ng/mL / x     / x     / x     / x      CARDIAC MARKERS ( 14 Dec 2020 23:32 )  0.048 ng/mL / x     / x     / x     / x      CARDIAC MARKERS ( 14 Dec 2020 21:34 )  0.047 ng/mL / x     / x     / x     / x        RADIOLOGY:  < from: MR Head No Cont Disc (12.15.20 @ 16:03) >  IMPRESSION:  Redemonstration volume loss remote infarcts right greater than left MCA distribution in bilateral frontal and parietal lobes with encephalomalacia, bilateral PCA, and bilateral cerebellar left greater than right territories with encephalomalacia, marked microvascular disease, remote lacunar infarcts, without new restricted diffusion, hemorrhage or shift.    Decreased size mamillary bodies correlate with thiamin vitamin [id number] severely motion limited MRA with atherosclerotic vascular calcification multifocal narrowing and stenosis of the cavernous to provide ICAs, with severe multifocal stenosis of the intracranial arterial middle and posterior cerebral arteriesand vertebral and basilar arteries as detailed above, MRA of the neck only obtained  views, suggest repeat imaging when patient can hold still.  < end of copied text >    < from: CT Head No Cont (12.14.20 @ 22:01) >  IMPRESSION: No intracranial hemorrhage or mass effect. Stable chronic infarcts.  < end of copied text >    ECHOCARDIOGRAM:   pending official read.    MEDICATIONS  (STANDING):  aspirin 325 milliGRAM(s) Oral daily  atorvastatin 40 milliGRAM(s) Oral at bedtime  carvedilol Oral Tab/Cap - Peds 25 milliGRAM(s) Oral two times a day  cefTRIAXone Injectable. 1000 milliGRAM(s) IV Push every 24 hours  heparin   Injectable 5000 Unit(s) SubCutaneous every 12 hours  sodium chloride 0.45%. 1000 milliLiter(s) (75 mL/Hr) IV Continuous <Continuous>    MEDICATIONS  (PRN):    TELEMETRY REVIEW:  12/16: frequent bigeminy, triplets, PVCs 90-100s    ASSESSMENT AND PLAN:    79 y/o M w/ PMH of CVA, TIA, dyslipidemia, HTN, GERD, p/w AMS.     1) Toxic/Metabolic encephalopathy due to UTI and suspected Wernicke's   - tele monitoring. tele review as above.  - CTH - Multiple chronic stable infarcts. f/u MRI / MRA > as above, no acute infarcts.  - start IV vitamin B1. f/u b12, b1, b6, folate lvls.  - start Seroquel prn for agitation/ sundowning   - ABX for UTI  - Speech, PT, Neuro, Cardio consult appreciated     2) Acute on Chronic Systolic HF - not in acute exacerbation  - Per cardio last known EF 45%, now 10-20% EF  - check bnp  - Cont: GDMT: not candidate for ACEi. Cont. BB, hydralazine, Imdur   - Defer LHC to r/o ischemic for now until mentation improves   - EP eval - for poss ICD or Life vest    3) Mildly elevated troponins  - Mildly elevated and downtrended. No CP  - Cont. ASA, statin  - Cardio consult appreciated -- plan for eventual LHC to r/o ischemia in setting of severely reduced EF    4) UTI - likely GNR  - Cont. Ceftriaxone  - F/u urine cx   - s/p gentle hydration. encourage PO hydration.     5) LASHELL on CKD Stage 3  - s/p IVF  - Avoid nephrotoxic agents, no ACE/ARB  - bladder scan, r/o urinary retention.     6) Hypernatremia - resolved  - Recheck in AM. Cont. IVF, D5 1/2 NS x 10hrs.     7) Transaminitis   - monitor f/u hep panel   - lipids wnl  - monitor on statin therapy.    8) DVT ppx  - Heparin SubQ    Advanced Directives: Full Code.    ~~ Spoke to nitin Coleman with updates 12/15, 12/16 CHIEF COMPLAINT/DIAGNOSIS: AMS    HPI: 79 y/o M w/ PMH of CVA, TIA, dyslipidemia, HTN, GERD, h/o seizure disorder p/w AMS. Patient states he doesn't recall the episode at all, and states he is completely fine. Patient has trouble stating where is currently, but otherwise knows it's 2020, and is conversational. As per ED note, patient's daughter had noticed AMS where patient's speech was abnormal and was groggy. However, daughter had stated patient did not have LOC. Presently patient denies any weakness in arms / legs / slurred speech / facial droop / vision deficits / sensory deficits / CP / SOB / cough / runny nose / sore throat / nausea / vomiting / abdominal pain / diarrhea.  ** As per son patient has been non-compliant with medications for 1month unable to receive from ?? **    12/16: no complaints. limited ros due to cognitive impairment. overnight with agitation requiring haldol. denies sob, cp or palp. reports no prior hx of alcohol use.    PHYSICAL EXAM:  Constitutional: Awake and alert, obese  HEENT:  Normal Hearing, MMM  Neck: Soft and supple  Respiratory: Breath sounds are clear/ diminished b/l   Cardiovascular: S1 and S2, regular rate and rhythm, no Murmurs, gallops or rubs  Gastrointestinal: Bowel Sounds present, soft, nontender, nondistended, no guarding, no rebound  Extremities: trace, pitting +1 b/l peripheral edema  Vascular: 2+ peripheral pulses  Neurological: A/O x 2, forgetful. follows commands. no visible facial droop. able to lift arm over head. blinks to threat. speech fluent.   Musculoskeletal: 5/5 strength b/l upper and lower extremities  Skin: No rashes    Vital Signs Last 24 Hrs  T(C): 36.6 (16 Dec 2020 08:30), Max: 36.6 (16 Dec 2020 08:30)  T(F): 97.8 (16 Dec 2020 08:30), Max: 97.8 (16 Dec 2020 08:30)  HR: 57 (16 Dec 2020 08:30) (51 - 60)  BP: 146/89 (16 Dec 2020 08:30) (109/78 - 146/89)  BP(mean): 92 (15 Dec 2020 20:12) (92 - 92)  RR: 18 (16 Dec 2020 08:30) (16 - 18)  SpO2: 97% (16 Dec 2020 08:30) (97% - 99%) - room air     LABS: All Labs Reviewed:                        10.9   5.30  )-----------( 223      ( 15 Dec 2020 07:39 )             37.2     12-16    140  |  113<H>  |  26<H>  ----------------------------<  90  5.0   |  20<L>  |  1.75<H>    Ca    9.1      16 Dec 2020 07:11  Phos  3.1     12-15  Mg     2.2     12-16    TPro  7.4  /  Alb  3.0<L>  /  TBili  1.7<H>  /  DBili  x   /  AST  44<H>  /  ALT  140<H>  /  AlkPhos  109  12-15    PT/INR - ( 15 Dec 2020 07:39 )   PT: 14.9 sec;   INR: 1.30 ratio         PTT - ( 15 Dec 2020 07:39 )  PTT:30.8 sec  CARDIAC MARKERS ( 15 Dec 2020 00:40 )  0.045 ng/mL / x     / x     / x     / x      CARDIAC MARKERS ( 14 Dec 2020 23:32 )  0.048 ng/mL / x     / x     / x     / x      CARDIAC MARKERS ( 14 Dec 2020 21:34 )  0.047 ng/mL / x     / x     / x     / x        RADIOLOGY:  < from: MR Head No Cont Disc (12.15.20 @ 16:03) >  IMPRESSION:  Redemonstration volume loss remote infarcts right greater than left MCA distribution in bilateral frontal and parietal lobes with encephalomalacia, bilateral PCA, and bilateral cerebellar left greater than right territories with encephalomalacia, marked microvascular disease, remote lacunar infarcts, without new restricted diffusion, hemorrhage or shift.    Decreased size mamillary bodies correlate with thiamin vitamin [id number] severely motion limited MRA with atherosclerotic vascular calcification multifocal narrowing and stenosis of the cavernous to provide ICAs, with severe multifocal stenosis of the intracranial arterial middle and posterior cerebral arteriesand vertebral and basilar arteries as detailed above, MRA of the neck only obtained  views, suggest repeat imaging when patient can hold still.  < end of copied text >    < from: CT Head No Cont (12.14.20 @ 22:01) >  IMPRESSION: No intracranial hemorrhage or mass effect. Stable chronic infarcts.  < end of copied text >    ECHOCARDIOGRAM:   pending official read.    MEDICATIONS  (STANDING):  aspirin 325 milliGRAM(s) Oral daily  atorvastatin 40 milliGRAM(s) Oral at bedtime  carvedilol Oral Tab/Cap - Peds 25 milliGRAM(s) Oral two times a day  cefTRIAXone Injectable. 1000 milliGRAM(s) IV Push every 24 hours  heparin   Injectable 5000 Unit(s) SubCutaneous every 12 hours  sodium chloride 0.45%. 1000 milliLiter(s) (75 mL/Hr) IV Continuous <Continuous>    MEDICATIONS  (PRN):    TELEMETRY REVIEW:  12/16: frequent bigeminy, triplets, PVCs 90-100s    ASSESSMENT AND PLAN:    79 y/o M w/ PMH of CVA, TIA, dyslipidemia, HTN, GERD, p/w AMS.     1) Toxic/Metabolic encephalopathy due to UTI and suspected Wernicke's   - tele monitoring. tele review as above.  - CTH - Multiple chronic stable infarcts. f/u MRI / MRA > as above, no acute infarcts.  - start IV vitamin B1. f/u b12, b1, b6, folate lvls.  - start Seroquel prn for agitation/ sundowning   - ABX for UTI  - Speech, PT, Neuro, Cardio consult appreciated     2) Acute on Chronic Systolic HF/ Cardiomyopathy - not in acute exacerbation  - Per cardio last known EF 45%, now 10-20% EF  - check bnp  - Cont: GDMT: not candidate for ACEi. Cont. BB, hydralazine, Imdur   - Defer LHC to r/o ischemic for now until mentation improves   - send viral studies - coxsackie, echo virus.  - amyloidosis w/u - send lgt chains/ urine/serum electrophoresis   - EP eval - for poss ICD or Life vest    3) Mildly elevated troponins  - Mildly elevated and downtrended. No CP  - Cont. ASA, statin  - Cardio consult appreciated -- plan for eventual LHC to r/o ischemia in setting of severely reduced EF    4) UTI - likely GNR  - Cont. Ceftriaxone  - F/u urine cx   - s/p gentle hydration. encourage PO hydration.     5) LASHELL on CKD Stage 3  - s/p IVF  - Avoid nephrotoxic agents, no ACE/ARB  - bladder scan, r/o urinary retention.     6) Hypernatremia - resolved  - Recheck in AM. Cont. IVF, D5 1/2 NS x 10hrs.     7) Transaminitis   - monitor f/u hep panel   - lipids wnl  - monitor on statin therapy.    8) DVT ppx  - Heparin SubQ    Advanced Directives: Full Code.    ~~ Spoke to nitin Coleman with updates 12/15, 12/16

## 2020-12-16 NOTE — PROVIDER CONTACT NOTE (CHANGE IN STATUS NOTIFICATION) - ASSESSMENT
Pt continues to be combative, uncooperative, refusing to get back to bed.  Pt's family called to assist with re-orientation, but patient continues to be agitated and aggressive to staff.

## 2020-12-16 NOTE — CONSULT NOTE ADULT - ASSESSMENT
A/P: 80 year old male with above history with worsening cardiomyopathy.  A/P: 80 year old male with above history with newly diagnosed severe cardiomyopathy, AMS and possible Wernicke's encephalopathy.  Continue telemonitoring   Ischemic work up when able to tolerate  Can consider AICD after 3 months of GDMT and improved mentation if CM persists  Lifevest contraindicated in patients who are not able to manage device independently.  Given current episodes of confusion, agitation and difficulty wearing telemetry monitor, lifevest would be contraindicated.  Outpatient EP follow up  Further management per medicine  Plan to be discussed with Dr. Rodgers A/P: 80 year old male with above history with newly diagnosed severe cardiomyopathy, AMS and possible Wernicke's encephalopathy.  Continue telemonitoring   Ischemic work up when able to tolerate  Can consider AICD after 3 months of GDMT for CHF and improved mentation if CM persists  Lifevest contraindicated in patients who are not able to manage device independently.  Given current episodes of confusion, agitation and difficulty wearing telemetry monitor, lifevest would be contraindicated.  Outpatient EP follow up  Further management per medicine  Plan to be discussed with Dr. Rodgers

## 2020-12-16 NOTE — CONSULT NOTE ADULT - SUBJECTIVE AND OBJECTIVE BOX
HPI:  79 y/o M w/ PMH of CVA, TIA, dyslipidemia, HTN, GERD, h/o seizure disorder p/w AMS. Patient states he doesn't recall the episode at all, and states he is completely fine. Patient has trouble stating where is currently, but otherwise knows it's 2020, and is conversational. As per ED note, patient's daughter had noticed AMS where patient's pseech was abnormal and was groggy. However, daughter had stated patient did not have LOC    Presently patient denies any weakness in arms / legs / slurred speech / facial droop / vision deficits / sensory deficits / CP / SOB / cough / runny nose / sore throat / nausea / vomiting / abdominal pain / diarrhea     PAST MEDICAL & SURGICAL HISTORY:  Chronic GERD  HTN (hypertension)  HLD (hyperlipidemia)  CVA (cerebral vascular accident)  High cholesterol  HTN (hypertension)  History of surgical removal of testicle  H/O hernia repair  No significant past surgical history        MEDICATIONS  (STANDING):  aspirin 325 milliGRAM(s) Oral daily  atorvastatin 40 milliGRAM(s) Oral at bedtime  carvedilol Oral Tab/Cap - Peds 25 milliGRAM(s) Oral two times a day  cefTRIAXone Injectable. 1000 milliGRAM(s) IV Push every 24 hours  heparin   Injectable 5000 Unit(s) SubCutaneous every 12 hours  hydrALAZINE 10 milliGRAM(s) Oral three times a day  isosorbide   mononitrate ER Tablet (IMDUR) 30 milliGRAM(s) Oral daily  thiamine IVPB 500 milliGRAM(s) IV Intermittent every 8 hours    MEDICATIONS  (PRN):  QUEtiapine 12.5 milliGRAM(s) Oral every 8 hours PRN agitation      Allergies    Allergy Status Unknown  No Known Allergies            SOCIAL HISTORY: Denies tobacco, etoh abuse or illicit drug use    FAMILY HISTORY:      Vital Signs Last 24 Hrs  T(C): 36.6 (16 Dec 2020 08:30), Max: 36.6 (16 Dec 2020 08:30)  T(F): 97.8 (16 Dec 2020 08:30), Max: 97.8 (16 Dec 2020 08:30)  HR: 57 (16 Dec 2020 08:30) (51 - 60)  BP: 146/89 (16 Dec 2020 08:30) (109/78 - 146/89)  BP(mean): 92 (15 Dec 2020 20:12) (92 - 92)  RR: 18 (16 Dec 2020 08:30) (16 - 18)  SpO2: 97% (16 Dec 2020 08:30) (97% - 99%)    REVIEW OF SYSTEMS:    CONSTITUTIONAL:  As per HPI.  HEENT:  Eyes:  No diplopia or blurred vision. ENT:  No earache, sore throat or runny nose.  CARDIOVASCULAR:  No pressure, squeezing, strangling, tightness, heaviness or aching about the chest, neck, axilla or epigastrium.  RESPIRATORY:  No cough, shortness of breath, PND or orthopnea.  GASTROINTESTINAL:  No nausea, vomiting or diarrhea.  GENITOURINARY:  No dysuria, frequency or urgency.  MUSCULOSKELETAL:  As per HPI.  SKIN:  No change in skin, hair or nails.  NEUROLOGIC:  No paresthesias, fasciculations, seizures or weakness.  PSYCHIATRIC:  No disorder of thought or mood.  ENDOCRINE:  No heat or cold intolerance, polyuria or polydipsia.  HEMATOLOGICAL:  No easy bruising or bleedings:  .     PHYSICAL EXAMINATION:    GENERAL APPEARANCE:  Pt. is not currently dyspneic, in no distress. Pt. is alert, oriented, and pleasant.  HEENT:  Pupils are normal and react normally. No icterus. Mucous membranes well colored.  NECK:  Supple. No lymphadenopathy. Jugular venous pressure not elevated. Carotids equal.   HEART:   The cardiac impulse has a normal quality. There are no murmurs, rubs or gallops noted  CHEST:  Chest is clear to auscultation. Normal respiratory effort.  ABDOMEN:  Soft and nontender.   EXTREMITIES:  There is no edema.   SKIN:  No rash or significant lesions are noted.    I&O's Summary    15 Dec 2020 07:01  -  16 Dec 2020 07:00  --------------------------------------------------------  IN: 150 mL / OUT: 0 mL / NET: 150 mL        LABS:                        10.9   5.30  )-----------( 223      ( 15 Dec 2020 07:39 )             37.2     12-16    140  |  113<H>  |  26<H>  ----------------------------<  90  5.0   |  20<L>  |  1.75<H>    Ca    9.1      16 Dec 2020 07:11  Phos  3.1     12-15  Mg     2.2     12-16    TPro  7.4  /  Alb  3.0<L>  /  TBili  1.7<H>  /  DBili  x   /  AST  44<H>  /  ALT  140<H>  /  AlkPhos  109  12-15    LIVER FUNCTIONS - ( 15 Dec 2020 07:39 )  Alb: 3.0 g/dL / Pro: 7.4 gm/dL / ALK PHOS: 109 U/L / ALT: 140 U/L / AST: 44 U/L / GGT: x           PT/INR - ( 15 Dec 2020 07:39 )   PT: 14.9 sec;   INR: 1.30 ratio         PTT - ( 15 Dec 2020 07:39 )  PTT:30.8 sec  CARDIAC MARKERS ( 15 Dec 2020 00:40 )  0.045 ng/mL / x     / x     / x     / x      CARDIAC MARKERS ( 14 Dec 2020 23:32 )  0.048 ng/mL / x     / x     / x     / x      CARDIAC MARKERS ( 14 Dec 2020 21:34 )  0.047 ng/mL / x     / x     / x     / x          Urinalysis Basic - ( 14 Dec 2020 21:20 )    Color: Brenda / Appearance: Clear / S.025 / pH: x  Gluc: x / Ketone: Trace  / Bili: Negative / Urobili: 4 mg/dL   Blood: x / Protein: 100 mg/dL / Nitrite: Positive   Leuk Esterase: Moderate / RBC: 6-10 /HPF / WBC 11-25   Sq Epi: x / Non Sq Epi: Moderate / Bacteria: Many          EKG:    TELEMETRY:    CARDIAC TESTS:    RADIOLOGY & ADDITIONAL STUDIES:    ASSESSMENT & PLAN: HPI:  80 year old male with history of CVA, TIA, HLD, HTN, GERD, seizure disorder who presented to ED with AMS. Patient resides at [facility] and daughter noted that he was not making sense and broght him to the hospital.  During eval MRI showed multiple old infarcts, significant cerebral vascular disease and consistent with Wernicke's engephalopathy, abnormal EEG, mildly elevated troponins and echo revealed newly reduced EF 10-20%, previously 40-45%.  Spoke with patient's daughter who reported that patient was previously on Coreg but stopped approximately one month ago due to issues with no being able to get a refill.  Patient has been agitated while admitted, overnight had code grey called and patient was given Haldol.  Today patient is A&Ox1-2, pleasant but repeatedly removes telemetry monitor.  EP asked to evaluate for ICD/lifevest.      PAST MEDICAL & SURGICAL HISTORY:  Chronic GERD  HTN (hypertension)  HLD (hyperlipidemia)  CVA (cerebral vascular accident)  High cholesterol  HTN (hypertension)  History of surgical removal of testicle  H/O hernia repair  No significant past surgical history        MEDICATIONS  (STANDING):  aspirin 325 milliGRAM(s) Oral daily  atorvastatin 40 milliGRAM(s) Oral at bedtime  carvedilol Oral Tab/Cap - Peds 25 milliGRAM(s) Oral two times a day  cefTRIAXone Injectable. 1000 milliGRAM(s) IV Push every 24 hours  heparin   Injectable 5000 Unit(s) SubCutaneous every 12 hours  hydrALAZINE 10 milliGRAM(s) Oral three times a day  isosorbide   mononitrate ER Tablet (IMDUR) 30 milliGRAM(s) Oral daily  thiamine IVPB 500 milliGRAM(s) IV Intermittent every 8 hours    MEDICATIONS  (PRN):  QUEtiapine 12.5 milliGRAM(s) Oral every 8 hours PRN agitation      Allergies    Allergy Status Unknown  No Known Allergies            SOCIAL HISTORY: Denies tobacco, etoh abuse or illicit drug use    FAMILY HISTORY:      Vital Signs Last 24 Hrs  T(C): 36.6 (16 Dec 2020 08:30), Max: 36.6 (16 Dec 2020 08:30)  T(F): 97.8 (16 Dec 2020 08:30), Max: 97.8 (16 Dec 2020 08:30)  HR: 57 (16 Dec 2020 08:30) (51 - 60)  BP: 146/89 (16 Dec 2020 08:30) (109/78 - 146/89)  BP(mean): 92 (15 Dec 2020 20:12) (92 - 92)  RR: 18 (16 Dec 2020 08:30) (16 - 18)  SpO2: 97% (16 Dec 2020 08:30) (97% - 99%)    REVIEW OF SYSTEMS:    CONSTITUTIONAL:  As per HPI.  HEENT:  Eyes:  No diplopia or blurred vision. ENT:  No earache, sore throat or runny nose.  CARDIOVASCULAR:  No pressure, squeezing, strangling, tightness, heaviness or aching about the chest, neck, axilla or epigastrium.  RESPIRATORY:  No cough, shortness of breath, PND or orthopnea.  GASTROINTESTINAL:  No nausea, vomiting or diarrhea.  GENITOURINARY:  No dysuria, frequency or urgency.  MUSCULOSKELETAL:  As per HPI.  SKIN:  No change in skin, hair or nails.  NEUROLOGIC:  No paresthesias, fasciculations, seizures or weakness.  PSYCHIATRIC:  No disorder of thought or mood.  ENDOCRINE:  No heat or cold intolerance, polyuria or polydipsia.  HEMATOLOGICAL:  No easy bruising or bleedings:  .     PHYSICAL EXAMINATION:      GENERAL APPEARANCE:  Pt. is not currently dyspneic, in no distress. Pt. is alert, oriented, and pleasant.  HEENT:  Pupils are normal and react normally. No icterus. Mucous membranes well colored.  NECK:  Supple. No lymphadenopathy. Jugular venous pressure not elevated. Carotids equal.   HEART:   The cardiac impulse has a normal quality. There are no murmurs, rubs or gallops noted  CHEST:  Diminished at bases, no wheeze, rales or rhonchi  ABDOMEN:  Soft and nontender.   EXTREMITIES: B/L +1 edema  SKIN:  No rash or significant lesions are noted.    I&O's Summary    15 Dec 2020 07:01  -  16 Dec 2020 07:00  --------------------------------------------------------  IN: 150 mL / OUT: 0 mL / NET: 150 mL        LABS:                        10.9   5.30  )-----------( 223      ( 15 Dec 2020 07:39 )             37.2     12-16    140  |  113<H>  |  26<H>  ----------------------------<  90  5.0   |  20<L>  |  1.75<H>    Ca    9.1      16 Dec 2020 07:11  Phos  3.1     12-15  Mg     2.2         TPro  7.4  /  Alb  3.0<L>  /  TBili  1.7<H>  /  DBili  x   /  AST  44<H>  /  ALT  140<H>  /  AlkPhos  109  12-15    LIVER FUNCTIONS - ( 15 Dec 2020 07:39 )  Alb: 3.0 g/dL / Pro: 7.4 gm/dL / ALK PHOS: 109 U/L / ALT: 140 U/L / AST: 44 U/L / GGT: x           PT/INR - ( 15 Dec 2020 07:39 )   PT: 14.9 sec;   INR: 1.30 ratio         PTT - ( 15 Dec 2020 07:39 )  PTT:30.8 sec  CARDIAC MARKERS ( 15 Dec 2020 00:40 )  0.045 ng/mL / x     / x     / x     / x      CARDIAC MARKERS ( 14 Dec 2020 23:32 )  0.048 ng/mL / x     / x     / x     / x      CARDIAC MARKERS ( 14 Dec 2020 21:34 )  0.047 ng/mL / x     / x     / x     / x          Urinalysis Basic - ( 14 Dec 2020 21:20 )    Color: Brenda / Appearance: Clear / S.025 / pH: x  Gluc: x / Ketone: Trace  / Bili: Negative / Urobili: 4 mg/dL   Blood: x / Protein: 100 mg/dL / Nitrite: Positive   Leuk Esterase: Moderate / RBC: 6-10 /HPF / WBC 11-25   Sq Epi: x / Non Sq Epi: Moderate / Bacteria: Many          EK/15/2020  NSR@81bpm  KY:166ms  QRS: 132ms  QT/QTc: 432/501ms    TELEMETRY: Currently off telemetry, previously SR 50-90s    CARDIAC TESTS:  12/15/2020: Official report pending    RADIOLOGY & ADDITIONAL STUDIES:  < from: MR Head No Cont Disc (12.15.20 @ 16:03) >  IMPRESSION:    Redemonstration volume loss remote infarcts right greater than left MCA distribution in bilateral frontal and parietal lobes with encephalomalacia, bilateral PCA, and bilateral cerebellar left greater than right territories with encephalomalacia, marked microvascular disease, remote lacunar infarcts, without new restricted diffusion, hemorrhage or shift.    Decreased size mamillary bodies correlate with thiamin vitamin [id number] severely motion limited MRA with atherosclerotic vascular calcification multifocal narrowing and stenosis of the cavernous to provide ICAs, with severe multifocal stenosis of the intracranial arterial middle and posterior cerebral arteriesand vertebral and basilar arteries as detailed above, MRA of the neck only obtained  views, suggest repeat imaging when patient can hold still.             HPI:  80 year old male with history of CVA, TIA, HLD, HTN, GERD, seizure disorder who presented to ED with AMS. Patient resides at [facility] and daughter noted that he was not making sense and broght him to the hospital.  During eval MRI showed multiple old infarcts, significant cerebral vascular disease and consistent with Wernicke's encephalopathy, abnormal EEG, mildly elevated troponins and echo revealed newly reduced EF 10-20%, previously 45-50% in 2020.  Spoke with patient's daughter who reported that patient was previously on Coreg but stopped approximately one month ago due to issues with refill.  Patient has been agitated while admitted, overnight had code grey called and patient was given Haldol.  Today patient is A&Ox1-2, pleasant but repeatedly removes telemetry monitor.  EP asked to evaluate for ICD/lifevest.      PAST MEDICAL & SURGICAL HISTORY:  Chronic GERD  HTN (hypertension)  HLD (hyperlipidemia)  CVA (cerebral vascular accident)  High cholesterol  HTN (hypertension)  History of surgical removal of testicle  H/O hernia repair  No significant past surgical history        MEDICATIONS  (STANDING):  aspirin 325 milliGRAM(s) Oral daily  atorvastatin 40 milliGRAM(s) Oral at bedtime  carvedilol Oral Tab/Cap - Peds 25 milliGRAM(s) Oral two times a day  cefTRIAXone Injectable. 1000 milliGRAM(s) IV Push every 24 hours  heparin   Injectable 5000 Unit(s) SubCutaneous every 12 hours  hydrALAZINE 10 milliGRAM(s) Oral three times a day  isosorbide   mononitrate ER Tablet (IMDUR) 30 milliGRAM(s) Oral daily  thiamine IVPB 500 milliGRAM(s) IV Intermittent every 8 hours    MEDICATIONS  (PRN):  QUEtiapine 12.5 milliGRAM(s) Oral every 8 hours PRN agitation      Allergies    Allergy Status Unknown  No Known Allergies            SOCIAL HISTORY: Denies tobacco, etoh abuse or illicit drug use    FAMILY HISTORY:      Vital Signs Last 24 Hrs  T(C): 36.6 (16 Dec 2020 08:30), Max: 36.6 (16 Dec 2020 08:30)  T(F): 97.8 (16 Dec 2020 08:30), Max: 97.8 (16 Dec 2020 08:30)  HR: 57 (16 Dec 2020 08:30) (51 - 60)  BP: 146/89 (16 Dec 2020 08:30) (109/78 - 146/89)  BP(mean): 92 (15 Dec 2020 20:12) (92 - 92)  RR: 18 (16 Dec 2020 08:30) (16 - 18)  SpO2: 97% (16 Dec 2020 08:30) (97% - 99%)    REVIEW OF SYSTEMS:    CONSTITUTIONAL:  As per HPI.  HEENT:  Eyes:  No diplopia or blurred vision. ENT:  No earache, sore throat or runny nose.  CARDIOVASCULAR:  No pressure, squeezing, strangling, tightness, heaviness or aching about the chest, neck, axilla or epigastrium.  RESPIRATORY:  No cough, shortness of breath, PND or orthopnea.  GASTROINTESTINAL:  No nausea, vomiting or diarrhea.  GENITOURINARY:  No dysuria, frequency or urgency.  MUSCULOSKELETAL:  As per HPI.  SKIN:  No change in skin, hair or nails.  NEUROLOGIC:  No paresthesias, fasciculations, seizures or weakness.  PSYCHIATRIC:  No disorder of thought or mood.  ENDOCRINE:  No heat or cold intolerance, polyuria or polydipsia.  HEMATOLOGICAL:  No easy bruising or bleedings:  .     PHYSICAL EXAMINATION:      GENERAL APPEARANCE:  Pt. is not currently dyspneic, in no distress. Pt. is alert, oriented, and pleasant.  HEENT:  Pupils are normal and react normally. No icterus. Mucous membranes well colored.  NECK:  Supple. No lymphadenopathy. Jugular venous pressure not elevated. Carotids equal.   HEART:   The cardiac impulse has a normal quality. There are no murmurs, rubs or gallops noted  CHEST:  Diminished at bases, no wheeze, rales or rhonchi  ABDOMEN:  Soft and nontender.   EXTREMITIES: B/L +1 edema  SKIN:  No rash or significant lesions are noted.    I&O's Summary    15 Dec 2020 07:01  -  16 Dec 2020 07:00  --------------------------------------------------------  IN: 150 mL / OUT: 0 mL / NET: 150 mL        LABS:                        10.9   5.30  )-----------( 223      ( 15 Dec 2020 07:39 )             37.2     12-16    140  |  113<H>  |  26<H>  ----------------------------<  90  5.0   |  20<L>  |  1.75<H>    Ca    9.1      16 Dec 2020 07:11  Phos  3.1     12-15  Mg     2.2     12    TPro  7.4  /  Alb  3.0<L>  /  TBili  1.7<H>  /  DBili  x   /  AST  44<H>  /  ALT  140<H>  /  AlkPhos  109  12-15    LIVER FUNCTIONS - ( 15 Dec 2020 07:39 )  Alb: 3.0 g/dL / Pro: 7.4 gm/dL / ALK PHOS: 109 U/L / ALT: 140 U/L / AST: 44 U/L / GGT: x           PT/INR - ( 15 Dec 2020 07:39 )   PT: 14.9 sec;   INR: 1.30 ratio         PTT - ( 15 Dec 2020 07:39 )  PTT:30.8 sec  CARDIAC MARKERS ( 15 Dec 2020 00:40 )  0.045 ng/mL / x     / x     / x     / x      CARDIAC MARKERS ( 14 Dec 2020 23:32 )  0.048 ng/mL / x     / x     / x     / x      CARDIAC MARKERS ( 14 Dec 2020 21:34 )  0.047 ng/mL / x     / x     / x     / x          Urinalysis Basic - ( 14 Dec 2020 21:20 )    Color: Brenda / Appearance: Clear / S.025 / pH: x  Gluc: x / Ketone: Trace  / Bili: Negative / Urobili: 4 mg/dL   Blood: x / Protein: 100 mg/dL / Nitrite: Positive   Leuk Esterase: Moderate / RBC: 6-10 /HPF / WBC 11-25   Sq Epi: x / Non Sq Epi: Moderate / Bacteria: Many          EK/15/2020  NSR@81bpm  AR:166ms  QRS: 132ms  QT/QTc: 432/501ms    TELEMETRY: Currently off telemetry, previously SR 50-90s    CARDIAC TESTS:  12/15/2020: Official report pending    RADIOLOGY & ADDITIONAL STUDIES:  < from: MR Head No Cont Disc (12.15.20 @ 16:03) >  IMPRESSION:    Redemonstration volume loss remote infarcts right greater than left MCA distribution in bilateral frontal and parietal lobes with encephalomalacia, bilateral PCA, and bilateral cerebellar left greater than right territories with encephalomalacia, marked microvascular disease, remote lacunar infarcts, without new restricted diffusion, hemorrhage or shift.    Decreased size mamillary bodies correlate with thiamin vitamin [id number] severely motion limited MRA with atherosclerotic vascular calcification multifocal narrowing and stenosis of the cavernous to provide ICAs, with severe multifocal stenosis of the intracranial arterial middle and posterior cerebral arteriesand vertebral and basilar arteries as detailed above, MRA of the neck only obtained  views, suggest repeat imaging when patient can hold still.

## 2020-12-16 NOTE — PROVIDER CONTACT NOTE (CHANGE IN STATUS NOTIFICATION) - ACTION/TREATMENT ORDERED:
Md aware, Ativan 0.5mg IVP ordered x1, given and will continue to monitor.
MD made aware, 1:1 ordered and initiated will continue to monitor.

## 2020-12-16 NOTE — DIETITIAN INITIAL EVALUATION ADULT. - PERTINENT MEDS FT
MEDICATIONS  (STANDING):  aspirin 325 milliGRAM(s) Oral daily  atorvastatin 40 milliGRAM(s) Oral at bedtime  carvedilol Oral Tab/Cap - Peds 25 milliGRAM(s) Oral two times a day  cefTRIAXone Injectable. 1000 milliGRAM(s) IV Push every 24 hours  heparin   Injectable 5000 Unit(s) SubCutaneous every 12 hours  hydrALAZINE 10 milliGRAM(s) Oral three times a day  isosorbide   mononitrate ER Tablet (IMDUR) 30 milliGRAM(s) Oral daily  thiamine IVPB 500 milliGRAM(s) IV Intermittent every 8 hours    MEDICATIONS  (PRN):

## 2020-12-16 NOTE — CONSULT NOTE ADULT - SUBJECTIVE AND OBJECTIVE BOX
CHIEF COMPLAINT:  Patient is a 80y old  Male who presents with a chief complaint of AMS (15 Dec 2020 12:54)      HPI:  81 y/o M w/ PMH of CVA, TIA, dyslipidemia, HTN, GERD, h/o seizure disorder p/w AMS. Patient states he doesn't recall the episode at all, but reports that his daughter noticed he was not making sense so brought him o the hospital. It is reported in the chart that daughter had stated patient did not have LOC/syncope, just garbled speech and not making sense.    During work up MRI/MRA brain showed multiple old infarcts with encephalomalacia, significant cerebral vascular disease and an abnormal EEG. He had minimally elevated troponins and TTE pending but showing a newly reduced LVSF EF about 10-20% with global hypokinesis. Also as per radiology his MRA/MRI appears to be consistent with Vit B1 deficiency concerning for Wernicke's encephalopathy.    O/N he became very agitated and a code gray needed to called and he was given haldol.    Patient seen and examined at bedside. He is now more calm. He is intermittently AAOx3 but then will quickly fall asleep and be confused.    When he was with it he denies fevers, chills, CP, palp, SOB, abd pain, N/V, dizziness, orthopnea, PND.      PMHx:  PAST MEDICAL & SURGICAL HISTORY:  Chronic GERD  HTN (hypertension)  HLD (hyperlipidemia)  CVA (cerebral vascular accident)  High cholesterol  HTN (hypertension)    History of surgical removal of testicle  H/O hernia repair    FAMILY HISTORY:  reviewed and NC    ALLERGIES:  Allergies  Allergy Status Unknown    SHx:  Denies tobacco, etoh or illicit drug use.    REVIEW OF SYSTEMS:  10 system ROS was obtained, all pertinent positives and negatives are in HPI otherwise they were negative.    Vital Signs Last 24 Hrs  T(C): 36.4 (15 Dec 2020 20:12), Max: 36.4 (15 Dec 2020 20:12)  T(F): 97.5 (15 Dec 2020 20:12), Max: 97.5 (15 Dec 2020 20:12)  HR: 54 (15 Dec 2020 22:09) (51 - 81)  BP: 117/68 (15 Dec 2020 22:01) (109/78 - 137/76)  BP(mean): 92 (15 Dec 2020 20:12) (92 - 92)  RR: 16 (15 Dec 2020 16:41) (16 - 19)  SpO2: 99% (15 Dec 2020 20:12) (93% - 99%)    I&O's Summary  15 Dec 2020 07:01  -  16 Dec 2020 07:00  --------------------------------------------------------  IN: 150 mL / OUT: 0 mL / NET: 150 mL    PHYSICAL EXAM:   Constitutional:Intermittently awake and alert in NAD  HEENT: EOMI, Normal Hearing, MMM  Neck: Soft and supple, No LAD, No JVD, no carotid bruit  Respiratory: Breath sounds are clear bilaterally, No wheezing, rales or rhonchi, good air movement  Cardiovascular: S1 and S2, regular rate and rhythm, II/VI systolic murmur at LSB, gallops or rubs. PMI is nondisplaced  Gastrointestinal: Bowel Sounds present, soft, nontender, nondistended, no guarding, no rebound  Extremities: Warm and well perfused, trace-+1 peripheral edema  Neurological: A/O x 3 (waxes and wanes)  Skin: No rashes appreciated    MEDICATIONS:  MEDICATIONS  (STANDING):  aspirin 325 milliGRAM(s) Oral daily  atorvastatin 40 milliGRAM(s) Oral at bedtime  carvedilol Oral Tab/Cap - Peds 25 milliGRAM(s) Oral two times a day  cefTRIAXone Injectable. 1000 milliGRAM(s) IV Push every 24 hours  dextrose 5% + sodium chloride 0.45%. 1000 milliLiter(s) (50 mL/Hr) IV Continuous <Continuous>  heparin   Injectable 5000 Unit(s) SubCutaneous every 12 hours      LABS: All Labs Reviewed:                        10.9   5.30  )-----------( 223      ( 15 Dec 2020 07:39 )             37.2     12-16    140  |  113<H>  |  26<H>  ----------------------------<  90  5.0   |  20<L>  |  1.75<H>    Ca    9.1      16 Dec 2020 07:11  Phos  3.1     12-15  Mg     2.2     12-16    TPro  7.4  /  Alb  3.0<L>  /  TBili  1.7<H>  /  DBili  x   /  AST  44<H>  /  ALT  140<H>  /  AlkPhos  109  12-15    PT/INR - ( 15 Dec 2020 07:39 )   PT: 14.9 sec;   INR: 1.30 ratio    PTT - ( 15 Dec 2020 07:39 )  PTT:30.8 sec    CARDIAC MARKERS ( 15 Dec 2020 00:40 )  0.045 ng/mL / x     / x     / x     / x      CARDIAC MARKERS ( 14 Dec 2020 23:32 )  0.048 ng/mL / x     / x     / x     / x      CARDIAC MARKERS ( 14 Dec 2020 21:34 )  0.047 ng/mL / x     / x     / x     / x          BLOOD CULTURES: Organism --  Gram Stain Blood -- Gram Stain --  Specimen Source .Urine Clean Catch (Midstream)  Culture-Blood --      LIPID PROFILE lipid profile                          12-15 @ 07:39  cholesterol           91 mg/dL  Direct LDL            --  HDL cholesterol       27 mg/dL  HDL/Total cholesterol --  Triglycerides, serum  57 mg/dL    RADIOLOGY:    EXAM:  XR CHEST PA LAT 2V                        PROCEDURE DATE:  12/14/2020    IMPRESSION:   Heart enlargement again noted. Mild central CHF at this time.    EXAM:  MR ANGIO NECK DC                        EXAM:  MR ANGIO BRAIN DC                        EXAM:  MR BRAIN DC                        PROCEDURE DATE:  12/15/2020      FINDINGS:  Brain MRI:  Motion limited study, redemonstration enlarged ventricles and sulci consistent with severe volume loss with remote infarcts right greater than left MCA distribution involving the bilateral posterior frontal and parietal lobes as and remote PCA infarcts with lateral ventricle ex vacuo enlargement unchanged. Redemonstration left larger than right cerebellar remote infarcts with encephalomalacia, gliosis, and ex vacuo enlargement of fourth ventricle unchanged. There is extensive patchy T2 and FLAIR white matter hyperintensity likely microvascular disease with remote lacunar infarcts unchanged from prior. There is a decreased size to the mamillary bodies, correlate with thiamin vitamin B1 deficiency. Additional foci of of T2/FLAIR signal hyperintensity within the hemispheric white matter, which are nonspecific and likely sequela of microvascular disease. No new intraparenchymal hematoma, mass effect or midline shift. No new restrictive diffusion to suggest acute or subacute infarction. No new abnormal extra-axial fluid collections are present. Basal cisterns remain patent and unchanged.    The calvarium is intact. Orbits are limited by motion, with redemonstration of downward displacement of the right orbital floor unchanged from prior. Tympanomastoid cavities appear free of acute disease. There is mild mucosal thickening in the paraspinal sinuses with possible retention cyst or polyps in themaxillary antra, there are no air-fluid levels.    Brain MRA:  Motion limited MRA, there is flow-related signal within the internal carotid arteries within the cavernous supraclinoid and bifurcation segments with multifocal stenosis and with extensive atherosclerotic vascular calcification on CT. there are stenoses proximal to the bifurcation segments with poorly delineated left A1 segment and stenosis of the distal right A1 segment with limitations of the study due to motion. There are severe stenoses of the M1,  proximal M2 and an 3 MCA branch vessels, with study limited by motion with significant decrease in size and number right greater than left distal MCA branches. Proximal A 2 anterior cerebral arteries are poorly delineated, this may reflect technique and motion, there are stenoses of the distal bilateral anterior cerebral arteries.    Tortuous and dominant left vertebral artery crosses the right of midline and is the dominant supply to the basilar artery. Poorly delineated right vertebral artery secondary to motion narrowing proximal to the vertebrobasilar junction, and stenosis of the right posterior inferior cerebellar artery vessels are poorly delineated junction, suggest repeat imaging the patient can hold still. There is multifocal stenosis and narrowing of the basilar artery. There is poor delineation and multifocal stenosis and narrowing of the bilateral proximal mid and distal posterior cerebral arteries.    Neck MRA:  views were obtained only, suggest repeat imaging when patient is able to hold and complete exam.    IMPRESSION:    Redemonstration volume loss remote infarcts right greater than left MCA distribution in bilateral frontal and parietal lobes with encephalomalacia, bilateral PCA, and bilateral cerebellar left greater than right territories with encephalomalacia, marked microvascular disease, remote lacunar infarcts, without new restricted diffusion, hemorrhage or shift.    Decreased size mamillary bodies correlate with thiamin vitamin [id number] severely motion limited MRA with atherosclerotic vascular calcification multifocal narrowing and stenosis of the cavernous to provide ICAs, with severe multifocal stenosis of the intracranial arterial middle and posterior cerebral arteriesand vertebral and basilar arteries as detailed above, MRA of the neck only obtained  views, suggest repeat imaging when patient can hold still.    EKG:    (12/15/2020, my interpretation): NSR at 81bpm, biatrial enlargement, LAD and LVH with repolarization     TELEMETRY:    Reviewed. HRs 50-90s with occasional PVCs, sometimes in couplets or bigeminy    ECHO:    Formal report pending but on my review appears to have severely depressed LVSF, EF 10-20% with global hypokinesis, AV sclerosis with trace AI, MAC with mild MS and trace-mild MR, Mild TR with moderately elevated RVSP consistant with moderate PHTN

## 2020-12-16 NOTE — DIETITIAN INITIAL EVALUATION ADULT. - PERTINENT LABORATORY DATA
12-16 Na140 mmol/L Glu 90 mg/dL K+ 5.0 mmol/L Cr  1.75 mg/dL<H> BUN 26 mg/dL<H> Phos n/a   Alb n/a   PAB n/a

## 2020-12-17 LAB
ANION GAP SERPL CALC-SCNC: 6 MMOL/L — SIGNIFICANT CHANGE UP (ref 5–17)
BUN SERPL-MCNC: 33 MG/DL — HIGH (ref 7–23)
CALCIUM SERPL-MCNC: 9.1 MG/DL — SIGNIFICANT CHANGE UP (ref 8.5–10.1)
CHLORIDE SERPL-SCNC: 112 MMOL/L — HIGH (ref 96–108)
CO2 SERPL-SCNC: 25 MMOL/L — SIGNIFICANT CHANGE UP (ref 22–31)
CREAT SERPL-MCNC: 1.65 MG/DL — HIGH (ref 0.5–1.3)
FERRITIN SERPL-MCNC: 141 NG/ML — SIGNIFICANT CHANGE UP (ref 30–400)
GLUCOSE SERPL-MCNC: 83 MG/DL — SIGNIFICANT CHANGE UP (ref 70–99)
HCT VFR BLD CALC: 35.1 % — LOW (ref 39–50)
HGB BLD-MCNC: 10.3 G/DL — LOW (ref 13–17)
IRON SATN MFR SERPL: 24 UG/DL — LOW (ref 45–165)
IRON SATN MFR SERPL: 8 % — LOW (ref 16–55)
MAGNESIUM SERPL-MCNC: 2.1 MG/DL — SIGNIFICANT CHANGE UP (ref 1.6–2.6)
MCHC RBC-ENTMCNC: 29.1 PG — SIGNIFICANT CHANGE UP (ref 27–34)
MCHC RBC-ENTMCNC: 29.3 GM/DL — LOW (ref 32–36)
MCV RBC AUTO: 99.2 FL — SIGNIFICANT CHANGE UP (ref 80–100)
PLATELET # BLD AUTO: 182 K/UL — SIGNIFICANT CHANGE UP (ref 150–400)
POTASSIUM SERPL-MCNC: 4.4 MMOL/L — SIGNIFICANT CHANGE UP (ref 3.5–5.3)
POTASSIUM SERPL-SCNC: 4.4 MMOL/L — SIGNIFICANT CHANGE UP (ref 3.5–5.3)
RBC # BLD: 3.54 M/UL — LOW (ref 4.2–5.8)
RBC # FLD: 15.6 % — HIGH (ref 10.3–14.5)
SODIUM SERPL-SCNC: 143 MMOL/L — SIGNIFICANT CHANGE UP (ref 135–145)
TIBC SERPL-MCNC: 288 UG/DL — SIGNIFICANT CHANGE UP (ref 220–430)
UIBC SERPL-MCNC: 263 UG/DL — SIGNIFICANT CHANGE UP (ref 110–370)
WBC # BLD: 5.18 K/UL — SIGNIFICANT CHANGE UP (ref 3.8–10.5)
WBC # FLD AUTO: 5.18 K/UL — SIGNIFICANT CHANGE UP (ref 3.8–10.5)

## 2020-12-17 PROCEDURE — 99232 SBSQ HOSP IP/OBS MODERATE 35: CPT

## 2020-12-17 PROCEDURE — 76770 US EXAM ABDO BACK WALL COMP: CPT | Mod: 26

## 2020-12-17 RX ORDER — HALOPERIDOL DECANOATE 100 MG/ML
1 INJECTION INTRAMUSCULAR EVERY 12 HOURS
Refills: 0 | Status: DISCONTINUED | OUTPATIENT
Start: 2020-12-17 | End: 2020-12-17

## 2020-12-17 RX ORDER — CARVEDILOL PHOSPHATE 80 MG/1
12.5 CAPSULE, EXTENDED RELEASE ORAL EVERY 12 HOURS
Refills: 0 | Status: DISCONTINUED | OUTPATIENT
Start: 2020-12-17 | End: 2020-12-17

## 2020-12-17 RX ORDER — TAMSULOSIN HYDROCHLORIDE 0.4 MG/1
0.4 CAPSULE ORAL DAILY
Refills: 0 | Status: DISCONTINUED | OUTPATIENT
Start: 2020-12-17 | End: 2020-12-17

## 2020-12-17 RX ORDER — BETHANECHOL CHLORIDE 25 MG
10 TABLET ORAL ONCE
Refills: 0 | Status: COMPLETED | OUTPATIENT
Start: 2020-12-17 | End: 2020-12-17

## 2020-12-17 RX ORDER — FUROSEMIDE 40 MG
20 TABLET ORAL DAILY
Refills: 0 | Status: DISCONTINUED | OUTPATIENT
Start: 2020-12-17 | End: 2020-12-22

## 2020-12-17 RX ORDER — CARVEDILOL PHOSPHATE 80 MG/1
3.12 CAPSULE, EXTENDED RELEASE ORAL EVERY 12 HOURS
Refills: 0 | Status: DISCONTINUED | OUTPATIENT
Start: 2020-12-17 | End: 2020-12-17

## 2020-12-17 RX ORDER — CARVEDILOL PHOSPHATE 80 MG/1
3.12 CAPSULE, EXTENDED RELEASE ORAL EVERY 12 HOURS
Refills: 0 | Status: DISCONTINUED | OUTPATIENT
Start: 2020-12-17 | End: 2020-12-23

## 2020-12-17 RX ORDER — TAMSULOSIN HYDROCHLORIDE 0.4 MG/1
0.4 CAPSULE ORAL
Refills: 0 | Status: DISCONTINUED | OUTPATIENT
Start: 2020-12-17 | End: 2020-12-23

## 2020-12-17 RX ADMIN — Medication 255 MILLIGRAM(S): at 00:32

## 2020-12-17 RX ADMIN — HEPARIN SODIUM 5000 UNIT(S): 5000 INJECTION INTRAVENOUS; SUBCUTANEOUS at 11:01

## 2020-12-17 RX ADMIN — Medication 1 TABLET(S): at 11:02

## 2020-12-17 RX ADMIN — Medication 10 MILLIGRAM(S): at 16:31

## 2020-12-17 RX ADMIN — ISOSORBIDE MONONITRATE 30 MILLIGRAM(S): 60 TABLET, EXTENDED RELEASE ORAL at 11:02

## 2020-12-17 RX ADMIN — HEPARIN SODIUM 5000 UNIT(S): 5000 INJECTION INTRAVENOUS; SUBCUTANEOUS at 21:53

## 2020-12-17 RX ADMIN — Medication 255 MILLIGRAM(S): at 18:16

## 2020-12-17 RX ADMIN — TAMSULOSIN HYDROCHLORIDE 0.4 MILLIGRAM(S): 0.4 CAPSULE ORAL at 16:31

## 2020-12-17 RX ADMIN — HEPARIN SODIUM 5000 UNIT(S): 5000 INJECTION INTRAVENOUS; SUBCUTANEOUS at 00:26

## 2020-12-17 RX ADMIN — CEFTRIAXONE 1000 MILLIGRAM(S): 500 INJECTION, POWDER, FOR SOLUTION INTRAMUSCULAR; INTRAVENOUS at 00:32

## 2020-12-17 RX ADMIN — TAMSULOSIN HYDROCHLORIDE 0.4 MILLIGRAM(S): 0.4 CAPSULE ORAL at 21:53

## 2020-12-17 RX ADMIN — Medication 20 MILLIGRAM(S): at 11:01

## 2020-12-17 RX ADMIN — Medication 255 MILLIGRAM(S): at 11:01

## 2020-12-17 RX ADMIN — ATORVASTATIN CALCIUM 40 MILLIGRAM(S): 80 TABLET, FILM COATED ORAL at 21:53

## 2020-12-17 RX ADMIN — Medication 10 MILLIGRAM(S): at 18:16

## 2020-12-17 RX ADMIN — Medication 325 MILLIGRAM(S): at 11:01

## 2020-12-17 NOTE — PROGRESS NOTE ADULT - SUBJECTIVE AND OBJECTIVE BOX
CHIEF COMPLAINT:  Patient is a 80y old  Male who presents with a chief complaint of AMS (15 Dec 2020 12:54)    HPI:  79 y/o M w/ PMH of CVA, TIA, dyslipidemia, HTN, GERD, h/o seizure disorder p/w AMS. Patient states he doesn't recall the episode at all, but reports that his daughter noticed he was not making sense so brought him o the hospital. It is reported in the chart that daughter had stated patient did not have LOC/syncope, just garbled speech and not making sense.    During work up MRI/MRA brain showed multiple old infarcts with encephalomalacia, significant cerebral vascular disease and an abnormal EEG. He had minimally elevated troponins and TTE pending but showing a newly reduced LVSF EF about 10-20% with global hypokinesis. Also as per radiology his MRA/MRI appears to be consistent with Vit B1 deficiency concerning for Wernicke's encephalopathy.    O/N 12/15 he became very agitated and a code gray needed to called and he was given haldol.  12/16 - patient continued to be agitated and required ativan during the afternoon/evening  12/17 - patient lethargic, intermittently pulling off his tele. He has been bradycardiac down to the 30s with frequent PVCs and wide complex rhythm that is irregular with varying wide complexes ?AIVR    Patient seen and examined at bedside. He is very lethargic but arrousable for a few seconds. If he is layed flat he c/o SOB that is improved with sitting him up therefore + orthopnea    Limited ROS due to lethergy.    PMHx:  PAST MEDICAL & SURGICAL HISTORY:  Chronic GERD  HTN (hypertension)  HLD (hyperlipidemia)  CVA (cerebral vascular accident)  High cholesterol  HTN (hypertension)    History of surgical removal of testicle  H/O hernia repair    FAMILY HISTORY:  reviewed and NC    ALLERGIES:  Allergies  Allergy Status Unknown    SHx:  Denies tobacco, etoh or illicit drug use.    REVIEW OF SYSTEMS:  Limited ROS due to lethergy but attemted a 10 system ROS, all pertinent positives and negatives are in HPI otherwise they were negative.    Vital Signs Last 24 Hrs  T(C): 36.4 (17 Dec 2020 09:02), Max: 36.4 (17 Dec 2020 09:02)  T(F): 97.6 (17 Dec 2020 09:02), Max: 97.6 (17 Dec 2020 09:02)  HR: 55 (17 Dec 2020 09:02) (45 - 55)  BP: 139/72 (17 Dec 2020 09:02) (109/50 - 139/72)  BP(mean): --  RR: 18 (17 Dec 2020 09:02) (18 - 18)  SpO2: 95% (17 Dec 2020 09:02) (95% - 100%)      PHYSICAL EXAM:   Constitutional: lethargic, in NAD  HEENT: Normal Hearing, MMM  Neck: Soft and supple, No LAD, + JVD, no carotid bruit  Respiratory: poor effort but appears to have Breath sounds are clear bilaterally, No wheezing, rales or rhonchi, good air movement  Cardiovascular: S1 and S2, regular rate and rhythm, II/VI systolic murmur at LSB, gallops or rubs. PMI is nondisplaced  Gastrointestinal: Bowel Sounds present, soft, nontender, nondistended, no guarding, no rebound  Extremities: Warm and well perfused, trace-+1 peripheral edema  Neurological: A/O x 2, is no longer aware of where we are or what brought him to the hospital but knows it is 2020 and his name  Skin: No rashes appreciated    MEDICATIONS:  MEDICATIONS  (STANDING):  aspirin 325 milliGRAM(s) Oral daily  atorvastatin 40 milliGRAM(s) Oral at bedtime  carvedilol 12.5 milliGRAM(s) Oral every 12 hours  cefTRIAXone Injectable. 1000 milliGRAM(s) IV Push every 24 hours  furosemide   Injectable 20 milliGRAM(s) IV Push daily  haloperidol     Tablet 1 milliGRAM(s) Oral every 12 hours  heparin   Injectable 5000 Unit(s) SubCutaneous every 12 hours  hydrALAZINE 10 milliGRAM(s) Oral three times a day  isosorbide   mononitrate ER Tablet (IMDUR) 30 milliGRAM(s) Oral daily  multivitamin 1 Tablet(s) Oral daily  thiamine IVPB 500 milliGRAM(s) IV Intermittent every 8 hours      LABS: All Labs Reviewed:                        10.3   5.18  )-----------( 182      ( 17 Dec 2020 07:29 )             35.1                         10.9   5.30  )-----------( 223      ( 15 Dec 2020 07:39 )             37.2   12-17    143  |  112<H>  |  33<H>  ----------------------------<  83  4.4   |  25  |  1.65<H>    Ca    9.1      17 Dec 2020 07:29  Mg     2.1     12-17 12-16    140  |  113<H>  |  26<H>  ----------------------------<  90  5.0   |  20<L>  |  1.75<H>    Ca    9.1      16 Dec 2020 07:11  Phos  3.1     12-15  Mg     2.2     12-16    TPro  7.4  /  Alb  3.0<L>  /  TBili  1.7<H>  /  DBili  x   /  AST  44<H>  /  ALT  140<H>  /  AlkPhos  109  12-15    PT/INR - ( 15 Dec 2020 07:39 )   PT: 14.9 sec;   INR: 1.30 ratio    PTT - ( 15 Dec 2020 07:39 )  PTT:30.8 sec    CARDIAC MARKERS ( 15 Dec 2020 00:40 )  0.045 ng/mL / x     / x     / x     / x      CARDIAC MARKERS ( 14 Dec 2020 23:32 )  0.048 ng/mL / x     / x     / x     / x      CARDIAC MARKERS ( 14 Dec 2020 21:34 )  0.047 ng/mL / x     / x     / x     / x        ProBNP >11,000      BLOOD CULTURES: Organism --  Gram Stain Blood -- Gram Stain --  Specimen Source .Urine Clean Catch (Midstream)  Culture-Blood --      LIPID PROFILE lipid profile                          12-15 @ 07:39  cholesterol           91 mg/dL  Direct LDL            --  HDL cholesterol       27 mg/dL  HDL/Total cholesterol --  Triglycerides, serum  57 mg/dL    RADIOLOGY:    EXAM:  XR CHEST PA LAT 2V                        PROCEDURE DATE:  12/14/2020    IMPRESSION:   Heart enlargement again noted. Mild central CHF at this time.    EXAM:  MR ANGIO NECK DC                        EXAM:  MR ANGIO BRAIN DC                        EXAM:  MR BRAIN DC                        PROCEDURE DATE:  12/15/2020      FINDINGS:  Brain MRI:  Motion limited study, redemonstration enlarged ventricles and sulci consistent with severe volume loss with remote infarcts right greater than left MCA distribution involving the bilateral posterior frontal and parietal lobes as and remote PCA infarcts with lateral ventricle ex vacuo enlargement unchanged. Redemonstration left larger than right cerebellar remote infarcts with encephalomalacia, gliosis, and ex vacuo enlargement of fourth ventricle unchanged. There is extensive patchy T2 and FLAIR white matter hyperintensity likely microvascular disease with remote lacunar infarcts unchanged from prior. There is a decreased size to the mamillary bodies, correlate with thiamin vitamin B1 deficiency. Additional foci of of T2/FLAIR signal hyperintensity within the hemispheric white matter, which are nonspecific and likely sequela of microvascular disease. No new intraparenchymal hematoma, mass effect or midline shift. No new restrictive diffusion to suggest acute or subacute infarction. No new abnormal extra-axial fluid collections are present. Basal cisterns remain patent and unchanged.    The calvarium is intact. Orbits are limited by motion, with redemonstration of downward displacement of the right orbital floor unchanged from prior. Tympanomastoid cavities appear free of acute disease. There is mild mucosal thickening in the paraspinal sinuses with possible retention cyst or polyps in themaxillary antra, there are no air-fluid levels.    Brain MRA:  Motion limited MRA, there is flow-related signal within the internal carotid arteries within the cavernous supraclinoid and bifurcation segments with multifocal stenosis and with extensive atherosclerotic vascular calcification on CT. there are stenoses proximal to the bifurcation segments with poorly delineated left A1 segment and stenosis of the distal right A1 segment with limitations of the study due to motion. There are severe stenoses of the M1,  proximal M2 and an 3 MCA branch vessels, with study limited by motion with significant decrease in size and number right greater than left distal MCA branches. Proximal A 2 anterior cerebral arteries are poorly delineated, this may reflect technique and motion, there are stenoses of the distal bilateral anterior cerebral arteries.    Tortuous and dominant left vertebral artery crosses the right of midline and is the dominant supply to the basilar artery. Poorly delineated right vertebral artery secondary to motion narrowing proximal to the vertebrobasilar junction, and stenosis of the right posterior inferior cerebellar artery vessels are poorly delineated junction, suggest repeat imaging the patient can hold still. There is multifocal stenosis and narrowing of the basilar artery. There is poor delineation and multifocal stenosis and narrowing of the bilateral proximal mid and distal posterior cerebral arteries.    Neck MRA:  views were obtained only, suggest repeat imaging when patient is able to hold and complete exam.    IMPRESSION:    Redemonstration volume loss remote infarcts right greater than left MCA distribution in bilateral frontal and parietal lobes with encephalomalacia, bilateral PCA, and bilateral cerebellar left greater than right territories with encephalomalacia, marked microvascular disease, remote lacunar infarcts, without new restricted diffusion, hemorrhage or shift.    Decreased size mamillary bodies correlate with thiamin vitamin [id number] severely motion limited MRA with atherosclerotic vascular calcification multifocal narrowing and stenosis of the cavernous to provide ICAs, with severe multifocal stenosis of the intracranial arterial middle and posterior cerebral arteriesand vertebral and basilar arteries as detailed above, MRA of the neck only obtained  views, suggest repeat imaging when patient can hold still.    EKG:    (12/15/2020, my interpretation): NSR at 81bpm, biatrial enlargement, LAD and LVH with repolarization     TELEMETRY:    Reviewed. HRs 30-50s with frequent PVCs and a slow wide complex rhythm ?AIVR    ECHO:    EXAM:  ECHO TTE WO CON COMP W DOPP    PROCEDURE DATE:  12/15/2020       Summary     The left ventricle cavity is mildly dilated.   Mild concentric left ventricular hypertrophy is present.   Estimated left ventricular ejection fraction is 15-20 %.   The left atrium is severely dilated.   The right atrium appears moderately dilated.   Mild to Moderate mitral regurgitation is present.   Mild (1+) tricuspid valve regurgitation is present.   Mild aortic sclerosis is present with normal valvular opening.

## 2020-12-17 NOTE — PROGRESS NOTE ADULT - ASSESSMENT
81 y/o M w/ PMH of CVA, TIA, dyslipidemia, HTN, GERD, h/o seizure disorder p/w AMS found to have abnormal MRI/MRA of the brain with minimally elevated troponins and newly severe cardiomyopathy EF 15-20%. Cardiology consulted for further evaluation and treatment.    -Patient continues to be confused and intermittently agitated requiring ativan. Not a candidate at this time for LHC because would be too high risk for injury/complication since he would not be cooperative. LV is dilated, indicating that this is unlikely to be new. C/W Medical management with ASA, Statin, BB.  -Starting to develop orthopnea with elevated pro-bnp, agree with starting on Lasix 20 IV Qday and monitoring strict I/O, daily weights and monitor renal function/electrolytes.  -Has been bradycardiac with wide complex rhythms and frequent PVCs, unable to get his BB regularly due to bradycardia, agree with decreasing dose for now.  -Agree with hydralazine and imdur (imdur to help with pill burden) and continue to optimize these medications as he can tolerate.  -If renal function continues to improve and BP can tolerate it consider starting on an ACE vs ARB.  -EP on board, appreciated recommendations and how patient is not a candidate for LifeVest at this time. continue to monitor mentation.

## 2020-12-17 NOTE — CONSULT NOTE ADULT - SUBJECTIVE AND OBJECTIVE BOX
UROLOGY CONSULTATION    CATHY SANCHEZ  [id number]    CC    HPI   79 y/o M w/ PMH of CVA, TIA, dyslipidemia, HTN, GERD, h/o seizure disorder admitted for AMS.    Found to have a UTI on admission    Urology consult requested for inability to pass a catheter  He has not voided all day and bladder scan shows 500mL    Patient seen and examined at bedside.   he denies any urinary complaints prior to hospitalization  Denies history of UTIs, prostate surgery  Does not follow with a Urologist     He states that he does not feel like he needs to void at this time    Denies dysuria, urgency, frequency, gross hematuria, fevers, chills, nausea, vomiting or weight loss    ROS  12 point ROS negative except that outlined in HPI    PMHX  Syncope and collapse    Chronic GERD    HTN (hypertension)    HLD (hyperlipidemia)    CVA (cerebral vascular accident)    High cholesterol    HTN (hypertension)    Unknown and unspecified causes of morbidity    History of surgical removal of testicle    H/O hernia repair    No significant past surgical history        MEDS  aspirin 325 milliGRAM(s) Oral daily  atorvastatin 40 milliGRAM(s) Oral at bedtime  bethanechol 10 milliGRAM(s) Oral once  carvedilol 3.125 milliGRAM(s) Oral every 12 hours  cefTRIAXone Injectable. 1000 milliGRAM(s) IV Push every 24 hours  furosemide   Injectable 20 milliGRAM(s) IV Push daily  heparin   Injectable 5000 Unit(s) SubCutaneous every 12 hours  hydrALAZINE 10 milliGRAM(s) Oral three times a day  isosorbide   mononitrate ER Tablet (IMDUR) 30 milliGRAM(s) Oral daily  multivitamin 1 Tablet(s) Oral daily  QUEtiapine 12.5 milliGRAM(s) Oral every 8 hours PRN  tamsulosin 0.4 milliGRAM(s) Oral daily  thiamine IVPB 500 milliGRAM(s) IV Intermittent every 8 hours      Allergies  No Known Allergies        VITALS  T(C): 36.4 (12-17-20 @ 09:02), Max: 36.4 (12-17-20 @ 09:02)  HR: 51 (12-17-20 @ 13:58)  BP: 121/65 (12-17-20 @ 13:58)  RR: 18 (12-17-20 @ 13:58)  SpO2: 95% (12-17-20 @ 09:02)      Gen: elderly Male in NAD, well nourished  HEENT: normocephalic, anicteric sclera, moist mucus membranes; hearing intact  Chest: non labored breathing  Abd: soft, NT, ND, no masses  : no suprapubic distension or tenderness, normal phallus without lesions, bilateral testes without tenderness or swelling  Psych: Alert but confused, non cooperative and combative   Ext: moves all four extremities freely, no peripheral edema    LABS  12-17    143  |  112<H>  |  33<H>  ----------------------------<  83  4.4   |  25  |  1.65<H>    Ca    9.1      17 Dec 2020 07:29  Mg     2.1     12-17      CBC Full  -  ( 17 Dec 2020 07:29 )  WBC Count : 5.18 K/uL  Hemoglobin : 10.3 g/dL  Hematocrit : 35.1 %  Platelet Count - Automated : 182 K/uL  Mean Cell Volume : 99.2 fl  Mean Cell Hemoglobin : 29.1 pg  Mean Cell Hemoglobin Concentration : 29.3 gm/dL  Auto Neutrophil # : x  Auto Lymphocyte # : x  Auto Monocyte # : x  Auto Eosinophil # : x  Auto Basophil # : x  Auto Neutrophil % : x  Auto Lymphocyte % : x  Auto Monocyte % : x  Auto Eosinophil % : x  Auto Basophil % : x      UA: +nitrite      Culture - Urine (collected 14 Dec 2020 21:34)  Source: .Urine Clean Catch (Midstream)  Final Report (16 Dec 2020 20:46):    >100,000 CFU/ml Klebsiella pneumoniae  Organism: Klebsiella pneumoniae (16 Dec 2020 20:46)  Organism: Klebsiella pneumoniae (16 Dec 2020 20:46)        RADIOLOGY        ASSESSMENT & PLAN    UTI  - C/w Antibiotics  - Cause of UTI in elderly men is usually due to BPH obstruction  - Check renal and bladder US    Retention of urine?  - Patient adamantly refusing malagon insertion despite my pleas to convince him that his infection will not improve or may take longer to improve without a catheter. He wishes to hold off from a malagon since he is comfortable  - increase flomax to BID  - Call Urology back if patient becomes uncomfortable, has worsening cr or bilateral hydro on sonogram. he will need sedation for a malagon insertion        Thank you for allowing me to participate in the care of this patient  Please call if there are questions or concerns    I explained the various issues and complexities regarding their current urological condition(s) and treatment options. The patient verbalized understanding and I answered all of their questions to satisfaction.     Ladan Spence MD  [facility]  [phone]    12-17-20 @ 17:55

## 2020-12-17 NOTE — PROGRESS NOTE ADULT - SUBJECTIVE AND OBJECTIVE BOX
CHIEF COMPLAINT/DIAGNOSIS: AMS    HPI: 81 y/o M w/ PMH of CVA, TIA, dyslipidemia, HTN, GERD, h/o seizure disorder p/w AMS. Patient states he doesn't recall the episode at all, and states he is completely fine. Patient has trouble stating where is currently, but otherwise knows it's 2020, and is conversational. As per ED note, patient's daughter had noticed AMS where patient's speech was abnormal and was groggy. However, daughter had stated patient did not have LOC. Presently patient denies any weakness in arms / legs / slurred speech / facial droop / vision deficits / sensory deficits / CP / SOB / cough / runny nose / sore throat / nausea / vomiting / abdominal pain / diarrhea.  ** As per son patient has been non-compliant with medications for 1month unable to receive from ?? **    12/16: no complaints. limited ros due to cognitive impairment. overnight with agitation requiring haldol. denies sob, cp or palp. reports no prior hx of alcohol use.  12/17:    PHYSICAL EXAM:  Constitutional: Awake and alert, obese  HEENT:  Normal Hearing, MMM  Neck: Soft and supple  Respiratory: Breath sounds are clear/ diminished b/l   Cardiovascular: S1 and S2, regular rate and rhythm, no Murmurs, gallops or rubs  Gastrointestinal: Bowel Sounds present, soft, nontender, nondistended, no guarding, no rebound  Extremities: trace, pitting +1 b/l peripheral edema  Vascular: 2+ peripheral pulses  Neurological: A/O x 2, forgetful. follows commands. no visible facial droop. able to lift arm over head. blinks to threat. speech fluent.   Musculoskeletal: 5/5 strength b/l upper and lower extremities  Skin: No rashes    Vital Signs Last 24 Hrs  T(C): 36.2 (17 Dec 2020 05:47), Max: 36.6 (16 Dec 2020 08:30)  T(F): 97.2 (17 Dec 2020 05:47), Max: 97.8 (16 Dec 2020 08:30)  HR: 45 (17 Dec 2020 05:47) (45 - 57)  BP: 110/68 (17 Dec 2020 05:47) (109/50 - 146/89)  BP(mean): --  RR: 18 (16 Dec 2020 23:15) (18 - 18)  SpO2: 99% (17 Dec 2020 05:47) (97% - 100%) - room air     LABS: All Labs Reviewed:                        10.3   5.18  )-----------( 182      ( 17 Dec 2020 07:29 )             35.1     12-16    140  |  113<H>  |  26<H>  ----------------------------<  90  5.0   |  20<L>  |  1.75<H>    Ca    9.1      16 Dec 2020 07:11  Mg     2.2     12-16    12-14 @ 21:34  Organism Klebsiella pneumoniae  Gram Stain Blood -- Gram Stain --  Specimen Source .Urine Clean Catch (Midstream)    RADIOLOGY:  < from: MR Head No Cont Disc (12.15.20 @ 16:03) >  IMPRESSION:  Re demonstration volume loss remote infarcts right greater than left MCA distribution in bilateral frontal and parietal lobes with encephalomalacia, bilateral PCA, and bilateral cerebellar left greater than right territories with encephalomalacia, marked microvascular disease, remote lacunar infarcts, without new restricted diffusion, hemorrhage or shift.    Decreased size mamillary bodies correlate with thiamin vitamin [id number] severely motion limited MRA with atherosclerotic vascular calcification multifocal narrowing and stenosis of the cavernous to provide ICAs, with severe multifocal stenosis of the intracranial arterial middle and posterior cerebral arteries and vertebral and basilar arteries as detailed above, MRA of the neck only obtained  views, suggest repeat imaging when patient can hold still.  < end of copied text >    < from: CT Head No Cont (12.14.20 @ 22:01) >  IMPRESSION: No intracranial hemorrhage or mass effect. Stable chronic infarcts.  < end of copied text >    ECHOCARDIOGRAM:   waiting for official read    MEDICATIONS  (STANDING):  aspirin 325 milliGRAM(s) Oral daily  atorvastatin 40 milliGRAM(s) Oral at bedtime  carvedilol Oral Tab/Cap - Peds 25 milliGRAM(s) Oral two times a day  cefTRIAXone Injectable. 1000 milliGRAM(s) IV Push every 24 hours  heparin   Injectable 5000 Unit(s) SubCutaneous every 12 hours  hydrALAZINE 10 milliGRAM(s) Oral three times a day  isosorbide   mononitrate ER Tablet (IMDUR) 30 milliGRAM(s) Oral daily  multivitamin 1 Tablet(s) Oral daily  thiamine IVPB 500 milliGRAM(s) IV Intermittent every 8 hours    MEDICATIONS  (PRN):  LORazepam   Injectable 0.5 milliGRAM(s) IV Push three times a day PRN Agitation  QUEtiapine 12.5 milliGRAM(s) Oral every 8 hours PRN agitation    TELEMETRY REVIEW:  12/17: frequent bigeminy, triplets, PVCs HR. mid 30s at night. 50-70s daytime.      ASSESSMENT AND PLAN:    81 y/o M w/ PMH of CVA, TIA, dyslipidemia, HTN, GERD, p/w AMS.     1) Toxic/Metabolic encephalopathy due to UTI and possible Wernicke's   - tele monitoring. tele review as above.  - CTH - Multiple chronic stable infarcts. f/u MRI / MRA > as above, no acute infarcts.  - Start IV thiamine   - F/u b12, b1, b6, folate lvls.  - start Seroquel prn for agitation/ sundowning   - ABX for UTI  - Speech, PT, Neuro, Cardio consult appreciated     2) Acute on Chronic Systolic HF/ Cardiomyopathy - not in acute exacerbation  - Per cardio last known EF 45%, now 10-20% EF  - check bnp  - Cont: GDMT: not candidate for ACEi. Cont. BB, hydralazine, Imdur   - Defer LHC to r/o ischemic for now until mentation improves   - send viral studies - coxsackie, echo virus.  - amyloidosis w/u - send lgt chains/ urine/serum electrophoresis   - EP eval - AICD after 3 months of GDMT, life vest contraindicated at this time    3) Mildly elevated troponins  - Mildly elevated and downtrended. No CP  - Cont. ASA, statin  - Cardio consult appreciated -- plan for eventual LHC to r/o ischemia in setting of severely reduced EF    4) UTI - likely GNR  - Cont. Ceftriaxone  - Urine cx - klebsiella, sens to Cef.  - s/p gentle hydration. encourage PO hydration.     5) LASHELL on CKD Stage 3  - s/p IVF  - Avoid nephrotoxic agents, no ACE/ARB  - bladder scan, r/o urinary retention.     6) Hypernatremia - resolved  - Recheck in AM. Cont. IVF, D5 1/2 NS x 10hrs.     7) Transaminitis   - monitor f/u hep panel   - lipids wnl  - monitor on statin therapy.    8) DVT ppx  - Heparin SubQ    Advanced Directives: Full Code.    ~~ Spoke to nitin Coleman with updates 12/15, 12/16 CHIEF COMPLAINT/DIAGNOSIS: AMS    HPI: 79 y/o M w/ PMH of CVA, TIA, dyslipidemia, HTN, GERD, h/o seizure disorder p/w AMS. Patient states he doesn't recall the episode at all, and states he is completely fine. Patient has trouble stating where is currently, but otherwise knows it's 2020, and is conversational. As per ED note, patient's daughter had noticed AMS where patient's speech was abnormal and was groggy. However, daughter had stated patient did not have LOC. Presently patient denies any weakness in arms / legs / slurred speech / facial droop / vision deficits / sensory deficits / CP / SOB / cough / runny nose / sore throat / nausea / vomiting / abdominal pain / diarrhea.  ** As per son patient has been non-compliant with medications for 1month unable to receive from ?? **    12/16: no complaints. limited ros due to cognitive impairment. overnight with agitation requiring haldol. denies sob, cp or palp. reports no prior hx of alcohol use.  12/17: lethargic, weakness. received IV ativan overnight and still sedated. will change to oral and monitor. on 1:1    PHYSICAL EXAM:  Constitutional: Awake and lethargic, obese  HEENT:  Normal Hearing, MMM  Neck: Soft and supple  Respiratory: Breath sounds are clear/ diminished b/l   Cardiovascular: S1 and S2, regular rate and rhythm, no Murmurs, gallops or rubs  Gastrointestinal: Bowel Sounds present, soft, nontender, nondistended, no guarding, no rebound  Extremities: trace, pitting +1 b/l peripheral edema  Vascular: 2+ peripheral pulses  Neurological: A/O x 2, forgetful. follows commands. no visible facial droop. able to lift arm over head. blinks to threat. speech fluent.   Musculoskeletal: 5/5 strength b/l upper and lower extremities  Skin: No rashes    Vital Signs Last 24 Hrs  T(C): 36.2 (17 Dec 2020 05:47), Max: 36.6 (16 Dec 2020 08:30)  T(F): 97.2 (17 Dec 2020 05:47), Max: 97.8 (16 Dec 2020 08:30)  HR: 45 (17 Dec 2020 05:47) (45 - 57)  BP: 110/68 (17 Dec 2020 05:47) (109/50 - 146/89)  BP(mean): --  RR: 18 (16 Dec 2020 23:15) (18 - 18)  SpO2: 99% (17 Dec 2020 05:47) (97% - 100%) - room air     LABS: All Labs Reviewed:                        10.3   5.18  )-----------( 182      ( 17 Dec 2020 07:29 )             35.1     12-16    140  |  113<H>  |  26<H>  ----------------------------<  90  5.0   |  20<L>  |  1.75<H>    Ca    9.1      16 Dec 2020 07:11  Mg     2.2     12-16    12-14 @ 21:34  Organism Klebsiella pneumoniae  Gram Stain Blood -- Gram Stain --  Specimen Source .Urine Clean Catch (Midstream)    RADIOLOGY:  < from: MR Head No Cont Disc (12.15.20 @ 16:03) >  IMPRESSION:  Re demonstration volume loss remote infarcts right greater than left MCA distribution in bilateral frontal and parietal lobes with encephalomalacia, bilateral PCA, and bilateral cerebellar left greater than right territories with encephalomalacia, marked microvascular disease, remote lacunar infarcts, without new restricted diffusion, hemorrhage or shift.    Decreased size mamillary bodies correlate with thiamin vitamin [id number] severely motion limited MRA with atherosclerotic vascular calcification multifocal narrowing and stenosis of the cavernous to provide ICAs, with severe multifocal stenosis of the intracranial arterial middle and posterior cerebral arteries and vertebral and basilar arteries as detailed above, MRA of the neck only obtained  views, suggest repeat imaging when patient can hold still.  < end of copied text >    < from: CT Head No Cont (12.14.20 @ 22:01) >  IMPRESSION: No intracranial hemorrhage or mass effect. Stable chronic infarcts.  < end of copied text >    ECHOCARDIOGRAM:   waiting for official read    MEDICATIONS  (STANDING):  aspirin 325 milliGRAM(s) Oral daily  atorvastatin 40 milliGRAM(s) Oral at bedtime  carvedilol Oral Tab/Cap - Peds 25 milliGRAM(s) Oral two times a day  cefTRIAXone Injectable. 1000 milliGRAM(s) IV Push every 24 hours  heparin   Injectable 5000 Unit(s) SubCutaneous every 12 hours  hydrALAZINE 10 milliGRAM(s) Oral three times a day  isosorbide   mononitrate ER Tablet (IMDUR) 30 milliGRAM(s) Oral daily  multivitamin 1 Tablet(s) Oral daily  thiamine IVPB 500 milliGRAM(s) IV Intermittent every 8 hours    MEDICATIONS  (PRN):  LORazepam   Injectable 0.5 milliGRAM(s) IV Push three times a day PRN Agitation  QUEtiapine 12.5 milliGRAM(s) Oral every 8 hours PRN agitation    TELEMETRY REVIEW:  12/17: frequent bigeminy, triplets, PVCs HR. mid 30s at night. 50-70s daytime.      ASSESSMENT AND PLAN:    79 y/o M w/ PMH of CVA, TIA, dyslipidemia, HTN, GERD, p/w AMS.     1) Toxic/Metabolic encephalopathy due to UTI and possible Wernicke's   - tele monitoring. tele review as above.  - CTH - Multiple chronic stable infarcts. f/u MRI / MRA > as above, no acute infarcts.  - Start IV thiamine   - F/u b12, b1, b6, folate lvls.  - start Seroquel prn for agitation/ sundowning   - ABX for UTI  - Speech, PT, Neuro, Cardio consult appreciated     2) Acute on Chronic Systolic HF/ Cardiomyopathy - not in acute exacerbation  - Per cardio last known EF 45%, now 10-20% EF  - check bnp  - Cont: GDMT: not candidate for ACEi. Cont. BB, hydralazine, Imdur   - Defer LHC to r/o ischemic for now until mentation improves   - send viral studies - coxsackie, echo virus.  - amyloidosis w/u - send lgt chains/ urine/serum electrophoresis   - EP eval - AICD after 3 months of GDMT, life vest contraindicated at this time    3) Mildly elevated troponins  - Mildly elevated and downtrended. No CP  - Cont. ASA, statin  - Cardio consult appreciated -- plan for eventual LHC to r/o ischemia in setting of severely reduced EF    4) UTI - likely GNR  - Cont. Ceftriaxone  - Urine cx - klebsiella, sens to Cef.  - s/p gentle hydration. encourage PO hydration.     5) LASHELL on CKD Stage 3  - s/p IVF  - Avoid nephrotoxic agents, no ACE/ARB  - bladder scan, r/o urinary retention.     6) Hypernatremia - resolved  - Recheck in AM. Cont. IVF, D5 1/2 NS x 10hrs.     7) Transaminitis   - monitor f/u hep panel   - lipids wnl  - monitor on statin therapy.    8) DVT ppx  - Heparin SubQ    Advanced Directives: Full Code. Consult palliative. patient with multiple chronic progressive conditions    ~~ Spoke to nitin Coleman with updates 12/15, 12/16 CHIEF COMPLAINT/DIAGNOSIS: AMS    HPI: 79 y/o M w/ PMH of CVA, TIA, dyslipidemia, HTN, GERD, h/o seizure disorder p/w AMS. Patient states he doesn't recall the episode at all, and states he is completely fine. Patient has trouble stating where is currently, but otherwise knows it's 2020, and is conversational. As per ED note, patient's daughter had noticed AMS where patient's speech was abnormal and was groggy. However, daughter had stated patient did not have LOC. Presently patient denies any weakness in arms / legs / slurred speech / facial droop / vision deficits / sensory deficits / CP / SOB / cough / runny nose / sore throat / nausea / vomiting / abdominal pain / diarrhea.  ** As per son patient has been non-compliant with medications for 1month unable to receive from ?? **    12/16: no complaints. limited ros due to cognitive impairment. overnight with agitation requiring haldol. denies sob, cp or palp. reports no prior hx of alcohol use.  12/17: lethargic, weakness. received IV ativan overnight and still sedated. will change to oral and monitor. on 1:1    PHYSICAL EXAM:  Constitutional: Awake and lethargic, obese  HEENT:  Normal Hearing, MMM  Neck: Soft and supple  Respiratory: Breath sounds are clear/ diminished b/l   Cardiovascular: S1 and S2, regular rate and rhythm, no Murmurs, gallops or rubs  Gastrointestinal: Bowel Sounds present, soft, nontender, nondistended, no guarding, no rebound  Extremities: trace, pitting +1 b/l peripheral edema  Vascular: 2+ peripheral pulses  Neurological: A/O x 2, forgetful. follows commands. no visible facial droop. able to lift arm over head. blinks to threat. speech fluent.   Musculoskeletal: 5/5 strength b/l upper and lower extremities  Skin: No rashes    Vital Signs Last 24 Hrs  T(C): 36.2 (17 Dec 2020 05:47), Max: 36.6 (16 Dec 2020 08:30)  T(F): 97.2 (17 Dec 2020 05:47), Max: 97.8 (16 Dec 2020 08:30)  HR: 45 (17 Dec 2020 05:47) (45 - 57)  BP: 110/68 (17 Dec 2020 05:47) (109/50 - 146/89)  BP(mean): --  RR: 18 (16 Dec 2020 23:15) (18 - 18)  SpO2: 99% (17 Dec 2020 05:47) (97% - 100%) - room air     LABS: All Labs Reviewed:                        10.3   5.18  )-----------( 182      ( 17 Dec 2020 07:29 )             35.1     12-16    140  |  113<H>  |  26<H>  ----------------------------<  90  5.0   |  20<L>  |  1.75<H>    Ca    9.1      16 Dec 2020 07:11  Mg     2.2     12-16    12-14 @ 21:34  Organism Klebsiella pneumoniae  Gram Stain Blood -- Gram Stain --  Specimen Source .Urine Clean Catch (Midstream)    RADIOLOGY:  < from: MR Head No Cont Disc (12.15.20 @ 16:03) >  IMPRESSION:  Re demonstration volume loss remote infarcts right greater than left MCA distribution in bilateral frontal and parietal lobes with encephalomalacia, bilateral PCA, and bilateral cerebellar left greater than right territories with encephalomalacia, marked microvascular disease, remote lacunar infarcts, without new restricted diffusion, hemorrhage or shift.    Decreased size mamillary bodies correlate with thiamin vitamin [id number] severely motion limited MRA with atherosclerotic vascular calcification multifocal narrowing and stenosis of the cavernous to provide ICAs, with severe multifocal stenosis of the intracranial arterial middle and posterior cerebral arteries and vertebral and basilar arteries as detailed above, MRA of the neck only obtained  views, suggest repeat imaging when patient can hold still.  < end of copied text >    < from: CT Head No Cont (12.14.20 @ 22:01) >  IMPRESSION: No intracranial hemorrhage or mass effect. Stable chronic infarcts.  < end of copied text >    ECHOCARDIOGRAM:   waiting for official read    MEDICATIONS  (STANDING):  aspirin 325 milliGRAM(s) Oral daily  atorvastatin 40 milliGRAM(s) Oral at bedtime  carvedilol Oral Tab/Cap - Peds 25 milliGRAM(s) Oral two times a day  cefTRIAXone Injectable. 1000 milliGRAM(s) IV Push every 24 hours  heparin   Injectable 5000 Unit(s) SubCutaneous every 12 hours  hydrALAZINE 10 milliGRAM(s) Oral three times a day  isosorbide   mononitrate ER Tablet (IMDUR) 30 milliGRAM(s) Oral daily  multivitamin 1 Tablet(s) Oral daily  thiamine IVPB 500 milliGRAM(s) IV Intermittent every 8 hours    MEDICATIONS  (PRN):  LORazepam   Injectable 0.5 milliGRAM(s) IV Push three times a day PRN Agitation  QUEtiapine 12.5 milliGRAM(s) Oral every 8 hours PRN agitation    TELEMETRY REVIEW:  12/17: frequent bigeminy, triplets, PVCs HR. mid 30s at night. 50-70s daytime.      ASSESSMENT AND PLAN:    79 y/o M w/ PMH of CVA, TIA, dyslipidemia, HTN, GERD, p/w AMS.     1) Toxic/Metabolic encephalopathy due to UTI and possible Wernicke's   - tele monitoring. tele review as above.  - CTH - Multiple chronic stable infarcts. f/u MRI / MRA > as above, no acute infarcts.  - Start IV thiamine   - F/u b12, b1, b6, folate lvls.  - Seroquel/ativan prn for agitation/ sundowning   - ABX for UTI  - Speech, PT, Neuro, Cardio consult appreciated     2) Acute on Chronic Systolic HF/ Cardiomyopathy - not in acute exacerbation  - Per cardio last known EF 45%, now 10-20% EF  - check bnp  - Cont: GDMT: not candidate for ACEi. Cont. BB, hydralazine, Imdur   - Defer LHC to r/o ischemic for now until mentation improves   - send viral studies - coxsackie, echo virus.  - amyloidosis w/u - send lgt chains/ urine/serum electrophoresis   - EP eval - AICD after 3 months of GDMT, life vest contraindicated at this time  12/17: reduced BB w/ parameters due to bradycardia. start low dose IV Lasix 20mg    3) Mildly elevated troponins  - Mildly elevated and downtrended. No CP  - Cont. ASA, statin  - Cardio consult appreciated -- plan for eventual LHC to r/o ischemia in setting of severely reduced EF    4) UTI - likely GNR  - Cont. Ceftriaxone  - Urine cx - klebsiella, sens to Cef.  - s/p gentle hydration. encourage PO hydration.     5) LASHELL on CKD Stage 3  - s/p IVF  - Avoid nephrotoxic agents, no ACE/ARB  - bladder scan, r/o urinary retention.     6) Hypernatremia - resolved    7) Transaminitis   - monitor f/u hep panel   - lipids wnl  - monitor on statin therapy.    8) DVT ppx  - Heparin SubQ    Advanced Directives: Full Code. Consult palliative. patient with multiple chronic progressive conditions    ~~ Spoke to son Jared with updates 12/15, 12/16 CHIEF COMPLAINT/DIAGNOSIS: AMS    HPI: 81 y/o M w/ PMH of CVA, TIA, dyslipidemia, HTN, GERD, h/o seizure disorder p/w AMS. Patient states he doesn't recall the episode at all, and states he is completely fine. Patient has trouble stating where is currently, but otherwise knows it's 2020, and is conversational. As per ED note, patient's daughter had noticed AMS where patient's speech was abnormal and was groggy. However, daughter had stated patient did not have LOC. Presently patient denies any weakness in arms / legs / slurred speech / facial droop / vision deficits / sensory deficits / CP / SOB / cough / runny nose / sore throat / nausea / vomiting / abdominal pain / diarrhea.  ** As per son patient has been non-compliant with medications for 1month unable to receive from ?? **    12/16: no complaints. limited ros due to cognitive impairment. overnight with agitation requiring haldol. denies sob, cp or palp. reports no prior hx of alcohol use.  12/17: lethargic, weakness. received IV ativan overnight and still sedated. will change to oral and monitor. on 1:1    PHYSICAL EXAM:  Constitutional: Awake and lethargic, obese  HEENT:  Normal Hearing, MMM  Neck: Soft and supple  Respiratory: Breath sounds are clear/ diminished b/l   Cardiovascular: S1 and S2, regular rate and rhythm, no Murmurs, gallops or rubs  Gastrointestinal: Bowel Sounds present, soft, nontender, nondistended, no guarding, no rebound  Extremities: trace, pitting +1 b/l peripheral edema  Vascular: 2+ peripheral pulses  Neurological: A/O x 2, forgetful. follows commands. no visible facial droop. able to lift arm over head. blinks to threat. speech fluent.   Musculoskeletal: 5/5 strength b/l upper and lower extremities  Skin: No rashes    Vital Signs Last 24 Hrs  T(C): 36.2 (17 Dec 2020 05:47), Max: 36.6 (16 Dec 2020 08:30)  T(F): 97.2 (17 Dec 2020 05:47), Max: 97.8 (16 Dec 2020 08:30)  HR: 45 (17 Dec 2020 05:47) (45 - 57)  BP: 110/68 (17 Dec 2020 05:47) (109/50 - 146/89)  BP(mean): --  RR: 18 (16 Dec 2020 23:15) (18 - 18)  SpO2: 99% (17 Dec 2020 05:47) (97% - 100%) - room air     LABS: All Labs Reviewed:                        10.3   5.18  )-----------( 182      ( 17 Dec 2020 07:29 )             35.1     12-16    140  |  113<H>  |  26<H>  ----------------------------<  90  5.0   |  20<L>  |  1.75<H>    Ca    9.1      16 Dec 2020 07:11  Mg     2.2     12-16    12-14 @ 21:34  Organism Klebsiella pneumoniae  Gram Stain Blood -- Gram Stain --  Specimen Source .Urine Clean Catch (Midstream)    RADIOLOGY:  < from: MR Head No Cont Disc (12.15.20 @ 16:03) >  IMPRESSION:  Re demonstration volume loss remote infarcts right greater than left MCA distribution in bilateral frontal and parietal lobes with encephalomalacia, bilateral PCA, and bilateral cerebellar left greater than right territories with encephalomalacia, marked microvascular disease, remote lacunar infarcts, without new restricted diffusion, hemorrhage or shift.    Decreased size mamillary bodies correlate with thiamin vitamin [id number] severely motion limited MRA with atherosclerotic vascular calcification multifocal narrowing and stenosis of the cavernous to provide ICAs, with severe multifocal stenosis of the intracranial arterial middle and posterior cerebral arteries and vertebral and basilar arteries as detailed above, MRA of the neck only obtained  views, suggest repeat imaging when patient can hold still.  < end of copied text >    < from: CT Head No Cont (12.14.20 @ 22:01) >  IMPRESSION: No intracranial hemorrhage or mass effect. Stable chronic infarcts.  < end of copied text >    ECHOCARDIOGRAM:   waiting for official read    MEDICATIONS  (STANDING):  aspirin 325 milliGRAM(s) Oral daily  atorvastatin 40 milliGRAM(s) Oral at bedtime  carvedilol Oral Tab/Cap - Peds 25 milliGRAM(s) Oral two times a day  cefTRIAXone Injectable. 1000 milliGRAM(s) IV Push every 24 hours  heparin   Injectable 5000 Unit(s) SubCutaneous every 12 hours  hydrALAZINE 10 milliGRAM(s) Oral three times a day  isosorbide   mononitrate ER Tablet (IMDUR) 30 milliGRAM(s) Oral daily  multivitamin 1 Tablet(s) Oral daily  thiamine IVPB 500 milliGRAM(s) IV Intermittent every 8 hours    MEDICATIONS  (PRN):  LORazepam   Injectable 0.5 milliGRAM(s) IV Push three times a day PRN Agitation  QUEtiapine 12.5 milliGRAM(s) Oral every 8 hours PRN agitation    TELEMETRY REVIEW:  12/17: frequent bigeminy, triplets, PVCs HR. mid 30s at night. 50-70s daytime.      ASSESSMENT AND PLAN:    81 y/o M w/ PMH of CVA, TIA, dyslipidemia, HTN, GERD, p/w AMS.     1) Toxic/Metabolic encephalopathy due to UTI and possible Wernicke's   - tele monitoring. tele review as above.  - CTH - Multiple chronic stable infarcts. f/u MRI / MRA > as above, no acute infarcts.  - Cont. IV thiamine day #2  - F/u b12, b1, b6, folate lvls.  - Seroquel/ativan prn for agitation/ sundowning   - ABX for UTI  - Speech, PT, Neuro, Cardio consult appreciated     2) Acute on Chronic Systolic HF/ Cardiomyopathy - not in acute exacerbation  - Per cardio last known EF 45%, now 10-20% EF  - check bnp  - Cont: GDMT: not candidate for ACEi. Cont. BB, hydralazine, Imdur   - Defer LHC to r/o ischemic for now until mentation improves   - send viral studies - coxsackie, echo virus.  - amyloidosis w/u - send lgt chains/ urine/serum electrophoresis   - EP eval - AICD after 3 months of GDMT, life vest contraindicated at this time  12/17: reduced BB w/ parameters due to bradycardia. start low dose IV Lasix 20mg    3) Mildly elevated troponins  - Mildly elevated and downtrended. No CP  - Cont. ASA, statin  - Cardio consult appreciated -- plan for eventual LHC to r/o ischemia in setting of severely reduced EF    4) UTI - likely GNR  - Cont. Ceftriaxone  - Urine cx - klebsiella, sens to Cef.  - s/p gentle hydration. encourage PO hydration.     5) LASHELL on CKD Stage 3  - s/p IVF  - Avoid nephrotoxic agents, no ACE/ARB  - bladder scan, r/o urinary retention.     6) Hypernatremia - resolved    7) Transaminitis   - monitor f/u hep panel   - lipids wnl  - monitor on statin therapy.    8) DVT ppx  - Heparin SubQ    Advanced Directives: Full Code. Consult palliative. patient with multiple chronic progressive conditions    ~~ Spoke to nitin Coleman with updates 12/15, 12/16 12/17: LM for Jared w/ call back

## 2020-12-18 LAB
ADD ON TEST-SPECIMEN IN LAB: SIGNIFICANT CHANGE UP
AMPHET UR-MCNC: NEGATIVE — SIGNIFICANT CHANGE UP
ANION GAP SERPL CALC-SCNC: 5 MMOL/L — SIGNIFICANT CHANGE UP (ref 5–17)
BARBITURATES UR SCN-MCNC: NEGATIVE — SIGNIFICANT CHANGE UP
BENZODIAZ UR-MCNC: NEGATIVE — SIGNIFICANT CHANGE UP
BUN SERPL-MCNC: 37 MG/DL — HIGH (ref 7–23)
CALCIUM SERPL-MCNC: 8.7 MG/DL — SIGNIFICANT CHANGE UP (ref 8.5–10.1)
CHLORIDE SERPL-SCNC: 111 MMOL/L — HIGH (ref 96–108)
CO2 SERPL-SCNC: 25 MMOL/L — SIGNIFICANT CHANGE UP (ref 22–31)
COCAINE METAB.OTHER UR-MCNC: NEGATIVE — SIGNIFICANT CHANGE UP
CREAT SERPL-MCNC: 1.78 MG/DL — HIGH (ref 0.5–1.3)
GLUCOSE SERPL-MCNC: 96 MG/DL — SIGNIFICANT CHANGE UP (ref 70–99)
METHADONE UR-MCNC: NEGATIVE — SIGNIFICANT CHANGE UP
OPIATES UR-MCNC: NEGATIVE — SIGNIFICANT CHANGE UP
PCP SPEC-MCNC: SIGNIFICANT CHANGE UP
PCP UR-MCNC: NEGATIVE — SIGNIFICANT CHANGE UP
POTASSIUM SERPL-MCNC: 4.5 MMOL/L — SIGNIFICANT CHANGE UP (ref 3.5–5.3)
POTASSIUM SERPL-SCNC: 4.5 MMOL/L — SIGNIFICANT CHANGE UP (ref 3.5–5.3)
SODIUM SERPL-SCNC: 141 MMOL/L — SIGNIFICANT CHANGE UP (ref 135–145)
THC UR QL: NEGATIVE — SIGNIFICANT CHANGE UP
TSH SERPL-MCNC: 2.16 UU/ML — SIGNIFICANT CHANGE UP (ref 0.34–4.82)

## 2020-12-18 PROCEDURE — 99223 1ST HOSP IP/OBS HIGH 75: CPT

## 2020-12-18 PROCEDURE — 99232 SBSQ HOSP IP/OBS MODERATE 35: CPT

## 2020-12-18 RX ORDER — QUETIAPINE FUMARATE 200 MG/1
25 TABLET, FILM COATED ORAL DAILY
Refills: 0 | Status: DISCONTINUED | OUTPATIENT
Start: 2020-12-18 | End: 2020-12-23

## 2020-12-18 RX ORDER — LIDOCAINE HCL 20 MG/ML
10 VIAL (ML) INJECTION ONCE
Refills: 0 | Status: COMPLETED | OUTPATIENT
Start: 2020-12-18 | End: 2020-12-18

## 2020-12-18 RX ADMIN — TAMSULOSIN HYDROCHLORIDE 0.4 MILLIGRAM(S): 0.4 CAPSULE ORAL at 09:10

## 2020-12-18 RX ADMIN — QUETIAPINE FUMARATE 25 MILLIGRAM(S): 200 TABLET, FILM COATED ORAL at 10:39

## 2020-12-18 RX ADMIN — Medication 20 MILLIGRAM(S): at 09:10

## 2020-12-18 RX ADMIN — ISOSORBIDE MONONITRATE 30 MILLIGRAM(S): 60 TABLET, EXTENDED RELEASE ORAL at 09:10

## 2020-12-18 RX ADMIN — TAMSULOSIN HYDROCHLORIDE 0.4 MILLIGRAM(S): 0.4 CAPSULE ORAL at 21:48

## 2020-12-18 RX ADMIN — Medication 10 MILLIGRAM(S): at 05:33

## 2020-12-18 RX ADMIN — Medication 10 MILLIGRAM(S): at 13:37

## 2020-12-18 RX ADMIN — Medication 255 MILLIGRAM(S): at 13:37

## 2020-12-18 RX ADMIN — CEFTRIAXONE 1000 MILLIGRAM(S): 500 INJECTION, POWDER, FOR SOLUTION INTRAMUSCULAR; INTRAVENOUS at 00:11

## 2020-12-18 RX ADMIN — Medication 0.25 MILLIGRAM(S): at 21:48

## 2020-12-18 RX ADMIN — HEPARIN SODIUM 5000 UNIT(S): 5000 INJECTION INTRAVENOUS; SUBCUTANEOUS at 21:48

## 2020-12-18 RX ADMIN — Medication 255 MILLIGRAM(S): at 00:33

## 2020-12-18 RX ADMIN — Medication 255 MILLIGRAM(S): at 05:33

## 2020-12-18 RX ADMIN — Medication 1 TABLET(S): at 09:10

## 2020-12-18 RX ADMIN — Medication 10 MILLIGRAM(S): at 21:48

## 2020-12-18 RX ADMIN — Medication 325 MILLIGRAM(S): at 09:10

## 2020-12-18 RX ADMIN — CARVEDILOL PHOSPHATE 3.12 MILLIGRAM(S): 80 CAPSULE, EXTENDED RELEASE ORAL at 13:37

## 2020-12-18 RX ADMIN — ATORVASTATIN CALCIUM 40 MILLIGRAM(S): 80 TABLET, FILM COATED ORAL at 21:48

## 2020-12-18 RX ADMIN — HEPARIN SODIUM 5000 UNIT(S): 5000 INJECTION INTRAVENOUS; SUBCUTANEOUS at 09:10

## 2020-12-18 RX ADMIN — QUETIAPINE FUMARATE 12.5 MILLIGRAM(S): 200 TABLET, FILM COATED ORAL at 03:38

## 2020-12-18 RX ADMIN — Medication 255 MILLIGRAM(S): at 21:47

## 2020-12-18 NOTE — CONSULT NOTE ADULT - ATTENDING COMMENTS
Patient was seen and examined, modifications and corrections made  I agree with findings and plan    All material risks and benefits to surgery and no surgery were discussed with patient.  These include, but not exclusive to,  infection, hematoma, bleeding, abscess; organ/nerve/vascular injury; scarring, deformity and/or death.  All questions were answered.

## 2020-12-18 NOTE — GOALS OF CARE CONVERSATION - ADVANCED CARE PLANNING - CONVERSATION DETAILS
Team spoke with pts son via phone to discuss goals of care, assist with planning and provide supportive counseling. Pt. was residing at [facility] and pts daughter lived there with him. Pts independence was always important to him, as per son.       Pts current medical condition and goals of care discussed. Team discussed pts heart failure and how this is a progressive illness. We reviewed how on Monday pt. will be having more testing, including stress test and tissue biopsy. Pts son expressed that he understands that pts condition is serious and that his heart failure is progressive however, at this point he wishes to continue with any aggressive interventions that are offered.     We discussed pts wishes regarding resuscitation and intubation. We explained that CPR is not recommended and it is unlikely to be successful. Pts son expressed understanding but reports at this time that he does not want any limits in place and would want pt. to be resuscitated and intubated if needed. MOLST completed with CPR and trial of intubation.     Team discussed having a follow up meeting on Tuesday after pts tests and we will further discuss plan at that time. We did express concern that at depending on pts needs it will be up to [facility] if they will be able to accept pt. back. He expressed understanding of this.    Plan at this time is to continue with current medical management. No limits set. Follow up meeting on Tuesday. Our team will continue to follow.

## 2020-12-18 NOTE — PROGRESS NOTE ADULT - SUBJECTIVE AND OBJECTIVE BOX
CHIEF COMPLAINT:  Patient is a 80y old  Male who presents with a chief complaint of AMS (15 Dec 2020 12:54)    HPI:  81 y/o M w/ PMH of CVA, TIA, dyslipidemia, HTN, GERD, h/o seizure disorder p/w AMS. Patient states he doesn't recall the episode at all, but reports that his daughter noticed he was not making sense so brought him o the hospital. It is reported in the chart that daughter had stated patient did not have LOC/syncope, just garbled speech and not making sense.    During work up MRI/MRA brain showed multiple old infarcts with encephalomalacia, significant cerebral vascular disease and an abnormal EEG. He had minimally elevated troponins and TTE pending but showing a newly reduced LVSF EF about 10-20% with global hypokinesis. Also as per radiology his MRA/MRI appears to be consistent with Vit B1 deficiency concerning for Wernicke's encephalopathy.    O/N 12/15 he became very agitated and a code gray needed to called and he was given haldol.  12/16 - patient continued to be agitated and required ativan during the afternoon/evening  12/17 - patient lethargic, intermittently pulling off his tele. He has been bradycardiac down to the 30s with frequent PVCs and wide complex rhythm that is irregular with varying wide complexes ?AIVR  12/18 - Patient continues to be bradycardiac with PVCs and wide complex rhythm ?AIVR    Patient seen and examined at bedside.  He is agitated and threatening and refusing to answer any of my questions or allowing me to examine him    Unable to obtain ROS    PMHx:  PAST MEDICAL & SURGICAL HISTORY:  Chronic GERD  HTN (hypertension)  HLD (hyperlipidemia)  CVA (cerebral vascular accident)  High cholesterol  HTN (hypertension)    History of surgical removal of testicle  H/O hernia repair    FAMILY HISTORY:  reviewed and NC    ALLERGIES:  Allergies  Allergy Status Unknown    SHx:  Denies tobacco, etoh or illicit drug use.    REVIEW OF SYSTEMS:  Unable to obtain because patient refusing questions.    Vital Signs Last 24 Hrs  T(C): 36.6 (18 Dec 2020 07:56), Max: 36.6 (18 Dec 2020 07:56)  T(F): 97.9 (18 Dec 2020 07:56), Max: 97.9 (18 Dec 2020 07:56)  HR: 64 (18 Dec 2020 14:17) (52 - 69)  BP: 130/74 (18 Dec 2020 14:17) (97/57 - 130/74)  BP(mean): --  RR: 18 (18 Dec 2020 14:17) (18 - 19)  SpO2: 98% (18 Dec 2020 14:17) (98% - 99%)    PHYSICAL EXAM:   patient refusing examination very agitated but in NAD. The following is yesterday's PE:    Constitutional: lethargic, in NAD  HEENT: Normal Hearing, MMM  Neck: Soft and supple, No LAD, + JVD, no carotid bruit  Respiratory: poor effort but appears to have Breath sounds are clear bilaterally, No wheezing, rales or rhonchi, good air movement  Cardiovascular: S1 and S2, regular rate and rhythm, II/VI systolic murmur at LSB, gallops or rubs. PMI is nondisplaced  Gastrointestinal: Bowel Sounds present, soft, nontender, nondistended, no guarding, no rebound  Extremities: Warm and well perfused, trace-+1 peripheral edema  Neurological: A/O x 2, is no longer aware of where we are or what brought him to the hospital but knows it is 2020 and his name  Skin: No rashes appreciated    MEDICATIONS  (STANDING):  aspirin 325 milliGRAM(s) Oral daily  atorvastatin 40 milliGRAM(s) Oral at bedtime  carvedilol 3.125 milliGRAM(s) Oral every 12 hours  cefTRIAXone Injectable. 1000 milliGRAM(s) IV Push every 24 hours  furosemide   Injectable 20 milliGRAM(s) IV Push daily  heparin   Injectable 5000 Unit(s) SubCutaneous every 12 hours  hydrALAZINE 10 milliGRAM(s) Oral three times a day  isosorbide   mononitrate ER Tablet (IMDUR) 30 milliGRAM(s) Oral daily  LORazepam     Tablet 0.25 milliGRAM(s) Oral at bedtime  LORazepam   Injectable 0.25 milliGRAM(s) IV Push once  multivitamin 1 Tablet(s) Oral daily  QUEtiapine 25 milliGRAM(s) Oral daily  tamsulosin 0.4 milliGRAM(s) Oral two times a day  thiamine IVPB 500 milliGRAM(s) IV Intermittent every 8 hours    LABS: All Labs Reviewed:                        10.3   5.18  )-----------( 182      ( 17 Dec 2020 07:29 )             35.1                         10.9   5.30  )-----------( 223      ( 15 Dec 2020 07:39 )             37.2   12-17    143  |  112<H>  |  33<H>  ----------------------------<  83  4.4   |  25  |  1.65<H>    Ca    9.1      17 Dec 2020 07:29  Mg     2.1     12-17    12-16    140  |  113<H>  |  26<H>  ----------------------------<  90  5.0   |  20<L>  |  1.75<H>    Ca    9.1      16 Dec 2020 07:11  Phos  3.1     12-15  Mg     2.2     12-16    TPro  7.4  /  Alb  3.0<L>  /  TBili  1.7<H>  /  DBili  x   /  AST  44<H>  /  ALT  140<H>  /  AlkPhos  109  12-15    PT/INR - ( 15 Dec 2020 07:39 )   PT: 14.9 sec;   INR: 1.30 ratio    PTT - ( 15 Dec 2020 07:39 )  PTT:30.8 sec    CARDIAC MARKERS ( 15 Dec 2020 00:40 )  0.045 ng/mL / x     / x     / x     / x      CARDIAC MARKERS ( 14 Dec 2020 23:32 )  0.048 ng/mL / x     / x     / x     / x      CARDIAC MARKERS ( 14 Dec 2020 21:34 )  0.047 ng/mL / x     / x     / x     / x        ProBNP >11,000      BLOOD CULTURES: Organism --  Gram Stain Blood -- Gram Stain --  Specimen Source .Urine Clean Catch (Midstream)  Culture-Blood --      LIPID PROFILE lipid profile                          12-15 @ 07:39  cholesterol           91 mg/dL  Direct LDL            --  HDL cholesterol       27 mg/dL  HDL/Total cholesterol --  Triglycerides, serum  57 mg/dL    RADIOLOGY:    EXAM:  XR CHEST PA LAT 2V                        PROCEDURE DATE:  12/14/2020    IMPRESSION:   Heart enlargement again noted. Mild central CHF at this time.    EXAM:  MR ANGIO NECK DC                        EXAM:  MR ANGIO BRAIN DC                        EXAM:  MR BRAIN DC                        PROCEDURE DATE:  12/15/2020      FINDINGS:  Brain MRI:  Motion limited study, redemonstration enlarged ventricles and sulci consistent with severe volume loss with remote infarcts right greater than left MCA distribution involving the bilateral posterior frontal and parietal lobes as and remote PCA infarcts with lateral ventricle ex vacuo enlargement unchanged. Redemonstration left larger than right cerebellar remote infarcts with encephalomalacia, gliosis, and ex vacuo enlargement of fourth ventricle unchanged. There is extensive patchy T2 and FLAIR white matter hyperintensity likely microvascular disease with remote lacunar infarcts unchanged from prior. There is a decreased size to the mamillary bodies, correlate with thiamin vitamin B1 deficiency. Additional foci of of T2/FLAIR signal hyperintensity within the hemispheric white matter, which are nonspecific and likely sequela of microvascular disease. No new intraparenchymal hematoma, mass effect or midline shift. No new restrictive diffusion to suggest acute or subacute infarction. No new abnormal extra-axial fluid collections are present. Basal cisterns remain patent and unchanged.    The calvarium is intact. Orbits are limited by motion, with redemonstration of downward displacement of the right orbital floor unchanged from prior. Tympanomastoid cavities appear free of acute disease. There is mild mucosal thickening in the paraspinal sinuses with possible retention cyst or polyps in themaxillary antra, there are no air-fluid levels.    Brain MRA:  Motion limited MRA, there is flow-related signal within the internal carotid arteries within the cavernous supraclinoid and bifurcation segments with multifocal stenosis and with extensive atherosclerotic vascular calcification on CT. there are stenoses proximal to the bifurcation segments with poorly delineated left A1 segment and stenosis of the distal right A1 segment with limitations of the study due to motion. There are severe stenoses of the M1,  proximal M2 and an 3 MCA branch vessels, with study limited by motion with significant decrease in size and number right greater than left distal MCA branches. Proximal A 2 anterior cerebral arteries are poorly delineated, this may reflect technique and motion, there are stenoses of the distal bilateral anterior cerebral arteries.    Tortuous and dominant left vertebral artery crosses the right of midline and is the dominant supply to the basilar artery. Poorly delineated right vertebral artery secondary to motion narrowing proximal to the vertebrobasilar junction, and stenosis of the right posterior inferior cerebellar artery vessels are poorly delineated junction, suggest repeat imaging the patient can hold still. There is multifocal stenosis and narrowing of the basilar artery. There is poor delineation and multifocal stenosis and narrowing of the bilateral proximal mid and distal posterior cerebral arteries.    Neck MRA:  views were obtained only, suggest repeat imaging when patient is able to hold and complete exam.    IMPRESSION:    Redemonstration volume loss remote infarcts right greater than left MCA distribution in bilateral frontal and parietal lobes with encephalomalacia, bilateral PCA, and bilateral cerebellar left greater than right territories with encephalomalacia, marked microvascular disease, remote lacunar infarcts, without new restricted diffusion, hemorrhage or shift.    Decreased size mamillary bodies correlate with thiamin vitamin [id number] severely motion limited MRA with atherosclerotic vascular calcification multifocal narrowing and stenosis of the cavernous to provide ICAs, with severe multifocal stenosis of the intracranial arterial middle and posterior cerebral arteriesand vertebral and basilar arteries as detailed above, MRA of the neck only obtained  views, suggest repeat imaging when patient can hold still.    EKG:    (12/15/2020, my interpretation): NSR at 81bpm, biatrial enlargement, LAD and LVH with repolarization     TELEMETRY:    Reviewed. HRs 30-50s with frequent PVCs and a slow wide complex rhythm ?AIVR    ECHO:    EXAM:  ECHO TTE WO CON COMP W DOPP    PROCEDURE DATE:  12/15/2020       Summary     The left ventricle cavity is mildly dilated.   Mild concentric left ventricular hypertrophy is present.   Estimated left ventricular ejection fraction is 15-20 %.   The left atrium is severely dilated.   The right atrium appears moderately dilated.   Mild to Moderate mitral regurgitation is present.   Mild (1+) tricuspid valve regurgitation is present.   Mild aortic sclerosis is present with normal valvular opening. CHIEF COMPLAINT:  Patient is a 80y old  Male who presents with a chief complaint of AMS (15 Dec 2020 12:54)    HPI:  81 y/o M w/ PMH of CVA, TIA, dyslipidemia, HTN, GERD, h/o seizure disorder p/w AMS. Patient states he doesn't recall the episode at all, but reports that his daughter noticed he was not making sense so brought him o the hospital. It is reported in the chart that daughter had stated patient did not have LOC/syncope, just garbled speech and not making sense.    During work up MRI/MRA brain showed multiple old infarcts with encephalomalacia, significant cerebral vascular disease and an abnormal EEG. He had minimally elevated troponins and TTE pending but showing a newly reduced LVSF EF about 10-20% with global hypokinesis. Also as per radiology his MRA/MRI appears to be consistent with Vit B1 deficiency concerning for Wernicke's encephalopathy.    O/N 12/15 he became very agitated and a code gray needed to called and he was given haldol.  12/16 - patient continued to be agitated and required ativan during the afternoon/evening  12/17 - patient lethargic, intermittently pulling off his tele. He has been bradycardiac down to the 30s with frequent PVCs and wide complex rhythm that is irregular with varying wide complexes ?AIVR  12/18 - Patient continues to be bradycardiac with PVCs and wide complex rhythm ?AIVR    Patient seen and examined at bedside.  He is agitated and threatening and refusing to answer any of my questions or allowing me to examine him    Unable to obtain ROS    PMHx:  PAST MEDICAL & SURGICAL HISTORY:  Chronic GERD  HTN (hypertension)  HLD (hyperlipidemia)  CVA (cerebral vascular accident)  High cholesterol  HTN (hypertension)    History of surgical removal of testicle  H/O hernia repair    FAMILY HISTORY:  reviewed and NC    ALLERGIES:  Allergies  Allergy Status Unknown    SHx:  Denies tobacco, etoh or illicit drug use.    REVIEW OF SYSTEMS:  Unable to obtain because patient refusing questions.    Vital Signs Last 24 Hrs  T(C): 36.6 (18 Dec 2020 07:56), Max: 36.6 (18 Dec 2020 07:56)  T(F): 97.9 (18 Dec 2020 07:56), Max: 97.9 (18 Dec 2020 07:56)  HR: 64 (18 Dec 2020 14:17) (52 - 69)  BP: 130/74 (18 Dec 2020 14:17) (97/57 - 130/74)  BP(mean): --  RR: 18 (18 Dec 2020 14:17) (18 - 19)  SpO2: 98% (18 Dec 2020 14:17) (98% - 99%)    PHYSICAL EXAM:   patient refusing examination very agitated but in NAD. The following is yesterday's PE:    Constitutional: lethargic, in NAD  HEENT: Normal Hearing, MMM  Neck: Soft and supple, No LAD, + JVD, no carotid bruit  Respiratory: poor effort but appears to have Breath sounds are clear bilaterally, No wheezing, rales or rhonchi, good air movement  Cardiovascular: S1 and S2, regular rate and rhythm, II/VI systolic murmur at LSB, gallops or rubs. PMI is nondisplaced  Gastrointestinal: Bowel Sounds present, soft, nontender, nondistended, no guarding, no rebound  Extremities: Warm and well perfused, trace-+1 peripheral edema  Neurological: A/O x 2, is no longer aware of where we are or what brought him to the hospital but knows it is 2020 and his name  Skin: No rashes appreciated    MEDICATIONS  (STANDING):  aspirin 325 milliGRAM(s) Oral daily  atorvastatin 40 milliGRAM(s) Oral at bedtime  carvedilol 3.125 milliGRAM(s) Oral every 12 hours  cefTRIAXone Injectable. 1000 milliGRAM(s) IV Push every 24 hours  furosemide   Injectable 20 milliGRAM(s) IV Push daily  heparin   Injectable 5000 Unit(s) SubCutaneous every 12 hours  hydrALAZINE 10 milliGRAM(s) Oral three times a day  isosorbide   mononitrate ER Tablet (IMDUR) 30 milliGRAM(s) Oral daily  LORazepam     Tablet 0.25 milliGRAM(s) Oral at bedtime  LORazepam   Injectable 0.25 milliGRAM(s) IV Push once  multivitamin 1 Tablet(s) Oral daily  QUEtiapine 25 milliGRAM(s) Oral daily  tamsulosin 0.4 milliGRAM(s) Oral two times a day  thiamine IVPB 500 milliGRAM(s) IV Intermittent every 8 hours    MEDICATIONS  (PRN):  LORazepam     Tablet 0.5 milliGRAM(s) Oral every 12 hours PRN Agitation    LABS: All Labs Reviewed:  Patient refusing labs, most recent labs are as follows:                      10.3   5.18  )-----------( 182      ( 17 Dec 2020 07:29 )             35.1                         10.9   5.30  )-----------( 223      ( 15 Dec 2020 07:39 )             37.2   12-17    143  |  112<H>  |  33<H>  ----------------------------<  83  4.4   |  25  |  1.65<H>    Ca    9.1      17 Dec 2020 07:29  Mg     2.1     12-17    12-16    140  |  113<H>  |  26<H>  ----------------------------<  90  5.0   |  20<L>  |  1.75<H>    Ca    9.1      16 Dec 2020 07:11  Phos  3.1     12-15  Mg     2.2     12-16    TPro  7.4  /  Alb  3.0<L>  /  TBili  1.7<H>  /  DBili  x   /  AST  44<H>  /  ALT  140<H>  /  AlkPhos  109  12-15    PT/INR - ( 15 Dec 2020 07:39 )   PT: 14.9 sec;   INR: 1.30 ratio    PTT - ( 15 Dec 2020 07:39 )  PTT:30.8 sec    CARDIAC MARKERS ( 15 Dec 2020 00:40 )  0.045 ng/mL / x     / x     / x     / x      CARDIAC MARKERS ( 14 Dec 2020 23:32 )  0.048 ng/mL / x     / x     / x     / x      CARDIAC MARKERS ( 14 Dec 2020 21:34 )  0.047 ng/mL / x     / x     / x     / x        ProBNP >11,000      BLOOD CULTURES: Organism --  Gram Stain Blood -- Gram Stain --  Specimen Source .Urine Clean Catch (Midstream)  Culture-Blood --      LIPID PROFILE lipid profile                          12-15 @ 07:39  cholesterol           91 mg/dL  Direct LDL            --  HDL cholesterol       27 mg/dL  HDL/Total cholesterol --  Triglycerides, serum  57 mg/dL    RADIOLOGY:    EXAM:  XR CHEST PA LAT 2V                        PROCEDURE DATE:  12/14/2020    IMPRESSION:   Heart enlargement again noted. Mild central CHF at this time.    EXAM:  MR ANGIO NECK DC                        EXAM:  MR ANGIO BRAIN DC                        EXAM:  MR BRAIN DC                        PROCEDURE DATE:  12/15/2020      FINDINGS:  Brain MRI:  Motion limited study, redemonstration enlarged ventricles and sulci consistent with severe volume loss with remote infarcts right greater than left MCA distribution involving the bilateral posterior frontal and parietal lobes as and remote PCA infarcts with lateral ventricle ex vacuo enlargement unchanged. Redemonstration left larger than right cerebellar remote infarcts with encephalomalacia, gliosis, and ex vacuo enlargement of fourth ventricle unchanged. There is extensive patchy T2 and FLAIR white matter hyperintensity likely microvascular disease with remote lacunar infarcts unchanged from prior. There is a decreased size to the mamillary bodies, correlate with thiamin vitamin B1 deficiency. Additional foci of of T2/FLAIR signal hyperintensity within the hemispheric white matter, which are nonspecific and likely sequela of microvascular disease. No new intraparenchymal hematoma, mass effect or midline shift. No new restrictive diffusion to suggest acute or subacute infarction. No new abnormal extra-axial fluid collections are present. Basal cisterns remain patent and unchanged.    The calvarium is intact. Orbits are limited by motion, with redemonstration of downward displacement of the right orbital floor unchanged from prior. Tympanomastoid cavities appear free of acute disease. There is mild mucosal thickening in the paraspinal sinuses with possible retention cyst or polyps in themaxillary antra, there are no air-fluid levels.    Brain MRA:  Motion limited MRA, there is flow-related signal within the internal carotid arteries within the cavernous supraclinoid and bifurcation segments with multifocal stenosis and with extensive atherosclerotic vascular calcification on CT. there are stenoses proximal to the bifurcation segments with poorly delineated left A1 segment and stenosis of the distal right A1 segment with limitations of the study due to motion. There are severe stenoses of the M1,  proximal M2 and an 3 MCA branch vessels, with study limited by motion with significant decrease in size and number right greater than left distal MCA branches. Proximal A 2 anterior cerebral arteries are poorly delineated, this may reflect technique and motion, there are stenoses of the distal bilateral anterior cerebral arteries.    Tortuous and dominant left vertebral artery crosses the right of midline and is the dominant supply to the basilar artery. Poorly delineated right vertebral artery secondary to motion narrowing proximal to the vertebrobasilar junction, and stenosis of the right posterior inferior cerebellar artery vessels are poorly delineated junction, suggest repeat imaging the patient can hold still. There is multifocal stenosis and narrowing of the basilar artery. There is poor delineation and multifocal stenosis and narrowing of the bilateral proximal mid and distal posterior cerebral arteries.    Neck MRA:  views were obtained only, suggest repeat imaging when patient is able to hold and complete exam.    IMPRESSION:    Redemonstration volume loss remote infarcts right greater than left MCA distribution in bilateral frontal and parietal lobes with encephalomalacia, bilateral PCA, and bilateral cerebellar left greater than right territories with encephalomalacia, marked microvascular disease, remote lacunar infarcts, without new restricted diffusion, hemorrhage or shift.    Decreased size mamillary bodies correlate with thiamin vitamin [id number] severely motion limited MRA with atherosclerotic vascular calcification multifocal narrowing and stenosis of the cavernous to provide ICAs, with severe multifocal stenosis of the intracranial arterial middle and posterior cerebral arteriesand vertebral and basilar arteries as detailed above, MRA of the neck only obtained  views, suggest repeat imaging when patient can hold still.    EKG:    (12/15/2020, my interpretation): NSR at 81bpm, biatrial enlargement, LAD and LVH with repolarization     TELEMETRY:    Reviewed. HRs 30-50s with frequent PVCs and a slow wide complex rhythm ?AIVR    ECHO:    EXAM:  ECHO TTE WO CON COMP W DOPP    PROCEDURE DATE:  12/15/2020       Summary     The left ventricle cavity is mildly dilated.   Mild concentric left ventricular hypertrophy is present.   Estimated left ventricular ejection fraction is 15-20 %.   The left atrium is severely dilated.   The right atrium appears moderately dilated.   Mild to Moderate mitral regurgitation is present.   Mild (1+) tricuspid valve regurgitation is present.   Mild aortic sclerosis is present with normal valvular opening.

## 2020-12-18 NOTE — CONSULT NOTE ADULT - ASSESSMENT
81yo M w/ multiple comorbidities presented with AMS. Currently being evaluated for systemic amyloidosis and surgery was consulted for subcutaneous tissue biopsy    Plan:  - will add on for Monday 12/21  - Please preop Sunday night  - continue medical management by primary team    Plan discussed with surgery team and attending, Dr. Valentine 79yo M w/ multiple comorbidities presented with AMS. Currently being evaluated for systemic amyloidosis and surgery was consulted for subcutaneous tissue biopsy    Plan:  - planned for bedside subcutaneous tissue biopsy tomorrow AM  - continue medical management by primary team    Plan discussed with surgery team and attending, Dr. Mcgill

## 2020-12-18 NOTE — CONSULT NOTE ADULT - SUBJECTIVE AND OBJECTIVE BOX
HPI:  79yo M w/ hx of CVA/TIA, dyslipidemia, HTN, GERD, seizure disorder presented with AMS evidenced by daughter at 12/15/20. Patient currently in no acute distress, AOx2. Patient is able to talk but intermittently does not answer appropriately within context. Denies headache, fever/chills, shortness of breath, chest pain.  Patient underwent brain MRI/MRA which demonstrated multiple old infarcts with encephalomalacia and significant vascular disease and consistent w/ Wernicke's encephalopathy given vitB1 deficiency. Abnormal EEG. Cardiology is following for elevated troponin and EF of 10-20% with diffuse hypokinesis and irregular rhythm    Sugery was consulted for subcutaneous biopsy for diagnosis of systemic amyloidosis.       PAST MEDICAL & SURGICAL HISTORY:  Chronic GERD    HTN (hypertension)    HLD (hyperlipidemia)    CVA (cerebral vascular accident)    High cholesterol    HTN (hypertension)    History of surgical removal of testicle    H/O hernia repair    No significant past surgical history        MEDICATIONS  (STANDING):  aspirin 325 milliGRAM(s) Oral daily  atorvastatin 40 milliGRAM(s) Oral at bedtime  carvedilol 3.125 milliGRAM(s) Oral every 12 hours  cefTRIAXone Injectable. 1000 milliGRAM(s) IV Push every 24 hours  furosemide   Injectable 20 milliGRAM(s) IV Push daily  heparin   Injectable 5000 Unit(s) SubCutaneous every 12 hours  hydrALAZINE 10 milliGRAM(s) Oral three times a day  isosorbide   mononitrate ER Tablet (IMDUR) 30 milliGRAM(s) Oral daily  LORazepam     Tablet 0.25 milliGRAM(s) Oral at bedtime  LORazepam   Injectable 0.25 milliGRAM(s) IV Push once  multivitamin 1 Tablet(s) Oral daily  QUEtiapine 25 milliGRAM(s) Oral daily  tamsulosin 0.4 milliGRAM(s) Oral two times a day  thiamine IVPB 500 milliGRAM(s) IV Intermittent every 8 hours    MEDICATIONS  (PRN):  LORazepam     Tablet 0.5 milliGRAM(s) Oral every 12 hours PRN Agitation      Allergies    No Known Allergies    Intolerances        SOCIAL HISTORY:    FAMILY HISTORY:          Physical Exam:  General: NAD, resting comfortably  HEENT: NC/AT, EOMI, normal hearing, no oral lesions, no LAD, neck supple  Pulmonary: normal resp effort, CTA-B  Cardiovascular: S1, S2 normal, irregular rhythm  Abdominal: soft, ND/NT, no sign of old scars  Extremities: WWP, normal strength, no clubbing/cyanosis/edema  Neuro: A/O x 3, CNs II-XII grossly intact, normal sensation, no focal deficits  Pulses: palpable distal pulses    Vital Signs Last 24 Hrs  T(C): 36.6 (18 Dec 2020 07:56), Max: 36.6 (18 Dec 2020 07:56)  T(F): 97.9 (18 Dec 2020 07:56), Max: 97.9 (18 Dec 2020 07:56)  HR: 64 (18 Dec 2020 14:17) (52 - 69)  BP: 130/74 (18 Dec 2020 14:17) (97/57 - 130/74)  BP(mean): --  RR: 18 (18 Dec 2020 14:17) (18 - 19)  SpO2: 98% (18 Dec 2020 14:17) (98% - 99%)    I&O's Summary    17 Dec 2020 07:01  -  18 Dec 2020 07:00  --------------------------------------------------------  IN: 0 mL / OUT: 250 mL / NET: -250 mL            LABS:                        10.3   5.18  )-----------( 182      ( 17 Dec 2020 07:29 )             35.1     12-18    141  |  111<H>  |  37<H>  ----------------------------<  96  4.5   |  25  |  1.78<H>    Ca    8.7      18 Dec 2020 11:18  Mg     2.1     12-17          CAPILLARY BLOOD GLUCOSE            Cultures:      RADIOLOGY & ADDITIONAL STUDIES:    < from: MR Head No Cont Disc (12.15.20 @ 16:03) >  EXAM:  MR ANGIO NECK DC                          EXAM:  MR ANGIO BRAIN DC                          EXAM:  MR BRAIN DC                       < end of copied text >  < from: MR Head No Cont Disc (12.15.20 @ 16:03) >  IMPRESSION:    Redemonstration volume loss remote infarcts right greater than left MCA distribution in bilateral frontal and parietal lobes with encephalomalacia, bilateral PCA, and bilateral cerebellar left greater than right territories with encephalomalacia, marked microvascular disease, remote lacunar infarcts, without new restricted diffusion, hemorrhage or shift.    Decreased size mamillary bodies correlate with thiamin vitamin [id number] severely motion limited MRA with atherosclerotic vascular calcification multifocal narrowing and stenosis of the cavernous to provide ICAs, with severe multifocal stenosis of the intracranial arterial middle and posterior cerebral arteriesand vertebral and basilar arteries as detailed above, MRA of the neck only obtained  views, suggest repeat imaging when patient can hold still.    < end of copied text >

## 2020-12-18 NOTE — CONSULT NOTE ADULT - CONSULT REQUESTED DATE/TIME
15-Dec-2020 12:18
16-Dec-2020 08:17
16-Dec-2020 15:00
18-Dec-2020 09:38
18-Dec-2020 14:26
17-Dec-2020 17:54

## 2020-12-18 NOTE — CONSULT NOTE ADULT - CONSULT REASON
UTI, retention of urine
Cardiomyopathy, evaluate for ICD or lifevest
Elevated troponins
GOC
change in mS
subcutaneous tissue biopsy for systemic amyloidosis

## 2020-12-18 NOTE — PROGRESS NOTE ADULT - ASSESSMENT
79 y/o M w/ PMH of CVA, TIA, dyslipidemia, HTN, GERD, h/o seizure disorder p/w AMS found to have abnormal MRI/MRA of the brain with minimally elevated troponins and newly severe cardiomyopathy EF 15-20%. Cardiology consulted for further evaluation and treatment.    -Patient continues to be confused and intermittently agitated requiring ativan. Not a candidate at this time for LHC because would be too high risk for injury/complication since he would not be cooperative. LV is dilated, indicating that this is unlikely to be new. C/W Medical management with ASA, Statin, BB.  -Starting to develop orthopnea with elevated pro-bnp, agree with starting on Lasix 20 IV Qday and monitoring strict I/O, daily weights and monitor renal function/electrolytes.  -Has been bradycardiac with wide complex rhythms and frequent PVCs, unable to get his BB regularly due to bradycardia, agree with decreasing dose for now.  -Agree with hydralazine and imdur (imdur to help with pill burden) and continue to optimize these medications as he can tolerate.  -If renal function continues to improve and BP can tolerate it consider starting on an ACE vs ARB.  -EP on board, appreciated recommendations and how patient is not a candidate for LifeVest at this time. continue to monitor mentation.      PRELIM NOTE!!! NOT COMPLETED!! 81 y/o M w/ PMH of CVA, TIA, dyslipidemia, HTN, GERD, h/o seizure disorder p/w AMS found to have abnormal MRI/MRA of the brain with minimally elevated troponins and newly severe cardiomyopathy EF 15-20%. During work up patient was found to have elevated Kappa/Lamda light chains concerning for Amyloidosis. Cardiology consulted for further evaluation and treatment.    -Patient is uncooperative, would be too high risk for LHC but still recommend evaluation for source of cardiomyopathy.   -Plan for Pharm Nuclear stress test  -Elevated kappa/lamda, agree with getting surgery on board and checking fat pad bx for amyloid  -Remains bradycardic, BB reduced to 3.125 BID but still unable to receive it. Hold for HR <55  -Unable to get lab work and on Lasix 20 IV Qday. If he continues to refuse lab work would stop this and switch to Lasix 20 PO, because we need to know his electrolytes if we are going to be diuresing him  -Holding ACE/ARB due to renal function. Continue to monitor renal function if patient allows blood draws and start ACE/ARB if able C/W Hydralazine/Isordil. No plan to make changes since he has some episodes of lower BP.  -EP on board, appreciated recommendations and how patient is not a candidate for LifeVest at this time. continue to monitor mentation.  -Agree with palliative consultation, patient's prognosis is poor.

## 2020-12-18 NOTE — CONSULT NOTE ADULT - SUBJECTIVE AND OBJECTIVE BOX
HPI: Pt is a 80y old Male a PMH of CVA, TIA, dyslipidemia, HTN, GERD, h/o seizure disorder presents with AMS. Patient states he doesn't recall the episode at all, but reports that his daughter noticed he was not making sense so brought him to the hospital. It is reported in the chart that daughter had stated patient did not have LOC/syncope, just garbled speech and not making sense. MRI/MRA brain showed multiple old infarcts with encephalomalacia, significant cerebral vascular disease and an abnormal EEG. He had minimally elevated troponin  and TTE  showing a newly reduced  EF about 10-20% global hypokinesis. Pt has had intermittent agitation and confusion. Palliative Medicine Consult  to establish [facility]  2020 Seen and examined at bedside with nsg aide present for constant obs. Pt oriented to person and place. Denies pain or dyspnea.      PAIN: ( )Yes   (X )No    DYSPNEA: ( ) Yes  (X ) No  Level:    PAST MEDICAL & SURGICAL HISTORY:  Chronic GERD  HTN (hypertension)  HLD (hyperlipidemia)  CVA (cerebral vascular accident)  High cholesterol  HTN (hypertension)  History of surgical removal of testicle  H/O hernia repair  No significant past surgical history    SOCIAL HX:    Hx opiate tolerance ( )YES  ( )NO    Baseline ADLs  (Prior to Admission)  (X ) Independent   ( )Dependent    FAMILY HISTORY:  Unable to obtain due to confusion    Review of Systems:    Unable to obtain/Limited due to: confusion      PHYSICAL EXAM:    Vital Signs Last 24 Hrs  T(C): 36.6 (18 Dec 2020 07:56), Max: 36.6 (18 Dec 2020 07:56)  T(F): 97.9 (18 Dec 2020 07:56), Max: 97.9 (18 Dec 2020 07:56)  HR: 69 (18 Dec 2020 07:56) (51 - 69)  BP: 125/65 (18 Dec 2020 07:56) (97/57 - 125/65)  RR: 19 (18 Dec 2020 07:56) (18 - 19)  SpO2: 99% (18 Dec 2020 07:56) (98% - 99%)  Daily Weight in k.1 (18 Dec 2020 06:10)    PPSV2:  40-50 %    General: Elderly male in NAD  Mental Status: Alert and oriented X2/restless and agitated at times  HEENT: oral mucosa dry  Lungs: clear to auscultation pb  Cardiac: S1S2+  GI: abd soft NT ND + BS  : voids  Ext: MACKEY on bed/pb LE edema  Neuro: confusion      LABS:                                 10.3   5.18  )-----------( 182      ( 17 Dec 2020 07:29 )             35.1          12    141  |  111<H>  |  37<H>  ----------------------------<  96  4.5   |  25  |  1.78<H>    Ca    8.7      18 Dec 2020 11:18  Mg     2.1             Albumin: Albumin, Serum: 3.0 g/dL (12-15 @ 07:39)      Allergies    No Known Allergies    Intolerances      MEDICATIONS  (STANDING):  aspirin 325 milliGRAM(s) Oral daily  atorvastatin 40 milliGRAM(s) Oral at bedtime  carvedilol 3.125 milliGRAM(s) Oral every 12 hours  cefTRIAXone Injectable. 1000 milliGRAM(s) IV Push every 24 hours  furosemide   Injectable 20 milliGRAM(s) IV Push daily  heparin   Injectable 5000 Unit(s) SubCutaneous every 12 hours  hydrALAZINE 10 milliGRAM(s) Oral three times a day  isosorbide   mononitrate ER Tablet (IMDUR) 30 milliGRAM(s) Oral daily  LORazepam   Injectable 0.25 milliGRAM(s) IV Push once  multivitamin 1 Tablet(s) Oral daily  QUEtiapine 25 milliGRAM(s) Oral daily  tamsulosin 0.4 milliGRAM(s) Oral two times a day  thiamine IVPB 500 milliGRAM(s) IV Intermittent every 8 hours    MEDICATIONS  (PRN):  LORazepam     Tablet 0.5 milliGRAM(s) Oral every 12 hours PRN Agitation      RADIOLOGY/ADDITIONAL STUDIES:  < from: US Kidney and Bladder (20 @ 18:55) >  EXAM:  US KIDNEYS AND BLADDER                            PROCEDURE DATE:  2020          INTERPRETATION:  CLINICAL INFORMATION: UTI, urinary retention.    COMPARISON: None available.    TECHNIQUE: Sonography of the kidneys and bladder.    FINDINGS:    Right kidney: 9.2 cm. No renal mass, hydronephrosis or calculi.    Left kidney: 10.4 cm. No renal mass, hydronephrosis or calculi.    Urinary bladder: Distended, 11.2 x 8.7 x 11.2 cm (765 mL). LEFT ureteral jet identified. Enlarged prostate measuring 5.2 x 5.1 x 5.6 cm (79 mL).    IMPRESSION:  Distended bladder, enlarged prostate. No hydronephrosis.    < from: MR Head No Cont Disc (12.15.20 @ 16:03) >    EXAM:  MR ANGIO NECK DC                          EXAM:  MR ANGIO BRAIN DC                          EXAM:  MR BRAIN DC                            PROCEDURE DATE:  12/15/2020          INTERPRETATION:  CLINICAL INDICATION: Altered mental status    Technique: Noncontrast brain MRI, non-contrast brain MRA and attempt at a neck MRA was performed.    Through the brain, sagittal and axial T1, axial T2, FLAIR, diffusion weighted images and an ADC map were obtained. MR angiography of the intracranial circulation was performed with time of flight imaging technique.  views were for MRA of the neck, per the technologist, the patient was unable to hold still and complete the exam. Maximal intensity projection images were reviewed in multiple planes.    COMPARISON: Head CT 2020, 10/18/2020, MRI brain, 2020    FINDINGS:  Brain MRI:  Motion limited study, redemonstration enlarged ventricles and sulci consistent with severe volume loss with remote infarcts right greater than left MCA distribution involving the bilateral posterior frontal and parietal lobes as and remote PCA infarcts with lateral ventricle ex vacuo enlargement unchanged. Redemonstration left larger than right cerebellar remote infarcts with encephalomalacia, gliosis, and ex vacuo enlargement of fourth ventricle unchanged. There is extensive patchy T2 and FLAIR white matter hyperintensity likely microvascular disease with remote lacunar infarcts unchanged from prior. There is a decreased size to the mamillary bodies, correlate with thiamin vitamin B1 deficiency. Additional foci of of T2/FLAIR signal hyperintensity within the hemispheric white matter, which are nonspecific and likely sequela of microvascular disease. No new intraparenchymal hematoma, mass effect or midline shift. No new restrictive diffusion to suggest acute or subacute infarction. No new abnormal extra-axial fluid collections are present. Basal cisterns remain patent and unchanged.    The calvarium is intact. Orbits are limited by motion, with redemonstration of downward displacement of the right orbital floor unchanged from prior. Tympanomastoid cavities appear free of acute disease. There is mild mucosal thickening in the paraspinal sinuses with possible retention cyst or polyps in themaxillary antra, there are no air-fluid levels.      Brain MRA:  Motion limited MRA, there is flow-related signal within the internal carotid arteries within the cavernous supraclinoid and bifurcation segments with multifocal stenosis and with extensive atherosclerotic vascular calcification on CT. there are stenoses proximal to the bifurcation segments with poorly delineated left A1 segment and stenosis of the distal right A1 segment with limitations of the study due to motion. There are severe stenoses of the M1,  proximal M2 and an 3 MCA branch vessels, with study limited by motion with significant decrease in size and number right greater than left distal MCA branches. Proximal A 2 anterior cerebral arteries are poorly delineated, this may reflect technique and motion, there are stenoses of the distal bilateral anterior cerebral arteries.    Tortuous and dominant left vertebral artery crosses the right of midline and is the dominant supply to the basilar artery. Poorly delineated right vertebral artery secondary to motion narrowing proximal to the vertebrobasilar junction, and stenosis of the right posterior inferior cerebellar artery vessels are poorly delineated junction, suggest repeat imaging the patient can hold still. There is multifocal stenosis and narrowing of the basilar artery. There is poor delineation and multifocal stenosis and narrowing of the bilateral proximal mid and distal posterior cerebral arteries.    Neck MRA:  views were obtained only, suggest repeat imaging when patient is able to hold and complete exam.    IMPRESSION:    Redemonstration volume loss remote infarcts right greater than left MCA distribution in bilateral frontal and parietal lobes with encephalomalacia, bilateral PCA, and bilateral cerebellar left greater than right territories with encephalomalacia, marked microvascular disease, remote lacunar infarcts, without new restricted diffusion, hemorrhage or shift.    Decreased size mamillary bodies correlate with thiamin vitamin [id number] severely motion limited MRA with atherosclerotic vascular calcification multifocal narrowing and stenosis of the cavernous to provide ICAs, with severe multifocal stenosis of the intracranial arterial middle and posterior cerebral arteriesand vertebral and basilar arteries as detailed above, MRA of the neck only obtained  views, suggest repeat imaging when patient can hold still.        < from: CT Head No Cont (12.14.20 @ 22:01) >    EXAM:  CT BRAIN                            PROCEDURE DATE:  2020          INTERPRETATION:  CLINICAL INFORMATION: Altered mental status    TECHNIQUE: Noncontrast axial CT images of the brain were acquired from the base of skull to vertex.    COMPARISON: CT head 10/18/2020.    FINDINGS: No intracranial hemorrhage is seen. Chronic, stable infarcts in the right frontal and parietal lobes. Chronic, stable bilateral cerebellar infarcts. Extensive periventricular and subcortical white matter hypoattenuation without mass effect is noted, non-specific, but likely related to chronic small vessel ischemic changes..    Cerebral volume loss is present with proportional prominence of the sulci and ventricles. No mass effect or midline shift is seen. Basal cisterns are not effaced.    The visualized paranasal sinuses and tympanomastoid cavities are clear.    No osseous abnormality seen.    IMPRESSION: No intracranial hemorrhage or mass effect. Stable chronic infarcts.      < from: Xray Chest 2 Views PA/Lat (1214.20 @ 21:48) >    EXAM:  XR CHEST PA LAT 2V                            PROCEDURE DATE:  2020          INTERPRETATION:  AP chest on 2020 at 9:37 PM. Patient has chest pain.    Heart is grossly enlarged. The aorta is somewhat prominent.    There is a slight central congestive picture somewhat increased from . There is respiratory motion on the study.    IMPRESSION: Heart enlargement again noted. Mild central CHF at this time.

## 2020-12-18 NOTE — CONSULT NOTE ADULT - ASSESSMENT
HPI: Pt is a 80y old Male a PMH of CVA, TIA, dyslipidemia, HTN, GERD, h/o seizure disorder presents with AMS. Patient states he doesn't recall the episode at all, but reports that his daughter noticed he was not making sense so brought him to the hospital. It is reported in the chart that daughter had stated patient did not have LOC/syncope, just garbled speech and not making sense. MRI/MRA brain showed multiple old infarcts with encephalomalacia, significant cerebral vascular disease and an abnormal EEG. He had minimally elevated troponin  and TTE  showing a newly reduced  EF about 10-20% global hypokinesis. Pt has had intermittent agitation and confusion. Palliative Medicine Consult  to establish GOC  12/18/2020 Seen and examined at bedside with nsg aide present for constant obs. Pt oriented to person and place. Denies pain or dyspnea.    Assessment and Plan:    1) AMS  -Metabolic encephalopathy  -1) Toxic/Metabolic encephalopathy  -? R/T infectious process  -possible Wernicke's   -CT head= Multiple chronic stable infarcts  -MRI/MRA noted  -Intermittent agitation  -Required IV Haldol  -Maintain Seroquel  -Cont Ativan 0.5 mg PO Q12H PRN  -Cont Ativan 0.25 mg PO HS    2) Acute on Chronic Systolic HF  -Cardiomyopathy   -Last EF  45%, now 10-20% EF  -Cont. BB    -Cont. IV Lasix QD  -NM stress test Monday  - EP eval noted/ AICD after 3 months of med tx  - life vest contraindicated at this time    4) UTI   - Cont. Ceftriaxone  - Urine cx - klebsiella, sens to Cef.  - s/p gentle hydration  - encourage PO hydration.     5) LASHELL on CKD Stage 3  - urinary retention  - S/P IVF  -creat > 1.7  -bladder scan  - patient refusing Arevalo  - Cont. Flomax     6) Advanced Directives  -Pt without capacity  -Son Jared sommer  -No limits  set in place  -Will schedule GOC discussion

## 2020-12-18 NOTE — PROGRESS NOTE ADULT - SUBJECTIVE AND OBJECTIVE BOX
CHIEF COMPLAINT/DIAGNOSIS: AMS    HPI: 79 y/o M w/ PMH of CVA, TIA, dyslipidemia, HTN, GERD, h/o seizure disorder p/w AMS. Patient states he doesn't recall the episode at all, and states he is completely fine. Patient has trouble stating where is currently, but otherwise knows it's 2020, and is conversational. As per ED note, patient's daughter had noticed AMS where patient's speech was abnormal and was groggy. However, daughter had stated patient did not have LOC. Presently patient denies any weakness in arms / legs / slurred speech / facial droop / vision deficits / sensory deficits / CP / SOB / cough / runny nose / sore throat / nausea / vomiting / abdominal pain / diarrhea.  ** As per son patient has been non-compliant with medications for 1month unable to receive from ?? **    12/16: no complaints. limited ros due to cognitive impairment. overnight with agitation requiring haldol. denies sob, cp or palp. reports no prior hx of alcohol use.  12/17: lethargic, weakness. received IV ativan overnight and still sedated. will change to oral and monitor. on 1:1  12/18: awake, and slightly cooperative. does not tolerate benzos well. c/o of sob at times.    PHYSICAL EXAM:  Constitutional: Awake, obese  HEENT:  Normal Hearing, MMM  Neck: Soft and supple  Respiratory: Breath sounds are clear/ diminished b/l   Cardiovascular: S1 and S2, regular rate and rhythm, no Murmurs, gallops or rubs  Gastrointestinal: Bowel Sounds present, soft, nontender, nondistended, no guarding, no rebound  Extremities: trace, pitting +1 b/l peripheral edema  Vascular: 2+ peripheral pulses  Neurological: A/O x 2, forgetful. follows commands. no visible facial droop. able to lift arm over head. blinks to threat. speech fluent.   Musculoskeletal: 5/5 strength b/l upper and lower extremities  Skin: No rashes    Vital Signs Last 24 Hrs  T(C): 36.6 (18 Dec 2020 07:56), Max: 36.6 (18 Dec 2020 07:56)  T(F): 97.9 (18 Dec 2020 07:56), Max: 97.9 (18 Dec 2020 07:56)  HR: 69 (18 Dec 2020 07:56) (51 - 69)  BP: 125/65 (18 Dec 2020 07:56) (97/57 - 125/65)  BP(mean): --  RR: 19 (18 Dec 2020 07:56) (18 - 19)  SpO2: 99% (18 Dec 2020 07:56) (98% - 99%)- room air     LABS: All Labs Reviewed:                        10.3   5.18  )-----------( 182      ( 17 Dec 2020 07:29 )             35.1     12-18    141  |  111<H>  |  37<H>  ----------------------------<  96  4.5   |  25  |  1.78<H>    Ca    8.7      18 Dec 2020 11:18  Mg     2.1     12-17    Blood Culture: 12-14 @ 21:34  Organism Klebsiella pneumoniae  Gram Stain Blood -- Gram Stain --  Specimen Source .Urine Clean Catch (Midstream)  Culture-Blood --    RADIOLOGY:  < from: MR Head No Cont Disc (12.15.20 @ 16:03) >  IMPRESSION:  Re demonstration volume loss remote infarcts right greater than left MCA distribution in bilateral frontal and parietal lobes with encephalomalacia, bilateral PCA, and bilateral cerebellar left greater than right territories with encephalomalacia, marked microvascular disease, remote lacunar infarcts, without new restricted diffusion, hemorrhage or shift.    Decreased size mamillary bodies correlate with thiamin vitamin [id number] severely motion limited MRA with atherosclerotic vascular calcification multifocal narrowing and stenosis of the cavernous to provide ICAs, with severe multifocal stenosis of the intracranial arterial middle and posterior cerebral arteries and vertebral and basilar arteries as detailed above, MRA of the neck only obtained  views, suggest repeat imaging when patient can hold still.  < end of copied text >    < from: CT Head No Cont (12.14.20 @ 22:01) >  IMPRESSION: No intracranial hemorrhage or mass effect. Stable chronic infarcts.  < end of copied text >    ECHOCARDIOGRAM:   < from: TTE Echo Complete w/o Contrast w/ Doppler (12.15.20 @ 14:20) >   The left ventricle cavity is mildly dilated.   Mild concentric left ventricular hypertrophy is present.   Estimated left ventricular ejection fraction is 15-20 %.   The left atrium is severely dilated.   The right atrium appears moderately dilated.   Mild to Moderate mitral regurgitation is present.   Mild (1+) tricuspid valve regurgitation is present.   Mild aortic sclerosis is present with normal valvular opening.  < end of copied text >    MEDICATIONS  (STANDING):  aspirin 325 milliGRAM(s) Oral daily  atorvastatin 40 milliGRAM(s) Oral at bedtime  carvedilol 3.125 milliGRAM(s) Oral every 12 hours  cefTRIAXone Injectable. 1000 milliGRAM(s) IV Push every 24 hours  furosemide   Injectable 20 milliGRAM(s) IV Push daily  heparin   Injectable 5000 Unit(s) SubCutaneous every 12 hours  hydrALAZINE 10 milliGRAM(s) Oral three times a day  isosorbide   mononitrate ER Tablet (IMDUR) 30 milliGRAM(s) Oral daily  LORazepam   Injectable 0.25 milliGRAM(s) IV Push once  multivitamin 1 Tablet(s) Oral daily  QUEtiapine 25 milliGRAM(s) Oral daily  tamsulosin 0.4 milliGRAM(s) Oral two times a day  thiamine IVPB 500 milliGRAM(s) IV Intermittent every 8 hours    MEDICATIONS  (PRN):  QUEtiapine 12.5 milliGRAM(s) Oral every 8 hours PRN agitation    TELEMETRY REVIEW:  12/18: frequent bigeminy, triplets, PVCs HR. mid 30s at night. 60-80s daytime.      ASSESSMENT AND PLAN:    79 y/o M w/ PMH of CVA, TIA, dyslipidemia, HTN, GERD, p/w AMS.     1) Toxic/Metabolic encephalopathy due to UTI and possible Wernicke's   - tele monitoring. tele review as above.  - CTH - Multiple chronic stable infarcts. f/u MRI / MRA > as above, no acute infarcts.  - Cont. IV thiamine day #3  - F/u b12, b1, b6, folate lvls.  - Seroquel daily (limit use - prolongs QTC) /ativan prn for agitation/ sundowning   - ABX for UTI  - Speech, PT, Neuro, Cardio consult appreciated     2) Acute on Chronic Systolic HF/ Cardiomyopathy - not in acute exacerbation  - Per cardio last known EF 45%, now 10-20% EF  - Cont: GDMT: not candidate for ACEi.   - Cont. BB (dose reduced to 3.125 BID w/ parameters), hydralazine, Imdur   - Cont. IV Lasix QD  - NM stress test Monday. NPO at midnight on Sunday  - send viral studies - coxsackie, echo virus.  - amyloidosis w/u - elevated lgt chains. get fat pad bx d/w cardio. sx cx   - EP eval - AICD after 3 months of GDMT, life vest contraindicated at this time    3) Mildly elevated troponins  - Mildly elevated and downtrended. No CP  - Cont. ASA, statin  - Cardio consult appreciated - NM stress test (plan for Monday, ativan prior to test)    4) UTI - likely GNR  - Cont. Ceftriaxone  - Urine cx - klebsiella, sens to Cef.  - s/p gentle hydration. encourage PO hydration.     5) LASHELL on CKD Stage 3, urinary retention  - s/p IVF  - Avoid nephrotoxic agents, no ACE/ARB  - bladder scan - monitor. patient refusing Arevalo  - Cont. Flomax     6) Transaminitis   - monitor f/u hep panel   - lipids wnl  - monitor on statin therapy.    8) DVT ppx  - Heparin SubQ    Advanced Directives: Full Code. Consult palliative. patient with multiple chronic progressive conditions    ~~ Spoke to son Jared with updates 12/15, 12/16 12/17: LM for Jared w/ call back. spoke with Dez at length this AM 12/18. agreeable to sedative medications.

## 2020-12-19 ENCOUNTER — RESULT REVIEW (OUTPATIENT)
Age: 80
End: 2020-12-19

## 2020-12-19 LAB
ANION GAP SERPL CALC-SCNC: 4 MMOL/L — LOW (ref 5–17)
BUN SERPL-MCNC: 35 MG/DL — HIGH (ref 7–23)
CALCIUM SERPL-MCNC: 8.8 MG/DL — SIGNIFICANT CHANGE UP (ref 8.5–10.1)
CHLORIDE SERPL-SCNC: 111 MMOL/L — HIGH (ref 96–108)
CO2 SERPL-SCNC: 27 MMOL/L — SIGNIFICANT CHANGE UP (ref 22–31)
CREAT SERPL-MCNC: 1.46 MG/DL — HIGH (ref 0.5–1.3)
CREATININE, URINE RESULT: 61 MG/DL — SIGNIFICANT CHANGE UP
GLUCOSE SERPL-MCNC: 110 MG/DL — HIGH (ref 70–99)
HCT VFR BLD CALC: 34.6 % — LOW (ref 39–50)
HGB BLD-MCNC: 10.3 G/DL — LOW (ref 13–17)
MCHC RBC-ENTMCNC: 29 PG — SIGNIFICANT CHANGE UP (ref 27–34)
MCHC RBC-ENTMCNC: 29.8 GM/DL — LOW (ref 32–36)
MCV RBC AUTO: 97.5 FL — SIGNIFICANT CHANGE UP (ref 80–100)
PLATELET # BLD AUTO: 169 K/UL — SIGNIFICANT CHANGE UP (ref 150–400)
POTASSIUM SERPL-MCNC: 4.4 MMOL/L — SIGNIFICANT CHANGE UP (ref 3.5–5.3)
POTASSIUM SERPL-SCNC: 4.4 MMOL/L — SIGNIFICANT CHANGE UP (ref 3.5–5.3)
PROT ?TM UR-MCNC: 8 MG/DL — SIGNIFICANT CHANGE UP (ref 0–12)
RBC # BLD: 3.55 M/UL — LOW (ref 4.2–5.8)
RBC # FLD: 15.5 % — HIGH (ref 10.3–14.5)
SODIUM SERPL-SCNC: 142 MMOL/L — SIGNIFICANT CHANGE UP (ref 135–145)
WBC # BLD: 5.72 K/UL — SIGNIFICANT CHANGE UP (ref 3.8–10.5)
WBC # FLD AUTO: 5.72 K/UL — SIGNIFICANT CHANGE UP (ref 3.8–10.5)

## 2020-12-19 PROCEDURE — 88305 TISSUE EXAM BY PATHOLOGIST: CPT | Mod: 26

## 2020-12-19 PROCEDURE — 99232 SBSQ HOSP IP/OBS MODERATE 35: CPT

## 2020-12-19 RX ORDER — LIDOCAINE HYDROCHLORIDE AND EPINEPHRINE 10; 10 MG/ML; UG/ML
10 INJECTION, SOLUTION INFILTRATION; PERINEURAL ONCE
Refills: 0 | Status: DISCONTINUED | OUTPATIENT
Start: 2020-12-19 | End: 2020-12-21

## 2020-12-19 RX ADMIN — TAMSULOSIN HYDROCHLORIDE 0.4 MILLIGRAM(S): 0.4 CAPSULE ORAL at 21:54

## 2020-12-19 RX ADMIN — ISOSORBIDE MONONITRATE 30 MILLIGRAM(S): 60 TABLET, EXTENDED RELEASE ORAL at 11:32

## 2020-12-19 RX ADMIN — ATORVASTATIN CALCIUM 40 MILLIGRAM(S): 80 TABLET, FILM COATED ORAL at 21:54

## 2020-12-19 RX ADMIN — CARVEDILOL PHOSPHATE 3.12 MILLIGRAM(S): 80 CAPSULE, EXTENDED RELEASE ORAL at 06:15

## 2020-12-19 RX ADMIN — Medication 325 MILLIGRAM(S): at 11:32

## 2020-12-19 RX ADMIN — QUETIAPINE FUMARATE 25 MILLIGRAM(S): 200 TABLET, FILM COATED ORAL at 11:32

## 2020-12-19 RX ADMIN — TAMSULOSIN HYDROCHLORIDE 0.4 MILLIGRAM(S): 0.4 CAPSULE ORAL at 11:32

## 2020-12-19 RX ADMIN — Medication 0.5 MILLIGRAM(S): at 16:54

## 2020-12-19 RX ADMIN — Medication 10 MILLIGRAM(S): at 21:54

## 2020-12-19 RX ADMIN — Medication 10 MILLIGRAM(S): at 06:13

## 2020-12-19 RX ADMIN — Medication 1 TABLET(S): at 11:32

## 2020-12-19 RX ADMIN — Medication 20 MILLIGRAM(S): at 11:34

## 2020-12-19 RX ADMIN — HEPARIN SODIUM 5000 UNIT(S): 5000 INJECTION INTRAVENOUS; SUBCUTANEOUS at 11:32

## 2020-12-19 RX ADMIN — HEPARIN SODIUM 5000 UNIT(S): 5000 INJECTION INTRAVENOUS; SUBCUTANEOUS at 21:54

## 2020-12-19 RX ADMIN — Medication 255 MILLIGRAM(S): at 06:13

## 2020-12-19 RX ADMIN — CEFTRIAXONE 1000 MILLIGRAM(S): 500 INJECTION, POWDER, FOR SOLUTION INTRAMUSCULAR; INTRAVENOUS at 00:06

## 2020-12-19 RX ADMIN — Medication 10 MILLIGRAM(S): at 14:30

## 2020-12-19 RX ADMIN — CARVEDILOL PHOSPHATE 3.12 MILLIGRAM(S): 80 CAPSULE, EXTENDED RELEASE ORAL at 17:28

## 2020-12-19 NOTE — PROGRESS NOTE ADULT - SUBJECTIVE AND OBJECTIVE BOX
Cheif complaints and Diagnosis: LASHELL/ Acute systolic chf/ possibley amyloidosis    Subjective: no complaints      REVIEW OF SYSTEMS:    CONSTITUTIONAL: No weakness, fevers or chills  EYES/ENT: No visual changes;  No vertigo or throat pain   NECK: No pain or stiffness  RESPIRATORY: No cough, wheezing, hemoptysis; No shortness of breath  CARDIOVASCULAR: No chest pain or palpitations  GASTROINTESTINAL: No abdominal or epigastric pain. No nausea, vomiting, or hematemesis; No diarrhea or constipation. No melena or hematochezia.  GENITOURINARY: No dysuria, frequency or hematuria  NEUROLOGICAL: No numbness or weakness  SKIN: No itching, burning, rashes, or lesions   All other review of systems is negative unless indicated above      Vital Signs Last 24 Hrs  T(C): 36.4 (19 Dec 2020 10:03), Max: 36.4 (18 Dec 2020 20:21)  T(F): 97.6 (19 Dec 2020 10:03), Max: 97.6 (19 Dec 2020 10:03)  HR: 72 (19 Dec 2020 10:03) (56 - 76)  BP: 146/85 (19 Dec 2020 10:03) (113/54 - 152/69)  BP(mean): --  RR: 18 (19 Dec 2020 10:03) (18 - 18)  SpO2: 94% (19 Dec 2020 10:03) (94% - 100%)    HEENT:   pupils equal and reactive, EOMI, no oropharyngeal lesions, erythema, exudates, oral thrush    NECK:   supple, no carotid bruits, no palpable lymph nodes, no thyromegaly    CV:  +S1, +S2, regular, no murmurs or rubs    RESP:   lungs clear to auscultation bilaterally, no wheezing, rales, rhonchi, good air entry bilaterally    BREAST:  not examined    GI:  abdomen soft, non-tender, non-distended, normal BS, no bruits, no abdominal masses, no palpable masses    RECTAL:  not examined    :  not examined    MSK:   normal muscle tone, no atrophy, no rigidity, no contractions    EXT:   no clubbing, no cyanosis, no edema, no calf pain, swelling or erythema    VASCULAR:  pulses equal and symmetric in the upper and lower extremities    NEURO:  AAOX3, no focal neurological deficits, follows all commands, able to move extremities spontaneously    SKIN:  no ulcers, lesions or rashes    MEDICATIONS  (STANDING):  aspirin 325 milliGRAM(s) Oral daily  atorvastatin 40 milliGRAM(s) Oral at bedtime  carvedilol 3.125 milliGRAM(s) Oral every 12 hours  cefTRIAXone Injectable. 1000 milliGRAM(s) IV Push every 24 hours  furosemide   Injectable 20 milliGRAM(s) IV Push daily  heparin   Injectable 5000 Unit(s) SubCutaneous every 12 hours  hydrALAZINE 10 milliGRAM(s) Oral three times a day  isosorbide   mononitrate ER Tablet (IMDUR) 30 milliGRAM(s) Oral daily  lidocaine 1%/epinephrine 1:100,000 Inj 10 milliLiter(s) Local Injection once  LORazepam     Tablet 0.25 milliGRAM(s) Oral at bedtime  LORazepam   Injectable 0.25 milliGRAM(s) IV Push once  multivitamin 1 Tablet(s) Oral daily  QUEtiapine 25 milliGRAM(s) Oral daily  tamsulosin 0.4 milliGRAM(s) Oral two times a day    MEDICATIONS  (PRN):  LORazepam     Tablet 0.5 milliGRAM(s) Oral every 12 hours PRN Agitation      19 Dec 2020 07:32    142    |  111    |  35     ----------------------------<  110    4.4     |  27     |  1.46     Ca    8.8        19 Dec 2020 07:32    CBC Full  -  ( 19 Dec 2020 07:32 )  WBC Count : 5.72 K/uL  Hemoglobin : 10.3 g/dL  Hematocrit : 34.6 %  Platelet Count - Automated : 169 K/uL  Mean Cell Volume : 97.5 fl  Mean Cell Hemoglobin : 29.0 pg  Mean Cell Hemoglobin Concentration : 29.8 gm/dL  Auto Neutrophil # : x  Auto Lymphocyte # : x  Auto Monocyte # : x  Auto Eosinophil # : x  Auto Basophil # : x  Auto Neutrophil % : x  Auto Lymphocyte % : x  Auto Monocyte % : x  Auto Eosinophil % : x  Auto Basophil % : x                RADIOLOGY RESULTS:          Assessment and Plan:          ASSESSMENT AND PLAN:    81 y/o M w/ PMH of CVA, TIA, dyslipidemia, HTN, GERD, p/w AMS.     1) Toxic/Metabolic encephalopathy due to UTI and possible Wernicke's   - tele monitoring. tele review as above.  - CTH - Multiple chronic stable infarcts. f/u MRI / MRA > as above, no acute infarcts.  - Cont. IV thiamine day #3  - F/u b12, b1, b6, folate lvls.  - Seroquel daily (limit use - prolongs QTC) /ativan prn for agitation/ sundowning   - ABX for UTI  - Speech, PT, Neuro, Cardio consult appreciated     2) Acute on Chronic Systolic HF/ Cardiomyopathy - not in acute exacerbation  - Per cardio last known EF 45%, now 10-20% EF  - Cont: GDMT: not candidate for ACEi.   - Cont. BB (dose reduced to 3.125 BID w/ parameters), hydralazine, Imdur   - Cont. IV Lasix QD  - NM stress test Monday. NPO at midnight on Sunday  - send viral studies - coxsackie, echo virus.  - amyloidosis w/u - elevated lgt chains. s/p fat pad biopsy 12/19  - EP eval - AICD after 3 months of GDMT, life vest contraindicated at this time    3) Mildly elevated troponins  - Mildly elevated and downtrended. No CP  - Cont. ASA, statin  - Cardio consult appreciated - NM stress test (plan for Monday, ativan prior to test)    4) UTI - likely GNR  - Cont. Ceftriaxone  - Urine cx - klebsiella, sens to Cef.  - s/p gentle hydration. encourage PO hydration.     5) LASHELL on CKD Stage 3, urinary retention  - s/p IVF  - Avoid nephrotoxic agents, no ACE/ARB  - bladder scan - monitor. patient refusing Arevalo  - Cont. Flomax     6) Transaminitis   - monitor f/u hep panel   - lipids wnl  - monitor on statin therapy.    8) DVT ppx  - Heparin SubQ    Advanced Directives: Full Code. Consult palliative. patient with multiple chronic progressive conditions    ~~ Spoke to son Jared with updates 12/15, 12/16 12/17: LM for Jared w/ call back. spoke with Dez at length this AM 12/18. agreeable to sedative medications.

## 2020-12-19 NOTE — PROGRESS NOTE ADULT - SUBJECTIVE AND OBJECTIVE BOX
CHIEF COMPLAINT:  Patient is a 80y old  Male who presents with a chief complaint of AMS (15 Dec 2020 12:54)    HPI:  79 y/o M w/ PMH of CVA, TIA, dyslipidemia, HTN, GERD, h/o seizure disorder p/w AMS. Patient states he doesn't recall the episode at all, but reports that his daughter noticed he was not making sense so brought him o the hospital. It is reported in the chart that daughter had stated patient did not have LOC/syncope, just garbled speech and not making sense.    During work up MRI/MRA brain showed multiple old infarcts with encephalomalacia, significant cerebral vascular disease and an abnormal EEG. He had minimally elevated troponins and TTE pending but showing a newly reduced LVSF EF about 10-20% with global hypokinesis. Also as per radiology his MRA/MRI appears to be consistent with Vit B1 deficiency concerning for Wernicke's encephalopathy.    O/N 12/15 he became very agitated and a code gray needed to called and he was given haldol.  12/16 - patient continued to be agitated and required ativan during the afternoon/evening  12/17 - patient lethargic, intermittently pulling off his tele. He has been bradycardiac down to the 30s with frequent PVCs and wide complex rhythm that is irregular with varying wide complexes ?AIVR  12/18 - Patient continues to be bradycardiac with PVCs and wide complex rhythm ?AIVR  12/19 - Lying flat but still dyspneic but clinically better.  No more AIVR on the monitor so far.  Tolerating current meds.  Renal function stable.      Patient seen and examined at bedside.  He is agitated and threatening and refusing to answer any of my questions or allowing me to examine him    Unable to obtain ROS    PMHx:  PAST MEDICAL & SURGICAL HISTORY:  Chronic GERD  HTN (hypertension)  HLD (hyperlipidemia)  CVA (cerebral vascular accident)  High cholesterol  HTN (hypertension)    History of surgical removal of testicle  H/O hernia repair    FAMILY HISTORY:  reviewed and NC    ALLERGIES:  Allergies  Allergy Status Unknown    SHx:  Denies tobacco, etoh or illicit drug use.    REVIEW OF SYSTEMS:  Unable to obtain because patient refusing questions.      Vital Signs Last 24 Hrs  T(C): 36.4 (18 Dec 2020 20:21), Max: 36.4 (18 Dec 2020 20:21)  T(F): 97.5 (18 Dec 2020 20:21), Max: 97.5 (18 Dec 2020 20:21)  HR: 76 (19 Dec 2020 06:00) (56 - 76)  BP: 152/69 (19 Dec 2020 06:00) (113/54 - 152/69)  BP(mean): --  RR: 18 (18 Dec 2020 20:21) (18 - 18)  SpO2: 100% (18 Dec 2020 20:21) (98% - 100%)      PHYSICAL EXAM:   Constitutional: lethargic but awake, in NAD  HEENT: Normal Hearing, MMM  Neck: Soft and supple, No LAD, + JVD, no carotid bruit  Respiratory: poor effort but appears to have Breath sounds are clear bilaterally, No wheezing, rales or rhonchi, good air movement  Cardiovascular: S1 and S2, regular rate and rhythm, II/VI systolic murmur at LSB, gallops or rubs. PMI is nondisplaced  Gastrointestinal: Bowel Sounds present, soft, nontender, nondistended, no guarding, no rebound  Extremities: Warm and well perfused, trace-+1 peripheral edema  Neurological: A/O x 2, is no longer aware of where we are or what brought him to the hospital but knows it is 2020 and his name  Skin: No rashes appreciated      MEDICATIONS  (STANDING):  aspirin 325 milliGRAM(s) Oral daily  atorvastatin 40 milliGRAM(s) Oral at bedtime  carvedilol 3.125 milliGRAM(s) Oral every 12 hours  cefTRIAXone Injectable. 1000 milliGRAM(s) IV Push every 24 hours  furosemide   Injectable 20 milliGRAM(s) IV Push daily  heparin   Injectable 5000 Unit(s) SubCutaneous every 12 hours  hydrALAZINE 10 milliGRAM(s) Oral three times a day  isosorbide   mononitrate ER Tablet (IMDUR) 30 milliGRAM(s) Oral daily  LORazepam     Tablet 0.25 milliGRAM(s) Oral at bedtime  LORazepam   Injectable 0.25 milliGRAM(s) IV Push once  multivitamin 1 Tablet(s) Oral daily  QUEtiapine 25 milliGRAM(s) Oral daily  tamsulosin 0.4 milliGRAM(s) Oral two times a day    MEDICATIONS  (PRN):  LORazepam     Tablet 0.5 milliGRAM(s) Oral every 12 hours PRN Agitation        LABS: All Labs Reviewed:  Patient refusing labs, most recent labs are as follows:  COVID-19 Antibody - (12.15.20 @ 07:39): COVID-19 IgG Antibody Index: 1.11: COVID-19 IgG Antibody Interpretation: Negative:  COVID-19 PCR . (12.14.20 @ 21:34): COVID-19 PCR: [place]:                      10.3   5.72  )-----------( 169      ( 19 Dec 2020 07:32 )             34.6                         10.3   5.18  )-----------( 182      ( 17 Dec 2020 07:29 )             35.1                         10.9   5.30  )-----------( 223      ( 15 Dec 2020 07:39 )             37.2       12-19    142  |  111<H>  |  35<H>  ----------------------------<  110<H>  4.4   |  27  |  1.46<H>    Ca    8.8      19 Dec 2020 07:32        12-17    143  |  112<H>  |  33<H>  ----------------------------<  83  4.4   |  25  |  1.65<H>    Ca    9.1      17 Dec 2020 07:29  Mg     2.1     12-17        12-16    140  |  113<H>  |  26<H>  ----------------------------<  90  5.0   |  20<L>  |  1.75<H>    Ca    9.1      16 Dec 2020 07:11  Phos  3.1     12-15  Mg     2.2     12-16    TPro  7.4  /  Alb  3.0<L>  /  TBili  1.7<H>  /  DBili  x   /  AST  44<H>  /  ALT  140<H>  /  AlkPhos  109  12-15    PT/INR - ( 15 Dec 2020 07:39 )   PT: 14.9 sec;   INR: 1.30 ratio    PTT - ( 15 Dec 2020 07:39 )  PTT:30.8 sec    CARDIAC MARKERS ( 15 Dec 2020 00:40 )  0.045 ng/mL / x     / x     / x     / x      CARDIAC MARKERS ( 14 Dec 2020 23:32 )  0.048 ng/mL / x     / x     / x     / x      CARDIAC MARKERS ( 14 Dec 2020 21:34 )  0.047 ng/mL / x     / x     / x     / x        ProBNP >11,000      BLOOD CULTURES: Organism --  Gram Stain Blood -- Gram Stain --  Specimen Source .Urine Clean Catch (Midstream)  Culture-Blood --      LIPID PROFILE lipid profile                          12-15 @ 07:39  cholesterol           91 mg/dL  Direct LDL            --  HDL cholesterol       27 mg/dL  HDL/Total cholesterol --  Triglycerides, serum  57 mg/dL    RADIOLOGY:    EXAM:  XR CHEST PA LAT 2V                        PROCEDURE DATE:  12/14/2020    IMPRESSION:   Heart enlargement again noted. Mild central CHF at this time.    EXAM:  MR ANGIO NECK DC                        EXAM:  MR ANGIO BRAIN DC                        EXAM:  MR BRAIN DC                        PROCEDURE DATE:  12/15/2020      FINDINGS:  Brain MRI:  Motion limited study, redemonstration enlarged ventricles and sulci consistent with severe volume loss with remote infarcts right greater than left MCA distribution involving the bilateral posterior frontal and parietal lobes as and remote PCA infarcts with lateral ventricle ex vacuo enlargement unchanged. Redemonstration left larger than right cerebellar remote infarcts with encephalomalacia, gliosis, and ex vacuo enlargement of fourth ventricle unchanged. There is extensive patchy T2 and FLAIR white matter hyperintensity likely microvascular disease with remote lacunar infarcts unchanged from prior. There is a decreased size to the mamillary bodies, correlate with thiamin vitamin B1 deficiency. Additional foci of of T2/FLAIR signal hyperintensity within the hemispheric white matter, which are nonspecific and likely sequela of microvascular disease. No new intraparenchymal hematoma, mass effect or midline shift. No new restrictive diffusion to suggest acute or subacute infarction. No new abnormal extra-axial fluid collections are present. Basal cisterns remain patent and unchanged.  The calvarium is intact. Orbits are limited by motion, with redemonstration of downward displacement of the right orbital floor unchanged from prior. Tympanomastoid cavities appear free of acute disease. There is mild mucosal thickening in the paraspinal sinuses with possible retention cyst or polyps in themaxillary antra, there are no air-fluid levels.    Brain MRA:  Motion limited MRA, there is flow-related signal within the internal carotid arteries within the cavernous supraclinoid and bifurcation segments with multifocal stenosis and with extensive atherosclerotic vascular calcification on CT. there are stenoses proximal to the bifurcation segments with poorly delineated left A1 segment and stenosis of the distal right A1 segment with limitations of the study due to motion. There are severe stenoses of the M1,  proximal M2 and an 3 MCA branch vessels, with study limited by motion with significant decrease in size and number right greater than left distal MCA branches. Proximal A 2 anterior cerebral arteries are poorly delineated, this may reflect technique and motion, there are stenoses of the distal bilateral anterior cerebral arteries.  Tortuous and dominant left vertebral artery crosses the right of midline and is the dominant supply to the basilar artery. Poorly delineated right vertebral artery secondary to motion narrowing proximal to the vertebrobasilar junction, and stenosis of the right posterior inferior cerebellar artery vessels are poorly delineated junction, suggest repeat imaging the patient can hold still. There is multifocal stenosis and narrowing of the basilar artery. There is poor delineation and multifocal stenosis and narrowing of the bilateral proximal mid and distal posterior cerebral arteries.    Neck MRA:  views were obtained only, suggest repeat imaging when patient is able to hold and complete exam.  IMPRESSION:  Redemonstration volume loss remote infarcts right greater than left MCA distribution in bilateral frontal and parietal lobes with encephalomalacia, bilateral PCA, and bilateral cerebellar left greater than right territories with encephalomalacia, marked microvascular disease, remote lacunar infarcts, without new restricted diffusion, hemorrhage or shift.  Decreased size mamillary bodies correlate with thiamin vitamin [id number] severely motion limited MRA with atherosclerotic vascular calcification multifocal narrowing and stenosis of the cavernous to provide ICAs, with severe multifocal stenosis of the intracranial arterial middle and posterior cerebral arteries and vertebral and basilar arteries as detailed above, MRA of the neck only obtained  views, suggest repeat imaging when patient can hold still.      EKG:    (12/15/2020, my interpretation): NSR at 81bpm, biatrial enlargement, LAD and LVH with repolarization       TELEMETRY:    Reviewed. HRs 30-50s with frequent PVCs and a slow wide complex rhythm ?AIVR on 12/18 not seen since      ECHO:    EXAM:  ECHO TTE WO CON COMP W DOPP    PROCEDURE DATE:  12/15/2020     Summary:   The left ventricle cavity is mildly dilated.   Mild concentric left ventricular hypertrophy is present.   Estimated left ventricular ejection fraction is 15-20 %.   The left atrium is severely dilated.   The right atrium appears moderately dilated.   Mild to Moderate mitral regurgitation is present.   Mild (1+) tricuspid valve regurgitation is present.   Mild aortic sclerosis is present with normal valvular opening.

## 2020-12-19 NOTE — PROGRESS NOTE ADULT - ASSESSMENT
79 y/o M w/ PMH of CVA, TIA, dyslipidemia, HTN, GERD, h/o seizure disorder p/w AMS found to have abnormal MRI/MRA of the brain with minimally elevated troponins and newly severe cardiomyopathy EF 15-20%. During work up patient was found to have elevated Kappa/Lamda light chains concerning for Amyloidosis. Cardiology consulted for further evaluation and treatment.    -Patient is uncooperative, would be too high risk for LHC but still recommend evaluation for source of cardiomyopathy.   -Plan for Pharm Nuclear stress test  -Elevated kappa/lamda, agree with getting surgery on board and checking fat pad bx for amyloid  -Remains bradycardic, BB reduced to 3.125 BID but still unable to receive it. Hold for HR <55  -Unable to get lab work and on Lasix 20 IV Qday. If he continues to refuse lab work would stop this and switch to Lasix 20 PO, because we need to know his electrolytes if we are going to be diuresing him  -Holding ACE/ARB due to renal function. Continue to monitor renal function if patient allows blood draws and start ACE/ARB if able C/W Hydralazine/Isordil. No plan to make changes since he has some episodes of lower BP.  -EP on board, appreciated recommendations and how patient is not a candidate for LifeVest at this time. continue to monitor mentation.  -Agree with palliative consultation, patient's prognosis is poor.    12/15 he became very agitated and a code gray needed to called and he was given haldol.  12/16 - patient continued to be agitated and required ativan during the afternoon/evening  12/17 - patient lethargic, intermittently pulling off his tele. He has been bradycardiac down to the 30s with frequent PVCs and wide complex rhythm that is irregular with varying wide complexes ?AIVR  12/18 - Patient continues to be bradycardiac with PVCs and wide complex rhythm ?AIVR  12/19 - Lying flat but still dyspneic but clinically better.  No more AIVR on the monitor so far.  Tolerating current meds.  Renal function stable.  Continue as per medicine etal.  Will follow as work-up and treatment progresses

## 2020-12-19 NOTE — PROGRESS NOTE ADULT - SUBJECTIVE AND OBJECTIVE BOX
Seen and examined at bedside this am. Pt underwent the core needle biopsy of abdominal fat pad to rule out amyloidosis. Pt tolerated the procedure well with the assistance of a chaperone.     MEDICATIONS  (STANDING):  aspirin 325 milliGRAM(s) Oral daily  atorvastatin 40 milliGRAM(s) Oral at bedtime  carvedilol 3.125 milliGRAM(s) Oral every 12 hours  cefTRIAXone Injectable. 1000 milliGRAM(s) IV Push every 24 hours  furosemide   Injectable 20 milliGRAM(s) IV Push daily  heparin   Injectable 5000 Unit(s) SubCutaneous every 12 hours  hydrALAZINE 10 milliGRAM(s) Oral three times a day  isosorbide   mononitrate ER Tablet (IMDUR) 30 milliGRAM(s) Oral daily  lidocaine 1%/epinephrine 1:100,000 Inj 10 milliLiter(s) Local Injection once  LORazepam     Tablet 0.25 milliGRAM(s) Oral at bedtime  LORazepam   Injectable 0.25 milliGRAM(s) IV Push once  multivitamin 1 Tablet(s) Oral daily  QUEtiapine 25 milliGRAM(s) Oral daily  tamsulosin 0.4 milliGRAM(s) Oral two times a day      Vital Signs Last 24 Hrs  T(C): 36.4 (19 Dec 2020 10:03), Max: 36.4 (18 Dec 2020 20:21)  T(F): 97.6 (19 Dec 2020 10:03), Max: 97.6 (19 Dec 2020 10:03)  HR: 72 (19 Dec 2020 10:03) (56 - 76)  BP: 146/85 (19 Dec 2020 10:03) (113/54 - 152/69)  BP(mean): --  RR: 18 (19 Dec 2020 10:03) (18 - 18)  SpO2: 94% (19 Dec 2020 10:03) (94% - 100%)    PHYSICAL EXAM:  GENERAL: NAD, lying in bed comfortably, obese  HEAD:  Atraumatic, Normocephalic  EYES: EOMI, PERRLA, conjunctiva and sclera clear  ENT: Moist mucous membranes  NECK: Supple, No JVD  CHEST/LUNG: Clear to auscultation bilaterally; No rales, rhonchi, wheezing, or rubs. Unlabored respirations  HEART: Regular rate and rhythm; No murmurs, rubs, or gallops  ABDOMEN: Bowel sounds present; Soft, large girth abd, nontender, nondistended. No hepatomegaly.  EXTREMITIES:  2+ Peripheral Pulses, brisk capillary refill. No clubbing, cyanosis, or edema  NERVOUS SYSTEM:  Alert & Oriented X3, speech clear. No deficits   MSK: FROM all 4 extremities, full and equal strength  SKIN: No rashes or lesions    LABS:                        10.3   5.72  )-----------( 169      ( 19 Dec 2020 07:32 )             34.6     19 Dec 2020 07:32    142    |  111    |  35     ----------------------------<  110    4.4     |  27     |  1.46     Ca    8.8        19 Dec 2020 07:32

## 2020-12-19 NOTE — PROGRESS NOTE ADULT - ASSESSMENT
79yo M w/ multiple comorbidities presented with AMS. Currently being evaluated for systemic amyloidosis and surgery was consulted for subcutaneous tissue biopsy    Plan:  - bedside subcutaneous tissue biopsy done  - biopsy sent to pathology  - no further surgical intervention indicated  - surgery signing off, thank you for the consult  - continue medical management by primary team    Plan discussed with surgery team and attending, Dr. Mcgill

## 2020-12-20 PROCEDURE — 93010 ELECTROCARDIOGRAM REPORT: CPT

## 2020-12-20 PROCEDURE — 99232 SBSQ HOSP IP/OBS MODERATE 35: CPT

## 2020-12-20 RX ORDER — FLUTICASONE PROPIONATE 50 MCG
1 SPRAY, SUSPENSION NASAL
Refills: 0 | Status: DISCONTINUED | OUTPATIENT
Start: 2020-12-20 | End: 2020-12-23

## 2020-12-20 RX ORDER — QUETIAPINE FUMARATE 200 MG/1
12.5 TABLET, FILM COATED ORAL
Refills: 0 | Status: DISCONTINUED | OUTPATIENT
Start: 2020-12-20 | End: 2020-12-23

## 2020-12-20 RX ADMIN — Medication 10 MILLIGRAM(S): at 23:21

## 2020-12-20 RX ADMIN — ISOSORBIDE MONONITRATE 30 MILLIGRAM(S): 60 TABLET, EXTENDED RELEASE ORAL at 13:24

## 2020-12-20 RX ADMIN — Medication 10 MILLIGRAM(S): at 13:25

## 2020-12-20 RX ADMIN — HEPARIN SODIUM 5000 UNIT(S): 5000 INJECTION INTRAVENOUS; SUBCUTANEOUS at 23:19

## 2020-12-20 RX ADMIN — HEPARIN SODIUM 5000 UNIT(S): 5000 INJECTION INTRAVENOUS; SUBCUTANEOUS at 13:24

## 2020-12-20 RX ADMIN — TAMSULOSIN HYDROCHLORIDE 0.4 MILLIGRAM(S): 0.4 CAPSULE ORAL at 23:20

## 2020-12-20 RX ADMIN — CEFTRIAXONE 1000 MILLIGRAM(S): 500 INJECTION, POWDER, FOR SOLUTION INTRAMUSCULAR; INTRAVENOUS at 00:07

## 2020-12-20 RX ADMIN — Medication 1 TABLET(S): at 13:24

## 2020-12-20 RX ADMIN — TAMSULOSIN HYDROCHLORIDE 0.4 MILLIGRAM(S): 0.4 CAPSULE ORAL at 13:24

## 2020-12-20 RX ADMIN — Medication 325 MILLIGRAM(S): at 13:25

## 2020-12-20 RX ADMIN — ATORVASTATIN CALCIUM 40 MILLIGRAM(S): 80 TABLET, FILM COATED ORAL at 23:19

## 2020-12-20 RX ADMIN — Medication 20 MILLIGRAM(S): at 13:25

## 2020-12-20 RX ADMIN — QUETIAPINE FUMARATE 25 MILLIGRAM(S): 200 TABLET, FILM COATED ORAL at 13:24

## 2020-12-20 NOTE — PROGRESS NOTE ADULT - SUBJECTIVE AND OBJECTIVE BOX
CHIEF COMPLAINT:  Patient is a 80y old  Male who presents with a chief complaint of AMS (15 Dec 2020 12:54)    HPI:  81 y/o M w/ PMH of CVA, TIA, dyslipidemia, HTN, GERD, h/o seizure disorder p/w AMS. Patient states he doesn't recall the episode at all, but reports that his daughter noticed he was not making sense so brought him o the hospital. It is reported in the chart that daughter had stated patient did not have LOC/syncope, just garbled speech and not making sense.    During work up MRI/MRA brain showed multiple old infarcts with encephalomalacia, significant cerebral vascular disease and an abnormal EEG. He had minimally elevated troponins and TTE pending but showing a newly reduced LVSF EF about 10-20% with global hypokinesis. Also as per radiology his MRA/MRI appears to be consistent with Vit B1 deficiency concerning for Wernicke's encephalopathy.    O/N 12/15 he became very agitated and a code gray needed to called and he was given haldol.  12/16 - patient continued to be agitated and required ativan during the afternoon/evening  12/17 - patient lethargic, intermittently pulling off his tele. He has been bradycardiac down to the 30s with frequent PVCs and wide complex rhythm that is irregular with varying wide complexes ?AIVR  12/18 - Patient continues to be bradycardiac with PVCs and wide complex rhythm ?AIVR  12/19 - Lying flat but still dyspneic but clinically better.  No more AIVR on the monitor so far.  Tolerating current meds.  Renal function stable.  12/20 - Refusing to wear the monitor but seems slightly less dyspneic this AM  Tolerating current meds so far.  No new cardiac changes.  Awaiting core bx for amyloidosis.      Patient seen and examined at bedside.  He is agitated and threatening and refusing to answer any of my questions or allowing me to examine him    Unable to obtain ROS    PMHx:  PAST MEDICAL & SURGICAL HISTORY:  Chronic GERD  HTN (hypertension)  HLD (hyperlipidemia)  CVA (cerebral vascular accident)  High cholesterol  HTN (hypertension)    History of surgical removal of testicle  H/O hernia repair    FAMILY HISTORY:  reviewed and NC    ALLERGIES:  Allergies  Allergy Status Unknown    SHx:  Denies tobacco, etoh or illicit drug use.    REVIEW OF SYSTEMS:  Unable to obtain because patient refusing questions.      Vital Signs Last 24 Hrs  T(C): 36.4 (20 Dec 2020 08:40), Max: 36.4 (20 Dec 2020 08:40)  T(F): 97.5 (20 Dec 2020 08:40), Max: 97.5 (20 Dec 2020 08:40)  HR: 75 (20 Dec 2020 08:40) (75 - 86)  BP: 142/80 (20 Dec 2020 08:40) (131/77 - 153/99)  BP(mean): --  RR: 18 (20 Dec 2020 08:40) (18 - 18)  SpO2: 96% (20 Dec 2020 08:40) (96% - 99%)      PHYSICAL EXAM:   Constitutional: lethargic but awake, in NAD  HEENT: Normal Hearing, MMM  Neck: Soft and supple, No LAD, + JVD, no carotid bruit  Respiratory: poor effort but appears to have Breath sounds are clear bilaterally, No wheezing, rales or rhonchi, good air movement  Cardiovascular: S1 and S2, regular rate and rhythm, II/VI systolic murmur at LSB, gallops or rubs. PMI is nondisplaced  Gastrointestinal: Bowel Sounds present, soft, nontender, nondistended, no guarding, no rebound  Extremities: Warm and well perfused, trace-+1 peripheral edema  Neurological: A/O x 2, is no longer aware of where we are or what brought him to the hospital but knows it is 2020 and his name  Skin: No rashes appreciated      MEDICATIONS  (STANDING):  aspirin 325 milliGRAM(s) Oral daily  atorvastatin 40 milliGRAM(s) Oral at bedtime  carvedilol 3.125 milliGRAM(s) Oral every 12 hours  cefTRIAXone Injectable. 1000 milliGRAM(s) IV Push every 24 hours  furosemide   Injectable 20 milliGRAM(s) IV Push daily  heparin   Injectable 5000 Unit(s) SubCutaneous every 12 hours  hydrALAZINE 10 milliGRAM(s) Oral three times a day  isosorbide   mononitrate ER Tablet (IMDUR) 30 milliGRAM(s) Oral daily  lidocaine 1%/epinephrine 1:100,000 Inj 10 milliLiter(s) Local Injection once  LORazepam     Tablet 0.25 milliGRAM(s) Oral at bedtime  LORazepam   Injectable 0.25 milliGRAM(s) IV Push once  multivitamin 1 Tablet(s) Oral daily  QUEtiapine 25 milliGRAM(s) Oral daily  tamsulosin 0.4 milliGRAM(s) Oral two times a day    MEDICATIONS  (PRN):  fluticasone propionate 50 MICROgram(s)/spray Nasal Spray 1 Spray(s) Both Nostrils two times a day PRN Congestion, Nasal  LORazepam     Tablet 0.5 milliGRAM(s) Oral every 12 hours PRN Agitation          LABS: All Labs Reviewed:  Patient refusing labs, most recent labs are as follows:  COVID-19 Antibody - (12.15.20 @ 07:39): COVID-19 IgG Antibody Index: 1.11: COVID-19 IgG Antibody Interpretation: Negative:  COVID-19 PCR . (12.14.20 @ 21:34): COVID-19 PCR: NotDetec:                      10.3   5.72  )-----------( 169      ( 19 Dec 2020 07:32 )             34.6                         10.3   5.18  )-----------( 182      ( 17 Dec 2020 07:29 )             35.1                         10.9   5.30  )-----------( 223      ( 15 Dec 2020 07:39 )             37.2       12-19    142  |  111<H>  |  35<H>  ----------------------------<  110<H>  4.4   |  27  |  1.46<H>    Ca    8.8      19 Dec 2020 07:32        12-17    143  |  112<H>  |  33<H>  ----------------------------<  83  4.4   |  25  |  1.65<H>    Ca    9.1      17 Dec 2020 07:29  Mg     2.1     12-17        12-16    140  |  113<H>  |  26<H>  ----------------------------<  90  5.0   |  20<L>  |  1.75<H>    Ca    9.1      16 Dec 2020 07:11  Phos  3.1     12-15  Mg     2.2     12-16    TPro  7.4  /  Alb  3.0<L>  /  TBili  1.7<H>  /  DBili  x   /  AST  44<H>  /  ALT  140<H>  /  AlkPhos  109  12-15    PT/INR - ( 15 Dec 2020 07:39 )   PT: 14.9 sec;   INR: 1.30 ratio    PTT - ( 15 Dec 2020 07:39 )  PTT:30.8 sec    CARDIAC MARKERS ( 15 Dec 2020 00:40 )  0.045 ng/mL / x     / x     / x     / x      CARDIAC MARKERS ( 14 Dec 2020 23:32 )  0.048 ng/mL / x     / x     / x     / x      CARDIAC MARKERS ( 14 Dec 2020 21:34 )  0.047 ng/mL / x     / x     / x     / x        ProBNP >11,000      BLOOD CULTURES: Organism --  Gram Stain Blood -- Gram Stain --  Specimen Source .Urine Clean Catch (Midstream)  Culture-Blood --      LIPID PROFILE lipid profile                          12-15 @ 07:39  cholesterol           91 mg/dL  Direct LDL            --  HDL cholesterol       27 mg/dL  HDL/Total cholesterol --  Triglycerides, serum  57 mg/dL    RADIOLOGY:    EXAM:  XR CHEST PA LAT 2V                        PROCEDURE DATE:  12/14/2020    IMPRESSION:   Heart enlargement again noted. Mild central CHF at this time.    EXAM:  MR ANGIO NECK DC                        EXAM:  MR ANGIO BRAIN DC                        EXAM:  MR BRAIN DC                        PROCEDURE DATE:  12/15/2020      FINDINGS:  Brain MRI:  Motion limited study, redemonstration enlarged ventricles and sulci consistent with severe volume loss with remote infarcts right greater than left MCA distribution involving the bilateral posterior frontal and parietal lobes as and remote PCA infarcts with lateral ventricle ex vacuo enlargement unchanged. Redemonstration left larger than right cerebellar remote infarcts with encephalomalacia, gliosis, and ex vacuo enlargement of fourth ventricle unchanged. There is extensive patchy T2 and FLAIR white matter hyperintensity likely microvascular disease with remote lacunar infarcts unchanged from prior. There is a decreased size to the mamillary bodies, correlate with thiamin vitamin B1 deficiency. Additional foci of of T2/FLAIR signal hyperintensity within the hemispheric white matter, which are nonspecific and likely sequela of microvascular disease. No new intraparenchymal hematoma, mass effect or midline shift. No new restrictive diffusion to suggest acute or subacute infarction. No new abnormal extra-axial fluid collections are present. Basal cisterns remain patent and unchanged.  The calvarium is intact. Orbits are limited by motion, with redemonstration of downward displacement of the right orbital floor unchanged from prior. Tympanomastoid cavities appear free of acute disease. There is mild mucosal thickening in the paraspinal sinuses with possible retention cyst or polyps in themaxillary antra, there are no air-fluid levels.    Brain MRA:  Motion limited MRA, there is flow-related signal within the internal carotid arteries within the cavernous supraclinoid and bifurcation segments with multifocal stenosis and with extensive atherosclerotic vascular calcification on CT. there are stenoses proximal to the bifurcation segments with poorly delineated left A1 segment and stenosis of the distal right A1 segment with limitations of the study due to motion. There are severe stenoses of the M1,  proximal M2 and an 3 MCA branch vessels, with study limited by motion with significant decrease in size and number right greater than left distal MCA branches. Proximal A 2 anterior cerebral arteries are poorly delineated, this may reflect technique and motion, there are stenoses of the distal bilateral anterior cerebral arteries.  Tortuous and dominant left vertebral artery crosses the right of midline and is the dominant supply to the basilar artery. Poorly delineated right vertebral artery secondary to motion narrowing proximal to the vertebrobasilar junction, and stenosis of the right posterior inferior cerebellar artery vessels are poorly delineated junction, suggest repeat imaging the patient can hold still. There is multifocal stenosis and narrowing of the basilar artery. There is poor delineation and multifocal stenosis and narrowing of the bilateral proximal mid and distal posterior cerebral arteries.    Neck MRA:  views were obtained only, suggest repeat imaging when patient is able to hold and complete exam.  IMPRESSION:  Redemonstration volume loss remote infarcts right greater than left MCA distribution in bilateral frontal and parietal lobes with encephalomalacia, bilateral PCA, and bilateral cerebellar left greater than right territories with encephalomalacia, marked microvascular disease, remote lacunar infarcts, without new restricted diffusion, hemorrhage or shift.  Decreased size mamillary bodies correlate with thiamin vitamin [id number] severely motion limited MRA with atherosclerotic vascular calcification multifocal narrowing and stenosis of the cavernous to provide ICAs, with severe multifocal stenosis of the intracranial arterial middle and posterior cerebral arteries and vertebral and basilar arteries as detailed above, MRA of the neck only obtained  views, suggest repeat imaging when patient can hold still.      EKG:    (12/15/2020, my interpretation): NSR at 81bpm, biatrial enlargement, LAD and LVH with repolarization       TELEMETRY:    Reviewed. HRs 30-50s with frequent PVCs and a slow wide complex rhythm ?AIVR on 12/18 not seen since      ECHO:    EXAM:  ECHO TTE WO CON COMP W DOPP    PROCEDURE DATE:  12/15/2020     Summary:   The left ventricle cavity is mildly dilated.   Mild concentric left ventricular hypertrophy is present.   Estimated left ventricular ejection fraction is 15-20 %.   The left atrium is severely dilated.   The right atrium appears moderately dilated.   Mild to Moderate mitral regurgitation is present.   Mild (1+) tricuspid valve regurgitation is present.   Mild aortic sclerosis is present with normal valvular opening.

## 2020-12-20 NOTE — PROGRESS NOTE ADULT - ASSESSMENT
79 y/o M w/ PMH of CVA, TIA, dyslipidemia, HTN, GERD, h/o seizure disorder p/w AMS found to have abnormal MRI/MRA of the brain with minimally elevated troponins and newly severe cardiomyopathy EF 15-20%. During work up patient was found to have elevated Kappa/Lamda light chains concerning for Amyloidosis. Cardiology consulted for further evaluation and treatment.    -Patient is uncooperative, would be too high risk for LHC but still recommend evaluation for source of cardiomyopathy.   -Plan for Pharm Nuclear stress test  -Elevated kappa/lamda, agree with getting surgery on board and checking fat pad bx for amyloid  -Remains bradycardic, BB reduced to 3.125 BID but still unable to receive it. Hold for HR <55  -Unable to get lab work and on Lasix 20 IV Qday. If he continues to refuse lab work would stop this and switch to Lasix 20 PO, because we need to know his electrolytes if we are going to be diuresing him  -Holding ACE/ARB due to renal function. Continue to monitor renal function if patient allows blood draws and start ACE/ARB if able C/W Hydralazine/Isordil. No plan to make changes since he has some episodes of lower BP.  -EP on board, appreciated recommendations and how patient is not a candidate for LifeVest at this time. continue to monitor mentation.  -Agree with palliative consultation, patient's prognosis is poor.    12/15 he became very agitated and a code gray needed to called and he was given haldol.  12/16 - patient continued to be agitated and required ativan during the afternoon/evening  12/17 - patient lethargic, intermittently pulling off his tele. He has been bradycardiac down to the 30s with frequent PVCs and wide complex rhythm that is irregular with varying wide complexes ?AIVR  12/18 - Patient continues to be bradycardiac with PVCs and wide complex rhythm ?AIVR  12/19 - Lying flat but still dyspneic but clinically better.  No more AIVR on the monitor so far.  Tolerating current meds.  Renal function stable.  Continue as per medicine etal.  Will follow as work-up and treatment progresses.    12/20 - Refusing to wear the monitor but seems slightly less dyspneic this AM  Tolerating current meds so far.  No new cardiac changes.  Awaiting core bx for amyloidosis.  Continue as per medicine etal.  Will follow as work-up and treatment progresses.

## 2020-12-20 NOTE — PROGRESS NOTE ADULT - SUBJECTIVE AND OBJECTIVE BOX
CHIEF COMPLAINT/DIAGNOSIS: AMS    HPI: 81 y/o M w/ PMH of CVA, TIA, dyslipidemia, HTN, GERD, h/o seizure disorder p/w AMS. Patient states he doesn't recall the episode at all, and states he is completely fine. Patient has trouble stating where is currently, but otherwise knows it's 2020, and is conversational. As per ED note, patient's daughter had noticed AMS where patient's speech was abnormal and was groggy. However, daughter had stated patient did not have LOC. Presently patient denies any weakness in arms / legs / slurred speech / facial droop / vision deficits / sensory deficits / CP / SOB / cough / runny nose / sore throat / nausea / vomiting / abdominal pain / diarrhea.  ** As per son patient has been non-compliant with medications for 1month unable to receive from ?? **    12/20:    PHYSICAL EXAM:  Constitutional: Awake, obese  HEENT:  Normal Hearing, MMM  Neck: Soft and supple  Respiratory: Breath sounds are clear/ diminished b/l   Cardiovascular: S1 and S2, regular rate and rhythm, no Murmurs, gallops or rubs  Gastrointestinal: Bowel Sounds present, soft, nontender, nondistended, no guarding, no rebound  Extremities: trace, pitting +1 b/l peripheral edema  Vascular: 2+ peripheral pulses  Neurological: A/O x 2, forgetful. follows commands. no visible facial droop. able to lift arm over head. blinks to threat. speech fluent.   Musculoskeletal: 5/5 strength b/l upper and lower extremities  Skin: No rashes    Vital Signs Last 24 Hrs  T(C): 36.6 (18 Dec 2020 07:56), Max: 36.6 (18 Dec 2020 07:56)  T(F): 97.9 (18 Dec 2020 07:56), Max: 97.9 (18 Dec 2020 07:56)  HR: 69 (18 Dec 2020 07:56) (51 - 69)  BP: 125/65 (18 Dec 2020 07:56) (97/57 - 125/65)  BP(mean): --  RR: 19 (18 Dec 2020 07:56) (18 - 19)  SpO2: 99% (18 Dec 2020 07:56) (98% - 99%)- room air     LABS: All Labs Reviewed:                        10.3   5.18  )-----------( 182      ( 17 Dec 2020 07:29 )             35.1     12-18    141  |  111<H>  |  37<H>  ----------------------------<  96  4.5   |  25  |  1.78<H>    Ca    8.7      18 Dec 2020 11:18  Mg     2.1     12-17    Blood Culture: 12-14 @ 21:34  Organism Klebsiella pneumoniae  Gram Stain Blood -- Gram Stain --  Specimen Source .Urine Clean Catch (Midstream)  Culture-Blood --    RADIOLOGY:  < from: MR Head No Cont Disc (12.15.20 @ 16:03) >  IMPRESSION:  Re demonstration volume loss remote infarcts right greater than left MCA distribution in bilateral frontal and parietal lobes with encephalomalacia, bilateral PCA, and bilateral cerebellar left greater than right territories with encephalomalacia, marked microvascular disease, remote lacunar infarcts, without new restricted diffusion, hemorrhage or shift.    Decreased size mamillary bodies correlate with thiamin vitamin [id number] severely motion limited MRA with atherosclerotic vascular calcification multifocal narrowing and stenosis of the cavernous to provide ICAs, with severe multifocal stenosis of the intracranial arterial middle and posterior cerebral arteries and vertebral and basilar arteries as detailed above, MRA of the neck only obtained  views, suggest repeat imaging when patient can hold still.  < end of copied text >    < from: CT Head No Cont (12.14.20 @ 22:01) >  IMPRESSION: No intracranial hemorrhage or mass effect. Stable chronic infarcts.  < end of copied text >    ECHOCARDIOGRAM:   < from: TTE Echo Complete w/o Contrast w/ Doppler (12.15.20 @ 14:20) >   The left ventricle cavity is mildly dilated.   Mild concentric left ventricular hypertrophy is present.   Estimated left ventricular ejection fraction is 15-20 %.   The left atrium is severely dilated.   The right atrium appears moderately dilated.   Mild to Moderate mitral regurgitation is present.   Mild (1+) tricuspid valve regurgitation is present.   Mild aortic sclerosis is present with normal valvular opening.  < end of copied text >    MEDICATIONS  (STANDING):  aspirin 325 milliGRAM(s) Oral daily  atorvastatin 40 milliGRAM(s) Oral at bedtime  carvedilol 3.125 milliGRAM(s) Oral every 12 hours  cefTRIAXone Injectable. 1000 milliGRAM(s) IV Push every 24 hours  furosemide   Injectable 20 milliGRAM(s) IV Push daily  heparin   Injectable 5000 Unit(s) SubCutaneous every 12 hours  hydrALAZINE 10 milliGRAM(s) Oral three times a day  isosorbide   mononitrate ER Tablet (IMDUR) 30 milliGRAM(s) Oral daily  LORazepam   Injectable 0.25 milliGRAM(s) IV Push once  multivitamin 1 Tablet(s) Oral daily  QUEtiapine 25 milliGRAM(s) Oral daily  tamsulosin 0.4 milliGRAM(s) Oral two times a day  thiamine IVPB 500 milliGRAM(s) IV Intermittent every 8 hours    MEDICATIONS  (PRN):  QUEtiapine 12.5 milliGRAM(s) Oral every 8 hours PRN agitation    TELEMETRY REVIEW:  12/18: frequent bigeminy, triplets, PVCs HR. mid 30s at night. 60-80s daytime.      ASSESSMENT AND PLAN:    81 y/o M w/ PMH of CVA, TIA, dyslipidemia, HTN, GERD, p/w AMS.     1) Toxic/Metabolic encephalopathy due to UTI and possible Wernicke's   - tele monitoring. tele review as above.  - CTH - Multiple chronic stable infarcts. f/u MRI / MRA > as above, no acute infarcts.  - Cont. IV thiamine day #3  - F/u b12, b1, b6, folate lvls.  - Seroquel daily (limit use - prolongs QTC) /ativan prn for agitation/ sundowning   - ABX for UTI  - Speech, PT, Neuro, Cardio consult appreciated     2) Acute on Chronic Systolic HF/ Cardiomyopathy - not in acute exacerbation  - Per cardio last known EF 45%, now 10-20% EF  - Cont: GDMT: not candidate for ACEi.   - Cont. BB (dose reduced to 3.125 BID w/ parameters), hydralazine, Imdur   - Cont. IV Lasix QD  - NM stress test Monday. NPO at midnight on Sunday  - send viral studies - coxsackie, echo virus.  - amyloidosis w/u - elevated lgt chains. get fat pad bx d/w cardio. sx cx   - EP eval - AICD after 3 months of GDMT, life vest contraindicated at this time    3) Mildly elevated troponins  - Mildly elevated and downtrended. No CP  - Cont. ASA, statin  - Cardio consult appreciated - NM stress test (plan for Monday, ativan prior to test)    4) UTI - likely GNR  - Cont. Ceftriaxone  - Urine cx - klebsiella, sens to Cef.  - s/p gentle hydration. encourage PO hydration.     5) LASHELL on CKD Stage 3, urinary retention  - s/p IVF  - Avoid nephrotoxic agents, no ACE/ARB  - bladder scan - monitor. patient refusing Arevalo  - Cont. Flomax     6) Transaminitis   - monitor f/u hep panel   - lipids wnl  - monitor on statin therapy.    8) DVT ppx  - Heparin SubQ    Advanced Directives: Full Code. Consult palliative. patient with multiple chronic progressive conditions    ~~ Spoke to son Jared with updates 12/15, 12/16 12/17: LM for Jared w/ call back. spoke with Dez at length this AM 12/18. agreeable to sedative medications.  CHIEF COMPLAINT/DIAGNOSIS: AMS    HPI: 79 y/o M w/ PMH of CVA, TIA, dyslipidemia, HTN, GERD, h/o seizure disorder p/w AMS. Patient states he doesn't recall the episode at all, and states he is completely fine. Patient has trouble stating where is currently, but otherwise knows it's 2020, and is conversational. As per ED note, patient's daughter had noticed AMS where patient's speech was abnormal and was groggy. However, daughter had stated patient did not have LOC. Presently patient denies any weakness in arms / legs / slurred speech / facial droop / vision deficits / sensory deficits / CP / SOB / cough / runny nose / sore throat / nausea / vomiting / abdominal pain / diarrhea.  ** As per son patient has been non-compliant with medications for 1month unable to receive from ?? **    12/20: sleeping, lethargic. patient awake and agitated throughout night. limited ROS    PHYSICAL EXAM:  Constitutional: Sleeping/lethargic, obese  HEENT:  Normal Hearing, MMM  Neck: Soft and supple  Respiratory: Breath sounds are clear/ diminished b/l   Cardiovascular: S1 and S2, regular rate and rhythm, no Murmurs, gallops or rubs  Gastrointestinal: Bowel Sounds present, soft, nontender, nondistended, no guarding, no rebound  Extremities: trace, pitting +1 b/l peripheral edema  Vascular: 2+ peripheral pulses  Neurological: Baseline A/O x 2, forgetful. follows commands. no visible facial droop. able to lift arm over head. blinks to threat. speech fluent.   Musculoskeletal: 5/5 strength b/l upper and lower extremities  Skin: No rashes    Vital Signs Last 24 Hrs  T(C): 36.4 (20 Dec 2020 08:40), Max: 36.4 (20 Dec 2020 08:40)  T(F): 97.5 (20 Dec 2020 08:40), Max: 97.5 (20 Dec 2020 08:40)  HR: 75 (20 Dec 2020 08:40) (75 - 86)  BP: 142/80 (20 Dec 2020 08:40) (131/77 - 153/99)  BP(mean): --  RR: 18 (20 Dec 2020 08:40) (18 - 18)  SpO2: 96% (20 Dec 2020 08:40) (96% - 99%) -- room air    LABS: All Labs Reviewed:                        10.3   5.72  )-----------( 169      ( 19 Dec 2020 07:32 )             34.6     12-19    142  |  111<H>  |  35<H>  ----------------------------<  110<H>  4.4   |  27  |  1.46<H>    Ca    8.8      19 Dec 2020 07:32    Blood Culture: 12-14 @ 21:34  Organism Klebsiella pneumoniae  Gram Stain Blood -- Gram Stain --  Specimen Source .Urine Clean Catch (Midstream)  Culture-Blood --    RADIOLOGY:  < from: MR Head No Cont Disc (12.15.20 @ 16:03) >  IMPRESSION:  Re demonstration volume loss remote infarcts right greater than left MCA distribution in bilateral frontal and parietal lobes with encephalomalacia, bilateral PCA, and bilateral cerebellar left greater than right territories with encephalomalacia, marked microvascular disease, remote lacunar infarcts, without new restricted diffusion, hemorrhage or shift.    Decreased size mamillary bodies correlate with thiamin vitamin [id number] severely motion limited MRA with atherosclerotic vascular calcification multifocal narrowing and stenosis of the cavernous to provide ICAs, with severe multifocal stenosis of the intracranial arterial middle and posterior cerebral arteries and vertebral and basilar arteries as detailed above, MRA of the neck only obtained  views, suggest repeat imaging when patient can hold still.  < end of copied text >    < from: CT Head No Cont (12.14.20 @ 22:01) >  IMPRESSION: No intracranial hemorrhage or mass effect. Stable chronic infarcts.  < end of copied text >    ECHOCARDIOGRAM:   < from: TTE Echo Complete w/o Contrast w/ Doppler (12.15.20 @ 14:20) >   The left ventricle cavity is mildly dilated.   Mild concentric left ventricular hypertrophy is present.   Estimated left ventricular ejection fraction is 15-20 %.   The left atrium is severely dilated.   The right atrium appears moderately dilated.   Mild to Moderate mitral regurgitation is present.   Mild (1+) tricuspid valve regurgitation is present.   Mild aortic sclerosis is present with normal valvular opening.  < end of copied text >    MEDICATIONS  (STANDING):  aspirin 325 milliGRAM(s) Oral daily  atorvastatin 40 milliGRAM(s) Oral at bedtime  carvedilol 3.125 milliGRAM(s) Oral every 12 hours  cefTRIAXone Injectable. 1000 milliGRAM(s) IV Push every 24 hours  furosemide   Injectable 20 milliGRAM(s) IV Push daily  heparin   Injectable 5000 Unit(s) SubCutaneous every 12 hours  hydrALAZINE 10 milliGRAM(s) Oral three times a day  isosorbide   mononitrate ER Tablet (IMDUR) 30 milliGRAM(s) Oral daily  LORazepam   Injectable 0.25 milliGRAM(s) IV Push once  multivitamin 1 Tablet(s) Oral daily  QUEtiapine 25 milliGRAM(s) Oral daily  tamsulosin 0.4 milliGRAM(s) Oral two times a day  thiamine IVPB 500 milliGRAM(s) IV Intermittent every 8 hours    MEDICATIONS  (PRN):  QUEtiapine 12.5 milliGRAM(s) Oral every 8 hours PRN agitation    TELEMETRY REVIEW:  12/18: frequent bigeminy, triplets, PVCs HR. mid 30s at night. 60-80s daytime.      ASSESSMENT AND PLAN:    79 y/o M w/ PMH of CVA, TIA, dyslipidemia, HTN, GERD, p/w AMS.     1) Toxic/Metabolic encephalopathy due to UTI and possible Wernicke's   - tele monitoring. tele review as above.  - CTH - Multiple chronic stable infarcts. f/u MRI / MRA > as above, no acute infarcts.  - Cont. IV thiamine day #3  - F/u b12, b1, b6, folate lvls.  - Seroquel daily (limit use - prolongs QTC) /seroquel for agitation/ sundowning -  td.  - ABX for UTI  - Speech, PT, Neuro, Cardio consult appreciated     2) Acute on Chronic Systolic HF/ Cardiomyopathy - not in acute exacerbation  - Per cardio last known EF 45%, now 10-20% EF  - Cont: GDMT: not candidate for ACEi.   - Cont. BB (dose reduced to 3.125 BID w/ parameters), hydralazine, Imdur   - Cont. IV Lasix QD  - NM stress test Monday. NPO at midnight on Sunday  - f/u viral studies - coxsackie neg, echo virus. >  - s/p fat pad bx on 12/19  - EP eval - AICD after 3 months of GDMT, life vest contraindicated at this time (adjust Seroquel to control agitation)    3) Mildly elevated troponins  - Mildly elevated and downtrended. No CP  - Cont. ASA, statin  - Cardio consult appreciated - NM stress test (plan for Monday, ativan prior to test)    4) UTI - likely GNR  - Cont. Ceftriaxone  - Urine cx - klebsiella, sens to Cef.  - s/p gentle hydration. encourage PO hydration.     5) LASHELL on CKD Stage 3, urinary retention  - s/p IVF  - Avoid nephrotoxic agents, no ACE/ARB  - Arevalo for retention.  Cont. Flomax - TOV prior to d/c  - Urology f/u appreciated     6) Transaminitis   - hep panel, lipids wnl  - monitor on statin therapy.    8) DVT ppx  - Heparin SubQ    Advanced Directives: Full Code. Consult palliative. patient with multiple chronic progressive conditions    ~~ Spoke to nitin Coleman with updates 12/15, 12/16 12/17: LM for Jared w/ call back. spoke with Dez at length this AM 12/18. agreeable to sedative medications.   12/20: spoke to Jared. Hes concerned about use over medicating his father for agitation. explained why ativan would be perferred with long QTc interval and cardiac issues. Agreed to stop ativan and trial Seroquel only with close monitoring of Qtc. Is aware of IV ativan prior to stress test. Disposition discussed if agitation can be controlled with meds then is Life vest an option if he is monitored in facility. will f/u with son and JACQUELINE crocker.

## 2020-12-21 LAB
% GAMMA, URINE: 17 % — SIGNIFICANT CHANGE UP
ALBUMIN 24H MFR UR ELPH: 35.8 % — SIGNIFICANT CHANGE UP
ALBUMIN SERPL ELPH-MCNC: 2.4 G/DL — LOW (ref 3.3–5)
ALP SERPL-CCNC: 83 U/L — SIGNIFICANT CHANGE UP (ref 40–120)
ALPHA1 GLOB 24H MFR UR ELPH: 21.2 % — SIGNIFICANT CHANGE UP
ALPHA2 GLOB 24H MFR UR ELPH: 12.6 % — SIGNIFICANT CHANGE UP
ALT FLD-CCNC: 114 U/L — HIGH (ref 12–78)
ANION GAP SERPL CALC-SCNC: 6 MMOL/L — SIGNIFICANT CHANGE UP (ref 5–17)
AST SERPL-CCNC: 41 U/L — HIGH (ref 15–37)
B-GLOBULIN 24H MFR UR ELPH: 13.4 % — SIGNIFICANT CHANGE UP
BILIRUB SERPL-MCNC: 1.2 MG/DL — SIGNIFICANT CHANGE UP (ref 0.2–1.2)
BUN SERPL-MCNC: 27 MG/DL — HIGH (ref 7–23)
CALCIUM SERPL-MCNC: 8.9 MG/DL — SIGNIFICANT CHANGE UP (ref 8.5–10.1)
CHLORIDE SERPL-SCNC: 112 MMOL/L — HIGH (ref 96–108)
CO2 SERPL-SCNC: 27 MMOL/L — SIGNIFICANT CHANGE UP (ref 22–31)
CREAT SERPL-MCNC: 1.2 MG/DL — SIGNIFICANT CHANGE UP (ref 0.5–1.3)
DEPRECATED KAPPA LC FREE/LAMBDA SER: 25.69 RATIO — HIGH (ref 0.7–6.02)
GLUCOSE SERPL-MCNC: 82 MG/DL — SIGNIFICANT CHANGE UP (ref 70–99)
HCT VFR BLD CALC: 32.7 % — LOW (ref 39–50)
HGB BLD-MCNC: 9.5 G/DL — LOW (ref 13–17)
INTERPRETATION 24H UR IFE-IMP: SIGNIFICANT CHANGE UP
INTERPRETATION 24H UR IFE-IMP: SIGNIFICANT CHANGE UP
KAPPA LC UR-MCNC: 38.53 MG/L — HIGH
LAMBDA LC UR-MCNC: 1.5 MG/L — SIGNIFICANT CHANGE UP
M PROTEIN 24H UR ELPH-MRATE: SIGNIFICANT CHANGE UP
MCHC RBC-ENTMCNC: 29 PG — SIGNIFICANT CHANGE UP (ref 27–34)
MCHC RBC-ENTMCNC: 29.1 GM/DL — LOW (ref 32–36)
MCV RBC AUTO: 99.7 FL — SIGNIFICANT CHANGE UP (ref 80–100)
PLATELET # BLD AUTO: 170 K/UL — SIGNIFICANT CHANGE UP (ref 150–400)
POTASSIUM SERPL-MCNC: 4.1 MMOL/L — SIGNIFICANT CHANGE UP (ref 3.5–5.3)
POTASSIUM SERPL-SCNC: 4.1 MMOL/L — SIGNIFICANT CHANGE UP (ref 3.5–5.3)
PROT ?TM UR-MCNC: 8 MG/DL — SIGNIFICANT CHANGE UP (ref 0–12)
PROT PATTERN 24H UR ELPH-IMP: SIGNIFICANT CHANGE UP
PROT SERPL-MCNC: 6.5 GM/DL — SIGNIFICANT CHANGE UP (ref 6–8.3)
RBC # BLD: 3.28 M/UL — LOW (ref 4.2–5.8)
RBC # FLD: 15.3 % — HIGH (ref 10.3–14.5)
SODIUM SERPL-SCNC: 145 MMOL/L — SIGNIFICANT CHANGE UP (ref 135–145)
TOTAL VOLUME - 24 HOUR: SIGNIFICANT CHANGE UP ML
URINE CREATININE CALCULATION: SIGNIFICANT CHANGE UP G/24 H (ref 1–2)
WBC # BLD: 5.77 K/UL — SIGNIFICANT CHANGE UP (ref 3.8–10.5)
WBC # FLD AUTO: 5.77 K/UL — SIGNIFICANT CHANGE UP (ref 3.8–10.5)

## 2020-12-21 PROCEDURE — 93010 ELECTROCARDIOGRAM REPORT: CPT

## 2020-12-21 PROCEDURE — 93018 CV STRESS TEST I&R ONLY: CPT

## 2020-12-21 PROCEDURE — 93016 CV STRESS TEST SUPVJ ONLY: CPT

## 2020-12-21 PROCEDURE — 99232 SBSQ HOSP IP/OBS MODERATE 35: CPT

## 2020-12-21 PROCEDURE — 78452 HT MUSCLE IMAGE SPECT MULT: CPT | Mod: 26

## 2020-12-21 RX ORDER — APIXABAN 2.5 MG/1
5 TABLET, FILM COATED ORAL
Refills: 0 | Status: DISCONTINUED | OUTPATIENT
Start: 2020-12-21 | End: 2020-12-23

## 2020-12-21 RX ORDER — THIAMINE MONONITRATE (VIT B1) 100 MG
100 TABLET ORAL DAILY
Refills: 0 | Status: DISCONTINUED | OUTPATIENT
Start: 2020-12-21 | End: 2020-12-23

## 2020-12-21 RX ORDER — ASPIRIN/CALCIUM CARB/MAGNESIUM 324 MG
81 TABLET ORAL DAILY
Refills: 0 | Status: DISCONTINUED | OUTPATIENT
Start: 2020-12-21 | End: 2020-12-23

## 2020-12-21 RX ORDER — LISINOPRIL 2.5 MG/1
2.5 TABLET ORAL DAILY
Refills: 0 | Status: DISCONTINUED | OUTPATIENT
Start: 2020-12-21 | End: 2020-12-22

## 2020-12-21 RX ORDER — HYDRALAZINE HCL 50 MG
25 TABLET ORAL THREE TIMES A DAY
Refills: 0 | Status: DISCONTINUED | OUTPATIENT
Start: 2020-12-21 | End: 2020-12-23

## 2020-12-21 RX ORDER — REGADENOSON 0.08 MG/ML
0.4 INJECTION, SOLUTION INTRAVENOUS ONCE
Refills: 0 | Status: COMPLETED | OUTPATIENT
Start: 2020-12-21 | End: 2020-12-21

## 2020-12-21 RX ADMIN — Medication 325 MILLIGRAM(S): at 12:05

## 2020-12-21 RX ADMIN — HEPARIN SODIUM 5000 UNIT(S): 5000 INJECTION INTRAVENOUS; SUBCUTANEOUS at 12:06

## 2020-12-21 RX ADMIN — Medication 25 MILLIGRAM(S): at 14:36

## 2020-12-21 RX ADMIN — Medication 1 TABLET(S): at 12:06

## 2020-12-21 RX ADMIN — TAMSULOSIN HYDROCHLORIDE 0.4 MILLIGRAM(S): 0.4 CAPSULE ORAL at 21:05

## 2020-12-21 RX ADMIN — Medication 20 MILLIGRAM(S): at 12:06

## 2020-12-21 RX ADMIN — Medication 81 MILLIGRAM(S): at 14:36

## 2020-12-21 RX ADMIN — TAMSULOSIN HYDROCHLORIDE 0.4 MILLIGRAM(S): 0.4 CAPSULE ORAL at 12:07

## 2020-12-21 RX ADMIN — Medication 10 MILLIGRAM(S): at 06:18

## 2020-12-21 RX ADMIN — ISOSORBIDE MONONITRATE 30 MILLIGRAM(S): 60 TABLET, EXTENDED RELEASE ORAL at 12:06

## 2020-12-21 RX ADMIN — LISINOPRIL 2.5 MILLIGRAM(S): 2.5 TABLET ORAL at 18:35

## 2020-12-21 RX ADMIN — Medication 100 MILLIGRAM(S): at 14:36

## 2020-12-21 RX ADMIN — Medication 25 MILLIGRAM(S): at 21:05

## 2020-12-21 RX ADMIN — APIXABAN 5 MILLIGRAM(S): 2.5 TABLET, FILM COATED ORAL at 18:48

## 2020-12-21 RX ADMIN — ATORVASTATIN CALCIUM 40 MILLIGRAM(S): 80 TABLET, FILM COATED ORAL at 21:05

## 2020-12-21 RX ADMIN — REGADENOSON 0.4 MILLIGRAM(S): 0.08 INJECTION, SOLUTION INTRAVENOUS at 10:10

## 2020-12-21 RX ADMIN — CEFTRIAXONE 1000 MILLIGRAM(S): 500 INJECTION, POWDER, FOR SOLUTION INTRAMUSCULAR; INTRAVENOUS at 00:15

## 2020-12-21 RX ADMIN — CARVEDILOL PHOSPHATE 3.12 MILLIGRAM(S): 80 CAPSULE, EXTENDED RELEASE ORAL at 18:35

## 2020-12-21 RX ADMIN — QUETIAPINE FUMARATE 25 MILLIGRAM(S): 200 TABLET, FILM COATED ORAL at 12:09

## 2020-12-21 NOTE — PROGRESS NOTE ADULT - SUBJECTIVE AND OBJECTIVE BOX
CHIEF COMPLAINT:  Patient is a 80y old  Male who presents with a chief complaint of AMS (15 Dec 2020 12:54)    HPI:  81 y/o M w/ PMH of CVA, TIA, dyslipidemia, HTN, GERD, h/o seizure disorder p/w AMS. Patient states he doesn't recall the episode at all, but reports that his daughter noticed he was not making sense so brought him o the hospital. It is reported in the chart that daughter had stated patient did not have LOC/syncope, just garbled speech and not making sense.    During work up MRI/MRA brain showed multiple old infarcts with encephalomalacia, significant cerebral vascular disease and an abnormal EEG. He had minimally elevated troponins and TTE pending but showing a newly reduced LVSF EF about 10-20% with global hypokinesis. Also as per radiology his MRA/MRI appears to be consistent with Vit B1 deficiency concerning for Wernicke's encephalopathy.  O/N 12/15 he became very agitated and a code gray needed to called and he was given haldol.  12/16 - patient continued to be agitated and required ativan during the afternoon/evening  12/17 - patient lethargic, intermittently pulling off his tele. He has been bradycardiac down to the 30s with frequent PVCs and wide complex rhythm that is irregular with varying wide complexes ?AIVR  12/18 - Patient continues to be bradycardiac with PVCs and wide complex rhythm ?AIVR  12/19 - Lying flat but still dyspneic but clinically better.  No more AIVR on the monitor so far.  Tolerating current meds.  Renal function stable.  12/20 - Refusing to wear the monitor but seems slightly less dyspneic this AM  Tolerating current meds so far.  No new cardiac changes.  Awaiting core bx for amyloidosis.  12/21 - Patient agitated and did not wear tele o/n    Patient seen and examined at bedside.  He is much more cooperative and calm. He is worried about his heart but denies any symptoms.    He denies fevers, chills, CP, palp, SOB, abd pain, N/V, dizziness, syncope, orthopnea, PND    PMHx:  PAST MEDICAL & SURGICAL HISTORY:  Chronic GERD  HTN (hypertension)  HLD (hyperlipidemia)  CVA (cerebral vascular accident)  High cholesterol  HTN (hypertension)    History of surgical removal of testicle  H/O hernia repair    FAMILY HISTORY:  reviewed and NC    ALLERGIES:  Allergies  Allergy Status Unknown    SHx:  Denies tobacco, etoh or illicit drug use.    REVIEW OF SYSTEMS:  Limited due to dementia but a 10 point ROS was obtained and was negative, unless indicated above.    Vital Signs Last 24 Hrs  T(C): 37.1 (21 Dec 2020 08:02), Max: 37.1 (20 Dec 2020 20:04)  T(F): 98.7 (21 Dec 2020 08:02), Max: 98.7 (20 Dec 2020 20:04)  HR: 85 (21 Dec 2020 08:02) (82 - 89)  BP: 147/74 (21 Dec 2020 08:02) (126/59 - 147/74)  BP(mean): --  RR: 18 (21 Dec 2020 08:02) (18 - 18)  SpO2: 94% (21 Dec 2020 08:02) (94% - 96%)    PHYSICAL EXAM:   Constitutional: Awake and alert, in NAD  HEENT: Normal Hearing, MMM  Neck: Soft and supple, No LAD, No JVD, no carotid bruit  Respiratory: poor effort but appears to have Breath sounds are clear bilaterally, No wheezing, rales or rhonchi, good air movement  Cardiovascular: S1 and S2, regular rate and rhythm, II/VI systolic murmur at LSB, gallops or rubs. PMI is nondisplaced  Gastrointestinal: Bowel Sounds present, soft, nontender, nondistended, no guarding, no rebound  Extremities: Warm and well perfused, trace peripheral edema  Neurological: A/O x 2, no focal deficits appreciated  Skin: No rashes appreciated    MEDICATIONS  (STANDING):  aspirin 325 milliGRAM(s) Oral daily  atorvastatin 40 milliGRAM(s) Oral at bedtime  carvedilol 3.125 milliGRAM(s) Oral every 12 hours  furosemide   Injectable 20 milliGRAM(s) IV Push daily  heparin   Injectable 5000 Unit(s) SubCutaneous every 12 hours  hydrALAZINE 10 milliGRAM(s) Oral three times a day  isosorbide   mononitrate ER Tablet (IMDUR) 30 milliGRAM(s) Oral daily  lidocaine 1%/epinephrine 1:100,000 Inj 10 milliLiter(s) Local Injection once  LORazepam   Injectable 0.25 milliGRAM(s) IV Push once  multivitamin 1 Tablet(s) Oral daily  QUEtiapine 25 milliGRAM(s) Oral daily  tamsulosin 0.4 milliGRAM(s) Oral two times a day    MEDICATIONS  (PRN):  fluticasone propionate 50 MICROgram(s)/spray Nasal Spray 1 Spray(s) Both Nostrils two times a day PRN Congestion, Nasal  QUEtiapine 12.5 milliGRAM(s) Oral two times a day PRN agitation    LABS: All Labs Reviewed:  Some labs are still pending. Most recent labs available are as follows:  COVID-19 Antibody - (12.15.20 @ 07:39): COVID-19 IgG Antibody Index: 1.11: COVID-19 IgG Antibody Interpretation: Negative:  COVID-19 PCR . (12.14.20 @ 21:34): COVID-19 PCR: NotDetec:                           9.5    5.77  )-----------( 170      ( 21 Dec 2020 08:48 )             32.7           12-19    142  |  111<H>  |  35<H>  ----------------------------<  110<H>  4.4   |  27  |  1.46<H>    Ca    8.8      19 Dec 2020 07:32    12-17    143  |  112<H>  |  33<H>  ----------------------------<  83  4.4   |  25  |  1.65<H>    Ca    9.1      17 Dec 2020 07:29  Mg     2.1     12-17    12-16    140  |  113<H>  |  26<H>  ----------------------------<  90  5.0   |  20<L>  |  1.75<H>    Ca    9.1      16 Dec 2020 07:11  Phos  3.1     12-15  Mg     2.2     12-16    TPro  7.4  /  Alb  3.0<L>  /  TBili  1.7<H>  /  DBili  x   /  AST  44<H>  /  ALT  140<H>  /  AlkPhos  109  12-15    PT/INR - ( 15 Dec 2020 07:39 )   PT: 14.9 sec;   INR: 1.30 ratio    PTT - ( 15 Dec 2020 07:39 )  PTT:30.8 sec    CARDIAC MARKERS ( 15 Dec 2020 00:40 )  0.045 ng/mL / x     / x     / x     / x      CARDIAC MARKERS ( 14 Dec 2020 23:32 )  0.048 ng/mL / x     / x     / x     / x      CARDIAC MARKERS ( 14 Dec 2020 21:34 )  0.047 ng/mL / x     / x     / x     / x        ProBNP >11,000      BLOOD CULTURES: Organism --  Gram Stain Blood -- Gram Stain --  Specimen Source .Urine Clean Catch (Midstream)  Culture-Blood --      LIPID PROFILE lipid profile                          12-15 @ 07:39  cholesterol           91 mg/dL  Direct LDL            --  HDL cholesterol       27 mg/dL  HDL/Total cholesterol --  Triglycerides, serum  57 mg/dL    RADIOLOGY:    EXAM:  XR CHEST PA LAT 2V                        PROCEDURE DATE:  12/14/2020    IMPRESSION:   Heart enlargement again noted. Mild central CHF at this time.    EXAM:  MR ANGIO NECK DC                        EXAM:  MR ANGIO BRAIN DC                        EXAM:  MR BRAIN DC                        PROCEDURE DATE:  12/15/2020      FINDINGS:  Brain MRI:  Motion limited study, redemonstration enlarged ventricles and sulci consistent with severe volume loss with remote infarcts right greater than left MCA distribution involving the bilateral posterior frontal and parietal lobes as and remote PCA infarcts with lateral ventricle ex vacuo enlargement unchanged. Redemonstration left larger than right cerebellar remote infarcts with encephalomalacia, gliosis, and ex vacuo enlargement of fourth ventricle unchanged. There is extensive patchy T2 and FLAIR white matter hyperintensity likely microvascular disease with remote lacunar infarcts unchanged from prior. There is a decreased size to the mamillary bodies, correlate with thiamin vitamin B1 deficiency. Additional foci of of T2/FLAIR signal hyperintensity within the hemispheric white matter, which are nonspecific and likely sequela of microvascular disease. No new intraparenchymal hematoma, mass effect or midline shift. No new restrictive diffusion to suggest acute or subacute infarction. No new abnormal extra-axial fluid collections are present. Basal cisterns remain patent and unchanged.  The calvarium is intact. Orbits are limited by motion, with redemonstration of downward displacement of the right orbital floor unchanged from prior. Tympanomastoid cavities appear free of acute disease. There is mild mucosal thickening in the paraspinal sinuses with possible retention cyst or polyps in themaxillary antra, there are no air-fluid levels.    Brain MRA:  Motion limited MRA, there is flow-related signal within the internal carotid arteries within the cavernous supraclinoid and bifurcation segments with multifocal stenosis and with extensive atherosclerotic vascular calcification on CT. there are stenoses proximal to the bifurcation segments with poorly delineated left A1 segment and stenosis of the distal right A1 segment with limitations of the study due to motion. There are severe stenoses of the M1,  proximal M2 and an 3 MCA branch vessels, with study limited by motion with significant decrease in size and number right greater than left distal MCA branches. Proximal A 2 anterior cerebral arteries are poorly delineated, this may reflect technique and motion, there are stenoses of the distal bilateral anterior cerebral arteries.  Tortuous and dominant left vertebral artery crosses the right of midline and is the dominant supply to the basilar artery. Poorly delineated right vertebral artery secondary to motion narrowing proximal to the vertebrobasilar junction, and stenosis of the right posterior inferior cerebellar artery vessels are poorly delineated junction, suggest repeat imaging the patient can hold still. There is multifocal stenosis and narrowing of the basilar artery. There is poor delineation and multifocal stenosis and narrowing of the bilateral proximal mid and distal posterior cerebral arteries.    Neck MRA:  views were obtained only, suggest repeat imaging when patient is able to hold and complete exam.  IMPRESSION:  Redemonstration volume loss remote infarcts right greater than left MCA distribution in bilateral frontal and parietal lobes with encephalomalacia, bilateral PCA, and bilateral cerebellar left greater than right territories with encephalomalacia, marked microvascular disease, remote lacunar infarcts, without new restricted diffusion, hemorrhage or shift.  Decreased size mamillary bodies correlate with thiamin vitamin [id number] severely motion limited MRA with atherosclerotic vascular calcification multifocal narrowing and stenosis of the cavernous to provide ICAs, with severe multifocal stenosis of the intracranial arterial middle and posterior cerebral arteries and vertebral and basilar arteries as detailed above, MRA of the neck only obtained  views, suggest repeat imaging when patient can hold still.      EKG:    (12/15/2020, my interpretation): NSR at 81bpm, biatrial enlargement, LAD and LVH with repolarization     TELEMETRY:    Reviewed. HRs 30-50s with frequent PVCs and a slow wide complex rhythm ?AIVR on 12/18 not seen since      ECHO:    EXAM:  ECHO TTE WO CON COMP W DOPP    PROCEDURE DATE:  12/15/2020     Summary:   The left ventricle cavity is mildly dilated.   Mild concentric left ventricular hypertrophy is present.   Estimated left ventricular ejection fraction is 15-20 %.   The left atrium is severely dilated.   The right atrium appears moderately dilated.   Mild to Moderate mitral regurgitation is present.   Mild (1+) tricuspid valve regurgitation is present.   Mild aortic sclerosis is present with normal valvular opening.

## 2020-12-21 NOTE — PROGRESS NOTE ADULT - ASSESSMENT
81 y/o M w/ PMH of CVA, TIA, dyslipidemia, HTN, GERD, h/o seizure disorder p/w AMS found to have abnormal MRI/MRA of the brain with minimally elevated troponins and newly severe cardiomyopathy EF 15-20%. During work up patient was found to have elevated Kappa/Lamda light chains concerning for Amyloidosis s/p biopsy. Cardiology consulted for further evaluation and treatment.    -Awaiting results of fat pad biopsy for amyloidosis  -Plan for pharm nuclear stress test today to evaluate for ischemic cause of his CM.  -Fluid status much improved, F/U on BMP/renal function (currently pending), if BUn/CR are starting to increase recommend transitioning to lasix 20 PO Qday  -While monitor renal function/diuresing holding off on ACE/ARB/Entresto  -He plans for nuclear stress test today and may need sedation, which may decrease BP, so will hold off on making changes to his hydralazine. But if unable to add ACE/ARB/Entresto and BP remains stable, consider increasing hydralazine to 25mg TID tomorrow.  -Remains not a candidate for LIfeVest since he is uncooperative with tele and would need to be able to disarm vest of needed

## 2020-12-21 NOTE — PROGRESS NOTE ADULT - SUBJECTIVE AND OBJECTIVE BOX
CHIEF COMPLAINT/DIAGNOSIS: AMS    HPI: 81 y/o M w/ PMH of CVA, TIA, dyslipidemia, HTN, GERD, h/o seizure disorder p/w AMS. Patient states he doesn't recall the episode at all, and states he is completely fine. Patient has trouble stating where is currently, but otherwise knows it's 2020, and is conversational. As per ED note, patient's daughter had noticed AMS where patient's speech was abnormal and was groggy. However, daughter had stated patient did not have LOC. Presently patient denies any weakness in arms / legs / slurred speech / facial droop / vision deficits / sensory deficits / CP / SOB / cough / runny nose / sore throat / nausea / vomiting / abdominal pain / diarrhea.  ** As per son patient has been non-compliant with medications for 1month unable to receive from ?? **    12/20: sleeping, lethargic. patient awake and agitated throughout night. limited ROS  12/21:    PHYSICAL EXAM:  Constitutional: Sleeping/lethargic, obese  HEENT:  Normal Hearing, MMM  Neck: Soft and supple  Respiratory: Breath sounds are clear/ diminished b/l   Cardiovascular: S1 and S2, regular rate and rhythm, no Murmurs, gallops or rubs  Gastrointestinal: Bowel Sounds present, soft, nontender, nondistended, no guarding, no rebound  Extremities: trace, pitting +1 b/l peripheral edema  Vascular: 2+ peripheral pulses  Neurological: Baseline A/O x 2, forgetful. follows commands. no visible facial droop. able to lift arm over head. blinks to threat. speech fluent.   Musculoskeletal: 5/5 strength b/l upper and lower extremities  Skin: No rashes    Vital Signs Last 24 Hrs  T(C): 36.4 (20 Dec 2020 08:40), Max: 36.4 (20 Dec 2020 08:40)  T(F): 97.5 (20 Dec 2020 08:40), Max: 97.5 (20 Dec 2020 08:40)  HR: 75 (20 Dec 2020 08:40) (75 - 86)  BP: 142/80 (20 Dec 2020 08:40) (131/77 - 153/99)  BP(mean): --  RR: 18 (20 Dec 2020 08:40) (18 - 18)  SpO2: 96% (20 Dec 2020 08:40) (96% - 99%) -- room air    LABS: All Labs Reviewed:                        10.3   5.72  )-----------( 169      ( 19 Dec 2020 07:32 )             34.6     12-19    142  |  111<H>  |  35<H>  ----------------------------<  110<H>  4.4   |  27  |  1.46<H>    Ca    8.8      19 Dec 2020 07:32    Blood Culture: 12-14 @ 21:34  Organism Klebsiella pneumoniae  Gram Stain Blood -- Gram Stain --  Specimen Source .Urine Clean Catch (Midstream)  Culture-Blood --    RADIOLOGY:  < from: MR Head No Cont Disc (12.15.20 @ 16:03) >  IMPRESSION:  Re demonstration volume loss remote infarcts right greater than left MCA distribution in bilateral frontal and parietal lobes with encephalomalacia, bilateral PCA, and bilateral cerebellar left greater than right territories with encephalomalacia, marked microvascular disease, remote lacunar infarcts, without new restricted diffusion, hemorrhage or shift.    Decreased size mamillary bodies correlate with thiamin vitamin [id number] severely motion limited MRA with atherosclerotic vascular calcification multifocal narrowing and stenosis of the cavernous to provide ICAs, with severe multifocal stenosis of the intracranial arterial middle and posterior cerebral arteries and vertebral and basilar arteries as detailed above, MRA of the neck only obtained  views, suggest repeat imaging when patient can hold still.  < end of copied text >    < from: CT Head No Cont (12.14.20 @ 22:01) >  IMPRESSION: No intracranial hemorrhage or mass effect. Stable chronic infarcts.  < end of copied text >    ECHOCARDIOGRAM:   < from: TTE Echo Complete w/o Contrast w/ Doppler (12.15.20 @ 14:20) >   The left ventricle cavity is mildly dilated.   Mild concentric left ventricular hypertrophy is present.   Estimated left ventricular ejection fraction is 15-20 %.   The left atrium is severely dilated.   The right atrium appears moderately dilated.   Mild to Moderate mitral regurgitation is present.   Mild (1+) tricuspid valve regurgitation is present.   Mild aortic sclerosis is present with normal valvular opening.  < end of copied text >    MEDICATIONS  (STANDING):  aspirin 325 milliGRAM(s) Oral daily  atorvastatin 40 milliGRAM(s) Oral at bedtime  carvedilol 3.125 milliGRAM(s) Oral every 12 hours  cefTRIAXone Injectable. 1000 milliGRAM(s) IV Push every 24 hours  furosemide   Injectable 20 milliGRAM(s) IV Push daily  heparin   Injectable 5000 Unit(s) SubCutaneous every 12 hours  hydrALAZINE 10 milliGRAM(s) Oral three times a day  isosorbide   mononitrate ER Tablet (IMDUR) 30 milliGRAM(s) Oral daily  LORazepam   Injectable 0.25 milliGRAM(s) IV Push once  multivitamin 1 Tablet(s) Oral daily  QUEtiapine 25 milliGRAM(s) Oral daily  tamsulosin 0.4 milliGRAM(s) Oral two times a day  thiamine IVPB 500 milliGRAM(s) IV Intermittent every 8 hours    MEDICATIONS  (PRN):  QUEtiapine 12.5 milliGRAM(s) Oral every 8 hours PRN agitation    TELEMETRY REVIEW:  12/21: refusing cardiac monitor.    ASSESSMENT AND PLAN:    81 y/o M w/ PMH of CVA, TIA, dyslipidemia, HTN, GERD, p/w AMS.     1) Toxic/Metabolic encephalopathy due to UTI and possible Wernicke's   - tele monitoring. tele review as above.  - CTH - Multiple chronic stable infarcts. f/u MRI / MRA > as above, no acute infarcts.  - s/p IV thiamine x3 days  - F/u b12, b1, b6, folate lvls.  - Seroquel daily (limit use - prolongs QTC) /seroquel for agitation/ sundowning -  td.  - ABX for UTI complete  - Speech, PT, Neuro, Cardio consult appreciated     2) Acute on Chronic Systolic HF/ Cardiomyopathy - not in acute exacerbation  - Per cardio last known EF 45%, now 10-20% EF  - Cont: GDMT: not candidate for ACEi.   - Cont. BB (dose reduced to 3.125 BID w/ parameters), hydralazine, Imdur   - Cont. IV Lasix QD  - NM stress test Monday. NPO at midnight on Sunday  - f/u viral studies - coxsackie neg, echo virus. >  - s/p fat pad bx on 12/19  - EP eval - AICD after 3 months of GDMT, life vest contraindicated at this time (adjust Seroquel to control agitation)    3) Mildly elevated troponins  - Mildly elevated and downtrended. No CP  - Cont. ASA, statin  - Cardio consult appreciated - NM stress test (plan for Monday, ativan prior to test)    4) UTI - likely GNR  - s/p course of Ceftriaxone  - Urine cx - klebsiella, sens to Cef.  - s/p gentle hydration. encourage PO hydration.     5) LASHELL on CKD Stage 3, urinary retention  - s/p IVF  - Avoid nephrotoxic agents, no ACE/ARB  - Arevalo for retention.  Cont. Flomax - TOV prior to d/c  - Urology f/u appreciated     6) Transaminitis   - hep panel, lipids wnl  - monitor on statin therapy.    8) DVT ppx  - Heparin SubQ    Advanced Directives: Full Code. Consult palliative. patient with multiple chronic progressive conditions    ~~ Spoke to nitin Coleman with updates 12/15, 12/16 12/17: LM for Jared w/ call back. spoke with Dez at length this AM 12/18. agreeable to sedative medications.   12/20: spoke to Jared. Hes concerned about use over medicating his father for agitation. explained why ativan would be perferred with long QTc interval and cardiac issues. Agreed to stop ativan and trial Seroquel only with close monitoring of Qtc. Is aware of IV ativan prior to stress test. Disposition discussed if agitation can be controlled with meds then is Life vest an option if he is monitored in facility. will f/u with son and JACQUELINE crocker. CHIEF COMPLAINT/DIAGNOSIS: AMS    HPI: 81 y/o M w/ PMH of CVA, TIA, dyslipidemia, HTN, GERD, h/o seizure disorder p/w AMS. Patient states he doesn't recall the episode at all, and states he is completely fine. Patient has trouble stating where is currently, but otherwise knows it's 2020, and is conversational. As per ED note, patient's daughter had noticed AMS where patient's speech was abnormal and was groggy. However, daughter had stated patient did not have LOC. Presently patient denies any weakness in arms / legs / slurred speech / facial droop / vision deficits / sensory deficits / CP / SOB / cough / runny nose / sore throat / nausea / vomiting / abdominal pain / diarrhea.  ** As per son patient has been non-compliant with medications for 1month unable to receive from ?? **    12/20: sleeping, lethargic. patient awake and agitated throughout night. limited ROS  12/21: awake, alert. conversating. no complaints. limited ros    PHYSICAL EXAM:  Constitutional: Sleeping/lethargic, obese  HEENT:  Normal Hearing, MMM  Neck: Soft and supple  Respiratory: Breath sounds are clear/ diminished b/l   Cardiovascular: S1 and S2, regular rate and rhythm, no Murmurs, gallops or rubs  Gastrointestinal: Bowel Sounds present, soft, nontender, nondistended, no guarding, no rebound  Extremities: trace, pitting +1 b/l peripheral edema  Vascular: 2+ peripheral pulses  Neurological: Baseline A/O x 2, forgetful. follows commands. no visible facial droop. able to lift arm over head. blinks to threat. speech fluent.   Musculoskeletal: 5/5 strength b/l upper and lower extremities  Skin: No rashes    Vital Signs Last 24 Hrs  T(C): 37.1 (21 Dec 2020 08:02), Max: 37.1 (20 Dec 2020 20:04)  T(F): 98.7 (21 Dec 2020 08:02), Max: 98.7 (20 Dec 2020 20:04)  HR: 91 (21 Dec 2020 12:04) (82 - 91)  BP: 150/68 (21 Dec 2020 12:04) (126/59 - 150/68)  BP(mean): --  RR: 17 (21 Dec 2020 12:04) (17 - 18)  SpO2: 94% (21 Dec 2020 12:04) (94% - 96%) -- room air    LABS: All Labs Reviewed:                        9.5    5.77  )-----------( 170      ( 21 Dec 2020 08:48 )             32.7     12-21    145  |  112<H>  |  27<H>  ----------------------------<  82  4.1   |  27  |  1.20    Ca    8.9      21 Dec 2020 08:48    TPro  6.5  /  Alb  2.4<L>  /  TBili  1.2  /  DBili  x   /  AST  41<H>  /  ALT  114<H>  /  AlkPhos  83  12-21    Blood Culture: 12-14 @ 21:34  Organism Klebsiella pneumoniae  Gram Stain Blood -- Gram Stain --  Specimen Source .Urine Clean Catch (Midstream)  Culture-Blood --    RADIOLOGY:  < from: MR Head No Cont Disc (12.15.20 @ 16:03) >  IMPRESSION:  Re demonstration volume loss remote infarcts right greater than left MCA distribution in bilateral frontal and parietal lobes with encephalomalacia, bilateral PCA, and bilateral cerebellar left greater than right territories with encephalomalacia, marked microvascular disease, remote lacunar infarcts, without new restricted diffusion, hemorrhage or shift.    Decreased size mamillary bodies correlate with thiamin vitamin [id number] severely motion limited MRA with atherosclerotic vascular calcification multifocal narrowing and stenosis of the cavernous to provide ICAs, with severe multifocal stenosis of the intracranial arterial middle and posterior cerebral arteries and vertebral and basilar arteries as detailed above, MRA of the neck only obtained  views, suggest repeat imaging when patient can hold still.  < end of copied text >    < from: CT Head No Cont (12.14.20 @ 22:01) >  IMPRESSION: No intracranial hemorrhage or mass effect. Stable chronic infarcts.  < end of copied text >    ECHOCARDIOGRAM:   < from: TTE Echo Complete w/o Contrast w/ Doppler (12.15.20 @ 14:20) >   The left ventricle cavity is mildly dilated.   Mild concentric left ventricular hypertrophy is present.   Estimated left ventricular ejection fraction is 15-20 %.   The left atrium is severely dilated.   The right atrium appears moderately dilated.   Mild to Moderate mitral regurgitation is present.   Mild (1+) tricuspid valve regurgitation is present.   Mild aortic sclerosis is present with normal valvular opening.  < end of copied text >    MEDICATIONS  (STANDING):  aspirin 325 milliGRAM(s) Oral daily  atorvastatin 40 milliGRAM(s) Oral at bedtime  carvedilol 3.125 milliGRAM(s) Oral every 12 hours  cefTRIAXone Injectable. 1000 milliGRAM(s) IV Push every 24 hours  furosemide   Injectable 20 milliGRAM(s) IV Push daily  heparin   Injectable 5000 Unit(s) SubCutaneous every 12 hours  hydrALAZINE 10 milliGRAM(s) Oral three times a day  isosorbide   mononitrate ER Tablet (IMDUR) 30 milliGRAM(s) Oral daily  LORazepam   Injectable 0.25 milliGRAM(s) IV Push once  multivitamin 1 Tablet(s) Oral daily  QUEtiapine 25 milliGRAM(s) Oral daily  tamsulosin 0.4 milliGRAM(s) Oral two times a day  thiamine IVPB 500 milliGRAM(s) IV Intermittent every 8 hours    MEDICATIONS  (PRN):  QUEtiapine 12.5 milliGRAM(s) Oral every 8 hours PRN agitation    TELEMETRY REVIEW:  12/21: refusing cardiac monitor.    ASSESSMENT AND PLAN:    81 y/o M w/ PMH of CVA, TIA, dyslipidemia, HTN, GERD, p/w AMS.     1) Toxic/Metabolic encephalopathy due to UTI and possible Wernicke's   - tele monitoring. tele review as above.  - CTH - Multiple chronic stable infarcts. f/u MRI / MRA > as above, no acute infarcts.  - s/p IV thiamine x3 days  - F/u b12, b1, b6, folate lvls.  - Seroquel daily (limit use - prolongs QTC) /seroquel for agitation/ sundowning -  td.  - ABX for UTI complete  - Speech, PT, Neuro, Cardio consult appreciated     2) Acute on Chronic Systolic HF/ Cardiomyopathy - not in acute exacerbation  - Per cardio last known EF 45%, now 10-20% EF  - Cont: GDMT: not candidate for ACEi.   - Cont. BB (dose reduced to 3.125 BID w/ parameters), hydralazine (increase to 25mg TID), Imdur   - Cont. IV Lasix QD  - s/p stress test (12/21) - no reversible defects.   - f/u viral studies - coxsackie neg, echo virus >  - s/p fat pad bx on 12/19  - EP eval - AICD after 3 months of GDMT, life vest contraindicated at this time (adjust Seroquel to control agitation)  12/21 - trial low dose ACEi    3) PAF   - PAF noted on tele 12/16, 12/18.   - CHADsVasc = 6.  - cont. BB w/ parameters.    4) Mildly elevated troponins  - Mildly elevated and downtrended. No CP  - Cont. ASA, statin  - Cardio consult appreciated    5) UTI - likely GNR  - s/p course of Ceftriaxone  - Urine cx - klebsiella, sens to Cef.  - s/p gentle hydration. encourage PO hydration.     6) LASHELL on CKD Stage 3, urinary retention  - s/p IVF  - Avoid nephrotoxic agents, no ACE/ARB  - Arevalo for retention.  Cont. Flomax - TOV prior to d/c  - Urology f/u appreciated     7) Transaminitis   - hep panel, lipids wnl  - monitor on statin therapy.    8) DVT ppx  - Heparin SubQ    Advanced Directives: Full Code. Consult palliative. patient with multiple chronic progressive conditions    ~~ Spoke to nitin Coleman with updates 12/15, 12/16 12/17: LM for Jared w/ call back. spoke with Dez at length this AM 12/18. agreeable to sedative medications.   12/20: spoke to Jared. Hes concerned about use over medicating his father for agitation. explained why ativan would be perferred with long QTc interval and cardiac issues. Agreed to stop ativan and trial Seroquel only with close monitoring of Qtc. Is aware of IV ativan prior to stress test. Disposition discussed if agitation can be controlled with meds then is Life vest an option if he is monitored in facility. will f/u with son and EP tm. CHIEF COMPLAINT/DIAGNOSIS: AMS    HPI: 81 y/o M w/ PMH of CVA, TIA, dyslipidemia, HTN, GERD, h/o seizure disorder p/w AMS. Patient states he doesn't recall the episode at all, and states he is completely fine. Patient has trouble stating where is currently, but otherwise knows it's 2020, and is conversational. As per ED note, patient's daughter had noticed AMS where patient's speech was abnormal and was groggy. However, daughter had stated patient did not have LOC. Presently patient denies any weakness in arms / legs / slurred speech / facial droop / vision deficits / sensory deficits / CP / SOB / cough / runny nose / sore throat / nausea / vomiting / abdominal pain / diarrhea.  ** As per son patient has been non-compliant with medications for 1month unable to receive from ?? **    12/20: sleeping, lethargic. patient awake and agitated throughout night. limited ROS  12/21: awake, alert. conversating. no complaints. limited ros    PHYSICAL EXAM:  Constitutional: Sleeping/lethargic, obese  HEENT:  Normal Hearing, MMM  Neck: Soft and supple  Respiratory: Breath sounds are clear/ diminished b/l   Cardiovascular: S1 and S2, regular rate and rhythm, no Murmurs, gallops or rubs  Gastrointestinal: Bowel Sounds present, soft, nontender, nondistended, no guarding, no rebound  Extremities: trace, pitting +1 b/l peripheral edema  Vascular: 2+ peripheral pulses  Neurological: Baseline A/O x 2, forgetful. follows commands. no visible facial droop. able to lift arm over head. blinks to threat. speech fluent.   Musculoskeletal: 5/5 strength b/l upper and lower extremities  Skin: No rashes    Vital Signs Last 24 Hrs  T(C): 37.1 (21 Dec 2020 08:02), Max: 37.1 (20 Dec 2020 20:04)  T(F): 98.7 (21 Dec 2020 08:02), Max: 98.7 (20 Dec 2020 20:04)  HR: 91 (21 Dec 2020 12:04) (82 - 91)  BP: 150/68 (21 Dec 2020 12:04) (126/59 - 150/68)  BP(mean): --  RR: 17 (21 Dec 2020 12:04) (17 - 18)  SpO2: 94% (21 Dec 2020 12:04) (94% - 96%) -- room air    LABS: All Labs Reviewed:                        9.5    5.77  )-----------( 170      ( 21 Dec 2020 08:48 )             32.7     12-21    145  |  112<H>  |  27<H>  ----------------------------<  82  4.1   |  27  |  1.20    Ca    8.9      21 Dec 2020 08:48    TPro  6.5  /  Alb  2.4<L>  /  TBili  1.2  /  DBili  x   /  AST  41<H>  /  ALT  114<H>  /  AlkPhos  83  12-21    Blood Culture: 12-14 @ 21:34  Organism Klebsiella pneumoniae  Gram Stain Blood -- Gram Stain --  Specimen Source .Urine Clean Catch (Midstream)  Culture-Blood --    RADIOLOGY:  < from: MR Head No Cont Disc (12.15.20 @ 16:03) >  IMPRESSION:  Re demonstration volume loss remote infarcts right greater than left MCA distribution in bilateral frontal and parietal lobes with encephalomalacia, bilateral PCA, and bilateral cerebellar left greater than right territories with encephalomalacia, marked microvascular disease, remote lacunar infarcts, without new restricted diffusion, hemorrhage or shift.    Decreased size mamillary bodies correlate with thiamin vitamin [id number] severely motion limited MRA with atherosclerotic vascular calcification multifocal narrowing and stenosis of the cavernous to provide ICAs, with severe multifocal stenosis of the intracranial arterial middle and posterior cerebral arteries and vertebral and basilar arteries as detailed above, MRA of the neck only obtained  views, suggest repeat imaging when patient can hold still.  < end of copied text >    < from: CT Head No Cont (12.14.20 @ 22:01) >  IMPRESSION: No intracranial hemorrhage or mass effect. Stable chronic infarcts.  < end of copied text >    ECHOCARDIOGRAM:   < from: TTE Echo Complete w/o Contrast w/ Doppler (12.15.20 @ 14:20) >   The left ventricle cavity is mildly dilated.   Mild concentric left ventricular hypertrophy is present.   Estimated left ventricular ejection fraction is 15-20 %.   The left atrium is severely dilated.   The right atrium appears moderately dilated.   Mild to Moderate mitral regurgitation is present.   Mild (1+) tricuspid valve regurgitation is present.   Mild aortic sclerosis is present with normal valvular opening.  < end of copied text >    MEDICATIONS  (STANDING):  aspirin 325 milliGRAM(s) Oral daily  atorvastatin 40 milliGRAM(s) Oral at bedtime  carvedilol 3.125 milliGRAM(s) Oral every 12 hours  cefTRIAXone Injectable. 1000 milliGRAM(s) IV Push every 24 hours  furosemide   Injectable 20 milliGRAM(s) IV Push daily  heparin   Injectable 5000 Unit(s) SubCutaneous every 12 hours  hydrALAZINE 10 milliGRAM(s) Oral three times a day  isosorbide   mononitrate ER Tablet (IMDUR) 30 milliGRAM(s) Oral daily  LORazepam   Injectable 0.25 milliGRAM(s) IV Push once  multivitamin 1 Tablet(s) Oral daily  QUEtiapine 25 milliGRAM(s) Oral daily  tamsulosin 0.4 milliGRAM(s) Oral two times a day  thiamine IVPB 500 milliGRAM(s) IV Intermittent every 8 hours    MEDICATIONS  (PRN):  QUEtiapine 12.5 milliGRAM(s) Oral every 8 hours PRN agitation    TELEMETRY REVIEW:  12/21: refusing cardiac monitor.    ASSESSMENT AND PLAN:    81 y/o M w/ PMH of CVA, TIA, dyslipidemia, HTN, GERD, p/w AMS.     1) Toxic/Metabolic encephalopathy due to UTI and possible Wernicke's   - tele monitoring. tele review as above.  - CTH - Multiple chronic stable infarcts. f/u MRI / MRA > as above, no acute infarcts.  - s/p IV thiamine x3 days  - F/u b12, b1, b6, folate lvls.  - Seroquel daily (limit use - prolongs QTC) /seroquel for agitation/ sundowning -  td.  - ABX for UTI complete  - Speech, PT, Neuro, Cardio consult appreciated     2) Acute on Chronic Systolic HF/ Cardiomyopathy - not in acute exacerbation  - Per cardio last known EF 45%, now 10-20% EF  - Cont: GDMT: not candidate for ACEi.   - Cont. BB (dose reduced to 3.125 BID w/ parameters), hydralazine (increase to 25mg TID), Imdur   - Cont. IV Lasix QD  - s/p stress test (12/21) - no reversible defects.   - f/u viral studies - coxsackie neg, echo virus >  - s/p fat pad bx on 12/19  - EP eval - AICD after 3 months of GDMT, life vest contraindicated at this time (adjust Seroquel to control agitation)  12/21 - trial low dose ACEi    3) PAF   - PAF noted on tele 12/16, 12/18.   - CHADsVasc = 6.  - cont. BB w/ parameters.  - Cont. Eliquis 5mg BID    4) Mildly elevated troponins  - Mildly elevated and downtrended. No CP  - Cont. ASA, statin  - Cardio consult appreciated    5) UTI - likely GNR  - s/p course of Ceftriaxone  - Urine cx - klebsiella, sens to Cef.  - s/p gentle hydration. encourage PO hydration.     6) LASHELL on CKD Stage 3, urinary retention  - s/p IVF  - Avoid nephrotoxic agents, no ACE/ARB  - Arevalo for retention.  Cont. Flomax - TOV prior to d/c  - Urology f/u appreciated     7) Transaminitis   - hep panel, lipids wnl  - monitor on statin therapy.    8) DVT ppx  - Heparin SubQ    Advanced Directives: Full Code. Consult palliative. patient with multiple chronic progressive conditions    ~~ Spoke to nitin Coleman with updates 12/15, 12/16 12/17: LM for Jared w/ call back. spoke with Dez at length this AM 12/18. agreeable to sedative medications.   12/20: spoke to Jared. Hes concerned about use over medicating his father for agitation. explained why ativan would be perferred with long QTc interval and cardiac issues. Agreed to stop ativan and trial Seroquel only with close monitoring of Qtc. Is aware of IV ativan prior to stress test. Disposition discussed if agitation can be controlled with meds then is Life vest an option if he is monitored in facility. will f/u with son and EP tm. CHIEF COMPLAINT/DIAGNOSIS: AMS    HPI: 79 y/o M w/ PMH of CVA, TIA, dyslipidemia, HTN, GERD, h/o seizure disorder p/w AMS. Patient states he doesn't recall the episode at all, and states he is completely fine. Patient has trouble stating where is currently, but otherwise knows it's 2020, and is conversational. As per ED note, patient's daughter had noticed AMS where patient's speech was abnormal and was groggy. However, daughter had stated patient did not have LOC. Presently patient denies any weakness in arms / legs / slurred speech / facial droop / vision deficits / sensory deficits / CP / SOB / cough / runny nose / sore throat / nausea / vomiting / abdominal pain / diarrhea.  ** As per son patient has been non-compliant with medications for 1month unable to receive from ?? **    12/20: sleeping, lethargic. patient awake and agitated throughout night. limited ROS  12/21: awake, alert. conversating. no complaints. limited ros    PHYSICAL EXAM:  Constitutional: Sleeping/lethargic, obese  HEENT:  Normal Hearing, MMM  Neck: Soft and supple  Respiratory: Breath sounds are clear/ diminished b/l   Cardiovascular: S1 and S2, regular rate and rhythm, no Murmurs, gallops or rubs  Gastrointestinal: Bowel Sounds present, soft, nontender, nondistended, no guarding, no rebound  Extremities: trace, pitting +1 b/l peripheral edema  Vascular: 2+ peripheral pulses  Neurological: Baseline A/O x 2, forgetful. follows commands. no visible facial droop. able to lift arm over head. blinks to threat. speech fluent.   Musculoskeletal: 5/5 strength b/l upper and lower extremities  Skin: No rashes    Vital Signs Last 24 Hrs  T(C): 37.1 (21 Dec 2020 08:02), Max: 37.1 (20 Dec 2020 20:04)  T(F): 98.7 (21 Dec 2020 08:02), Max: 98.7 (20 Dec 2020 20:04)  HR: 91 (21 Dec 2020 12:04) (82 - 91)  BP: 150/68 (21 Dec 2020 12:04) (126/59 - 150/68)  BP(mean): --  RR: 17 (21 Dec 2020 12:04) (17 - 18)  SpO2: 94% (21 Dec 2020 12:04) (94% - 96%) -- room air    LABS: All Labs Reviewed:                        9.5    5.77  )-----------( 170      ( 21 Dec 2020 08:48 )             32.7     12-21    145  |  112<H>  |  27<H>  ----------------------------<  82  4.1   |  27  |  1.20    Ca    8.9      21 Dec 2020 08:48    TPro  6.5  /  Alb  2.4<L>  /  TBili  1.2  /  DBili  x   /  AST  41<H>  /  ALT  114<H>  /  AlkPhos  83  12-21    Blood Culture: 12-14 @ 21:34  Organism Klebsiella pneumoniae  Gram Stain Blood -- Gram Stain --  Specimen Source .Urine Clean Catch (Midstream)  Culture-Blood --    RADIOLOGY:  < from: MR Head No Cont Disc (12.15.20 @ 16:03) >  IMPRESSION:  Re demonstration volume loss remote infarcts right greater than left MCA distribution in bilateral frontal and parietal lobes with encephalomalacia, bilateral PCA, and bilateral cerebellar left greater than right territories with encephalomalacia, marked microvascular disease, remote lacunar infarcts, without new restricted diffusion, hemorrhage or shift.    Decreased size mamillary bodies correlate with thiamin vitamin [id number] severely motion limited MRA with atherosclerotic vascular calcification multifocal narrowing and stenosis of the cavernous to provide ICAs, with severe multifocal stenosis of the intracranial arterial middle and posterior cerebral arteries and vertebral and basilar arteries as detailed above, MRA of the neck only obtained  views, suggest repeat imaging when patient can hold still.  < end of copied text >    < from: CT Head No Cont (12.14.20 @ 22:01) >  IMPRESSION: No intracranial hemorrhage or mass effect. Stable chronic infarcts.  < end of copied text >    ECHOCARDIOGRAM:   < from: TTE Echo Complete w/o Contrast w/ Doppler (12.15.20 @ 14:20) >   The left ventricle cavity is mildly dilated.   Mild concentric left ventricular hypertrophy is present.   Estimated left ventricular ejection fraction is 15-20 %.   The left atrium is severely dilated.   The right atrium appears moderately dilated.   Mild to Moderate mitral regurgitation is present.   Mild (1+) tricuspid valve regurgitation is present.   Mild aortic sclerosis is present with normal valvular opening.  < end of copied text >    MEDICATIONS  (STANDING):  aspirin 325 milliGRAM(s) Oral daily  atorvastatin 40 milliGRAM(s) Oral at bedtime  carvedilol 3.125 milliGRAM(s) Oral every 12 hours  cefTRIAXone Injectable. 1000 milliGRAM(s) IV Push every 24 hours  furosemide   Injectable 20 milliGRAM(s) IV Push daily  heparin   Injectable 5000 Unit(s) SubCutaneous every 12 hours  hydrALAZINE 10 milliGRAM(s) Oral three times a day  isosorbide   mononitrate ER Tablet (IMDUR) 30 milliGRAM(s) Oral daily  LORazepam   Injectable 0.25 milliGRAM(s) IV Push once  multivitamin 1 Tablet(s) Oral daily  QUEtiapine 25 milliGRAM(s) Oral daily  tamsulosin 0.4 milliGRAM(s) Oral two times a day  thiamine IVPB 500 milliGRAM(s) IV Intermittent every 8 hours    MEDICATIONS  (PRN):  QUEtiapine 12.5 milliGRAM(s) Oral every 8 hours PRN agitation    TELEMETRY REVIEW:  12/21: refusing cardiac monitor.    ASSESSMENT AND PLAN:    79 y/o M w/ PMH of CVA, TIA, dyslipidemia, HTN, GERD, p/w AMS.     1) Toxic/Metabolic encephalopathy due to UTI and possible Wernicke's   - tele monitoring. tele review as above.  - CTH - Multiple chronic stable infarcts. f/u MRI / MRA > as above, no acute infarcts.  - s/p IV thiamine x3 days  - F/u b12, b1, b6, folate lvls.  - Seroquel daily (limit use - prolongs QTC) /seroquel for agitation/ sundowning -  td.  - ABX for UTI complete  - Speech, PT, Neuro, Cardio consult appreciated     2) Acute on Chronic Systolic HF/ Cardiomyopathy - not in acute exacerbation  - Per cardio last known EF 45%, now 10-20% EF  - Cont: GDMT: not candidate for ACEi.   - Cont. BB (dose reduced to 3.125 BID w/ parameters), hydralazine (increase to 25mg TID), Imdur   - Cont. IV Lasix QD  - s/p stress test (12/21) - no reversible defects.   - f/u viral studies - coxsackie neg, echo virus >  - s/p fat pad bx on 12/19  - EP eval - AICD after 3 months of GDMT, life vest contraindicated at this time (adjust Seroquel to control agitation)  12/21 - trial low dose ACEi    3) PAF   - PAF noted on tele 12/16, 12/18.   - CHADsVasc = 6.  - cont. BB w/ parameters.  - Cont. Eliquis 5mg BID    4) Mildly elevated troponins  - Mildly elevated and downtrended. No CP  - Cont. ASA, statin  - Cardio consult appreciated    5) UTI - likely GNR  - s/p course of Ceftriaxone  - Urine cx - klebsiella, sens to Cef.  - s/p gentle hydration. encourage PO hydration.     6) LASHELL on CKD Stage 3, urinary retention  - s/p IVF  - Avoid nephrotoxic agents, no ACE/ARB  - Arevalo for retention.  Cont. Flomax - TOV prior to d/c  - Urology f/u appreciated     7) Transaminitis   - hep panel, lipids wnl  - monitor on statin therapy.    8) DVT ppx  - Heparin SubQ    Advanced Directives: Full Code. Consult palliative. patient with multiple chronic progressive conditions    ~~ Spoke to nitin Coleman with updates 12/15, 12/16 12/17: LM for Jared w/ call back. spoke with Dez at length this AM 12/18. agreeable to sedative medications.   12/20: spoke to Jared. Hes concerned about use over medicating his father for agitation. explained why ativan would be perferred with long QTc interval and cardiac issues. Agreed to stop ativan and trial Seroquel only with close monitoring of Qtc. Is aware of IV ativan prior to stress test. Disposition discussed if agitation can be controlled with meds then is Life vest an option if he is monitored in facility. will f/u with son and EP tm.  12/21: dez updated

## 2020-12-21 NOTE — PHARMACOTHERAPY INTERVENTION NOTE - COMMENTS
maintenance of thiamine
contacted patients daughter - as per daughter, patient only taking Coreg 25 mg BID all other medications stopped
For treatment of Wernicke encephalopathy 500 TID x2-3 days then reassess

## 2020-12-22 LAB
% ALBUMIN: 44.2 % — SIGNIFICANT CHANGE UP
% ALPHA 1: 5.5 % — SIGNIFICANT CHANGE UP
% ALPHA 2: 10.3 % — SIGNIFICANT CHANGE UP
% BETA: 11.9 % — SIGNIFICANT CHANGE UP
% GAMMA: 28.1 % — SIGNIFICANT CHANGE UP
ALBUMIN SERPL ELPH-MCNC: 2.9 G/DL — LOW (ref 3.6–5.5)
ALBUMIN/GLOB SERPL ELPH: 0.8 RATIO — SIGNIFICANT CHANGE UP
ALPHA1 GLOB SERPL ELPH-MCNC: 0.4 G/DL — SIGNIFICANT CHANGE UP (ref 0.1–0.4)
ALPHA2 GLOB SERPL ELPH-MCNC: 0.7 G/DL — SIGNIFICANT CHANGE UP (ref 0.5–1)
ANION GAP SERPL CALC-SCNC: 4 MMOL/L — LOW (ref 5–17)
B-GLOBULIN SERPL ELPH-MCNC: 0.8 G/DL — SIGNIFICANT CHANGE UP (ref 0.5–1)
BUN SERPL-MCNC: 24 MG/DL — HIGH (ref 7–23)
CALCIUM SERPL-MCNC: 8.8 MG/DL — SIGNIFICANT CHANGE UP (ref 8.5–10.1)
CHLORIDE SERPL-SCNC: 111 MMOL/L — HIGH (ref 96–108)
CO2 SERPL-SCNC: 30 MMOL/L — SIGNIFICANT CHANGE UP (ref 22–31)
CREAT SERPL-MCNC: 1.09 MG/DL — SIGNIFICANT CHANGE UP (ref 0.5–1.3)
GAMMA GLOBULIN: 1.8 G/DL — HIGH (ref 0.6–1.6)
GLUCOSE SERPL-MCNC: 122 MG/DL — HIGH (ref 70–99)
POTASSIUM SERPL-MCNC: 4.1 MMOL/L — SIGNIFICANT CHANGE UP (ref 3.5–5.3)
POTASSIUM SERPL-SCNC: 4.1 MMOL/L — SIGNIFICANT CHANGE UP (ref 3.5–5.3)
PROT PATTERN SERPL ELPH-IMP: SIGNIFICANT CHANGE UP
PROT SERPL-MCNC: 6.5 G/DL — SIGNIFICANT CHANGE UP (ref 6–8.3)
SARS-COV-2 RNA SPEC QL NAA+PROBE: SIGNIFICANT CHANGE UP
SODIUM SERPL-SCNC: 145 MMOL/L — SIGNIFICANT CHANGE UP (ref 135–145)

## 2020-12-22 PROCEDURE — 99232 SBSQ HOSP IP/OBS MODERATE 35: CPT

## 2020-12-22 PROCEDURE — 99231 SBSQ HOSP IP/OBS SF/LOW 25: CPT

## 2020-12-22 PROCEDURE — 93010 ELECTROCARDIOGRAM REPORT: CPT

## 2020-12-22 RX ORDER — FUROSEMIDE 40 MG
20 TABLET ORAL DAILY
Refills: 0 | Status: DISCONTINUED | OUTPATIENT
Start: 2020-12-22 | End: 2020-12-23

## 2020-12-22 RX ORDER — LISINOPRIL 2.5 MG/1
2.5 TABLET ORAL DAILY
Refills: 0 | Status: DISCONTINUED | OUTPATIENT
Start: 2020-12-22 | End: 2020-12-23

## 2020-12-22 RX ORDER — MAGNESIUM SULFATE 500 MG/ML
1 VIAL (ML) INJECTION ONCE
Refills: 0 | Status: COMPLETED | OUTPATIENT
Start: 2020-12-22 | End: 2020-12-22

## 2020-12-22 RX ORDER — LOSARTAN POTASSIUM 100 MG/1
25 TABLET, FILM COATED ORAL DAILY
Refills: 0 | Status: DISCONTINUED | OUTPATIENT
Start: 2020-12-22 | End: 2020-12-22

## 2020-12-22 RX ADMIN — Medication 20 MILLIGRAM(S): at 09:27

## 2020-12-22 RX ADMIN — QUETIAPINE FUMARATE 25 MILLIGRAM(S): 200 TABLET, FILM COATED ORAL at 09:27

## 2020-12-22 RX ADMIN — ATORVASTATIN CALCIUM 40 MILLIGRAM(S): 80 TABLET, FILM COATED ORAL at 21:41

## 2020-12-22 RX ADMIN — Medication 100 MILLIGRAM(S): at 09:27

## 2020-12-22 RX ADMIN — TAMSULOSIN HYDROCHLORIDE 0.4 MILLIGRAM(S): 0.4 CAPSULE ORAL at 21:41

## 2020-12-22 RX ADMIN — TAMSULOSIN HYDROCHLORIDE 0.4 MILLIGRAM(S): 0.4 CAPSULE ORAL at 09:27

## 2020-12-22 RX ADMIN — APIXABAN 5 MILLIGRAM(S): 2.5 TABLET, FILM COATED ORAL at 09:27

## 2020-12-22 RX ADMIN — APIXABAN 5 MILLIGRAM(S): 2.5 TABLET, FILM COATED ORAL at 21:41

## 2020-12-22 RX ADMIN — CARVEDILOL PHOSPHATE 3.12 MILLIGRAM(S): 80 CAPSULE, EXTENDED RELEASE ORAL at 05:54

## 2020-12-22 RX ADMIN — Medication 25 MILLIGRAM(S): at 05:54

## 2020-12-22 RX ADMIN — Medication 100 GRAM(S): at 14:46

## 2020-12-22 RX ADMIN — Medication 25 MILLIGRAM(S): at 14:47

## 2020-12-22 RX ADMIN — ISOSORBIDE MONONITRATE 30 MILLIGRAM(S): 60 TABLET, EXTENDED RELEASE ORAL at 09:27

## 2020-12-22 RX ADMIN — Medication 1 TABLET(S): at 09:27

## 2020-12-22 RX ADMIN — CARVEDILOL PHOSPHATE 3.12 MILLIGRAM(S): 80 CAPSULE, EXTENDED RELEASE ORAL at 17:02

## 2020-12-22 RX ADMIN — LISINOPRIL 2.5 MILLIGRAM(S): 2.5 TABLET ORAL at 09:26

## 2020-12-22 RX ADMIN — Medication 25 MILLIGRAM(S): at 21:41

## 2020-12-22 RX ADMIN — Medication 81 MILLIGRAM(S): at 09:27

## 2020-12-22 NOTE — PROGRESS NOTE ADULT - SUBJECTIVE AND OBJECTIVE BOX
CHIEF COMPLAINT:  Patient is a 80y old  Male who presents with a chief complaint of AMS (15 Dec 2020 12:54)    HPI:  81 y/o M w/ PMH of CVA, TIA, dyslipidemia, HTN, GERD, h/o seizure disorder p/w AMS. Patient states he doesn't recall the episode at all, but reports that his daughter noticed he was not making sense so brought him o the hospital. It is reported in the chart that daughter had stated patient did not have LOC/syncope, just garbled speech and not making sense.    During work up MRI/MRA brain showed multiple old infarcts with encephalomalacia, significant cerebral vascular disease and an abnormal EEG. He had minimally elevated troponins and TTE pending but showing a newly reduced LVSF EF about 10-20% with global hypokinesis. Also as per radiology his MRA/MRI appears to be consistent with Vit B1 deficiency concerning for Wernicke's encephalopathy.  O/N 12/15 he became very agitated and a code gray needed to called and he was given haldol.  12/16 - patient continued to be agitated and required ativan during the afternoon/evening  12/17 - patient lethargic, intermittently pulling off his tele. He has been bradycardiac down to the 30s with frequent PVCs and wide complex rhythm that is irregular with varying wide complexes ?AIVR  12/18 - Patient continues to be bradycardiac with PVCs and wide complex rhythm ?AIVR  12/19 - Lying flat but still dyspneic but clinically better.  No more AIVR on the monitor so far.  Tolerating current meds.  Renal function stable.  12/20 - Refusing to wear the monitor but seems slightly less dyspneic this AM  Tolerating current meds so far.  No new cardiac changes.  Awaiting core bx for amyloidosis.  12/21 - Patient agitated and did not wear tele o/n  12/22 - Early this AM patient had 4 beats of NSVT, asymptomatic. He continues to have PVCs but HR improved to the 80s    Patient seen and examined at bedside.  He is cooperative and calm.    He denies fevers, chills, CP, palp, SOB, abd pain, N/V, dizziness, syncope, orthopnea, PND    PMHx:  PAST MEDICAL & SURGICAL HISTORY:  Chronic GERD  HTN (hypertension)  HLD (hyperlipidemia)  CVA (cerebral vascular accident)  High cholesterol  HTN (hypertension)    History of surgical removal of testicle  H/O hernia repair    FAMILY HISTORY:  reviewed and NC    ALLERGIES:  Allergies  Allergy Status Unknown    SHx:  Denies tobacco, etoh or illicit drug use.    REVIEW OF SYSTEMS:  Limited due to dementia but a 10 point ROS was obtained and was negative, unless indicated above.    Vital Signs Last 24 Hrs  T(C): 36.3 (21 Dec 2020 20:33), Max: 37.1 (21 Dec 2020 08:02)  T(F): 97.3 (21 Dec 2020 20:33), Max: 98.7 (21 Dec 2020 08:02)  HR: 89 (22 Dec 2020 05:54) (78 - 91)  BP: 166/82 (22 Dec 2020 05:54) (128/59 - 166/82)  BP(mean): --  RR: 18 (21 Dec 2020 20:33) (17 - 18)  SpO2: 94% (21 Dec 2020 20:33) (94% - 94%)    PHYSICAL EXAM:   Constitutional: Awake and alert, in NAD  HEENT: Normal Hearing, MMM  Neck: Soft and supple, No LAD, + JVD, no carotid bruit  Respiratory: poor effort but appears to have Breath sounds are clear bilaterally, No wheezing, rales or rhonchi, good air movement  Cardiovascular: S1 and S2, regular rate and rhythm, II/VI systolic murmur at LSB, gallops or rubs. PMI is nondisplaced  Gastrointestinal: Bowel Sounds present, soft, nontender, nondistended, no guarding, no rebound  Extremities: Warm and well perfused, trace peripheral edema  Neurological: A/O x 2, no focal deficits appreciated  Skin: No rashes appreciated    MEDICATIONS  (STANDING):  apixaban 5 milliGRAM(s) Oral two times a day  aspirin enteric coated 81 milliGRAM(s) Oral daily  atorvastatin 40 milliGRAM(s) Oral at bedtime  carvedilol 3.125 milliGRAM(s) Oral every 12 hours  furosemide   Injectable 20 milliGRAM(s) IV Push daily  hydrALAZINE 25 milliGRAM(s) Oral three times a day  isosorbide   mononitrate ER Tablet (IMDUR) 30 milliGRAM(s) Oral daily  lisinopril 2.5 milliGRAM(s) Oral daily  multivitamin 1 Tablet(s) Oral daily  QUEtiapine 25 milliGRAM(s) Oral daily  tamsulosin 0.4 milliGRAM(s) Oral two times a day  thiamine 100 milliGRAM(s) Oral daily    MEDICATIONS  (PRN):  fluticasone propionate 50 MICROgram(s)/spray Nasal Spray 1 Spray(s) Both Nostrils two times a day PRN Congestion, Nasal  QUEtiapine 12.5 milliGRAM(s) Oral two times a day PRN agitation      LABS: All Labs Reviewed:   Today's labs are pending, most recent labs are as follows:                   9.5    5.77  )-----------( 170      ( 21 Dec 2020 08:48 )             32.7          12-21    145  |  112<H>  |  27<H>  ----------------------------<  82  4.1   |  27  |  1.20    Ca    8.9      21 Dec 2020 08:48    TPro  6.5  /  Alb  2.4<L>  /  TBili  1.2  /  DBili  x   /  AST  41<H>  /  ALT  114<H>  /  AlkPhos  83  12-21 12-19    142  |  111<H>  |  35<H>  ----------------------------<  110<H>  4.4   |  27  |  1.46<H>    Ca    8.8      19 Dec 2020 07:32    CARDIAC MARKERS ( 15 Dec 2020 00:40 )  0.045 ng/mL / x     / x     / x     / x      CARDIAC MARKERS ( 14 Dec 2020 23:32 )  0.048 ng/mL / x     / x     / x     / x      CARDIAC MARKERS ( 14 Dec 2020 21:34 )  0.047 ng/mL / x     / x     / x     / x        ProBNP >11,000      BLOOD CULTURES: Organism --  Gram Stain Blood -- Gram Stain --  Specimen Source .Urine Clean Catch (Midstream)  Culture-Blood --      LIPID PROFILE lipid profile                          12-15 @ 07:39  cholesterol           91 mg/dL  Direct LDL            --  HDL cholesterol       27 mg/dL  HDL/Total cholesterol --  Triglycerides, serum  57 mg/dL    RADIOLOGY:    EXAM:  XR CHEST PA LAT 2V                        PROCEDURE DATE:  12/14/2020    IMPRESSION:   Heart enlargement again noted. Mild central CHF at this time.    EXAM:  MR ANGIO NECK DC                        EXAM:  MR ANGIO BRAIN DC                        EXAM:  MR BRAIN DC                        PROCEDURE DATE:  12/15/2020      FINDINGS:  Brain MRI:  Motion limited study, redemonstration enlarged ventricles and sulci consistent with severe volume loss with remote infarcts right greater than left MCA distribution involving the bilateral posterior frontal and parietal lobes as and remote PCA infarcts with lateral ventricle ex vacuo enlargement unchanged. Redemonstration left larger than right cerebellar remote infarcts with encephalomalacia, gliosis, and ex vacuo enlargement of fourth ventricle unchanged. There is extensive patchy T2 and FLAIR white matter hyperintensity likely microvascular disease with remote lacunar infarcts unchanged from prior. There is a decreased size to the mamillary bodies, correlate with thiamin vitamin B1 deficiency. Additional foci of of T2/FLAIR signal hyperintensity within the hemispheric white matter, which are nonspecific and likely sequela of microvascular disease. No new intraparenchymal hematoma, mass effect or midline shift. No new restrictive diffusion to suggest acute or subacute infarction. No new abnormal extra-axial fluid collections are present. Basal cisterns remain patent and unchanged.  The calvarium is intact. Orbits are limited by motion, with redemonstration of downward displacement of the right orbital floor unchanged from prior. Tympanomastoid cavities appear free of acute disease. There is mild mucosal thickening in the paraspinal sinuses with possible retention cyst or polyps in themaxillary antra, there are no air-fluid levels.    Brain MRA:  Motion limited MRA, there is flow-related signal within the internal carotid arteries within the cavernous supraclinoid and bifurcation segments with multifocal stenosis and with extensive atherosclerotic vascular calcification on CT. there are stenoses proximal to the bifurcation segments with poorly delineated left A1 segment and stenosis of the distal right A1 segment with limitations of the study due to motion. There are severe stenoses of the M1,  proximal M2 and an 3 MCA branch vessels, with study limited by motion with significant decrease in size and number right greater than left distal MCA branches. Proximal A 2 anterior cerebral arteries are poorly delineated, this may reflect technique and motion, there are stenoses of the distal bilateral anterior cerebral arteries.  Tortuous and dominant left vertebral artery crosses the right of midline and is the dominant supply to the basilar artery. Poorly delineated right vertebral artery secondary to motion narrowing proximal to the vertebrobasilar junction, and stenosis of the right posterior inferior cerebellar artery vessels are poorly delineated junction, suggest repeat imaging the patient can hold still. There is multifocal stenosis and narrowing of the basilar artery. There is poor delineation and multifocal stenosis and narrowing of the bilateral proximal mid and distal posterior cerebral arteries.    Neck MRA:  views were obtained only, suggest repeat imaging when patient is able to hold and complete exam.  IMPRESSION:  Redemonstration volume loss remote infarcts right greater than left MCA distribution in bilateral frontal and parietal lobes with encephalomalacia, bilateral PCA, and bilateral cerebellar left greater than right territories with encephalomalacia, marked microvascular disease, remote lacunar infarcts, without new restricted diffusion, hemorrhage or shift.  Decreased size mamillary bodies correlate with thiamin vitamin [id number] severely motion limited MRA with atherosclerotic vascular calcification multifocal narrowing and stenosis of the cavernous to provide ICAs, with severe multifocal stenosis of the intracranial arterial middle and posterior cerebral arteries and vertebral and basilar arteries as detailed above, MRA of the neck only obtained  views, suggest repeat imaging when patient can hold still.      EKG:    (12/15/2020, my interpretation): NSR at 81bpm, biatrial enlargement, LAD and LVH with repolarization     TELEMETRY:    Reviewed. HRs improved, mostly 80s, still with intermittent PVCs and 1 run of 4 beats of NSVT this AM      ECHO:    EXAM:  ECHO TTE WO CON COMP W DOPP    PROCEDURE DATE:  12/15/2020     Summary:   The left ventricle cavity is mildly dilated.   Mild concentric left ventricular hypertrophy is present.   Estimated left ventricular ejection fraction is 15-20 %.   The left atrium is severely dilated.   The right atrium appears moderately dilated.   Mild to Moderate mitral regurgitation is present.   Mild (1+) tricuspid valve regurgitation is present.   Mild aortic sclerosis is present with normal valvular opening.

## 2020-12-22 NOTE — PROGRESS NOTE ADULT - SUBJECTIVE AND OBJECTIVE BOX
CHIEF COMPLAINT/DIAGNOSIS: AMS    HPI: 81 y/o M w/ PMH of CVA, TIA, dyslipidemia, HTN, GERD, h/o seizure disorder p/w AMS. Patient states he doesn't recall the episode at all, and states he is completely fine. Patient has trouble stating where is currently, but otherwise knows it's 2020, and is conversational. As per ED note, patient's daughter had noticed AMS where patient's speech was abnormal and was groggy. However, daughter had stated patient did not have LOC. Presently patient denies any weakness in arms / legs / slurred speech / facial droop / vision deficits / sensory deficits / CP / SOB / cough / runny nose / sore throat / nausea / vomiting / abdominal pain / diarrhea.  ** As per son patient has been non-compliant with medications for 1month unable to receive from ?? **    12/22: awake, alert. conversating. no complaints. limited ros    PHYSICAL EXAM:  Constitutional: Sleeping/lethargic, obese  HEENT:  Normal Hearing, MMM  Neck: Soft and supple  Respiratory: Breath sounds are clear/ diminished b/l   Cardiovascular: S1 and S2, regular rate and rhythm, no Murmurs, gallops or rubs  Gastrointestinal: Bowel Sounds present, soft, nontender, nondistended, no guarding, no rebound  Extremities: trace, pitting +1 b/l peripheral edema  Vascular: 2+ peripheral pulses  Neurological: Baseline A/O x 2, forgetful. follows commands. no visible facial droop. able to lift arm over head. blinks to threat. speech fluent.   Musculoskeletal: 5/5 strength b/l upper and lower extremities  Skin: No rashes    Vital Signs Last 24 Hrs  T(C): 36.8 (22 Dec 2020 08:35), Max: 36.8 (22 Dec 2020 08:35)  T(F): 98.2 (22 Dec 2020 08:35), Max: 98.2 (22 Dec 2020 08:35)  HR: 86 (22 Dec 2020 08:35) (78 - 91)  BP: 145/76 (22 Dec 2020 08:35) (128/59 - 166/82)  BP(mean): --  RR: 18 (22 Dec 2020 08:35) (17 - 18)  SpO2: 98% (22 Dec 2020 08:35) (94% - 98%)    LABS: All Labs Reviewed:                        9.5    5.77  )-----------( 170      ( 21 Dec 2020 08:48 )             32.7     12-21    145  |  112<H>  |  27<H>  ----------------------------<  82  4.1   |  27  |  1.20    Ca    8.9      21 Dec 2020 08:48    TPro  6.5  /  Alb  2.4<L>  /  TBili  1.2  /  DBili  x   /  AST  41<H>  /  ALT  114<H>  /  AlkPhos  83  12-21    Blood Culture: 12-14 @ 21:34  Organism Klebsiella pneumoniae  Gram Stain Blood -- Gram Stain --  Specimen Source .Urine Clean Catch (Midstream)  Culture-Blood --    RADIOLOGY:  < from: MR Head No Cont Disc (12.15.20 @ 16:03) >  IMPRESSION:  Re demonstration volume loss remote infarcts right greater than left MCA distribution in bilateral frontal and parietal lobes with encephalomalacia, bilateral PCA, and bilateral cerebellar left greater than right territories with encephalomalacia, marked microvascular disease, remote lacunar infarcts, without new restricted diffusion, hemorrhage or shift.    Decreased size mamillary bodies correlate with thiamin vitamin [id number] severely motion limited MRA with atherosclerotic vascular calcification multifocal narrowing and stenosis of the cavernous to provide ICAs, with severe multifocal stenosis of the intracranial arterial middle and posterior cerebral arteries and vertebral and basilar arteries as detailed above, MRA of the neck only obtained  views, suggest repeat imaging when patient can hold still.  < end of copied text >    < from: CT Head No Cont (12.14.20 @ 22:01) >  IMPRESSION: No intracranial hemorrhage or mass effect. Stable chronic infarcts.  < end of copied text >    ECHOCARDIOGRAM:   < from: TTE Echo Complete w/o Contrast w/ Doppler (12.15.20 @ 14:20) >   The left ventricle cavity is mildly dilated.   Mild concentric left ventricular hypertrophy is present.   Estimated left ventricular ejection fraction is 15-20 %.   The left atrium is severely dilated.   The right atrium appears moderately dilated.   Mild to Moderate mitral regurgitation is present.   Mild (1+) tricuspid valve regurgitation is present.   Mild aortic sclerosis is present with normal valvular opening.  < end of copied text >    MEDICATIONS  (STANDING):  apixaban 5 milliGRAM(s) Oral two times a day  aspirin enteric coated 81 milliGRAM(s) Oral daily  atorvastatin 40 milliGRAM(s) Oral at bedtime  carvedilol 3.125 milliGRAM(s) Oral every 12 hours  furosemide   Injectable 20 milliGRAM(s) IV Push daily  hydrALAZINE 25 milliGRAM(s) Oral three times a day  isosorbide   mononitrate ER Tablet (IMDUR) 30 milliGRAM(s) Oral daily  lisinopril 2.5 milliGRAM(s) Oral daily  multivitamin 1 Tablet(s) Oral daily  QUEtiapine 25 milliGRAM(s) Oral daily  tamsulosin 0.4 milliGRAM(s) Oral two times a day  thiamine 100 milliGRAM(s) Oral daily    MEDICATIONS  (PRN):  fluticasone propionate 50 MICROgram(s)/spray Nasal Spray 1 Spray(s) Both Nostrils two times a day PRN Congestion, Nasal  QUEtiapine 12.5 milliGRAM(s) Oral two times a day PRN agitation    TELEMETRY REVIEW:  12/21: refusing cardiac monitor.    ASSESSMENT AND PLAN:    81 y/o M w/ PMH of CVA, TIA, dyslipidemia, HTN, GERD, p/w AMS.     1) Toxic/Metabolic encephalopathy due to UTI and possible Wernicke's   - tele monitoring. tele review as above.  - CTH - Multiple chronic stable infarcts. f/u MRI / MRA > as above, no acute infarcts.  - s/p IV thiamine x3 days  - F/u b12, b1, b6, folate lvls.  - Seroquel daily (limit use - prolongs QTC) /seroquel for agitation/ sundowning -  td.  - ABX for UTI complete  - Speech, PT, Neuro, Cardio consult appreciated     2) Acute on Chronic Systolic HF/ Cardiomyopathy - not in acute exacerbation  - Per cardio last known EF 45%, now 10-20% EF  - Cont: GDMT: not candidate for ACEi.   - Cont. BB (dose reduced to 3.125 BID w/ parameters), hydralazine (increase to 25mg TID), Imdur   - Cont. IV Lasix QD  - s/p stress test (12/21) - no reversible defects.   - f/u viral studies - coxsackie neg, echo virus >  - s/p fat pad bx on 12/19  - EP eval - AICD after 3 months of GDMT, life vest contraindicated at this time (adjust Seroquel to control agitation)  12/21 - trial low dose ACEi    3) PAF   - PAF noted on tele 12/16, 12/18.   - CHADsVasc = 6.  - cont. BB w/ parameters.  - Cont. Eliquis 5mg BID    4) Mildly elevated troponins  - Mildly elevated and downtrended. No CP  - Cont. ASA, statin  - Cardio consult appreciated    5) UTI - likely GNR  - s/p course of Ceftriaxone  - Urine cx - klebsiella, sens to Cef.  - s/p gentle hydration. encourage PO hydration.     6) LASHELL on CKD Stage 3, urinary retention  - s/p IVF  - Avoid nephrotoxic agents, no ACE/ARB  - Arevalo for retention.  Cont. Flomax - TOV prior to d/c  - Urology f/u appreciated     7) Transaminitis   - hep panel, lipids wnl  - monitor on statin therapy.    8) DVT ppx  - Heparin SubQ    Advanced Directives: Full Code. Consult palliative. patient with multiple chronic progressive conditions    ~~ Spoke to nitin Coleman with updates 12/15, 12/16 12/17: LM for Jared w/ call back. spoke with Dez at length this AM 12/18. agreeable to sedative medications.   12/20: spoke to Jared. Hes concerned about use over medicating his father for agitation. explained why ativan would be perferred with long QTc interval and cardiac issues. Agreed to stop ativan and trial Seroquel only with close monitoring of Qtc. Is aware of IV ativan prior to stress test. Disposition discussed if agitation can be controlled with meds then is Life vest an option if he is monitored in facility. will f/u with son and EP tm.  12/21: dez updated CHIEF COMPLAINT/DIAGNOSIS: AMS    HPI: 81 y/o M w/ PMH of CVA, TIA, dyslipidemia, HTN, GERD, h/o seizure disorder p/w AMS. Patient states he doesn't recall the episode at all, and states he is completely fine. Patient has trouble stating where is currently, but otherwise knows it's 2020, and is conversational. As per ED note, patient's daughter had noticed AMS where patient's speech was abnormal and was groggy. However, daughter had stated patient did not have LOC. Presently patient denies any weakness in arms / legs / slurred speech / facial droop / vision deficits / sensory deficits / CP / SOB / cough / runny nose / sore throat / nausea / vomiting / abdominal pain / diarrhea.  ** As per son patient has been non-compliant with medications for 1month unable to receive from ?? **    12/22: awake, alert. conversating. no complaints. limited ros    PHYSICAL EXAM:  Constitutional: Sleeping/lethargic, obese  HEENT:  Normal Hearing, MMM  Neck: Soft and supple  Respiratory: Breath sounds are clear/ diminished b/l   Cardiovascular: S1 and S2, regular rate and rhythm, no Murmurs, gallops or rubs  Gastrointestinal: Bowel Sounds present, soft, nontender, nondistended, no guarding, no rebound  Extremities: trace, pitting +1 b/l peripheral edema  Vascular: 2+ peripheral pulses  Neurological: Baseline A/O x 2, forgetful. follows commands. no visible facial droop. able to lift arm over head. blinks to threat. speech fluent.   Musculoskeletal: 5/5 strength b/l upper and lower extremities  Skin: No rashes    Vital Signs Last 24 Hrs  T(C): 36.8 (22 Dec 2020 08:35), Max: 36.8 (22 Dec 2020 08:35)  T(F): 98.2 (22 Dec 2020 08:35), Max: 98.2 (22 Dec 2020 08:35)  HR: 86 (22 Dec 2020 08:35) (78 - 91)  BP: 145/76 (22 Dec 2020 08:35) (128/59 - 166/82)  BP(mean): --  RR: 18 (22 Dec 2020 08:35) (17 - 18)  SpO2: 98% (22 Dec 2020 08:35) (94% - 98%)    LABS: All Labs Reviewed    Blood Culture: 12-14 @ 21:34  Organism Klebsiella pneumoniae  Gram Stain Blood -- Gram Stain --  Specimen Source .Urine Clean Catch (Midstream)  Culture-Blood --    RADIOLOGY:  < from: MR Head No Cont Disc (12.15.20 @ 16:03) >  IMPRESSION:  Re demonstration volume loss remote infarcts right greater than left MCA distribution in bilateral frontal and parietal lobes with encephalomalacia, bilateral PCA, and bilateral cerebellar left greater than right territories with encephalomalacia, marked microvascular disease, remote lacunar infarcts, without new restricted diffusion, hemorrhage or shift.    Decreased size mamillary bodies correlate with thiamin vitamin [id number] severely motion limited MRA with atherosclerotic vascular calcification multifocal narrowing and stenosis of the cavernous to provide ICAs, with severe multifocal stenosis of the intracranial arterial middle and posterior cerebral arteries and vertebral and basilar arteries as detailed above, MRA of the neck only obtained  views, suggest repeat imaging when patient can hold still.  < end of copied text >    < from: CT Head No Cont (12.14.20 @ 22:01) >  IMPRESSION: No intracranial hemorrhage or mass effect. Stable chronic infarcts.  < end of copied text >    ECHOCARDIOGRAM:   < from: TTE Echo Complete w/o Contrast w/ Doppler (12.15.20 @ 14:20) >   The left ventricle cavity is mildly dilated.   Mild concentric left ventricular hypertrophy is present.   Estimated left ventricular ejection fraction is 15-20 %.   The left atrium is severely dilated.   The right atrium appears moderately dilated.   Mild to Moderate mitral regurgitation is present.   Mild (1+) tricuspid valve regurgitation is present.   Mild aortic sclerosis is present with normal valvular opening.  < end of copied text >    MEDICATIONS  (STANDING):  apixaban 5 milliGRAM(s) Oral two times a day  aspirin enteric coated 81 milliGRAM(s) Oral daily  atorvastatin 40 milliGRAM(s) Oral at bedtime  carvedilol 3.125 milliGRAM(s) Oral every 12 hours  furosemide   Injectable 20 milliGRAM(s) IV Push daily  hydrALAZINE 25 milliGRAM(s) Oral three times a day  isosorbide   mononitrate ER Tablet (IMDUR) 30 milliGRAM(s) Oral daily  lisinopril 2.5 milliGRAM(s) Oral daily  multivitamin 1 Tablet(s) Oral daily  QUEtiapine 25 milliGRAM(s) Oral daily  tamsulosin 0.4 milliGRAM(s) Oral two times a day  thiamine 100 milliGRAM(s) Oral daily    MEDICATIONS  (PRN):  fluticasone propionate 50 MICROgram(s)/spray Nasal Spray 1 Spray(s) Both Nostrils two times a day PRN Congestion, Nasal  QUEtiapine 12.5 milliGRAM(s) Oral two times a day PRN agitation    TELEMETRY REVIEW:  12/22: 4 beats Vtach overnight.    ASSESSMENT AND PLAN:    81 y/o M w/ PMH of CVA, TIA, dyslipidemia, HTN, GERD, p/w AMS.     1) Toxic/Metabolic encephalopathy due to UTI and possible Wernicke's   - tele monitoring. tele review as above.  - CTH - Multiple chronic stable infarcts. f/u MRI / MRA > as above, no acute infarcts.  - s/p IV thiamine x3 days  - F/u b12, b1, b6, folate lvls.  - Seroquel daily (limit use - prolongs QTC) /seroquel for agitation/ sundowning -  td.  - ABX for UTI complete  - Speech, PT, Neuro, Cardio consult appreciated     2) Acute on Chronic Systolic HF/ Cardiomyopathy - not in acute exacerbation  - Per cardio last known EF 45%, now 10-20% EF  - Cont: GDMT: not candidate for ACEi.   - Cont. BB (dose reduced to 3.125 BID w/ parameters), hydralazine (increase to 25mg TID), Imdur   - Cont. IV Lasix QD  - s/p stress test (12/21) - no reversible defects.   - f/u viral studies - coxsackie neg  - s/p fat pad bx on 12/19  - EP eval - AICD after 3 months of GDMT, life vest contraindicated at this time (adjust Seroquel to control agitation)  12/21 - trial low dose ACEi    3) PAF   - PAF noted on tele 12/16, 12/18.   - CHADsVasc = 6.  - cont. BB w/ parameters.  - Cont. Eliquis 5mg BID    4) Mildly elevated troponins  - Mildly elevated and downtrended. No CP  - Cont. ASA, statin  - Cardio consult appreciated    5) UTI - likely GNR  - s/p course of Ceftriaxone  - s/p gentle hydration. encourage PO hydration.     6) LASHELL on CKD Stage 3, urinary retention  - s/p IVF  - Avoid nephrotoxic agents, no ACE/ARB  - Arevalo for retention.  Cont. Flomax - TOV prior to d/c  - Urology f/u appreciated   12/22: repeat labs after starting ACEi, pending    7) Transaminitis   - hep panel, lipids wnl  - monitor on statin therapy.    8) DVT ppx  - Heparin SubQ    Advanced Directives: Full Code. Consult palliative. Patient with multiple chronic progressive conditions    ~~ Spoke to nitin Coleman with updates 12/15, 12/16 12/17: LM for Jared w/ call back. Spoke with Jared at length this AM 12/18. agreeable to sedative medications.   12/20: spoke to Jared. He is concerned about use over medicating his father for agitation. explained why ativan would be preferred with long QTc interval and cardiac issues. Agreed to stop ativan and trial Seroquel only with close monitoring of QTc. Is aware of IV ativan prior to stress test. Disposition discussed if agitation can be controlled with meds then is Life vest an option if he is monitored in facility. Will f/u with son and EP tm.  12/21, 12/22: Jared updated CHIEF COMPLAINT/DIAGNOSIS: AMS    HPI: 79 y/o M w/ PMH of CVA, TIA, dyslipidemia, HTN, GERD, h/o seizure disorder p/w AMS. Patient states he doesn't recall the episode at all, and states he is completely fine. Patient has trouble stating where is currently, but otherwise knows it's 2020, and is conversational. As per ED note, patient's daughter had noticed AMS where patient's speech was abnormal and was groggy. However, daughter had stated patient did not have LOC. Presently patient denies any weakness in arms / legs / slurred speech / facial droop / vision deficits / sensory deficits / CP / SOB / cough / runny nose / sore throat / nausea / vomiting / abdominal pain / diarrhea.  ** As per son patient has been non-compliant with medications for 1month unable to receive from ?? **    12/22: awake, alert. conversating. no complaints. limited ros    PHYSICAL EXAM:  Constitutional: Sleeping/lethargic, obese  HEENT:  Normal Hearing, MMM  Neck: Soft and supple  Respiratory: Breath sounds are clear/ diminished b/l   Cardiovascular: S1 and S2, regular rate and rhythm, no Murmurs, gallops or rubs  Gastrointestinal: Bowel Sounds present, soft, nontender, nondistended, no guarding, no rebound  Extremities: trace, pitting +1 b/l peripheral edema  Vascular: 2+ peripheral pulses  Neurological: Baseline A/O x 2, forgetful. follows commands. no visible facial droop. able to lift arm over head. blinks to threat. speech fluent.   Musculoskeletal: 5/5 strength b/l upper and lower extremities  Skin: No rashes    Vital Signs Last 24 Hrs  T(C): 36.8 (22 Dec 2020 08:35), Max: 36.8 (22 Dec 2020 08:35)  T(F): 98.2 (22 Dec 2020 08:35), Max: 98.2 (22 Dec 2020 08:35)  HR: 86 (22 Dec 2020 08:35) (78 - 91)  BP: 145/76 (22 Dec 2020 08:35) (128/59 - 166/82)  BP(mean): --  RR: 18 (22 Dec 2020 08:35) (17 - 18)  SpO2: 98% (22 Dec 2020 08:35) (94% - 98%)    LABS: All Labs Reviewed    Blood Culture: 12-14 @ 21:34  Organism Klebsiella pneumoniae  Gram Stain Blood -- Gram Stain --  Specimen Source .Urine Clean Catch (Midstream)  Culture-Blood --    RADIOLOGY:  < from: MR Head No Cont Disc (12.15.20 @ 16:03) >  IMPRESSION:  Re demonstration volume loss remote infarcts right greater than left MCA distribution in bilateral frontal and parietal lobes with encephalomalacia, bilateral PCA, and bilateral cerebellar left greater than right territories with encephalomalacia, marked microvascular disease, remote lacunar infarcts, without new restricted diffusion, hemorrhage or shift.    Decreased size mamillary bodies correlate with thiamin vitamin [id number] severely motion limited MRA with atherosclerotic vascular calcification multifocal narrowing and stenosis of the cavernous to provide ICAs, with severe multifocal stenosis of the intracranial arterial middle and posterior cerebral arteries and vertebral and basilar arteries as detailed above, MRA of the neck only obtained  views, suggest repeat imaging when patient can hold still.  < end of copied text >    < from: CT Head No Cont (12.14.20 @ 22:01) >  IMPRESSION: No intracranial hemorrhage or mass effect. Stable chronic infarcts.  < end of copied text >    ECHOCARDIOGRAM:   < from: TTE Echo Complete w/o Contrast w/ Doppler (12.15.20 @ 14:20) >   The left ventricle cavity is mildly dilated.   Mild concentric left ventricular hypertrophy is present.   Estimated left ventricular ejection fraction is 15-20 %.   The left atrium is severely dilated.   The right atrium appears moderately dilated.   Mild to Moderate mitral regurgitation is present.   Mild (1+) tricuspid valve regurgitation is present.   Mild aortic sclerosis is present with normal valvular opening.  < end of copied text >    MEDICATIONS  (STANDING):  apixaban 5 milliGRAM(s) Oral two times a day  aspirin enteric coated 81 milliGRAM(s) Oral daily  atorvastatin 40 milliGRAM(s) Oral at bedtime  carvedilol 3.125 milliGRAM(s) Oral every 12 hours  furosemide   Injectable 20 milliGRAM(s) IV Push daily  hydrALAZINE 25 milliGRAM(s) Oral three times a day  isosorbide   mononitrate ER Tablet (IMDUR) 30 milliGRAM(s) Oral daily  lisinopril 2.5 milliGRAM(s) Oral daily  multivitamin 1 Tablet(s) Oral daily  QUEtiapine 25 milliGRAM(s) Oral daily  tamsulosin 0.4 milliGRAM(s) Oral two times a day  thiamine 100 milliGRAM(s) Oral daily    MEDICATIONS  (PRN):  fluticasone propionate 50 MICROgram(s)/spray Nasal Spray 1 Spray(s) Both Nostrils two times a day PRN Congestion, Nasal  QUEtiapine 12.5 milliGRAM(s) Oral two times a day PRN agitation    TELEMETRY REVIEW:  12/22: 4 beats Vtach overnight.    ASSESSMENT AND PLAN:    79 y/o M w/ PMH of CVA, TIA, dyslipidemia, HTN, GERD, p/w AMS.     1) Toxic/Metabolic encephalopathy due to UTI and possible Wernicke's   - tele monitoring. tele review as above.  - CTH - Multiple chronic stable infarcts. f/u MRI / MRA > as above, no acute infarcts.  - s/p IV thiamine x3 days  - F/u b12, b1, b6, folate lvls.  - Seroquel daily (limit use - prolongs QTC) /seroquel for agitation/ sundowning -  td.  - ABX for UTI complete  - Speech, PT, Neuro, Cardio consult appreciated     2) Acute on Chronic Systolic HF/ Cardiomyopathy - not in acute exacerbation  - Per cardio last known EF 45%, now 10-20% EF  - Cont: GDMT: not candidate for ACEi.   - Cont. BB (dose reduced to 3.125 BID w/ parameters), hydralazine (increase to 25mg TID), Imdur   - Cont. IV Lasix QD  - s/p stress test (12/21) - no reversible defects.   - f/u viral studies - coxsackie neg  - s/p fat pad bx on 12/19  - EP eval - AICD after 3 months of GDMT, life vest contraindicated at this time (adjust Seroquel to control agitation)  12/21 - trial low dose ACEi    3) PAF   - PAF noted on tele 12/16, 12/18.   - CHADsVasc = 6.  - cont. BB w/ parameters.  - Cont. Eliquis 5mg BID    4) Mildly elevated troponins  - Mildly elevated and downtrended. No CP  - Cont. ASA, statin  - Cardio consult appreciated    5) UTI - likely GNR  - s/p course of Ceftriaxone  - s/p gentle hydration. encourage PO hydration.     6) LASHELL on CKD Stage 3, urinary retention  - s/p IVF  - Avoid nephrotoxic agents, no ACE/ARB  - Arevalo for retention.  Cont. Flomax - TOV prior to d/c  - Urology f/u appreciated   12/22: repeat labs after starting ACEi, pending    7) Transaminitis   - hep panel, lipids wnl  - monitor on statin therapy.    8) DVT ppx  - Heparin SubQ    Advanced Directives: Full Code. Consult palliative. Patient with multiple chronic progressive conditions    ~~ Spoke to nitin Coleman with updates 12/15, 12/16 12/17: LM for Jared w/ call back. Spoke with Jared at length this AM 12/18. agreeable to sedative medications.   12/20: spoke to Jared. He is concerned about use over medicating his father for agitation. explained why ativan would be preferred with long QTc interval and cardiac issues. Agreed to stop ativan and trial Seroquel only with close monitoring of QTc. Is aware of IV ativan prior to stress test. Disposition discussed if agitation can be controlled with meds then is Life vest an option if he is monitored in facility. Will f/u with son and EP tm.  12/21, 12/22: Jared updated

## 2020-12-22 NOTE — PROVIDER CONTACT NOTE (OTHER) - ASSESSMENT
Asymptomatic
Pt attempting to hit staff. Code zuniga called. MD Blackwood made aware.
Pt due for at 0100. Pt refusing another IV, Pt refusing to take PO medication. MD Cordoba and Jaison made aware, came to see pt.

## 2020-12-22 NOTE — PROVIDER CONTACT NOTE (OTHER) - SITUATION
dr service aware
admitted post syncope. Patient awaiting aicd placement after improved ef through med management. Currently 15%.
DR SERVICE AWARE
DR SERVICE AWARE
Pt agitated, refusing to stay in bed. Pt pulled off cardiac monitor and pulled out IV.
Pt agitated. Pt refusing to stay in chair.
Pt uses a rolling walker, household ambulator with short distances into the community. There are 3 steps to enter home with handrail, pt requires standy by assistance with step negotiation. Pt follows up in outpatient setting regarding venous stasis ulcers, now healed, was applying medi-honey.

## 2020-12-22 NOTE — PROVIDER CONTACT NOTE (OTHER) - REASON
Agitated, Pulled out IV, due for abx at 0100
Agitation
CARDIAC E.P
CARDIOLOGY
surgery
Patient experienced 4 beats of vtach

## 2020-12-22 NOTE — PROVIDER CONTACT NOTE (OTHER) - DATE AND TIME:
15-Dec-2020 14:34
15-Dec-2020 23:35
16-Dec-2020 03:35
16-Dec-2020 13:39
18-Dec-2020 12:27
22-Dec-2020 04:28

## 2020-12-22 NOTE — PROGRESS NOTE ADULT - ASSESSMENT
HPI: Pt is a 80y old Male a PMH of CVA, TIA, dyslipidemia, HTN, GERD, h/o seizure disorder presents with AMS. Patient states he doesn't recall the episode at all, but reports that his daughter noticed he was not making sense so brought him to the hospital. It is reported in the chart that daughter had stated patient did not have LOC/syncope, just garbled speech and not making sense. MRI/MRA brain showed multiple old infarcts with encephalomalacia, significant cerebral vascular disease and an abnormal EEG. He had minimally elevated troponin  and TTE  showing a newly reduced  EF about 10-20% global hypokinesis. Pt has had intermittent agitation and confusion. Palliative Medicine Consult  to establish [facility]  12/18/2020 Seen and examined at bedside with nsg aide present for constant obs. Pt oriented to person and place. Denies pain or dyspnea.    Assessment and Plan:    1) AMS  -Metabolic encephalopathy  -1) Toxic/Metabolic encephalopathy  -? R/T infectious process  -possible Wernicke's   -CT head= Multiple chronic stable infarcts  -MRI/MRA noted  -Intermittent agitation  -Required IV Haldol  -Maintain Seroquel  -Cont Ativan 0.5 mg PO Q12H PRN  -Cont Ativan 0.25 mg PO HS    2) Acute on Chronic Systolic HF  -Cardiomyopathy   -Last EF  45%, now 10-20% EF  -Cont. BB    -Cont. IV Lasix QD  -NM stress test Monday  - EP eval noted/ AICD after 3 months of med tx  - life vest contraindicated at this time    4) UTI   - Cont. Ceftriaxone  - Urine cx - klebsiella, sens to Cef.  - s/p gentle hydration  - encourage PO hydration.     5) LASHELL on CKD Stage 3  - urinary retention  - S/P IVF  -creat > 1.7  -bladder scan  - patient refusing Arevalo  - Cont. Flomax     6) Advanced Directives  -Pt without capacity  -Son Jared surrogate  -[facility]  discussion 12/18  -No limits set  -Follow up conversation today 12/22  -Discussed home hospice care   -Disposition pending      HPI: Pt is a 80y old Male a PMH of CVA, TIA, dyslipidemia, HTN, GERD, h/o seizure disorder presents with AMS. Patient states he doesn't recall the episode at all, but reports that his daughter noticed he was not making sense so brought him to the hospital. It is reported in the chart that daughter had stated patient did not have LOC/syncope, just garbled speech and not making sense. MRI/MRA brain showed multiple old infarcts with encephalomalacia, significant cerebral vascular disease and an abnormal EEG. He had minimally elevated troponin  and TTE  showing a newly reduced  EF about 10-20% global hypokinesis. Pt has had intermittent agitation and confusion. Palliative Medicine Consult  to establish [facility]  12/18/2020 Seen and examined at bedside with nsg aide present for constant obs. Pt oriented to person and place. Denies pain or dyspnea.    Assessment and Plan:    1) AMS  -Metabolic encephalopathy  -1) Toxic/Metabolic encephalopathy  -? R/T infectious process  -possible Wernicke's   -CT head= Multiple chronic stable infarcts  -MRI/MRA noted  -Intermittent agitation  -Required IV Haldol  -Maintain Seroquel  -Cont Ativan 0.5 mg PO Q12H PRN  -Cont Ativan 0.25 mg PO HS    2) Acute on Chronic Systolic HF  -Cardiomyopathy   -Last EF  45%, now 10-20% EF  -Cont. BB    -Cont. IV Lasix QD  -NM stress test Monday  - EP eval noted/ AICD after 3 months of med tx  - life vest contraindicated at this time    4) UTI   - Cont. Ceftriaxone  - Urine cx - klebsiella, sens to Cef.  - s/p gentle hydration  - encourage PO hydration.     5) LASHELL on CKD Stage 3  - urinary retention  - S/P IVF  -creat > 1.7  -bladder scan  - patient refusing Arevalo  - Cont. Flomax     6) Advanced Directives  -Pt without capacity  -Son Jared surrogate  -[facility]  discussion 12/18  -No limits set  -Follow up conversation today 12/22  -Discussed home hospice care in the future if they would like to focus on comfort measures  -Disposition MICHEL  -Will sign off

## 2020-12-22 NOTE — PROGRESS NOTE ADULT - ASSESSMENT
81 y/o M w/ PMH of CVA, TIA, dyslipidemia, HTN, GERD, h/o seizure disorder p/w AMS found to have abnormal MRI/MRA of the brain with minimally elevated troponins and newly severe cardiomyopathy EF 15-20%. During work up patient was found to have elevated Kappa/Lamda light chains concerning for Amyloidosis s/p biopsy and nuclear stress test showing scar from old infarct without ischemia. Cardiology consulted for further evaluation and treatment.    -Nuclear stress test showing scar from old infarct without ischemia, making his CM most likely ICM, but may be mixed, F/U fat pad biopsy, evaluating for amyloidosis.  -Fluid status improving, still had JVD but no orthopnea. Agree with giving another dose of IV lasix today, and then consider transition to PO lasix tomorrow/on D/C.  -Renal function improving with elevated BP, agree with starting ACE (patient noncompliant with medication, he admits to trouble taking 2x/day medication like entresto). Will likely be able to tolerate a higher dose of lisinopril 2.5, but ok to start there with recent LASHELL and hypotension. C/W hydralazine, imdur, coreg. Continue to uptitrate these medications as he can tolerate, can be done as an outpatient.  -F/U on BMP/renal function (currently pending)  -Had NSVT, asymptomatic and only 4 beats, make sure to f/u on electrolytes including K and Mg with goal K around 4 and mg >2  -Dispo as per primary team. OK to d/c from a cardiac standpoint with very close follow-up.

## 2020-12-22 NOTE — PROGRESS NOTE ADULT - SUBJECTIVE AND OBJECTIVE BOX
HPI: Pt is a 80y old Male a PMH of CVA, TIA, dyslipidemia, HTN, GERD, h/o seizure disorder presents with AMS. Patient states he doesn't recall the episode at all, but reports that his daughter noticed he was not making sense so brought him to the hospital. It is reported in the chart that daughter had stated patient did not have LOC/syncope, just garbled speech and not making sense. MRI/MRA brain showed multiple old infarcts with encephalomalacia, significant cerebral vascular disease and an abnormal EEG. He had minimally elevated troponin  and TTE  showing a newly reduced  EF about 10-20% global hypokinesis. Pt has had intermittent agitation and confusion. Palliative Medicine Consult  to establish [facility]  12/18/2020 Seen and examined at bedside with nsg aide present for constant obs. Pt oriented to person and place. Denies pain or dyspnea.  12/22/2020 Seen and examined at bedside with constant o0bs in place as pt is intermittently agitated. Denies discomfort  PAIN: ( )Yes   (X )No    DYSPNEA: ( ) Yes  (X ) No  Level:    PAST MEDICAL & SURGICAL HISTORY:  Chronic GERD  HTN (hypertension)  HLD (hyperlipidemia)  CVA (cerebral vascular accident)  High cholesterol  HTN (hypertension)  History of surgical removal of testicle  H/O hernia repair  No significant past surgical history    SOCIAL HX:    Hx opiate tolerance ( )YES  ( )NO    Baseline ADLs  (Prior to Admission)  (X ) Independent   ( )Dependent    FAMILY HISTORY:  Unable to obtain due to confusion    Review of Systems:    Unable to obtain/Limited due to: confusion      PHYSICAL EXAM:  ICU Vital Signs Last 24 Hrs  T(C): 36.8 (22 Dec 2020 08:35), Max: 36.8 (22 Dec 2020 08:35)  T(F): 98.2 (22 Dec 2020 08:35), Max: 98.2 (22 Dec 2020 08:35)  HR: 86 (22 Dec 2020 08:35) (78 - 91)  BP: 145/76 (22 Dec 2020 08:35) (128/59 - 166/82)  RR: 18 (22 Dec 2020 08:35) (17 - 18)  SpO2: 98% (22 Dec 2020 08:35) (94% - 98%)    PPSV2:  40-50 %    General: Elderly male in NAD  Mental Status: Alert and oriented X2/restless and agitated at times  HEENT: oral mucosa dry  Lungs: clear to auscultation pb  Cardiac: S1S2+  GI: abd soft NT ND + BS  : voids  Ext: MACKEY on bed/pb LE edema  Neuro: confusion      LABS:                            9.5    5.77  )-----------( 170      ( 21 Dec 2020 08:48 )             32.7                         12-21    145  |  112<H>  |  27<H>  ----------------------------<  82  4.1   |  27  |  1.20    Ca    8.9      21 Dec 2020 08:48    TPro  6.5  /  Alb  2.4<L>  /  TBili  1.2  /  DBili  x   /  AST  41<H>  /  ALT  114<H>  /  AlkPhos  83  12-21    Ca    8.7      18 Dec 2020 11:18  Mg     2.1     12-17    Albumin: Albumin, Serum: 3.0 g/dL (12-15 @ 07:39)      Allergies    No Known Allergies    Intolerances      MEDICATIONS  (STANDING):    MEDICATIONS  (PRN):    RADIOLOGY/ADDITIONAL STUDIES:  < from: NM Nuclear Stress Pharmacologic Multiple (12.21.20 @ 10:52) >    EXAM:  NM NUCLEAR STRESS MULTI PHARM                            PROCEDURE DATE:  12/21/2020          INTERPRETATION:  RADIOPHARMACEUTICAL: 12.8 mCi 99mTc-sestamibi IV at rest and 32.7 mCi 99mTc-sestamibi IV at stress    CLINICAL INFORMATION: 80 year old male  with chest pain, syncope and collapse; referred to evaluate cardiac function.    STRESS PROTOCOL: Intravenous Regadenoson 5cc (0.4 mg) was given rapidly over 10 seconds. Technetium sestamibi was injected immediately thereafter. A 12-lead EKG was recorded every minute and blood pressure was also recorded throughout the test. The study was stopped when the protocol was completed.    TECHNIQUE:  At rest, 12.8 mCi 99m Tc- sestamibi was injected intravenously and gated SPECT myocardial perfusion imaging was performed 45 minutes later. Immediately following the intravenous injection of Regadenoson, 32.7 mCi 99m-Technetium sestamibi was injected intravenously and gated SPECT myocardial perfusion imaging was performed 45 minutes later. Data were reconstructed in the cardiac standard short axis, horizontal long axis and vertical long axis projections.    COMPARISON: No previous myocardial perfusion scan for comparison    FINDINGS: The technical quality of the resting and post-stress perfusion images was satisfactory.  Review of resting and post-stress myocardial scintigrams revealed enlarged/dilated left ventricle with fixed perfusion defect in the apical wall and inferior wall. There was no evidence of reversible perfusion defects.    Gated wall motion study revealed severely impaired left ventricular contractility globally.    Calculated left ventricular ejection fraction post-stress was 18%, based on a left ventricular end diastolic volume of 323 mL and an end systolic volumeof 313 mL. Stroke volume was 70 mL.    IMPRESSION: Abnormal SPECT Myocardial Perfusion Imaging.    Dilated and globally hypokinetic left ventricle with fixed perfusion defects in the apical and inferior wall.    No scan evidence of reversible perfusion defects.    Severely reduced left ventricular contractility with an ejection fraction of 18% (Normal: 50% or greater).    Please refer to cardiac stress test report for dosage of Regadenoson administered, EKG findings and symptoms during the procedure.

## 2020-12-23 ENCOUNTER — TRANSCRIPTION ENCOUNTER (OUTPATIENT)
Age: 80
End: 2020-12-23

## 2020-12-23 VITALS — DIASTOLIC BLOOD PRESSURE: 79 MMHG | SYSTOLIC BLOOD PRESSURE: 151 MMHG

## 2020-12-23 LAB
ANION GAP SERPL CALC-SCNC: 4 MMOL/L — LOW (ref 5–17)
BUN SERPL-MCNC: 25 MG/DL — HIGH (ref 7–23)
CALCIUM SERPL-MCNC: 9.1 MG/DL — SIGNIFICANT CHANGE UP (ref 8.5–10.1)
CHLORIDE SERPL-SCNC: 110 MMOL/L — HIGH (ref 96–108)
CO2 SERPL-SCNC: 30 MMOL/L — SIGNIFICANT CHANGE UP (ref 22–31)
CREAT SERPL-MCNC: 0.96 MG/DL — SIGNIFICANT CHANGE UP (ref 0.5–1.3)
GLUCOSE SERPL-MCNC: 99 MG/DL — SIGNIFICANT CHANGE UP (ref 70–99)
HCT VFR BLD CALC: 34.7 % — LOW (ref 39–50)
HGB BLD-MCNC: 10.3 G/DL — LOW (ref 13–17)
MAGNESIUM SERPL-MCNC: 1.9 MG/DL — SIGNIFICANT CHANGE UP (ref 1.6–2.6)
MCHC RBC-ENTMCNC: 28.9 PG — SIGNIFICANT CHANGE UP (ref 27–34)
MCHC RBC-ENTMCNC: 29.7 GM/DL — LOW (ref 32–36)
MCV RBC AUTO: 97.2 FL — SIGNIFICANT CHANGE UP (ref 80–100)
PLATELET # BLD AUTO: 180 K/UL — SIGNIFICANT CHANGE UP (ref 150–400)
POTASSIUM SERPL-MCNC: 3.9 MMOL/L — SIGNIFICANT CHANGE UP (ref 3.5–5.3)
POTASSIUM SERPL-SCNC: 3.9 MMOL/L — SIGNIFICANT CHANGE UP (ref 3.5–5.3)
RBC # BLD: 3.57 M/UL — LOW (ref 4.2–5.8)
RBC # FLD: 15.4 % — HIGH (ref 10.3–14.5)
SODIUM SERPL-SCNC: 144 MMOL/L — SIGNIFICANT CHANGE UP (ref 135–145)
VIT B1 SERPL-MCNC: 108.4 NMOL/L — SIGNIFICANT CHANGE UP (ref 66.5–200)
WBC # BLD: 5.73 K/UL — SIGNIFICANT CHANGE UP (ref 3.8–10.5)
WBC # FLD AUTO: 5.73 K/UL — SIGNIFICANT CHANGE UP (ref 3.8–10.5)

## 2020-12-23 PROCEDURE — 99239 HOSP IP/OBS DSCHRG MGMT >30: CPT

## 2020-12-23 RX ORDER — CARVEDILOL PHOSPHATE 80 MG/1
1 CAPSULE, EXTENDED RELEASE ORAL
Qty: 0 | Refills: 0 | DISCHARGE
Start: 2020-12-23

## 2020-12-23 RX ORDER — FLUTICASONE PROPIONATE 50 MCG
1 SPRAY, SUSPENSION NASAL
Qty: 1 | Refills: 0
Start: 2020-12-23

## 2020-12-23 RX ORDER — ISOSORBIDE MONONITRATE 60 MG/1
1 TABLET, EXTENDED RELEASE ORAL
Qty: 30 | Refills: 0
Start: 2020-12-23 | End: 2021-01-21

## 2020-12-23 RX ORDER — THIAMINE MONONITRATE (VIT B1) 100 MG
1 TABLET ORAL
Qty: 0 | Refills: 0 | DISCHARGE
Start: 2020-12-23

## 2020-12-23 RX ORDER — CARVEDILOL PHOSPHATE 80 MG/1
1 CAPSULE, EXTENDED RELEASE ORAL
Qty: 60 | Refills: 0
Start: 2020-12-23 | End: 2021-01-21

## 2020-12-23 RX ORDER — THIAMINE MONONITRATE (VIT B1) 100 MG
1 TABLET ORAL
Qty: 30 | Refills: 0
Start: 2020-12-23 | End: 2021-01-21

## 2020-12-23 RX ORDER — LISINOPRIL 2.5 MG/1
1 TABLET ORAL
Qty: 0 | Refills: 0 | DISCHARGE
Start: 2020-12-23

## 2020-12-23 RX ORDER — CARVEDILOL PHOSPHATE 80 MG/1
1 CAPSULE, EXTENDED RELEASE ORAL
Qty: 0 | Refills: 0 | DISCHARGE

## 2020-12-23 RX ORDER — HYDRALAZINE HCL 50 MG
1 TABLET ORAL
Qty: 90 | Refills: 0
Start: 2020-12-23 | End: 2021-01-21

## 2020-12-23 RX ORDER — TAMSULOSIN HYDROCHLORIDE 0.4 MG/1
1 CAPSULE ORAL
Qty: 60 | Refills: 0
Start: 2020-12-23 | End: 2021-01-21

## 2020-12-23 RX ORDER — ATORVASTATIN CALCIUM 80 MG/1
1 TABLET, FILM COATED ORAL
Qty: 30 | Refills: 0
Start: 2020-12-23 | End: 2021-01-21

## 2020-12-23 RX ORDER — LISINOPRIL 2.5 MG/1
5 TABLET ORAL DAILY
Refills: 0 | Status: DISCONTINUED | OUTPATIENT
Start: 2020-12-23 | End: 2020-12-23

## 2020-12-23 RX ORDER — QUETIAPINE FUMARATE 200 MG/1
1 TABLET, FILM COATED ORAL
Qty: 30 | Refills: 0
Start: 2020-12-23 | End: 2021-01-21

## 2020-12-23 RX ORDER — FUROSEMIDE 40 MG
1 TABLET ORAL
Qty: 30 | Refills: 0
Start: 2020-12-23 | End: 2021-01-21

## 2020-12-23 RX ORDER — HYDRALAZINE HCL 50 MG
1 TABLET ORAL
Qty: 0 | Refills: 0 | DISCHARGE
Start: 2020-12-23

## 2020-12-23 RX ORDER — QUETIAPINE FUMARATE 200 MG/1
1 TABLET, FILM COATED ORAL
Qty: 0 | Refills: 0 | DISCHARGE
Start: 2020-12-23

## 2020-12-23 RX ORDER — ATORVASTATIN CALCIUM 80 MG/1
1 TABLET, FILM COATED ORAL
Qty: 0 | Refills: 0 | DISCHARGE
Start: 2020-12-23

## 2020-12-23 RX ORDER — ASPIRIN/CALCIUM CARB/MAGNESIUM 324 MG
1 TABLET ORAL
Qty: 0 | Refills: 0 | DISCHARGE
Start: 2020-12-23

## 2020-12-23 RX ORDER — FUROSEMIDE 40 MG
1 TABLET ORAL
Qty: 0 | Refills: 0 | DISCHARGE
Start: 2020-12-23

## 2020-12-23 RX ORDER — APIXABAN 2.5 MG/1
1 TABLET, FILM COATED ORAL
Qty: 60 | Refills: 0
Start: 2020-12-23 | End: 2021-01-21

## 2020-12-23 RX ORDER — ASPIRIN/CALCIUM CARB/MAGNESIUM 324 MG
1 TABLET ORAL
Qty: 30 | Refills: 0
Start: 2020-12-23 | End: 2021-01-21

## 2020-12-23 RX ORDER — LISINOPRIL 2.5 MG/1
1 TABLET ORAL
Qty: 30 | Refills: 0
Start: 2020-12-23 | End: 2021-01-21

## 2020-12-23 RX ORDER — FLUTICASONE PROPIONATE 50 MCG
1 SPRAY, SUSPENSION NASAL
Qty: 0 | Refills: 0 | DISCHARGE
Start: 2020-12-23

## 2020-12-23 RX ORDER — ISOSORBIDE MONONITRATE 60 MG/1
1 TABLET, EXTENDED RELEASE ORAL
Qty: 0 | Refills: 0 | DISCHARGE
Start: 2020-12-23

## 2020-12-23 RX ORDER — TAMSULOSIN HYDROCHLORIDE 0.4 MG/1
1 CAPSULE ORAL
Qty: 0 | Refills: 0 | DISCHARGE
Start: 2020-12-23

## 2020-12-23 RX ORDER — APIXABAN 2.5 MG/1
1 TABLET, FILM COATED ORAL
Qty: 0 | Refills: 0 | DISCHARGE
Start: 2020-12-23

## 2020-12-23 RX ADMIN — APIXABAN 5 MILLIGRAM(S): 2.5 TABLET, FILM COATED ORAL at 09:17

## 2020-12-23 RX ADMIN — Medication 20 MILLIGRAM(S): at 09:17

## 2020-12-23 RX ADMIN — Medication 25 MILLIGRAM(S): at 14:11

## 2020-12-23 RX ADMIN — QUETIAPINE FUMARATE 25 MILLIGRAM(S): 200 TABLET, FILM COATED ORAL at 11:43

## 2020-12-23 RX ADMIN — Medication 100 MILLIGRAM(S): at 09:17

## 2020-12-23 RX ADMIN — ISOSORBIDE MONONITRATE 30 MILLIGRAM(S): 60 TABLET, EXTENDED RELEASE ORAL at 09:17

## 2020-12-23 RX ADMIN — Medication 25 MILLIGRAM(S): at 06:14

## 2020-12-23 RX ADMIN — LISINOPRIL 5 MILLIGRAM(S): 2.5 TABLET ORAL at 09:17

## 2020-12-23 RX ADMIN — Medication 1 TABLET(S): at 09:17

## 2020-12-23 RX ADMIN — CARVEDILOL PHOSPHATE 3.12 MILLIGRAM(S): 80 CAPSULE, EXTENDED RELEASE ORAL at 06:14

## 2020-12-23 RX ADMIN — Medication 81 MILLIGRAM(S): at 09:17

## 2020-12-23 RX ADMIN — TAMSULOSIN HYDROCHLORIDE 0.4 MILLIGRAM(S): 0.4 CAPSULE ORAL at 09:17

## 2020-12-23 NOTE — PROGRESS NOTE ADULT - ATTENDING COMMENTS
I have personally seen and examined patient today.   I discussed the case with APN and I agree with findings and plan as detailed per note above, which I have amended where appropriate. discharge planning

## 2020-12-23 NOTE — PROGRESS NOTE ADULT - SUBJECTIVE AND OBJECTIVE BOX
CHIEF COMPLAINT:  Patient is a 80y old  Male who presents with a chief complaint of AMS (15 Dec 2020 12:54)    HPI:  81 y/o M w/ PMH of CVA, TIA, dyslipidemia, HTN, GERD, h/o seizure disorder p/w AMS. Patient states he doesn't recall the episode at all, but reports that his daughter noticed he was not making sense so brought him o the hospital. It is reported in the chart that daughter had stated patient did not have LOC/syncope, just garbled speech and not making sense.    During work up MRI/MRA brain showed multiple old infarcts with encephalomalacia, significant cerebral vascular disease and an abnormal EEG. He had minimally elevated troponins and TTE pending but showing a newly reduced LVSF EF about 10-20% with global hypokinesis. Also as per radiology his MRA/MRI appears to be consistent with Vit B1 deficiency concerning for Wernicke's encephalopathy.  O/N 12/15 he became very agitated and a code gray needed to called and he was given haldol.  12/16 - patient continued to be agitated and required ativan during the afternoon/evening  12/17 - patient lethargic, intermittently pulling off his tele. He has been bradycardiac down to the 30s with frequent PVCs and wide complex rhythm that is irregular with varying wide complexes ?AIVR  12/18 - Patient continues to be bradycardiac with PVCs and wide complex rhythm ?AIVR  12/19 - Lying flat but still dyspneic but clinically better.  No more AIVR on the monitor so far.  Tolerating current meds.  Renal function stable.  12/20 - Refusing to wear the monitor but seems slightly less dyspneic this AM  Tolerating current meds so far.  No new cardiac changes.  Awaiting core bx for amyloidosis.  12/21 - Patient agitated and did not wear tele o/n  12/22 - Early this AM patient had 4 beats of NSVT, asymptomatic. He continues to have PVCs but HR improved to the 80s  12/23 - patient continues to have multifocal PVCs but remains in sinus rhythm    Patient seen and examined at bedside.  He is cooperative and calm.    He denies fevers, chills, CP, palp, SOB, abd pain, N/V, dizziness, syncope, orthopnea, PND    PMHx:  PAST MEDICAL & SURGICAL HISTORY:  Chronic GERD  HTN (hypertension)  HLD (hyperlipidemia)  CVA (cerebral vascular accident)  High cholesterol  HTN (hypertension)    History of surgical removal of testicle  H/O hernia repair    FAMILY HISTORY:  reviewed and NC    ALLERGIES:  Allergies  Allergy Status Unknown    SHx:  Denies tobacco, etoh or illicit drug use.    REVIEW OF SYSTEMS:  Limited due to dementia but a 10 point ROS was obtained and was negative, unless indicated above.    Vital Signs Last 24 Hrs  T(C): 36.7 (22 Dec 2020 20:27), Max: 36.7 (22 Dec 2020 20:27)  T(F): 98 (22 Dec 2020 20:27), Max: 98 (22 Dec 2020 20:27)  HR: 94 (23 Dec 2020 06:14) (78 - 94)  BP: 167/95 (23 Dec 2020 06:14) (120/64 - 167/95)  BP(mean): --  RR: --  SpO2: 98% (22 Dec 2020 20:27) (98% - 98%)    PHYSICAL EXAM:   Constitutional: Awake and alert, in NAD  HEENT: Normal Hearing, MMM  Neck: Soft and supple, No LAD, + JVD, no carotid bruit  Respiratory: poor effort but appears to have Breath sounds are clear bilaterally, No wheezing, rales or rhonchi, good air movement  Cardiovascular: S1 and S2, regular rate and rhythm, II/VI systolic murmur at LSB, gallops or rubs. PMI is nondisplaced  Gastrointestinal: Bowel Sounds present, soft, nontender, nondistended, no guarding, no rebound  Extremities: Warm and well perfused, trace peripheral edema  Neurological: A/O x 2, no focal deficits appreciated  Skin: No rashes appreciated    MEDICATIONS  (STANDING):  apixaban 5 milliGRAM(s) Oral two times a day  aspirin enteric coated 81 milliGRAM(s) Oral daily  atorvastatin 40 milliGRAM(s) Oral at bedtime  carvedilol 3.125 milliGRAM(s) Oral every 12 hours  furosemide    Tablet 20 milliGRAM(s) Oral daily  hydrALAZINE 25 milliGRAM(s) Oral three times a day  isosorbide   mononitrate ER Tablet (IMDUR) 30 milliGRAM(s) Oral daily  lisinopril 2.5 milliGRAM(s) Oral daily  multivitamin 1 Tablet(s) Oral daily  QUEtiapine 25 milliGRAM(s) Oral daily  tamsulosin 0.4 milliGRAM(s) Oral two times a day  thiamine 100 milliGRAM(s) Oral daily    MEDICATIONS  (PRN):  fluticasone propionate 50 MICROgram(s)/spray Nasal Spray 1 Spray(s) Both Nostrils two times a day PRN Congestion, Nasal  QUEtiapine 12.5 milliGRAM(s) Oral two times a day PRN agitation      LABS: All Labs Reviewed:                        10.3   5.73  )-----------( 180      ( 23 Dec 2020 08:02 )             34.7                9.5    5.77  )-----------( 170      ( 21 Dec 2020 08:48 )             32.7          12-23    144  |  110<H>  |  25<H>  ----------------------------<  99  3.9   |  30  |  0.96    Ca    9.1      23 Dec 2020 08:02  Mg     1.9     12-23 12-21    145  |  112<H>  |  27<H>  ----------------------------<  82  4.1   |  27  |  1.20    Ca    8.9      21 Dec 2020 08:48    TPro  6.5  /  Alb  2.4<L>  /  TBili  1.2  /  DBili  x   /  AST  41<H>  /  ALT  114<H>  /  AlkPhos  83  12-21     CARDIAC MARKERS ( 15 Dec 2020 00:40 )  0.045 ng/mL / x     / x     / x     / x      CARDIAC MARKERS ( 14 Dec 2020 23:32 )  0.048 ng/mL / x     / x     / x     / x      CARDIAC MARKERS ( 14 Dec 2020 21:34 )  0.047 ng/mL / x     / x     / x     / x        ProBNP >11,000      BLOOD CULTURES: Organism --  Gram Stain Blood -- Gram Stain --  Specimen Source .Urine Clean Catch (Midstream)  Culture-Blood --      LIPID PROFILE lipid profile                          12-15 @ 07:39  cholesterol           91 mg/dL  Direct LDL            --  HDL cholesterol       27 mg/dL  HDL/Total cholesterol --  Triglycerides, serum  57 mg/dL    RADIOLOGY:    EXAM:  XR CHEST PA LAT 2V                        PROCEDURE DATE:  12/14/2020    IMPRESSION:   Heart enlargement again noted. Mild central CHF at this time.    EXAM:  MR ANGIO NECK DC                        EXAM:  MR ANGIO BRAIN DC                        EXAM:  MR BRAIN DC                        PROCEDURE DATE:  12/15/2020      FINDINGS:  Brain MRI:  Motion limited study, redemonstration enlarged ventricles and sulci consistent with severe volume loss with remote infarcts right greater than left MCA distribution involving the bilateral posterior frontal and parietal lobes as and remote PCA infarcts with lateral ventricle ex vacuo enlargement unchanged. Redemonstration left larger than right cerebellar remote infarcts with encephalomalacia, gliosis, and ex vacuo enlargement of fourth ventricle unchanged. There is extensive patchy T2 and FLAIR white matter hyperintensity likely microvascular disease with remote lacunar infarcts unchanged from prior. There is a decreased size to the mamillary bodies, correlate with thiamin vitamin B1 deficiency. Additional foci of of T2/FLAIR signal hyperintensity within the hemispheric white matter, which are nonspecific and likely sequela of microvascular disease. No new intraparenchymal hematoma, mass effect or midline shift. No new restrictive diffusion to suggest acute or subacute infarction. No new abnormal extra-axial fluid collections are present. Basal cisterns remain patent and unchanged.  The calvarium is intact. Orbits are limited by motion, with redemonstration of downward displacement of the right orbital floor unchanged from prior. Tympanomastoid cavities appear free of acute disease. There is mild mucosal thickening in the paraspinal sinuses with possible retention cyst or polyps in themaxillary antra, there are no air-fluid levels.    Brain MRA:  Motion limited MRA, there is flow-related signal within the internal carotid arteries within the cavernous supraclinoid and bifurcation segments with multifocal stenosis and with extensive atherosclerotic vascular calcification on CT. there are stenoses proximal to the bifurcation segments with poorly delineated left A1 segment and stenosis of the distal right A1 segment with limitations of the study due to motion. There are severe stenoses of the M1,  proximal M2 and an 3 MCA branch vessels, with study limited by motion with significant decrease in size and number right greater than left distal MCA branches. Proximal A 2 anterior cerebral arteries are poorly delineated, this may reflect technique and motion, there are stenoses of the distal bilateral anterior cerebral arteries.  Tortuous and dominant left vertebral artery crosses the right of midline and is the dominant supply to the basilar artery. Poorly delineated right vertebral artery secondary to motion narrowing proximal to the vertebrobasilar junction, and stenosis of the right posterior inferior cerebellar artery vessels are poorly delineated junction, suggest repeat imaging the patient can hold still. There is multifocal stenosis and narrowing of the basilar artery. There is poor delineation and multifocal stenosis and narrowing of the bilateral proximal mid and distal posterior cerebral arteries.    Neck MRA:  views were obtained only, suggest repeat imaging when patient is able to hold and complete exam.  IMPRESSION:  Redemonstration volume loss remote infarcts right greater than left MCA distribution in bilateral frontal and parietal lobes with encephalomalacia, bilateral PCA, and bilateral cerebellar left greater than right territories with encephalomalacia, marked microvascular disease, remote lacunar infarcts, without new restricted diffusion, hemorrhage or shift.  Decreased size mamillary bodies correlate with thiamin vitamin [id number] severely motion limited MRA with atherosclerotic vascular calcification multifocal narrowing and stenosis of the cavernous to provide ICAs, with severe multifocal stenosis of the intracranial arterial middle and posterior cerebral arteries and vertebral and basilar arteries as detailed above, MRA of the neck only obtained  views, suggest repeat imaging when patient can hold still.      EKG:    (12/15/2020, my interpretation): NSR at 81bpm, biatrial enlargement, LAD and LVH with repolarization     TELEMETRY:    Reviewed. remains in sinus with PVCs, sometimes multifocal      ECHO:    EXAM:  ECHO TTE WO CON COMP W DOPP    PROCEDURE DATE:  12/15/2020     Summary:   The left ventricle cavity is mildly dilated.   Mild concentric left ventricular hypertrophy is present.   Estimated left ventricular ejection fraction is 15-20 %.   The left atrium is severely dilated.   The right atrium appears moderately dilated.   Mild to Moderate mitral regurgitation is present.   Mild (1+) tricuspid valve regurgitation is present.   Mild aortic sclerosis is present with normal valvular opening.

## 2020-12-23 NOTE — PROGRESS NOTE ADULT - PROVIDER SPECIALTY LIST ADULT
Hospitalist
Hospitalist
Palliative Care
Cardiology
Hospitalist
Surgery
Hospitalist
Cardiology

## 2020-12-23 NOTE — PROGRESS NOTE ADULT - PROBLEM SELECTOR PROBLEM 1
Altered mental status, unspecified altered mental status type

## 2020-12-23 NOTE — DISCHARGE NOTE NURSING/CASE MANAGEMENT/SOCIAL WORK - PATIENT PORTAL LINK FT
You can access the FollowMyHealth Patient Portal offered by [facility] by registering at the following website: [email or web address]. By joining [email or web address] FollowMyHealth portal, you will also be able to view your health information using other applications (apps) compatible with our system.

## 2020-12-23 NOTE — PROGRESS NOTE ADULT - SUBJECTIVE AND OBJECTIVE BOX
CHIEF COMPLAINT/DIAGNOSIS: AMS    HPI: 79 y/o M w/ PMH of CVA, TIA, dyslipidemia, HTN, GERD, h/o seizure disorder p/w AMS. Patient states he doesn't recall the episode at all, and states he is completely fine. Patient has trouble stating where is currently, but otherwise knows it's 2020, and is conversational. As per ED note, patient's daughter had noticed AMS where patient's speech was abnormal and was groggy. However, daughter had stated patient did not have LOC. Presently patient denies any weakness in arms / legs / slurred speech / facial droop / vision deficits / sensory deficits / CP / SOB / cough / runny nose / sore throat / nausea / vomiting / abdominal pain / diarrhea.  ** As per son patient has been non-compliant with medications for 1month unable to receive from ?? **    12/23: awake, alert. conversating. no complaints. limited ros.     PHYSICAL EXAM:  Constitutional: Awake and conversating. sitting upright in bed  HEENT:  Normal Hearing, MMM  Neck: Soft and supple  Respiratory: Breath sounds are clear/ diminished b/l   Cardiovascular: S1 and S2, regular rate and rhythm, no Murmurs, gallops or rubs  Gastrointestinal: Bowel Sounds present, soft, nontender, nondistended, no guarding, no rebound  Extremities: trace, pitting +1 b/l peripheral edema  Vascular: 2+ peripheral pulses  Neurological: Baseline A/O x 2/3, forgetful. follows commands. no visible facial droop. able to lift arm over head. blinks to threat. speech fluent.   Musculoskeletal: 5/5 strength b/l upper and lower extremities  Skin: No rashes    Vital Signs Last 24 Hrs  T(C): 36.3 (23 Dec 2020 09:52), Max: 36.7 (22 Dec 2020 20:27)  T(F): 97.3 (23 Dec 2020 09:52), Max: 98 (22 Dec 2020 20:27)  HR: 86 (23 Dec 2020 09:52) (78 - 94)  BP: 139/82 (23 Dec 2020 09:52) (120/64 - 167/95)  BP(mean): --  RR: 18 (23 Dec 2020 09:52) (18 - 18)  SpO2: 96% (23 Dec 2020 09:52) (96% - 98%)    LABS: All Labs Reviewed    Blood Culture: 12-14 @ 21:34  Organism Klebsiella pneumoniae  Gram Stain Blood -- Gram Stain --  Specimen Source .Urine Clean Catch (Midstream)  Culture-Blood --    RADIOLOGY:  < from: MR Head No Cont Disc (12.15.20 @ 16:03) >  IMPRESSION:  Re demonstration volume loss remote infarcts right greater than left MCA distribution in bilateral frontal and parietal lobes with encephalomalacia, bilateral PCA, and bilateral cerebellar left greater than right territories with encephalomalacia, marked microvascular disease, remote lacunar infarcts, without new restricted diffusion, hemorrhage or shift.    Decreased size mamillary bodies correlate with thiamin vitamin [id number] severely motion limited MRA with atherosclerotic vascular calcification multifocal narrowing and stenosis of the cavernous to provide ICAs, with severe multifocal stenosis of the intracranial arterial middle and posterior cerebral arteries and vertebral and basilar arteries as detailed above, MRA of the neck only obtained  views, suggest repeat imaging when patient can hold still.  < end of copied text >    < from: CT Head No Cont (12.14.20 @ 22:01) >  IMPRESSION: No intracranial hemorrhage or mass effect. Stable chronic infarcts.  < end of copied text >    ECHOCARDIOGRAM:   < from: TTE Echo Complete w/o Contrast w/ Doppler (12.15.20 @ 14:20) >   The left ventricle cavity is mildly dilated.   Mild concentric left ventricular hypertrophy is present.   Estimated left ventricular ejection fraction is 15-20 %.   The left atrium is severely dilated.   The right atrium appears moderately dilated.   Mild to Moderate mitral regurgitation is present.   Mild (1+) tricuspid valve regurgitation is present.   Mild aortic sclerosis is present with normal valvular opening.  < end of copied text >    MEDICATIONS  (STANDING):  apixaban 5 milliGRAM(s) Oral two times a day  aspirin enteric coated 81 milliGRAM(s) Oral daily  atorvastatin 40 milliGRAM(s) Oral at bedtime  carvedilol 3.125 milliGRAM(s) Oral every 12 hours  furosemide   Injectable 20 milliGRAM(s) IV Push daily  hydrALAZINE 25 milliGRAM(s) Oral three times a day  isosorbide   mononitrate ER Tablet (IMDUR) 30 milliGRAM(s) Oral daily  lisinopril 2.5 milliGRAM(s) Oral daily  multivitamin 1 Tablet(s) Oral daily  QUEtiapine 25 milliGRAM(s) Oral daily  tamsulosin 0.4 milliGRAM(s) Oral two times a day  thiamine 100 milliGRAM(s) Oral daily    MEDICATIONS  (PRN):  fluticasone propionate 50 MICROgram(s)/spray Nasal Spray 1 Spray(s) Both Nostrils two times a day PRN Congestion, Nasal  QUEtiapine 12.5 milliGRAM(s) Oral two times a day PRN agitation    TELEMETRY REVIEW:  12/22: 4 beats Vtach overnight.  12/23: refused tele monitor    ASSESSMENT AND PLAN:    79 y/o M w/ PMH of CVA, TIA, dyslipidemia, HTN, GERD, p/w AMS.     1) Toxic/Metabolic encephalopathy due to UTI and possible Wernicke's   - tele monitoring. tele review as above.  - CTH - Multiple chronic stable infarcts. f/u MRI / MRA > as above, no acute infarcts.  - s/p IV thiamine x3 days  - F/u b12, b1, b6, folate lvls.  - Seroquel daily (limit use - prolongs QTC) QTC stable < 500  - ABX for UTI complete  - Speech, PT, Neuro, Cardio consult appreciated     2) Acute on Chronic Systolic HF/ Cardiomyopathy - not in acute exacerbation  - Per cardio last known EF 45%, now 10-20% EF  - Cont: GDMT: not candidate for ACEi.   - Cont. BB (dose reduced to 3.125 BID w/ parameters), hydralazine (increase to 25mg TID), Imdur   - Cont. IV Lasix QD  - s/p stress test (12/21) - no reversible defects.   - f/u viral studies - coxsackie neg  - s/p fat pad bx on 12/19  - EP eval - AICD after 3 months of GDMT, life vest contraindicated at this time (adjust Seroquel to control agitation)  12/21 - trial low dose ACEi    3) PAF   - PAF noted on tele 12/16, 12/18.   - CHADsVasc = 6.  - cont. BB w/ parameters.  - Cont. Eliquis 5mg BID    4) Mildly elevated troponins  - Mildly elevated and downtrended. No CP  - Cont. ASA, statin  - Cardio consult appreciated    5) UTI - likely GNR  - s/p course of Ceftriaxone  - s/p gentle hydration. encourage PO hydration.     6) LASHELL on CKD Stage 3, urinary retention  - s/p IVF  - Avoid nephrotoxic agents, no ACE/ARB  - Arevalo for retention.  Cont. Flomax - TOV prior to d/c  - Urology f/u appreciated   12/22: repeat labs after starting ACEi, pending    7) Transaminitis   - hep panel, lipids wnl  - monitor on statin therapy.    8) DVT ppx  - Heparin SubQ    d/c and f/u cardio outpatient.     Advanced Directives: Full Code. Consult palliative. Patient with multiple chronic progressive conditions    ~~ Spoke to nitin Coleman with updates 12/15, 12/16 12/17: LM for Jared w/ call back. Spoke with Jared at length this AM 12/18. agreeable to sedative medications.   12/20: spoke to Jared. He is concerned about use over medicating his father for agitation. explained why ativan would be preferred with long QTc interval and cardiac issues. Agreed to stop ativan and trial Seroquel only with close monitoring of QTc. Is aware of IV ativan prior to stress test. Disposition discussed if agitation can be controlled with meds then is Life vest an option if he is monitored in facility. Will f/u with son and EP tm.  12/21, 12/22: Jared updated  CHIEF COMPLAINT/DIAGNOSIS: AMS    HPI: 81 y/o M w/ PMH of CVA, TIA, dyslipidemia, HTN, GERD, h/o seizure disorder p/w AMS. Patient states he doesn't recall the episode at all, and states he is completely fine. Patient has trouble stating where is currently, but otherwise knows it's 2020, and is conversational. As per ED note, patient's daughter had noticed AMS where patient's speech was abnormal and was groggy. However, daughter had stated patient did not have LOC. Presently patient denies any weakness in arms / legs / slurred speech / facial droop / vision deficits / sensory deficits / CP / SOB / cough / runny nose / sore throat / nausea / vomiting / abdominal pain / diarrhea.  ** As per son patient has been non-compliant with medications for 1month unable to receive from ?? **    12/23: awake, alert. conversating. no complaints. limited ros.     PHYSICAL EXAM:  Constitutional: Awake and conversating. sitting upright in bed  HEENT:  Normal Hearing, MMM  Neck: Soft and supple  Respiratory: Breath sounds are clear/ diminished b/l   Cardiovascular: S1 and S2, regular rate and rhythm, no Murmurs, gallops or rubs  Gastrointestinal: Bowel Sounds present, soft, nontender, nondistended, no guarding, no rebound  Extremities: trace, pitting +1 b/l peripheral edema  Vascular: 2+ peripheral pulses  Neurological: Baseline A/O x 2/3, forgetful. follows commands. no visible facial droop. able to lift arm over head. blinks to threat. speech fluent.   Musculoskeletal: 5/5 strength b/l upper and lower extremities  Skin: No rashes    Vital Signs Last 24 Hrs  T(C): 36.3 (23 Dec 2020 09:52), Max: 36.7 (22 Dec 2020 20:27)  T(F): 97.3 (23 Dec 2020 09:52), Max: 98 (22 Dec 2020 20:27)  HR: 86 (23 Dec 2020 09:52) (78 - 94)  BP: 139/82 (23 Dec 2020 09:52) (120/64 - 167/95)  BP(mean): --  RR: 18 (23 Dec 2020 09:52) (18 - 18)  SpO2: 96% (23 Dec 2020 09:52) (96% - 98%)    LABS: All Labs Reviewed    Blood Culture: 12-14 @ 21:34  Organism Klebsiella pneumoniae  Gram Stain Blood -- Gram Stain --  Specimen Source .Urine Clean Catch (Midstream)  Culture-Blood --    RADIOLOGY:  < from: MR Head No Cont Disc (12.15.20 @ 16:03) >  IMPRESSION:  Re demonstration volume loss remote infarcts right greater than left MCA distribution in bilateral frontal and parietal lobes with encephalomalacia, bilateral PCA, and bilateral cerebellar left greater than right territories with encephalomalacia, marked microvascular disease, remote lacunar infarcts, without new restricted diffusion, hemorrhage or shift.    Decreased size mamillary bodies correlate with thiamin vitamin [id number] severely motion limited MRA with atherosclerotic vascular calcification multifocal narrowing and stenosis of the cavernous to provide ICAs, with severe multifocal stenosis of the intracranial arterial middle and posterior cerebral arteries and vertebral and basilar arteries as detailed above, MRA of the neck only obtained  views, suggest repeat imaging when patient can hold still.  < end of copied text >    < from: CT Head No Cont (12.14.20 @ 22:01) >  IMPRESSION: No intracranial hemorrhage or mass effect. Stable chronic infarcts.  < end of copied text >    ECHOCARDIOGRAM:   < from: TTE Echo Complete w/o Contrast w/ Doppler (12.15.20 @ 14:20) >   The left ventricle cavity is mildly dilated.   Mild concentric left ventricular hypertrophy is present.   Estimated left ventricular ejection fraction is 15-20 %.   The left atrium is severely dilated.   The right atrium appears moderately dilated.   Mild to Moderate mitral regurgitation is present.   Mild (1+) tricuspid valve regurgitation is present.   Mild aortic sclerosis is present with normal valvular opening.  < end of copied text >    MEDICATIONS  (STANDING):  apixaban 5 milliGRAM(s) Oral two times a day  aspirin enteric coated 81 milliGRAM(s) Oral daily  atorvastatin 40 milliGRAM(s) Oral at bedtime  carvedilol 3.125 milliGRAM(s) Oral every 12 hours  furosemide   Injectable 20 milliGRAM(s) IV Push daily  hydrALAZINE 25 milliGRAM(s) Oral three times a day  isosorbide   mononitrate ER Tablet (IMDUR) 30 milliGRAM(s) Oral daily  lisinopril 2.5 milliGRAM(s) Oral daily  multivitamin 1 Tablet(s) Oral daily  QUEtiapine 25 milliGRAM(s) Oral daily  tamsulosin 0.4 milliGRAM(s) Oral two times a day  thiamine 100 milliGRAM(s) Oral daily    MEDICATIONS  (PRN):  fluticasone propionate 50 MICROgram(s)/spray Nasal Spray 1 Spray(s) Both Nostrils two times a day PRN Congestion, Nasal  QUEtiapine 12.5 milliGRAM(s) Oral two times a day PRN agitation    TELEMETRY REVIEW:  12/22: 4 beats Vtach overnight.  12/23: refused tele monitor    ASSESSMENT AND PLAN:    81 y/o M w/ PMH of CVA, TIA, dyslipidemia, HTN, GERD, p/w AMS.     1) Toxic/Metabolic encephalopathy due to UTI and possible Wernicke's   - tele monitoring. tele review as above.  - CTH - Multiple chronic stable infarcts. f/u MRI / MRA > as above, no acute infarcts.  - s/p IV thiamine x3 days  - F/u b12, b1, b6, folate lvls.  - Seroquel daily (limit use - prolongs QTC) QTC stable < 500  - ABX for UTI complete  - Speech, PT, Neuro, Cardio consult appreciated     2) Acute on Chronic Systolic HF/ Cardiomyopathy - not in acute exacerbation  - Per cardio last known EF 45%, now 10-20% EF  - Cont: GDMT: not candidate for ACEi.   - Cont. BB (dose reduced to 3.125 BID w/ parameters), hydralazine (increase to 25mg TID), Imdur   - Cont. IV Lasix QD  - s/p stress test (12/21) - no reversible defects.   - f/u viral studies - coxsackie neg  - s/p fat pad bx on 12/19  - EP eval - AICD after 3 months of GDMT, life vest contraindicated at this time (adjust Seroquel to control agitation)  12/21 - trial low dose ACEi    3) PAF Vs poss. AVNRT.  - Noted on tele 12/16, 12/18.   - CHADsVasc = 6.  - cont. BB w/ parameters.  - Cont. Eliquis 5mg BID -- too costly for patient  d/w cardio will do monitor outpatient and then decided on full dose AC    4) Mildly elevated troponins  - Mildly elevated and downtrended. No CP  - Cont. ASA, statin  - Cardio consult appreciated    5) UTI - likely GNR  - s/p course of Ceftriaxone  - s/p gentle hydration. encourage PO hydration.     6) LASHELL on CKD Stage 3, urinary retention  - s/p IVF  - Avoid nephrotoxic agents, no ACE/ARB  - Arevalo for retention.  Cont. Flomax - TOV prior to d/c  - Urology f/u appreciated   12/22: repeat labs after starting ACEi, pending    7) Transaminitis   - hep panel, lipids wnl  - monitor on statin therapy.    8) DVT ppx  - Heparin SubQ    d/c and f/u cardio outpatient.     Advanced Directives: Full Code. Consult palliative. Patient with multiple chronic progressive conditions    ~~ Spoke to nitin Coleman with updates 12/15, 12/16 12/17: LM for Jared w/ call back. Spoke with Jared at length this AM 12/18. agreeable to sedative medications.   12/20: spoke to Jared. He is concerned about use over medicating his father for agitation. explained why ativan would be preferred with long QTc interval and cardiac issues. Agreed to stop ativan and trial Seroquel only with close monitoring of QTc. Is aware of IV ativan prior to stress test. Disposition discussed if agitation can be controlled with meds then is Life vest an option if he is monitored in facility. Will f/u with son and JACQUELINE crocker.  12/21, 12/22: Jared updated  CHIEF COMPLAINT/DIAGNOSIS: AMS    HPI: 79 y/o M w/ PMH of CVA, TIA, dyslipidemia, HTN, GERD, h/o seizure disorder p/w AMS. Patient states he doesn't recall the episode at all, and states he is completely fine. Patient has trouble stating where is currently, but otherwise knows it's 2020, and is conversational. As per ED note, patient's daughter had noticed AMS where patient's speech was abnormal and was groggy. However, daughter had stated patient did not have LOC. Presently patient denies any weakness in arms / legs / slurred speech / facial droop / vision deficits / sensory deficits / CP / SOB / cough / runny nose / sore throat / nausea / vomiting / abdominal pain / diarrhea.  ** As per son patient has been non-compliant with medications for 1month unable to receive from ?? **    12/23: awake, alert. conversating. no complaints. limited ros.     PHYSICAL EXAM:  Constitutional: Awake and conversating. sitting upright in bed  HEENT:  Normal Hearing, MMM  Neck: Soft and supple  Respiratory: Breath sounds are clear/ diminished b/l   Cardiovascular: S1 and S2, regular rate and rhythm, no Murmurs, gallops or rubs  Gastrointestinal: Bowel Sounds present, soft, nontender, nondistended, no guarding, no rebound  Extremities: trace, pitting +1 b/l peripheral edema  Vascular: 2+ peripheral pulses  Neurological: Baseline A/O x 2/3, forgetful. follows commands. no visible facial droop. able to lift arm over head. blinks to threat. speech fluent.   Musculoskeletal: 5/5 strength b/l upper and lower extremities  Skin: No rashes, no jaundice    Vital Signs Last 24 Hrs  T(C): 36.3 (23 Dec 2020 09:52), Max: 36.7 (22 Dec 2020 20:27)  T(F): 97.3 (23 Dec 2020 09:52), Max: 98 (22 Dec 2020 20:27)  HR: 86 (23 Dec 2020 09:52) (78 - 94)  BP: 139/82 (23 Dec 2020 09:52) (120/64 - 167/95)  BP(mean): --  RR: 18 (23 Dec 2020 09:52) (18 - 18)  SpO2: 96% (23 Dec 2020 09:52) (96% - 98%)    LABS: All Labs Reviewed    Blood Culture: 12-14 @ 21:34  Organism Klebsiella pneumoniae  Gram Stain Blood -- Gram Stain --  Specimen Source .Urine Clean Catch (Midstream)  Culture-Blood --    RADIOLOGY:  < from: MR Head No Cont Disc (12.15.20 @ 16:03) >  IMPRESSION:  Re demonstration volume loss remote infarcts right greater than left MCA distribution in bilateral frontal and parietal lobes with encephalomalacia, bilateral PCA, and bilateral cerebellar left greater than right territories with encephalomalacia, marked microvascular disease, remote lacunar infarcts, without new restricted diffusion, hemorrhage or shift.    Decreased size mamillary bodies correlate with thiamin vitamin [id number] severely motion limited MRA with atherosclerotic vascular calcification multifocal narrowing and stenosis of the cavernous to provide ICAs, with severe multifocal stenosis of the intracranial arterial middle and posterior cerebral arteries and vertebral and basilar arteries as detailed above, MRA of the neck only obtained  views, suggest repeat imaging when patient can hold still.  < end of copied text >    < from: CT Head No Cont (12.14.20 @ 22:01) >  IMPRESSION: No intracranial hemorrhage or mass effect. Stable chronic infarcts.  < end of copied text >    ECHOCARDIOGRAM:   < from: TTE Echo Complete w/o Contrast w/ Doppler (12.15.20 @ 14:20) >   The left ventricle cavity is mildly dilated.   Mild concentric left ventricular hypertrophy is present.   Estimated left ventricular ejection fraction is 15-20 %.   The left atrium is severely dilated.   The right atrium appears moderately dilated.   Mild to Moderate mitral regurgitation is present.   Mild (1+) tricuspid valve regurgitation is present.   Mild aortic sclerosis is present with normal valvular opening.  < end of copied text >    MEDICATIONS  (STANDING):  apixaban 5 milliGRAM(s) Oral two times a day  aspirin enteric coated 81 milliGRAM(s) Oral daily  atorvastatin 40 milliGRAM(s) Oral at bedtime  carvedilol 3.125 milliGRAM(s) Oral every 12 hours  furosemide   Injectable 20 milliGRAM(s) IV Push daily  hydrALAZINE 25 milliGRAM(s) Oral three times a day  isosorbide   mononitrate ER Tablet (IMDUR) 30 milliGRAM(s) Oral daily  lisinopril 2.5 milliGRAM(s) Oral daily  multivitamin 1 Tablet(s) Oral daily  QUEtiapine 25 milliGRAM(s) Oral daily  tamsulosin 0.4 milliGRAM(s) Oral two times a day  thiamine 100 milliGRAM(s) Oral daily    MEDICATIONS  (PRN):  fluticasone propionate 50 MICROgram(s)/spray Nasal Spray 1 Spray(s) Both Nostrils two times a day PRN Congestion, Nasal  QUEtiapine 12.5 milliGRAM(s) Oral two times a day PRN agitation    TELEMETRY REVIEW:  12/22: 4 beats Vtach overnight.  12/23: refused tele monitor    ASSESSMENT AND PLAN:    79 y/o M w/ PMH of CVA, TIA, dyslipidemia, HTN, GERD, p/w AMS.     1) Toxic/Metabolic encephalopathy due to UTI and possible Wernicke's   - tele monitoring. tele review as above.  - CTH - Multiple chronic stable infarcts. f/u MRI / MRA > as above, no acute infarcts.  - s/p IV thiamine x3 days  - F/u b12, b1, b6, folate lvls-nml  - Seroquel daily (limit use - prolongs QTC) QTC stable < 500  - ABX for UTI complete  - Speech, PT, Neuro, Cardio consult appreciated     2) Acute on Chronic Systolic HF/ Cardiomyopathy - not in acute exacerbation  - Per cardio last known EF 45%, now 10-20% EF  - Cont: GDMT: not candidate for ACEi.   - Cont. BB (dose reduced to 3.125 BID w/ parameters), hydralazine (increase to 25mg TID), Imdur   - Cont. IV Lasix QD  - s/p stress test (12/21) - no reversible defects.   - f/u viral studies - coxsackie neg  - s/p fat pad bx on 12/19  - EP eval - AICD after 3 months of GDMT, life vest contraindicated at this time (adjust Seroquel to control agitation)  12/21 - trial low dose ACEi    3) PAF Vs poss. AVNRT.  - Noted on tele 12/16, 12/18.   - CHADsVasc = 6.  - cont. BB w/ parameters.  - Cont. Eliquis 5mg BID -- too costly for patient  d/w cardio will do monitor outpatient and then decided on full dose AC    4) Mildly elevated troponins  - Mildly elevated and downtrended. No CP  - Cont. ASA, statin  - Cardio consult appreciated    5) UTI - likely GNR  - s/p course of Ceftriaxone  - s/p gentle hydration. encourage PO hydration.     6) LASHELL on CKD Stage 3, urinary retention  - s/p IVF  - Avoid nephrotoxic agents, no ACE/ARB  - Arevalo for retention.  Cont. Flomax - TOV prior to d/c  - Urology f/u appreciated   12/22: repeat labs after starting ACEi -- creat nml    7) Transaminitis   - hep panel, lipids wnl  - monitor on statin therapy.    8) DVT ppx  - Heparin SubQ    d/c and f/u cardio outpatient.     Advanced Directives: Full Code. Consult palliative. Patient with multiple chronic progressive conditions    ~~ Spoke to nitin Coleman with updates 12/15, 12/16 12/17: LM for Jared w/ call back. Spoke with Jared at length this AM 12/18. agreeable to sedative medications.   12/20: spoke to Jared. He is concerned about use over medicating his father for agitation. explained why ativan would be preferred with long QTc interval and cardiac issues. Agreed to stop ativan and trial Seroquel only with close monitoring of QTc. Is aware of IV ativan prior to stress test. Disposition discussed if agitation can be controlled with meds then is Life vest an option if he is monitored in facility. Will f/u with son and JACQUELINE crocker.  12/21, 12/22: Jared updated

## 2020-12-23 NOTE — PROGRESS NOTE ADULT - PROBLEM SELECTOR PROBLEM 2
Elevated troponin I level

## 2020-12-23 NOTE — PROGRESS NOTE ADULT - ASSESSMENT
79 y/o M w/ PMH of CVA, TIA, dyslipidemia, HTN, GERD, h/o seizure disorder p/w AMS found to have abnormal MRI/MRA of the brain with minimally elevated troponins and newly severe cardiomyopathy EF 15-20%. During work up patient was found to have elevated Kappa/Lamda light chains concerning for Amyloidosis s/p biopsy and nuclear stress test showing scar from old infarct without ischemia. Cardiology consulted for further evaluation and treatment.    -There is ?documentation of AFib from 12/16 and 12/18. Reviewed all strips available and no AFib noted, unfortunately unable to go back to these dates on tele. If there truly was sustained AFib then agree with continuing AC since he has a high CHADSVASc score. He has episodes of multifocal PVCs on a strip/EKG from 12/16 (in the tele book) but there are sinus beats within. If this is the concerning strip/EKG for AFib then I do not believe this is AFib and would D/C his AC.  -He tolerated lisinopril 2.5 well with improving Cr and stable K, therefore I increased it to 5mg. Continue to uptitrate his CM medications as he can tolerate.  -Nuclear stress test showing scar from old infarct without ischemia, making his CM most likely ICM, but may be mixed, F/U fat pad biopsy, evaluating for amyloidosis.  -Doing well from a fluid standpoint, agree with changing to PO lasix, now on 20mg Qday  -Dispo as per primary team. OK to d/c from a cardiac standpoint with very close follow-up.

## 2020-12-23 NOTE — PROGRESS NOTE ADULT - PROBLEM SELECTOR PROBLEM 3
Cardiomyopathy, unspecified type

## 2020-12-26 LAB — PYRIDOXAL PHOS SERPL-MCNC: 3.8 UG/L — LOW (ref 5.3–46.7)

## 2020-12-28 DIAGNOSIS — E78.5 HYPERLIPIDEMIA, UNSPECIFIED: ICD-10-CM

## 2020-12-28 DIAGNOSIS — I13.0 HYPERTENSIVE HEART AND CHRONIC KIDNEY DISEASE WITH HEART FAILURE AND STAGE 1 THROUGH STAGE 4 CHRONIC KIDNEY DISEASE, OR UNSPECIFIED CHRONIC KIDNEY DISEASE: ICD-10-CM

## 2020-12-28 DIAGNOSIS — R33.9 RETENTION OF URINE, UNSPECIFIED: ICD-10-CM

## 2020-12-28 DIAGNOSIS — K21.9 GASTRO-ESOPHAGEAL REFLUX DISEASE WITHOUT ESOPHAGITIS: ICD-10-CM

## 2020-12-28 DIAGNOSIS — D64.9 ANEMIA, UNSPECIFIED: ICD-10-CM

## 2020-12-28 DIAGNOSIS — E51.9 THIAMINE DEFICIENCY, UNSPECIFIED: ICD-10-CM

## 2020-12-28 DIAGNOSIS — N17.9 ACUTE KIDNEY FAILURE, UNSPECIFIED: ICD-10-CM

## 2020-12-28 DIAGNOSIS — I42.9 CARDIOMYOPATHY, UNSPECIFIED: ICD-10-CM

## 2020-12-28 DIAGNOSIS — G40.909 EPILEPSY, UNSPECIFIED, NOT INTRACTABLE, WITHOUT STATUS EPILEPTICUS: ICD-10-CM

## 2020-12-28 DIAGNOSIS — G92 TOXIC ENCEPHALOPATHY: ICD-10-CM

## 2020-12-28 DIAGNOSIS — I48.0 PAROXYSMAL ATRIAL FIBRILLATION: ICD-10-CM

## 2020-12-28 DIAGNOSIS — E87.0 HYPEROSMOLALITY AND HYPERNATREMIA: ICD-10-CM

## 2020-12-28 DIAGNOSIS — I50.23 ACUTE ON CHRONIC SYSTOLIC (CONGESTIVE) HEART FAILURE: ICD-10-CM

## 2020-12-28 DIAGNOSIS — N39.0 URINARY TRACT INFECTION, SITE NOT SPECIFIED: ICD-10-CM

## 2020-12-28 DIAGNOSIS — N18.30 CHRONIC KIDNEY DISEASE, STAGE 3 UNSPECIFIED: ICD-10-CM

## 2020-12-28 DIAGNOSIS — Z91.14 PATIENT'S OTHER NONCOMPLIANCE WITH MEDICATION REGIMEN: ICD-10-CM

## 2020-12-28 DIAGNOSIS — Z86.73 PERSONAL HISTORY OF TRANSIENT ISCHEMIC ATTACK (TIA), AND CEREBRAL INFARCTION WITHOUT RESIDUAL DEFICITS: ICD-10-CM

## 2020-12-31 NOTE — PHYSICAL THERAPY INITIAL EVALUATION ADULT - PERSONAL SAFETY AND JUDGMENT, REHAB EVAL
Please address the medication refill and close the encounter. If I can be of assistance, please route to the applicable pool. Thank you. Last visit: 11/19/2020  Last Med refill: 09/11/2020  Does patient have enough medication for 72 hours: No: Patient out    Next Visit Date:  No future appointments. Health Maintenance   Topic Date Due    Hepatitis C screen  1971    Diabetic retinal exam  07/03/1979    Lipid screen  07/03/1979    Shingles Vaccine (1 of 2) 07/03/2019    Flu vaccine (1) 09/01/2020    Hepatitis B vaccine (2 of 3 - Risk 3-dose series) 12/03/2020    A1C test (Diabetic or Prediabetic)  02/05/2021    Diabetic microalbuminuria test  11/05/2021    Diabetic foot exam  11/06/2021    Colon cancer screen fecal DNA test (Cologuard)  12/21/2023    DTaP/Tdap/Td vaccine (2 - Td) 11/05/2030    Pneumococcal 0-64 years Vaccine  Completed    HIV screen  Completed    Hepatitis A vaccine  Aged Out    Hib vaccine  Aged Out    Meningococcal (ACWY) vaccine  Aged Out       Hemoglobin A1C (%)   Date Value   11/05/2020 9.3   09/23/2020 13.3 (H)             ( goal A1C is < 7)   Microalb/Crt.  Ratio (mcg/mg creat)   Date Value   11/05/2020 117 (H)     No results found for: LDLCHOLESTEROL, LDLCALC    (goal LDL is <100)   AST (U/L)   Date Value   10/13/2020 15     ALT (U/L)   Date Value   10/13/2020 8     BUN (mg/dL)   Date Value   10/13/2020 14     BP Readings from Last 3 Encounters:   11/23/20 (!) 145/99   11/19/20 (!) 145/90   11/17/20 (!) 157/95          (goal 120/80)    All Future Testing planned in [facility]  Lab Frequency Next Occurrence   Lipid Panel Once 05/05/2022               Patient Active Problem List:     Abscess of lower lobe of right lung with pneumonia ([address]     Acute cholecystitis     Multifocal pneumonia     Obesity due to excess calories     Diabetes mellitus type 2 in obese ([address]     At high risk for tuberculosis infection     Trigger thumb of left hand intact

## 2021-03-11 RX ORDER — HYDRALAZINE HCL 50 MG
0 TABLET ORAL
Qty: 90 | Refills: 0
Start: 2021-03-11 | End: 2021-04-09

## 2021-04-08 PROBLEM — Z00.00 ENCOUNTER FOR PREVENTIVE HEALTH EXAMINATION: Status: ACTIVE | Noted: 2021-04-08

## 2021-04-14 ENCOUNTER — APPOINTMENT (OUTPATIENT)
Dept: ORTHOPEDIC SURGERY | Facility: CLINIC | Age: 81
End: 2021-04-14
Payer: SELF-PAY

## 2021-04-14 DIAGNOSIS — M79.672 PAIN IN LEFT FOOT: ICD-10-CM

## 2021-04-14 DIAGNOSIS — M79.605 PAIN IN LEFT LEG: ICD-10-CM

## 2021-04-14 DIAGNOSIS — M25.572 PAIN IN LEFT ANKLE AND JOINTS OF LEFT FOOT: ICD-10-CM

## 2021-04-14 PROCEDURE — 73610 X-RAY EXAM OF ANKLE: CPT | Mod: LT

## 2021-04-14 PROCEDURE — 73630 X-RAY EXAM OF FOOT: CPT | Mod: LT

## 2021-04-14 PROCEDURE — 99204 OFFICE O/P NEW MOD 45 MIN: CPT

## 2021-04-14 NOTE — HISTORY OF PRESENT ILLNESS
[FreeTextEntry1] : Dorian is an 80-year-old male who presents with left ankle pain and left foot pain for 2 weeks.  His son states that in the middle of the night 2 weeks ago he got up and hit his ankle against his walker injuring the ankle and foot.  The son states that last week his dad did have a significant amount of pain in the ankle and it was hard for him to go from a seated to standing position and walk.  His pain scale is 5 out of 10 and there is improvement of pain since the initial injury, and his walking has improved. A Doppler ultrasound was ordered for the patient, but did not have it done yet.  He has no numbness or tingling in the ankle and foot.  Patient is being treated for a heart condition as well. No other complaints.

## 2021-04-14 NOTE — ADDENDUM
[FreeTextEntry1] : I, Raudel Panchal, acted solely as a scribe for Dr. Abdi Chacon on this date 04/14/2021 .\par All medical record entries made by the Scribe were at my, Dr. Abdi Chacon, direction and personally dictated by me on 04/14/2021 . I have reviewed the chart and agree that the record accurately reflects my personal performance of the history, physical exam, assessment and plan. I have also personally directed, reviewed, and agreed with the chart.

## 2021-04-14 NOTE — PHYSICAL EXAM
[de-identified] : General: Alert and oriented x3. In no acute distress. Pleasant in nature with a normal affect. No apparent respiratory distress.\par \par L Ankle and Foot Exam\par Skin: Clean, dry, intact\par Inspection: No obvious malalignment,  +swelling, no effusion; no lymphadenopathy\par Pulses: 2+ DP/PT pulses\par ROM: L Ankle 10 degrees of dorsiflexion, 40 degrees of plantarflexion, 10 degrees of subtalar motion\par Tenderness: +pain to the great toe. No tenderness over the lateral malleolus, no CFL/ATFL/PTFL pain. No medial malleolus pain, no deltoid ligament pain. No proximal fibular pain. No heel pain.\par Stability: Negative anterior/posterior drawer.\par Strength: 5/5 TA/GS/EHL\par Neuro: In tact to light touch throughout\par Additional tests: Negative Mortons test, Negative syndesmosis squeeze test.  [de-identified] : 3V of the left ankle and left foot were ordered obtained and reviewed by me today, 04/14/2021 , revealed: No fracture.

## 2021-04-14 NOTE — DISCUSSION/SUMMARY
[de-identified] : Today I had a lengthy discussion with the patient regarding their left ankle pain. I have addressed all the patient's concerns surrounding the pathology of their condition. I recommend that the patient receive a venous Doppler DVT test for the left lower extremity, which was previously ordered for the patient. I recommend the patient utilize ice and Tylenol PRN. I would like to see the patient back in the office PRN to reassess their condition. The patient understood and verbally agreed to the treatment plan. All of their questions were answered and they were satisfied with the visit. The patient should call the office if they have any questions or experience worsening symptoms.

## 2021-04-14 NOTE — CONSULT LETTER
[Consult Letter:] : I had the pleasure of evaluating your patient, [unfilled]. [Please see my note below.] : Please see my note below. [Consult Closing:] : Thank you very much for allowing me to participate in the care of this patient.  If you have any questions, please do not hesitate to contact me. [Sincerely,] : Sincerely, [FreeTextEntry3] : Abdi Chacon, DO\par Foot and Ankle Surgery\par

## 2021-04-19 ENCOUNTER — APPOINTMENT (OUTPATIENT)
Dept: ELECTROPHYSIOLOGY | Facility: CLINIC | Age: 81
End: 2021-04-19
Payer: SELF-PAY

## 2021-04-19 ENCOUNTER — NON-APPOINTMENT (OUTPATIENT)
Age: 81
End: 2021-04-19

## 2021-04-19 DIAGNOSIS — I25.5 ISCHEMIC CARDIOMYOPATHY: ICD-10-CM

## 2021-04-19 DIAGNOSIS — Z86.59 PERSONAL HISTORY OF OTHER MENTAL AND BEHAVIORAL DISORDERS: ICD-10-CM

## 2021-04-19 DIAGNOSIS — Z86.79 PERSONAL HISTORY OF OTHER DISEASES OF THE CIRCULATORY SYSTEM: ICD-10-CM

## 2021-04-19 DIAGNOSIS — Z78.9 OTHER SPECIFIED HEALTH STATUS: ICD-10-CM

## 2021-04-19 DIAGNOSIS — Z86.39 PERSONAL HISTORY OF OTHER ENDOCRINE, NUTRITIONAL AND METABOLIC DISEASE: ICD-10-CM

## 2021-04-19 DIAGNOSIS — Z86.73 PERSONAL HISTORY OF TRANSIENT ISCHEMIC ATTACK (TIA), AND CEREBRAL INFARCTION W/OUT RESIDUAL DEFICITS: ICD-10-CM

## 2021-04-19 DIAGNOSIS — Z86.69 PERSONAL HISTORY OF OTHER DISEASES OF THE NERVOUS SYSTEM AND SENSE ORGANS: ICD-10-CM

## 2021-04-19 PROCEDURE — 93000 ELECTROCARDIOGRAM COMPLETE: CPT

## 2021-04-19 PROCEDURE — 99213 OFFICE O/P EST LOW 20 MIN: CPT

## 2021-04-19 RX ORDER — CARVEDILOL 3.12 MG/1
3.12 TABLET, FILM COATED ORAL TWICE DAILY
Qty: 180 | Refills: 1 | Status: ACTIVE | COMMUNITY

## 2021-04-19 RX ORDER — LISINOPRIL 5 MG/1
5 TABLET ORAL
Qty: 90 | Refills: 1 | Status: ACTIVE | COMMUNITY

## 2021-04-19 RX ORDER — HYDRALAZINE HYDROCHLORIDE 25 MG/1
25 TABLET ORAL
Qty: 60 | Refills: 0 | Status: ACTIVE | COMMUNITY
Start: 2021-04-15

## 2021-04-19 RX ORDER — ATORVASTATIN CALCIUM 40 MG/1
40 TABLET, FILM COATED ORAL
Qty: 90 | Refills: 1 | Status: ACTIVE | COMMUNITY

## 2021-04-19 RX ORDER — ASPIRIN 81 MG
81 TABLET, DELAYED RELEASE (ENTERIC COATED) ORAL DAILY
Refills: 0 | Status: ACTIVE | COMMUNITY

## 2021-04-19 RX ORDER — ISOSORBIDE MONONITRATE 30 MG
30 TABLET, EXTENDED RELEASE 24 HR ORAL
Refills: 0 | Status: ACTIVE | COMMUNITY

## 2021-04-19 NOTE — REASON FOR VISIT
[Initial Evaluation] : an initial evaluation of [Heart Failure] : congestive heart failure [FreeTextEntry1] : ref. Dr Shikha Rios

## 2021-04-19 NOTE — PHYSICAL EXAM
[Normal Appearance] : normal appearance [Normal Conjunctiva] : the conjunctiva exhibited no abnormalities [Normal Oral Mucosa] : normal oral mucosa [Heart Rate And Rhythm] : heart rate and rhythm were normal [Heart Sounds] : normal S1 and S2 [] : no respiratory distress [FreeTextEntry1] : 2+ pitting edema

## 2021-04-19 NOTE — HISTORY OF PRESENT ILLNESS
[FreeTextEntry1] : 80-year-old male with history of ischemic cardiomyopathy LVEF 20%, HTN, CAD, CVA, seizure disorder.  Patient severely reduced LV function based on echo from 12/2020 patient has been on beta-blockers ACE inhibitor refused to take Entresto and most recent repeat TTE from 4/2020 shows mild improvement in LV systolic function LVEF 20 to 25% global hyper hypokinesis severely dilated LA. Pt has exertional dyspnea. Patient is compliant with medications prescribed by Dr. Rios for CHF. MPI stress test showed fixed LV wall defect, no ischemia. \par

## 2021-04-19 NOTE — DISCUSSION/SUMMARY
[FreeTextEntry1] : 80-year-old male with history of ischemic cardiomyopathy LVEF 20%, HTN, CAD, CVA, seizure disorder.  Patient severely reduced LV function based on echo from 12/2020 patient has been on beta-blockers ACE inhibitor refused to take Entresto and most recent repeat TTE from 4/2020 shows mild improvement in LV systolic function LVEF 20 to 25% global hyper hypokinesis severely dilated LA.\par Based on MADIT II/SCDHF patient with low LVEF. Ischemic CMP would benefit from ICD implant for primary prevention of SCD. Discussed i/r/b/a of each intervention option and all questions were answered. Patient prefers to undergo ICD implant. \par Continue current CHF medical therapy as per Dr SCARLET Rios, avoid salt diet \par LE edema will undergo venous Doppler \par \par

## 2022-01-01 ENCOUNTER — EMERGENCY (EMERGENCY)
Facility: HOSPITAL | Age: 82
LOS: 0 days | End: 2022-08-30
Attending: EMERGENCY MEDICINE
Payer: SELF-PAY

## 2022-01-01 DIAGNOSIS — I46.9 CARDIAC ARREST, CAUSE UNSPECIFIED: ICD-10-CM

## 2022-01-01 DIAGNOSIS — Z98.890 OTHER SPECIFIED POSTPROCEDURAL STATES: Chronic | ICD-10-CM

## 2022-01-01 PROCEDURE — 99285 EMERGENCY DEPT VISIT HI MDM: CPT | Mod: 25

## 2022-01-01 PROCEDURE — 36680 INSERT NEEDLE BONE CAVITY: CPT

## 2022-01-01 PROCEDURE — 92950 HEART/LUNG RESUSCITATION CPR: CPT

## 2022-01-01 PROCEDURE — 31500 INSERT EMERGENCY AIRWAY: CPT

## 2022-08-30 NOTE — ED PROVIDER NOTE - PHYSICAL EXAMINATION
unresponsive, pupils blown and unresponsive, no pulse, no spontaneous respirations, IO right upper arm, ett oral, no visible signs of injury

## 2022-08-30 NOTE — ED PROVIDER NOTE - PROGRESS NOTE DETAILS
[phone], pt daughter Jacinta, she did not want to come to phone so she gave her sons girlfriend the phone and gave permission to peak to her, they were notified of his death. they are coming to the hospital. SRINIVASA Peterson DO

## 2022-08-30 NOTE — ED ADULT TRIAGE NOTE - CHIEF COMPLAINT QUOTE
Pt. brought in by EMS in cardiac arrest. As per EMS "patient's last known well was at 12am this morning. Patient lives at [facility] and the daughter was staying overnight to care for him. She woke up to use the bathroom, went to check on her father and he was not breathing. 911 was called. Daughter initiated CPR. We arrived on scene, placed an IO,  intubated and placed on the [place] device. 5 EPI's given en route to the hospital, last one given approximately 10 minutes prior to arrival." Patient brought into Trauma room, MD at bedside.

## 2022-08-30 NOTE — ED ADULT NURSE NOTE - NSIMPLEMENTINTERV_GEN_ALL_ED
Implemented All Fall Risk Interventions:  [place] to call system. Call bell, personal items and telephone within reach. Instruct patient to call for assistance. Room bathroom lighting operational. Non-slip footwear when patient is off stretcher. Physically safe environment: no spills, clutter or unnecessary equipment. Stretcher in lowest position, wheels locked, appropriate side rails in place. Provide visual cue, wrist band, yellow gown, etc. Monitor gait and stability. Monitor for mental status changes and reorient to person, place, and time. Review medications for side effects contributing to fall risk. Reinforce activity limits and safety measures with patient and family.

## 2022-08-30 NOTE — ED PROVIDER NOTE - OBJECTIVE STATEMENT
82 yo male biba with oral ett with ambu ventilations, cpr in progress. Last seen normal at 12 am, as per ems pt daughter woke up at around 3 am and checked on her father noticing he was not responding. ems arrived at approx 345am where he was intubated and cpr was initiated withio in upper right arm

## 2022-08-30 NOTE — ED ADULT NURSE NOTE - CHIEF COMPLAINT QUOTE
Pt. brought in by EMS in cardiac arrest. As per EMS "patient's last known well was at 12am this morning. Patient lives at [facility] and the daughter was staying overnight to care for him. She woke up to use the bathroom, went to check on her father and he was not breathing. 911 was called. Daughter initiated CPR. We arrived on scene, placed an IO,  intubated and placed on the Merrill device. 5 EPI's given en route to the hospital, last one given approximately 10 minutes prior to arrival." Patient brought into Trauma room, MD at bedside.

## 2022-08-30 NOTE — ED ADULT NURSE NOTE - OBJECTIVE STATEMENT
82 y/o male pt BIBEMS from [facility] assistive living in cardiac arrest. as per EMS, pt daughter woke up to check on her father when she found him unresponsive, and initiated CPR. last known well 0000 this morning. EMS arrived and continued CPR. pt was intubated and given 5 epi's PTA. pt arrived at  ED, code team activated. MD Peterson at bedside. pt found to be in asystole. MD Peterson pronounced pt. at 0421.

## 2022-10-02 NOTE — DISCHARGE NOTE NURSING/CASE MANAGEMENT/SOCIAL WORK - NSCORESITESY/N_GEN_A_CORE_RD
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  Ambulates independently with WC.  Tylenol x 2 given for RLQ abd pain.  CT abd/pelvis not done yet.   Yes

## 2023-02-12 NOTE — SWALLOW BEDSIDE ASSESSMENT ADULT - MODE OF PRESENTATION
Pt fed self at times but was often groggy and benefitted from cues to "stay awake" at which time he was assisted to feed. none

## 2024-04-15 NOTE — CONSULT NOTE ADULT - CONSULT REQUESTED DATE/TIME
28-Jul-2020
Initiate Treatment: Pimecrolimus and triamcinolone
Render In Strict Bullet Format?: No
Samples Given: Eucerin eczema relief
Detail Level: Zone

## 2025-01-22 NOTE — ED PROVIDER NOTE - CARE PROVIDER_API CALL
Exercise Session Detail   Celeste Lyons  NEUROLOGY  [address] [place] [address], [address] [place] [address]  Phone: [phone]  Fax: [phone]  Follow Up Time: 1-3 Days

## 2025-05-02 NOTE — PATIENT PROFILE ADULT - NSPROPTRIGHTNOTIFYOBTAINDET_GEN_A_NUR
Patient's wife, Patrica called to inform Dr Mcdonnell that James had a heart attack this week and is currently admitted to [facility] in ICU.  
pt unable to participate in profile 2/2 mental status

## 2025-06-10 NOTE — ED PROVIDER NOTE - CRITICAL CARE ATTESTATION
[facility] from [facility] for refill of aselastine nasal spray,   Omeprazole 40mg  Fexofenadine 180mg   I have personally provided the amount of critical care time documented below excluding time spent on separate procedures